# Patient Record
Sex: FEMALE | Race: WHITE | NOT HISPANIC OR LATINO | Employment: OTHER | URBAN - METROPOLITAN AREA
[De-identification: names, ages, dates, MRNs, and addresses within clinical notes are randomized per-mention and may not be internally consistent; named-entity substitution may affect disease eponyms.]

---

## 2023-01-26 PROBLEM — M05.10: Status: ACTIVE | Noted: 2023-01-26

## 2023-01-26 PROBLEM — G25.0 ESSENTIAL TREMOR: Status: ACTIVE | Noted: 2023-01-26

## 2023-01-26 PROBLEM — E78.00 HYPERCHOLESTEROLEMIA: Status: ACTIVE | Noted: 2023-01-26

## 2023-01-26 PROBLEM — G56.12 MEDIAN NEUROPATHY, LEFT: Status: ACTIVE | Noted: 2023-01-26

## 2023-01-26 PROBLEM — R20.0 LEFT FACIAL NUMBNESS: Status: ACTIVE | Noted: 2023-01-26

## 2023-01-26 PROBLEM — M79.18 CHRONIC MUSCULOSKELETAL PAIN: Status: ACTIVE | Noted: 2023-01-26

## 2023-01-26 PROBLEM — Z79.631 ON METHOTREXATE THERAPY: Status: ACTIVE | Noted: 2023-01-26

## 2023-01-26 PROBLEM — I10 HTN (HYPERTENSION), BENIGN: Status: ACTIVE | Noted: 2023-01-26

## 2023-01-26 PROBLEM — M06.371: Status: ACTIVE | Noted: 2023-01-26

## 2023-01-26 PROBLEM — E55.9 VITAMIN D DEFICIENCY: Status: ACTIVE | Noted: 2023-01-26

## 2023-01-26 PROBLEM — M81.0 OSTEOPOROSIS: Status: ACTIVE | Noted: 2023-01-26

## 2023-01-26 PROBLEM — I63.9 CEREBRAL INFARCTION (MULTI): Status: ACTIVE | Noted: 2023-01-26

## 2023-01-26 PROBLEM — R20.2 NUMBNESS AND TINGLING: Status: ACTIVE | Noted: 2023-01-26

## 2023-01-26 PROBLEM — J34.89 NASAL DISCHARGE: Status: ACTIVE | Noted: 2023-01-26

## 2023-01-26 PROBLEM — R19.5 POSITIVE FIT (FECAL IMMUNOCHEMICAL TEST): Status: ACTIVE | Noted: 2023-01-26

## 2023-01-26 PROBLEM — N64.4 BREAST PAIN: Status: ACTIVE | Noted: 2023-01-26

## 2023-01-26 PROBLEM — L60.0 ONYCHOCRYPTOSIS: Status: ACTIVE | Noted: 2023-01-26

## 2023-01-26 PROBLEM — G44.221 CHRONIC TENSION-TYPE HEADACHE, INTRACTABLE: Status: ACTIVE | Noted: 2023-01-26

## 2023-01-26 PROBLEM — R44.8 FACIAL PRESSURE: Status: ACTIVE | Noted: 2023-01-26

## 2023-01-26 PROBLEM — M54.12 CERVICAL RADICULOPATHY: Status: ACTIVE | Noted: 2023-01-26

## 2023-01-26 PROBLEM — G44.209 ACUTE NON INTRACTABLE TENSION-TYPE HEADACHE: Status: ACTIVE | Noted: 2023-01-26

## 2023-01-26 PROBLEM — R53.83 FATIGUE: Status: ACTIVE | Noted: 2023-01-26

## 2023-01-26 PROBLEM — I25.10 CORONARY DISORDER: Status: ACTIVE | Noted: 2023-01-26

## 2023-01-26 PROBLEM — M79.671 FOOT PAIN, BILATERAL: Status: ACTIVE | Noted: 2023-01-26

## 2023-01-26 PROBLEM — K21.9 GASTROESOPHAGEAL REFLUX DISEASE: Status: ACTIVE | Noted: 2023-01-26

## 2023-01-26 PROBLEM — G50.1 ATYPICAL FACE PAIN: Status: ACTIVE | Noted: 2023-01-26

## 2023-01-26 PROBLEM — M17.11 ARTHRITIS OF KNEE, RIGHT: Status: ACTIVE | Noted: 2023-01-26

## 2023-01-26 PROBLEM — M75.80 ROTATOR CUFF TENDONITIS: Status: ACTIVE | Noted: 2023-01-26

## 2023-01-26 PROBLEM — G89.29 CHRONIC MUSCULOSKELETAL PAIN: Status: ACTIVE | Noted: 2023-01-26

## 2023-01-26 PROBLEM — M25.579 ACUTE ANKLE PAIN: Status: ACTIVE | Noted: 2023-01-26

## 2023-01-26 PROBLEM — J01.90 ACUTE SINUSITIS: Status: ACTIVE | Noted: 2023-01-26

## 2023-01-26 PROBLEM — J44.9 CHRONIC OBSTRUCTIVE PULMONARY DISEASE (MULTI): Status: ACTIVE | Noted: 2023-01-26

## 2023-01-26 PROBLEM — M96.1 POSTLAMINECTOMY SYNDROME, LUMBAR: Status: ACTIVE | Noted: 2023-01-26

## 2023-01-26 PROBLEM — M48.062 LUMBAR STENOSIS WITH NEUROGENIC CLAUDICATION: Status: ACTIVE | Noted: 2023-01-26

## 2023-01-26 PROBLEM — Z86.69 HISTORY OF MIGRAINE HEADACHES: Status: ACTIVE | Noted: 2023-01-26

## 2023-01-26 PROBLEM — I73.9 PAD (PERIPHERAL ARTERY DISEASE) (CMS-HCC): Status: ACTIVE | Noted: 2023-01-26

## 2023-01-26 PROBLEM — D86.9 SARCOIDOSIS: Status: ACTIVE | Noted: 2023-01-26

## 2023-01-26 PROBLEM — R26.89 ANTALGIC GAIT: Status: ACTIVE | Noted: 2023-01-26

## 2023-01-26 PROBLEM — J34.89 RHINORRHEA: Status: ACTIVE | Noted: 2023-01-26

## 2023-01-26 PROBLEM — R49.0 CHRONIC HOARSENESS: Status: ACTIVE | Noted: 2023-01-26

## 2023-01-26 PROBLEM — M05.9 RHEUMATOID ARTHRITIS WITH RHEUMATOID FACTOR (MULTI): Status: ACTIVE | Noted: 2023-01-26

## 2023-01-26 PROBLEM — R73.01 ELEVATED FASTING GLUCOSE: Status: ACTIVE | Noted: 2023-01-26

## 2023-01-26 PROBLEM — M25.519 PAIN, JOINT, SHOULDER: Status: ACTIVE | Noted: 2023-01-26

## 2023-01-26 PROBLEM — M47.816 FACET DEGENERATION OF LUMBAR REGION: Status: ACTIVE | Noted: 2023-01-26

## 2023-01-26 PROBLEM — L85.9 HYPERKERATOSIS: Status: ACTIVE | Noted: 2023-01-26

## 2023-01-26 PROBLEM — M47.816 SPONDYLOSIS OF LUMBAR REGION WITHOUT MYELOPATHY OR RADICULOPATHY: Status: ACTIVE | Noted: 2023-01-26

## 2023-01-26 PROBLEM — B35.1 ONYCHOMYCOSIS: Status: ACTIVE | Noted: 2023-01-26

## 2023-01-26 PROBLEM — S80.11XD CONTUSION, KNEE AND LOWER LEG, RIGHT, SUBSEQUENT ENCOUNTER: Status: ACTIVE | Noted: 2023-01-26

## 2023-01-26 PROBLEM — M25.561 RIGHT KNEE PAIN: Status: ACTIVE | Noted: 2023-01-26

## 2023-01-26 PROBLEM — M79.672 FOOT PAIN, BILATERAL: Status: ACTIVE | Noted: 2023-01-26

## 2023-01-26 PROBLEM — R63.4 WEIGHT LOSS, UNINTENTIONAL: Status: ACTIVE | Noted: 2023-01-26

## 2023-01-26 PROBLEM — I73.9 CLAUDICATION (CMS-HCC): Status: ACTIVE | Noted: 2023-01-26

## 2023-01-26 PROBLEM — M70.60 TROCHANTERIC BURSITIS: Status: ACTIVE | Noted: 2023-01-26

## 2023-01-26 PROBLEM — H92.02 OTALGIA, LEFT: Status: ACTIVE | Noted: 2023-01-26

## 2023-01-26 PROBLEM — M25.559 JOINT PAIN, HIP: Status: ACTIVE | Noted: 2023-01-26

## 2023-01-26 PROBLEM — M48.00 SPINAL STENOSIS: Status: ACTIVE | Noted: 2023-01-26

## 2023-01-26 PROBLEM — S80.01XD CONTUSION, KNEE AND LOWER LEG, RIGHT, SUBSEQUENT ENCOUNTER: Status: ACTIVE | Noted: 2023-01-26

## 2023-01-26 PROBLEM — D64.9 ANEMIA: Status: ACTIVE | Noted: 2023-01-26

## 2023-01-26 PROBLEM — G50.9 TRIGEMINAL NEUROPATHY: Status: ACTIVE | Noted: 2023-01-26

## 2023-01-26 PROBLEM — I71.40 AAA (ABDOMINAL AORTIC ANEURYSM) (CMS-HCC): Status: ACTIVE | Noted: 2023-01-26

## 2023-01-26 PROBLEM — G56.03 BILATERAL CARPAL TUNNEL SYNDROME: Status: ACTIVE | Noted: 2023-01-26

## 2023-01-26 PROBLEM — R20.0 NUMBNESS AND TINGLING: Status: ACTIVE | Noted: 2023-01-26

## 2023-01-26 PROBLEM — R91.8 PULMONARY NODULES/LESIONS, MULTIPLE: Status: ACTIVE | Noted: 2023-01-26

## 2023-01-26 PROBLEM — R25.1 TREMOR: Status: ACTIVE | Noted: 2023-01-26

## 2023-01-26 PROBLEM — J31.0 CHRONIC RHINITIS: Status: ACTIVE | Noted: 2023-01-26

## 2023-01-26 PROBLEM — R51.9 HEADACHE: Status: ACTIVE | Noted: 2023-01-26

## 2023-01-26 PROBLEM — G62.9 PERIPHERAL NEUROPATHY: Status: ACTIVE | Noted: 2023-01-26

## 2023-01-26 RX ORDER — NITROGLYCERIN 0.4 MG/1
TABLET SUBLINGUAL
COMMUNITY

## 2023-01-26 RX ORDER — OMEPRAZOLE 40 MG/1
1 CAPSULE, DELAYED RELEASE ORAL
COMMUNITY
End: 2023-09-13

## 2023-01-26 RX ORDER — FOLIC ACID 1 MG/1
1 TABLET ORAL DAILY
COMMUNITY
Start: 2014-10-29

## 2023-01-26 RX ORDER — PRIMIDONE 50 MG/1
2 TABLET ORAL
COMMUNITY
Start: 2014-10-29 | End: 2023-12-20 | Stop reason: SDUPTHER

## 2023-01-26 RX ORDER — ISOSORBIDE MONONITRATE 30 MG/1
1 TABLET, EXTENDED RELEASE ORAL DAILY
COMMUNITY

## 2023-01-26 RX ORDER — FLUTICASONE FUROATE, UMECLIDINIUM BROMIDE AND VILANTEROL TRIFENATATE 100; 62.5; 25 UG/1; UG/1; UG/1
1 POWDER RESPIRATORY (INHALATION) DAILY
COMMUNITY
End: 2024-01-31

## 2023-01-26 RX ORDER — GABAPENTIN 800 MG/1
800 TABLET ORAL
COMMUNITY
Start: 2014-10-29 | End: 2023-12-20 | Stop reason: SDUPTHER

## 2023-01-26 RX ORDER — NALOXONE HYDROCHLORIDE 4 MG/.1ML
SPRAY NASAL
COMMUNITY
Start: 2021-11-18 | End: 2024-01-31

## 2023-01-26 RX ORDER — METOPROLOL TARTRATE 50 MG/1
1 TABLET ORAL EVERY 12 HOURS
COMMUNITY

## 2023-01-26 RX ORDER — IPRATROPIUM BROMIDE AND ALBUTEROL SULFATE 2.5; .5 MG/3ML; MG/3ML
SOLUTION RESPIRATORY (INHALATION) EVERY 4 HOURS PRN
COMMUNITY
End: 2024-01-31

## 2023-01-26 RX ORDER — PRAVASTATIN SODIUM 20 MG/1
1 TABLET ORAL DAILY
COMMUNITY

## 2023-01-26 RX ORDER — ERGOCALCIFEROL 1.25 MG/1
1 CAPSULE ORAL
COMMUNITY
End: 2023-12-15

## 2023-01-26 RX ORDER — MONTELUKAST SODIUM 10 MG/1
1 TABLET ORAL DAILY
COMMUNITY
Start: 2016-08-03 | End: 2023-09-13 | Stop reason: SDUPTHER

## 2023-01-26 RX ORDER — METOCLOPRAMIDE 10 MG/1
1 TABLET ORAL NIGHTLY
COMMUNITY
Start: 2016-01-07 | End: 2024-01-31 | Stop reason: SDUPTHER

## 2023-01-26 RX ORDER — CLOPIDOGREL BISULFATE 75 MG/1
1 TABLET ORAL DAILY
COMMUNITY

## 2023-01-26 RX ORDER — HYDROCODONE BITARTRATE AND ACETAMINOPHEN 7.5; 325 MG/1; MG/1
1 TABLET ORAL EVERY 6 HOURS PRN
COMMUNITY
Start: 2020-11-05 | End: 2023-11-09 | Stop reason: SDUPTHER

## 2023-01-26 RX ORDER — BUDESONIDE 0.5 MG/2ML
INHALANT ORAL
COMMUNITY
End: 2024-02-28 | Stop reason: ALTCHOICE

## 2023-01-26 RX ORDER — ALBUTEROL SULFATE 5 MG/ML
SOLUTION RESPIRATORY (INHALATION)
COMMUNITY
Start: 2014-10-29

## 2023-01-26 RX ORDER — AZATHIOPRINE 50 MG/1
2 TABLET ORAL
COMMUNITY
End: 2023-03-15

## 2023-01-26 RX ORDER — AZELASTINE 1 MG/ML
2 SPRAY, METERED NASAL DAILY
COMMUNITY
Start: 2021-11-05 | End: 2023-03-15 | Stop reason: SDUPTHER

## 2023-01-26 RX ORDER — FLUTICASONE PROPIONATE 50 MCG
2 SPRAY, SUSPENSION (ML) NASAL DAILY
COMMUNITY
Start: 2015-10-09 | End: 2023-10-12 | Stop reason: SDUPTHER

## 2023-03-09 LAB
ALANINE AMINOTRANSFERASE (SGPT) (U/L) IN SER/PLAS: 5 U/L (ref 7–45)
ALBUMIN (G/DL) IN SER/PLAS: 3.9 G/DL (ref 3.4–5)
ALKALINE PHOSPHATASE (U/L) IN SER/PLAS: 44 U/L (ref 33–136)
ANION GAP IN SER/PLAS: 11 MMOL/L (ref 10–20)
ASPARTATE AMINOTRANSFERASE (SGOT) (U/L) IN SER/PLAS: 10 U/L (ref 9–39)
BILIRUBIN TOTAL (MG/DL) IN SER/PLAS: 0.3 MG/DL (ref 0–1.2)
CALCIDIOL (25 OH VITAMIN D3) (NG/ML) IN SER/PLAS: 99 NG/ML
CALCIUM (MG/DL) IN SER/PLAS: 9 MG/DL (ref 8.6–10.3)
CARBON DIOXIDE, TOTAL (MMOL/L) IN SER/PLAS: 27 MMOL/L (ref 21–32)
CHLORIDE (MMOL/L) IN SER/PLAS: 107 MMOL/L (ref 98–107)
CREATININE (MG/DL) IN SER/PLAS: 0.72 MG/DL (ref 0.5–1.05)
ESTIMATED AVERAGE GLUCOSE FOR HBA1C: 103 MG/DL
GFR FEMALE: 87 ML/MIN/1.73M2
GLUCOSE (MG/DL) IN SER/PLAS: 79 MG/DL (ref 74–99)
HEMOGLOBIN A1C/HEMOGLOBIN TOTAL IN BLOOD: 5.2 %
POTASSIUM (MMOL/L) IN SER/PLAS: 4.6 MMOL/L (ref 3.5–5.3)
PROTEIN TOTAL: 6.9 G/DL (ref 6.4–8.2)
SODIUM (MMOL/L) IN SER/PLAS: 140 MMOL/L (ref 136–145)
THYROTROPIN (MIU/L) IN SER/PLAS BY DETECTION LIMIT <= 0.05 MIU/L: 1.83 MIU/L (ref 0.44–3.98)
UREA NITROGEN (MG/DL) IN SER/PLAS: 8 MG/DL (ref 6–23)

## 2023-03-10 ENCOUNTER — APPOINTMENT (OUTPATIENT)
Dept: PRIMARY CARE | Facility: CLINIC | Age: 73
End: 2023-03-10
Payer: MEDICARE

## 2023-03-15 ENCOUNTER — OFFICE VISIT (OUTPATIENT)
Dept: PRIMARY CARE | Facility: CLINIC | Age: 73
End: 2023-03-15
Payer: MEDICARE

## 2023-03-15 VITALS
TEMPERATURE: 96.9 F | OXYGEN SATURATION: 99 % | HEIGHT: 63 IN | SYSTOLIC BLOOD PRESSURE: 116 MMHG | HEART RATE: 64 BPM | BODY MASS INDEX: 24.84 KG/M2 | WEIGHT: 140.2 LBS | DIASTOLIC BLOOD PRESSURE: 74 MMHG

## 2023-03-15 DIAGNOSIS — J42 CHRONIC BRONCHITIS, UNSPECIFIED CHRONIC BRONCHITIS TYPE (MULTI): ICD-10-CM

## 2023-03-15 DIAGNOSIS — J30.89 NON-SEASONAL ALLERGIC RHINITIS, UNSPECIFIED TRIGGER: ICD-10-CM

## 2023-03-15 DIAGNOSIS — J01.90 ACUTE SINUSITIS, RECURRENCE NOT SPECIFIED, UNSPECIFIED LOCATION: Primary | ICD-10-CM

## 2023-03-15 LAB
6-ACETYLMORPHINE: <25 NG/ML
7-AMINOCLONAZEPAM: <25 NG/ML
ALPHA-HYDROXYALPRAZOLAM: <25 NG/ML
ALPHA-HYDROXYMIDAZOLAM: <25 NG/ML
ALPRAZOLAM: <25 NG/ML
AMPHETAMINE (PRESENCE) IN URINE BY SCREEN METHOD: ABNORMAL
BARBITURATES PRESENCE IN URINE BY SCREEN METHOD: ABNORMAL
CANNABINOIDS IN URINE BY SCREEN METHOD: ABNORMAL
CHLORDIAZEPOXIDE: <25 NG/ML
CLONAZEPAM: <25 NG/ML
COCAINE (PRESENCE) IN URINE BY SCREEN METHOD: ABNORMAL
CODEINE: <50 NG/ML
CREATINE, URINE FOR DRUG: 118.9 MG/DL
DIAZEPAM: <25 NG/ML
DRUG SCREEN COMMENT URINE: ABNORMAL
EDDP: <25 NG/ML
FENTANYL CONFIRMATION, URINE: <2.5 NG/ML
HYDROCODONE: 2450 NG/ML
HYDROMORPHONE: 237 NG/ML
LORAZEPAM: <25 NG/ML
METHADONE CONFIRMATION,URINE: <25 NG/ML
MIDAZOLAM: <25 NG/ML
MORPHINE URINE: <50 NG/ML
NORDIAZEPAM: <25 NG/ML
NORFENTANYL: <2.5 NG/ML
NORHYDROCODONE: >1000 NG/ML
NOROXYCODONE: <25 NG/ML
O-DESMETHYLTRAMADOL: <50 NG/ML
OXAZEPAM: <25 NG/ML
OXYCODONE: <25 NG/ML
OXYMORPHONE: <25 NG/ML
PHENCYCLIDINE (PRESENCE) IN URINE BY SCREEN METHOD: ABNORMAL
TEMAZEPAM: <25 NG/ML
TRAMADOL: <50 NG/ML
ZOLPIDEM METABOLITE (ZCA): <25 NG/ML
ZOLPIDEM: <25 NG/ML

## 2023-03-15 PROCEDURE — 1160F RVW MEDS BY RX/DR IN RCRD: CPT | Performed by: FAMILY MEDICINE

## 2023-03-15 PROCEDURE — 99213 OFFICE O/P EST LOW 20 MIN: CPT | Performed by: FAMILY MEDICINE

## 2023-03-15 PROCEDURE — 1159F MED LIST DOCD IN RCRD: CPT | Performed by: FAMILY MEDICINE

## 2023-03-15 PROCEDURE — 3078F DIAST BP <80 MM HG: CPT | Performed by: FAMILY MEDICINE

## 2023-03-15 PROCEDURE — 3074F SYST BP LT 130 MM HG: CPT | Performed by: FAMILY MEDICINE

## 2023-03-15 RX ORDER — CEPHALEXIN 500 MG/1
500 CAPSULE ORAL 2 TIMES DAILY
Qty: 20 CAPSULE | Refills: 0 | Status: SHIPPED | OUTPATIENT
Start: 2023-03-15 | End: 2023-03-25

## 2023-03-15 RX ORDER — METHOTREXATE 25 MG/ML
40 INJECTION, SOLUTION INTRA-ARTERIAL; INTRAMUSCULAR; INTRAVENOUS
COMMUNITY
Start: 2023-02-27

## 2023-03-15 RX ORDER — KETOROLAC TROMETHAMINE 5 MG/ML
1 SOLUTION OPHTHALMIC 2 TIMES DAILY
COMMUNITY
Start: 2022-05-04 | End: 2024-02-28 | Stop reason: ALTCHOICE

## 2023-03-15 RX ORDER — ADALIMUMAB 40MG/0.4ML
40 KIT SUBCUTANEOUS
COMMUNITY
Start: 2021-09-16

## 2023-03-15 RX ORDER — SULFAMETHOXAZOLE AND TRIMETHOPRIM 800; 160 MG/1; MG/1
1 TABLET ORAL 3 TIMES DAILY
COMMUNITY
Start: 2022-08-10 | End: 2023-03-15

## 2023-03-15 RX ORDER — IPRATROPIUM BROMIDE 42 UG/1
1 SPRAY, METERED NASAL 3 TIMES DAILY
COMMUNITY
Start: 2022-10-10 | End: 2023-03-15 | Stop reason: SDUPTHER

## 2023-03-15 RX ORDER — THEOPHYLLINE 300 MG/1
300 TABLET, EXTENDED RELEASE ORAL 3 TIMES DAILY
COMMUNITY
Start: 2023-02-13

## 2023-03-15 RX ORDER — PREDNISONE 5 MG/1
5 TABLET ORAL 3 TIMES DAILY
COMMUNITY
Start: 2022-11-06 | End: 2023-09-13 | Stop reason: ALTCHOICE

## 2023-03-15 RX ORDER — IPRATROPIUM BROMIDE 0.5 MG/2.5ML
0.5 SOLUTION RESPIRATORY (INHALATION) 2 TIMES DAILY
COMMUNITY
Start: 2023-01-23 | End: 2024-02-28 | Stop reason: SDUPTHER

## 2023-03-15 RX ORDER — SULFASALAZINE 500 MG/1
3 TABLET ORAL 3 TIMES DAILY
COMMUNITY
End: 2023-09-13

## 2023-03-15 RX ORDER — ALBUTEROL SULFATE 0.83 MG/ML
2.5 SOLUTION RESPIRATORY (INHALATION) EVERY 8 HOURS PRN
COMMUNITY
Start: 2023-01-23 | End: 2024-02-28 | Stop reason: SDUPTHER

## 2023-03-15 RX ORDER — ALBUTEROL SULFATE 90 UG/1
1 AEROSOL, METERED RESPIRATORY (INHALATION)
COMMUNITY
Start: 2023-02-27

## 2023-03-15 RX ORDER — AZELASTINE 1 MG/ML
2 SPRAY, METERED NASAL 2 TIMES DAILY
Qty: 72 ML | Refills: 3 | Status: SHIPPED | OUTPATIENT
Start: 2023-03-15 | End: 2024-05-31

## 2023-03-15 RX ORDER — HYDROXYCHLOROQUINE SULFATE 200 MG/1
200 TABLET, FILM COATED ORAL 2 TIMES DAILY
COMMUNITY
Start: 2023-01-23

## 2023-03-15 RX ORDER — IPRATROPIUM BROMIDE 42 UG/1
2 SPRAY, METERED NASAL 3 TIMES DAILY
Qty: 108 ML | Refills: 3 | Status: SHIPPED | OUTPATIENT
Start: 2023-03-15 | End: 2024-05-31 | Stop reason: WASHOUT

## 2023-03-15 ASSESSMENT — PAIN SCALES - GENERAL: PAINLEVEL: 8

## 2023-03-15 NOTE — PROGRESS NOTES
"Subjective   Patient ID: Radha Deluna is a 73 y.o. female who presents for Sinus Problem (Runny nose, sneezing for 2 months. ).    HPI patient complains of continual sinus and chest congestion for the last 2 months.  She has a history of year-round allergies.  She states the nasal sprays are helping and she would like a refill.  Denies fever or chills.    Review of Systems    Objective   /74 (BP Location: Left arm, Patient Position: Sitting, BP Cuff Size: Adult)   Pulse 64   Temp 36.1 °C (96.9 °F) (Temporal)   Ht 1.6 m (5' 3\")   Wt 63.6 kg (140 lb 3.2 oz)   SpO2 99%   BMI 24.84 kg/m²     Physical Exam  Alert, pleasant and in no acute distress.  HEENT: Normocephalic, atraumatic.  Ears: Canals clear.  Tympanic membranes intact and pearly gray.  Nose: Nares reveal mildly inflamed turbinates.  Mouth: No mucosal lesions noted.  No pharyngeal erythema.  Neck: Supple.  No palpable lymphadenopathy.  Heart: Regular rate and rhythm without murmur  Lungs: Bilateral diffuse rhonchi    Assessment/Plan   Problem List Items Addressed This Visit          Respiratory    Chronic obstructive pulmonary disease (CMS/HCC)       Infectious/Inflammatory    Acute sinusitis - Primary    Relevant Medications    cephalexin (Keflex) 500 mg capsule     Other Visit Diagnoses       Non-seasonal allergic rhinitis, unspecified trigger        Relevant Medications    azelastine (Astelin) 137 mcg (0.1 %) nasal spray    ipratropium (Atrovent) 42 mcg (0.06 %) nasal spray    dexAMETHasone (Decadron) injection 4 mg (Completed)        Patient with a flareup of sinusitis and bronchitis on top of her COPD and allergies.  We will provide IM injection of cortisone.  In addition, 10 days of Keflex antibiotics.  Refill for her nose sprays provided.  She is to call in 7 to 10 days if not improved.       "

## 2023-05-04 LAB
ALANINE AMINOTRANSFERASE (SGPT) (U/L) IN SER/PLAS: 5 U/L (ref 7–45)
ALBUMIN (G/DL) IN SER/PLAS: 4 G/DL (ref 3.4–5)
ALKALINE PHOSPHATASE (U/L) IN SER/PLAS: 62 U/L (ref 33–136)
ANION GAP IN SER/PLAS: 10 MMOL/L (ref 10–20)
ASPARTATE AMINOTRANSFERASE (SGOT) (U/L) IN SER/PLAS: 11 U/L (ref 9–39)
BILIRUBIN TOTAL (MG/DL) IN SER/PLAS: 0.3 MG/DL (ref 0–1.2)
CALCIUM (MG/DL) IN SER/PLAS: 9.6 MG/DL (ref 8.6–10.3)
CARBON DIOXIDE, TOTAL (MMOL/L) IN SER/PLAS: 29 MMOL/L (ref 21–32)
CHLORIDE (MMOL/L) IN SER/PLAS: 104 MMOL/L (ref 98–107)
CREATININE (MG/DL) IN SER/PLAS: 0.61 MG/DL (ref 0.5–1.05)
GFR FEMALE: >90 ML/MIN/1.73M2
GLUCOSE (MG/DL) IN SER/PLAS: 113 MG/DL (ref 74–99)
POTASSIUM (MMOL/L) IN SER/PLAS: 4.1 MMOL/L (ref 3.5–5.3)
PROTEIN TOTAL: 7.3 G/DL (ref 6.4–8.2)
SODIUM (MMOL/L) IN SER/PLAS: 139 MMOL/L (ref 136–145)
UREA NITROGEN (MG/DL) IN SER/PLAS: 9 MG/DL (ref 6–23)

## 2023-05-08 LAB
ERYTHROCYTE DISTRIBUTION WIDTH (RATIO) BY AUTOMATED COUNT: 14.6 % (ref 11.5–14.5)
ERYTHROCYTE MEAN CORPUSCULAR HEMOGLOBIN CONCENTRATION (G/DL) BY AUTOMATED: 31.1 G/DL (ref 32–36)
ERYTHROCYTE MEAN CORPUSCULAR VOLUME (FL) BY AUTOMATED COUNT: 98 FL (ref 80–100)
ERYTHROCYTES (10*6/UL) IN BLOOD BY AUTOMATED COUNT: 4.16 X10E12/L (ref 4–5.2)
HEMATOCRIT (%) IN BLOOD BY AUTOMATED COUNT: 40.9 % (ref 36–46)
HEMOGLOBIN (G/DL) IN BLOOD: 12.7 G/DL (ref 12–16)
LEUKOCYTES (10*3/UL) IN BLOOD BY AUTOMATED COUNT: 8.1 X10E9/L (ref 4.4–11.3)
NRBC (PER 100 WBCS) BY AUTOMATED COUNT: 0 /100 WBC (ref 0–0)
PLATELETS (10*3/UL) IN BLOOD AUTOMATED COUNT: 181 X10E9/L (ref 150–450)

## 2023-06-13 ENCOUNTER — TELEPHONE (OUTPATIENT)
Dept: PRIMARY CARE | Facility: CLINIC | Age: 73
End: 2023-06-13
Payer: MEDICARE

## 2023-06-13 DIAGNOSIS — M05.9 RHEUMATOID ARTHRITIS WITH POSITIVE RHEUMATOID FACTOR, INVOLVING UNSPECIFIED SITE (MULTI): Primary | ICD-10-CM

## 2023-06-13 NOTE — TELEPHONE ENCOUNTER
Pt called and states that she needs a new letter for her handicap place card. Pt would like to know if you could write her one or does she have to come in first

## 2023-08-16 ENCOUNTER — APPOINTMENT (OUTPATIENT)
Dept: PRIMARY CARE | Facility: CLINIC | Age: 73
End: 2023-08-16
Payer: MEDICARE

## 2023-09-08 LAB
CALCIDIOL (25 OH VITAMIN D3) (NG/ML) IN SER/PLAS: 93 NG/ML
PARATHYRIN INTACT (PG/ML) IN SER/PLAS: 49.1 PG/ML (ref 18.5–88)
THYROTROPIN (MIU/L) IN SER/PLAS BY DETECTION LIMIT <= 0.05 MIU/L: 3.36 MIU/L (ref 0.44–3.98)

## 2023-09-13 ENCOUNTER — OFFICE VISIT (OUTPATIENT)
Dept: PRIMARY CARE | Facility: CLINIC | Age: 73
End: 2023-09-13
Payer: MEDICARE

## 2023-09-13 VITALS
WEIGHT: 142.2 LBS | BODY MASS INDEX: 25.2 KG/M2 | TEMPERATURE: 97.3 F | SYSTOLIC BLOOD PRESSURE: 120 MMHG | DIASTOLIC BLOOD PRESSURE: 72 MMHG | HEIGHT: 63 IN

## 2023-09-13 DIAGNOSIS — Z72.0 NICOTINE ABUSE: ICD-10-CM

## 2023-09-13 DIAGNOSIS — F41.9 ANXIETY: ICD-10-CM

## 2023-09-13 DIAGNOSIS — K29.50 CHRONIC GASTRITIS WITHOUT BLEEDING, UNSPECIFIED GASTRITIS TYPE: ICD-10-CM

## 2023-09-13 DIAGNOSIS — Z00.00 HEALTH CARE MAINTENANCE: Primary | ICD-10-CM

## 2023-09-13 DIAGNOSIS — J44.9 CHRONIC OBSTRUCTIVE PULMONARY DISEASE, UNSPECIFIED COPD TYPE (MULTI): ICD-10-CM

## 2023-09-13 DIAGNOSIS — M75.81 RIGHT ROTATOR CUFF TENDONITIS: ICD-10-CM

## 2023-09-13 PROCEDURE — 99214 OFFICE O/P EST MOD 30 MIN: CPT | Performed by: FAMILY MEDICINE

## 2023-09-13 PROCEDURE — 1170F FXNL STATUS ASSESSED: CPT | Performed by: FAMILY MEDICINE

## 2023-09-13 PROCEDURE — 90662 IIV NO PRSV INCREASED AG IM: CPT | Performed by: FAMILY MEDICINE

## 2023-09-13 PROCEDURE — 3074F SYST BP LT 130 MM HG: CPT | Performed by: FAMILY MEDICINE

## 2023-09-13 PROCEDURE — 1160F RVW MEDS BY RX/DR IN RCRD: CPT | Performed by: FAMILY MEDICINE

## 2023-09-13 PROCEDURE — 1159F MED LIST DOCD IN RCRD: CPT | Performed by: FAMILY MEDICINE

## 2023-09-13 PROCEDURE — G0008 ADMIN INFLUENZA VIRUS VAC: HCPCS | Performed by: FAMILY MEDICINE

## 2023-09-13 PROCEDURE — 3078F DIAST BP <80 MM HG: CPT | Performed by: FAMILY MEDICINE

## 2023-09-13 PROCEDURE — 1125F AMNT PAIN NOTED PAIN PRSNT: CPT | Performed by: FAMILY MEDICINE

## 2023-09-13 PROCEDURE — G0439 PPPS, SUBSEQ VISIT: HCPCS | Performed by: FAMILY MEDICINE

## 2023-09-13 RX ORDER — MOXIFLOXACIN 5 MG/ML
SOLUTION/ DROPS OPHTHALMIC
COMMUNITY
Start: 2023-07-18 | End: 2023-09-13 | Stop reason: ALTCHOICE

## 2023-09-13 RX ORDER — METHYLPREDNISOLONE ACETATE 80 MG/ML
80 INJECTION, SUSPENSION INTRA-ARTICULAR; INTRALESIONAL; INTRAMUSCULAR; SOFT TISSUE ONCE
Status: COMPLETED | OUTPATIENT
Start: 2023-09-13 | End: 2023-09-13

## 2023-09-13 RX ORDER — PREDNISOLONE ACETATE 10 MG/ML
SUSPENSION/ DROPS OPHTHALMIC
COMMUNITY
Start: 2023-07-16 | End: 2024-02-28 | Stop reason: ALTCHOICE

## 2023-09-13 RX ORDER — MONTELUKAST SODIUM 10 MG/1
10 TABLET ORAL DAILY
Qty: 90 TABLET | Refills: 3 | Status: SHIPPED | OUTPATIENT
Start: 2023-09-13 | End: 2024-09-12

## 2023-09-13 RX ORDER — PANTOPRAZOLE SODIUM 40 MG/1
40 TABLET, DELAYED RELEASE ORAL DAILY
Qty: 30 TABLET | Refills: 5 | Status: SHIPPED | OUTPATIENT
Start: 2023-09-13 | End: 2024-05-31

## 2023-09-13 RX ORDER — BACITRACIN 500 [USP'U]/G
OINTMENT OPHTHALMIC
COMMUNITY
Start: 2023-08-21 | End: 2024-01-31

## 2023-09-13 RX ORDER — SERTRALINE HYDROCHLORIDE 25 MG/1
12.5-25 TABLET, FILM COATED ORAL DAILY
Qty: 30 TABLET | Refills: 5 | Status: SHIPPED | OUTPATIENT
Start: 2023-09-13 | End: 2024-05-31 | Stop reason: WASHOUT

## 2023-09-13 RX ADMIN — METHYLPREDNISOLONE ACETATE 80 MG: 80 INJECTION, SUSPENSION INTRA-ARTICULAR; INTRALESIONAL; INTRAMUSCULAR; SOFT TISSUE at 10:40

## 2023-09-13 ASSESSMENT — PAIN SCALES - GENERAL: PAINLEVEL: 6

## 2023-09-13 ASSESSMENT — ENCOUNTER SYMPTOMS
LOSS OF SENSATION IN FEET: 1
OCCASIONAL FEELINGS OF UNSTEADINESS: 1
DEPRESSION: 0

## 2023-09-13 ASSESSMENT — ACTIVITIES OF DAILY LIVING (ADL)
DOING_HOUSEWORK: INDEPENDENT
MANAGING_FINANCES: INDEPENDENT
GROCERY_SHOPPING: INDEPENDENT
DRESSING: INDEPENDENT
TAKING_MEDICATION: INDEPENDENT
BATHING: INDEPENDENT

## 2023-09-13 ASSESSMENT — PATIENT HEALTH QUESTIONNAIRE - PHQ9
1. LITTLE INTEREST OR PLEASURE IN DOING THINGS: NOT AT ALL
SUM OF ALL RESPONSES TO PHQ9 QUESTIONS 1 AND 2: 0
2. FEELING DOWN, DEPRESSED OR HOPELESS: NOT AT ALL

## 2023-09-13 NOTE — PROGRESS NOTES
"Subjective   Patient ID: Radha Deluna is a 73 y.o. female who presents for Medicare Annual Wellness Visit Subsequent (Medicare annual exam, pt is fasting. ) and Annual Exam (Annual physical exam. ).    HPI   Can't lift right shoulder. HX left shoulder rotator repair  Last eye appt 3 weeks ago (Cataracts removed 2-3 mos ago)  Review of Systems    Objective   /72 (BP Location: Left arm, Patient Position: Sitting, BP Cuff Size: Adult)   Temp 36.3 °C (97.3 °F) (Temporal)   Ht 1.6 m (5' 3\")   Wt 64.5 kg (142 lb 3.2 oz)   BMI 25.19 kg/m²     Physical Exam  Alert, pleasant and in no acute distress.  Neck: Supple, no JVD or carotid bruit  Heart: Regular rate and rhythm, no murmur  Lungs: Bilat rhonchi  Lower extremities: No edema  Feet: no lesions  Right shoulder: Moderate restriction of motion and positive impingement sign  Assessment/Plan   Problem List Items Addressed This Visit          Pulmonary and Pneumonias    Chronic obstructive pulmonary disease (CMS/[facility])    Relevant Medications    montelukast (Singulair) 10 mg tablet     Other Visit Diagnoses       Health care maintenance    -  Primary    Right rotator cuff tendonitis        Relevant Medications    dexAMETHasone (Decadron) injection 4 mg (Completed)    methylPREDNISolone acetate (DEPO-Medrol) injection 80 mg (Completed)    Anxiety        Relevant Medications    sertraline (Zoloft) 25 mg tablet    Nicotine abuse        Chronic gastritis without bleeding, unspecified gastritis type        Relevant Medications    pantoprazole (ProtoNix) 40 mg EC tablet          Patient here for her Medicare wellness and having struggles with her right shoulder.  We reviewed her past medical history.  Health maintenance: Flu vaccine provided.   Right shoulder: Rotator cuff tendinitis.  Discussed the importance of therapeutic exercises.  We will provide an intra-articular injection of cortisone to help provide relief.  X-ray and reevaluation if not improving over the " next month.  COPD: Montelukast refilled.  Lungs doing well.  Chronic gastritis: Continue pantoprazole.  No signs of bleeding.  Anxiety: Sertraline refilled.  Anxiety doing well.

## 2023-10-04 ENCOUNTER — APPOINTMENT (OUTPATIENT)
Dept: RADIOLOGY | Facility: HOSPITAL | Age: 73
End: 2023-10-04
Payer: MEDICARE

## 2023-10-04 ENCOUNTER — APPOINTMENT (OUTPATIENT)
Dept: CARDIOLOGY | Facility: HOSPITAL | Age: 73
End: 2023-10-04
Payer: MEDICARE

## 2023-10-06 ENCOUNTER — APPOINTMENT (OUTPATIENT)
Dept: RADIOLOGY | Facility: CLINIC | Age: 73
End: 2023-10-06
Payer: MEDICARE

## 2023-10-06 ENCOUNTER — LAB (OUTPATIENT)
Dept: LAB | Facility: LAB | Age: 73
End: 2023-10-06
Payer: MEDICARE

## 2023-10-06 ENCOUNTER — HOSPITAL ENCOUNTER (OUTPATIENT)
Dept: VASCULAR MEDICINE | Facility: HOSPITAL | Age: 73
Discharge: HOME | End: 2023-10-06
Payer: MEDICARE

## 2023-10-06 DIAGNOSIS — I48.91 UNSPECIFIED ATRIAL FIBRILLATION (MULTI): ICD-10-CM

## 2023-10-06 DIAGNOSIS — E83.51 HYPOCALCEMIA: Primary | ICD-10-CM

## 2023-10-06 DIAGNOSIS — I65.23 OCCLUSION AND STENOSIS OF BILATERAL CAROTID ARTERIES: ICD-10-CM

## 2023-10-06 DIAGNOSIS — I25.118 ATHEROSCLEROTIC HEART DISEASE OF NATIVE CORONARY ARTERY WITH OTHER FORMS OF ANGINA PECTORIS (CMS-HCC): ICD-10-CM

## 2023-10-06 LAB
ALBUMIN SERPL BCP-MCNC: 4.2 G/DL (ref 3.4–5)
ALP SERPL-CCNC: 54 U/L (ref 33–136)
ALT SERPL W P-5'-P-CCNC: 7 U/L (ref 7–45)
ANION GAP SERPL CALC-SCNC: 11 MMOL/L (ref 10–20)
AST SERPL W P-5'-P-CCNC: 15 U/L (ref 9–39)
BILIRUB SERPL-MCNC: 0.4 MG/DL (ref 0–1.2)
BUN SERPL-MCNC: 14 MG/DL (ref 6–23)
CALCIUM SERPL-MCNC: 9.9 MG/DL (ref 8.6–10.3)
CHLORIDE SERPL-SCNC: 103 MMOL/L (ref 98–107)
CO2 SERPL-SCNC: 29 MMOL/L (ref 21–32)
CREAT SERPL-MCNC: 0.69 MG/DL (ref 0.5–1.05)
GFR SERPL CREATININE-BSD FRML MDRD: >90 ML/MIN/1.73M*2
GLUCOSE SERPL-MCNC: 82 MG/DL (ref 74–99)
POTASSIUM SERPL-SCNC: 4.6 MMOL/L (ref 3.5–5.3)
PROT SERPL-MCNC: 7.3 G/DL (ref 6.4–8.2)
SODIUM SERPL-SCNC: 138 MMOL/L (ref 136–145)

## 2023-10-06 PROCEDURE — 93880 EXTRACRANIAL BILAT STUDY: CPT

## 2023-10-06 PROCEDURE — 36415 COLL VENOUS BLD VENIPUNCTURE: CPT

## 2023-10-06 PROCEDURE — 80053 COMPREHEN METABOLIC PANEL: CPT

## 2023-10-06 PROCEDURE — 93880 EXTRACRANIAL BILAT STUDY: CPT | Performed by: INTERNAL MEDICINE

## 2023-10-11 RX ORDER — FLUTICASONE PROPIONATE 50 MCG
2 SPRAY, SUSPENSION (ML) NASAL
Qty: 16 G | Status: CANCELLED | OUTPATIENT
Start: 2023-10-11

## 2023-10-11 NOTE — TELEPHONE ENCOUNTER
Pt was here in sept & needed refill on flon[phone]  Allergy to erythromycin & amoxicillin  Please advise  ty

## 2023-10-12 DIAGNOSIS — J30.89 NON-SEASONAL ALLERGIC RHINITIS, UNSPECIFIED TRIGGER: Primary | ICD-10-CM

## 2023-10-12 RX ORDER — FLUTICASONE PROPIONATE 50 MCG
2 SPRAY, SUSPENSION (ML) NASAL DAILY
Qty: 16 G | Refills: 11 | Status: SHIPPED | OUTPATIENT
Start: 2023-10-12 | End: 2024-10-11

## 2023-10-12 RX ORDER — FLUTICASONE PROPIONATE 50 MCG
2 SPRAY, SUSPENSION (ML) NASAL
Qty: 16 G | Status: CANCELLED | OUTPATIENT
Start: 2023-10-12

## 2023-10-13 ENCOUNTER — HOSPITAL ENCOUNTER (OUTPATIENT)
Dept: CARDIOLOGY | Facility: HOSPITAL | Age: 73
Discharge: HOME | End: 2023-10-13
Payer: MEDICARE

## 2023-10-13 ENCOUNTER — HOSPITAL ENCOUNTER (OUTPATIENT)
Dept: RADIOLOGY | Facility: HOSPITAL | Age: 73
Discharge: HOME | End: 2023-10-13
Payer: MEDICARE

## 2023-10-13 VITALS — WEIGHT: 140 LBS | BODY MASS INDEX: 24.8 KG/M2

## 2023-10-13 DIAGNOSIS — R07.9 CHEST PAIN, UNSPECIFIED: ICD-10-CM

## 2023-10-13 DIAGNOSIS — I25.10 ATHEROSCLEROTIC HEART DISEASE OF NATIVE CORONARY ARTERY WITHOUT ANGINA PECTORIS: ICD-10-CM

## 2023-10-13 DIAGNOSIS — I48.91 UNSPECIFIED ATRIAL FIBRILLATION (MULTI): ICD-10-CM

## 2023-10-13 DIAGNOSIS — I25.118 ATHEROSCLEROTIC HEART DISEASE OF NATIVE CORONARY ARTERY WITH OTHER FORMS OF ANGINA PECTORIS (CMS-HCC): ICD-10-CM

## 2023-10-13 PROCEDURE — 78451 HT MUSCLE IMAGE SPECT SING: CPT

## 2023-10-13 PROCEDURE — 2500000004 HC RX 250 GENERAL PHARMACY W/ HCPCS (ALT 636 FOR OP/ED): Performed by: INTERNAL MEDICINE

## 2023-10-13 PROCEDURE — 93017 CV STRESS TEST TRACING ONLY: CPT

## 2023-10-13 PROCEDURE — A9502 TC99M TETROFOSMIN: HCPCS | Performed by: INTERNAL MEDICINE

## 2023-10-13 PROCEDURE — 3430000001 HC RX 343 DIAGNOSTIC RADIOPHARMACEUTICALS: Performed by: INTERNAL MEDICINE

## 2023-10-13 RX ADMIN — DOBUTAMINE 40 MCG/KG/MIN: 12.5 INJECTION, SOLUTION, CONCENTRATE INTRAVENOUS at 10:41

## 2023-10-13 RX ADMIN — TETROFOSMIN 10.9 MILLICURIE: 0.23 INJECTION, POWDER, LYOPHILIZED, FOR SOLUTION INTRAVENOUS at 08:26

## 2023-10-13 RX ADMIN — TETROFOSMIN 35 MILLICURIE: 0.23 INJECTION, POWDER, LYOPHILIZED, FOR SOLUTION INTRAVENOUS at 12:28

## 2023-11-04 ENCOUNTER — APPOINTMENT (OUTPATIENT)
Dept: RADIOLOGY | Facility: CLINIC | Age: 73
End: 2023-11-04
Payer: MEDICARE

## 2023-11-09 ENCOUNTER — OFFICE VISIT (OUTPATIENT)
Dept: PAIN MEDICINE | Facility: CLINIC | Age: 73
End: 2023-11-09
Payer: MEDICARE

## 2023-11-09 VITALS
BODY MASS INDEX: 24.8 KG/M2 | HEART RATE: 56 BPM | DIASTOLIC BLOOD PRESSURE: 55 MMHG | SYSTOLIC BLOOD PRESSURE: 116 MMHG | TEMPERATURE: 96.4 F | RESPIRATION RATE: 18 BRPM | WEIGHT: 140 LBS | HEIGHT: 63 IN

## 2023-11-09 DIAGNOSIS — D86.9 SARCOIDOSIS: ICD-10-CM

## 2023-11-09 DIAGNOSIS — M25.551 PAIN OF BOTH HIP JOINTS: ICD-10-CM

## 2023-11-09 DIAGNOSIS — M25.552 PAIN OF BOTH HIP JOINTS: ICD-10-CM

## 2023-11-09 DIAGNOSIS — G89.29 CHRONIC MUSCULOSKELETAL PAIN: ICD-10-CM

## 2023-11-09 DIAGNOSIS — M79.18 CHRONIC MUSCULOSKELETAL PAIN: ICD-10-CM

## 2023-11-09 DIAGNOSIS — M06.371: ICD-10-CM

## 2023-11-09 DIAGNOSIS — M47.816 SPONDYLOSIS OF LUMBAR REGION WITHOUT MYELOPATHY OR RADICULOPATHY: Primary | ICD-10-CM

## 2023-11-09 PROCEDURE — 99213 OFFICE O/P EST LOW 20 MIN: CPT | Performed by: ANESTHESIOLOGY

## 2023-11-09 PROCEDURE — 1125F AMNT PAIN NOTED PAIN PRSNT: CPT | Performed by: ANESTHESIOLOGY

## 2023-11-09 PROCEDURE — 3078F DIAST BP <80 MM HG: CPT | Performed by: ANESTHESIOLOGY

## 2023-11-09 PROCEDURE — 1159F MED LIST DOCD IN RCRD: CPT | Performed by: ANESTHESIOLOGY

## 2023-11-09 PROCEDURE — 3074F SYST BP LT 130 MM HG: CPT | Performed by: ANESTHESIOLOGY

## 2023-11-09 PROCEDURE — 1160F RVW MEDS BY RX/DR IN RCRD: CPT | Performed by: ANESTHESIOLOGY

## 2023-11-09 RX ORDER — HYDROCODONE BITARTRATE AND ACETAMINOPHEN 7.5; 325 MG/1; MG/1
1 TABLET ORAL EVERY 6 HOURS PRN
Qty: 120 TABLET | Refills: 0 | Status: SHIPPED | OUTPATIENT
Start: 2023-11-09 | End: 2023-12-09

## 2023-11-09 SDOH — ECONOMIC STABILITY: FOOD INSECURITY: WITHIN THE PAST 12 MONTHS, THE FOOD YOU BOUGHT JUST DIDN'T LAST AND YOU DIDN'T HAVE MONEY TO GET MORE.: NEVER TRUE

## 2023-11-09 SDOH — ECONOMIC STABILITY: FOOD INSECURITY: WITHIN THE PAST 12 MONTHS, YOU WORRIED THAT YOUR FOOD WOULD RUN OUT BEFORE YOU GOT MONEY TO BUY MORE.: NEVER TRUE

## 2023-11-09 ASSESSMENT — COLUMBIA-SUICIDE SEVERITY RATING SCALE - C-SSRS
1. IN THE PAST MONTH, HAVE YOU WISHED YOU WERE DEAD OR WISHED YOU COULD GO TO SLEEP AND NOT WAKE UP?: NO
2. HAVE YOU ACTUALLY HAD ANY THOUGHTS OF KILLING YOURSELF?: NO
6. HAVE YOU EVER DONE ANYTHING, STARTED TO DO ANYTHING, OR PREPARED TO DO ANYTHING TO END YOUR LIFE?: NO

## 2023-11-09 ASSESSMENT — LIFESTYLE VARIABLES: HOW OFTEN DO YOU HAVE A DRINK CONTAINING ALCOHOL: NEVER

## 2023-11-09 ASSESSMENT — ENCOUNTER SYMPTOMS
JOINT SWELLING: 1
FATIGUE: 1
PAIN: 1
STIFFNESS: 1

## 2023-11-09 ASSESSMENT — PAIN SCALES - GENERAL: PAINLEVEL: 7

## 2023-11-09 NOTE — PROGRESS NOTES
Subjective   Patient ID: Radha Deluna is a 73 y.o. female who presents for Pain.  Pain  This is a chronic problem. The current episode started more than 1 year ago. The problem occurs constantly. The problem is unchanged. The pain is present in the right knee, left hip and left knee. The pain is medium. The symptoms are aggravated by any movement. Associated symptoms include fatigue, joint swelling and stiffness. Past treatments include OTC NSAID and acetaminophen. The treatment provided mild relief. The swelling is presently diffuse. She has been Fussy. Her past medical history is significant for chronic back pain and rheumatic disease.     73-year-old  female here today for med management.  She has severe symptoms with rheumatoid arthritis as well as sarcoidosis and has had a previous laminectomy.  She takes 2-1/2 tablets a day of hydrocodone 7.5 325 mg tablets which gives her excellent pain relief.  Review of Systems   Constitutional:  Positive for fatigue.   Musculoskeletal:  Positive for joint swelling and stiffness.       Objective   Physical Exam  Gen. appearance:  Patient's alert and oriented ×3 ;cooperative mild to moderate distress  Heart: Regular rate and rhythm  Lungs: Clear to auscultation  Abdomen: Soft nontender  Neuro: Cranial nerves II -XII intact; DTR: +2 over 4 biceps triceps brachial radialis patellar and Achilles  Spinal exam: Positive paraspinal tenderness noted bilaterally L4 5 L5-S1 with flexion extension and rotation/no bony abnormalities  Musculoskeletal:  Upper and lower extremity strength was 4/5 bilaterally  :  deferred  Skin: Warm and dry      Assessment/Plan   Diagnoses and all orders for this visit:  Spondylosis of lumbar region without myelopathy or radiculopathy  -     HYDROcodone-acetaminophen (Norco) 7.5-325 mg tablet; Take 1 tablet by mouth every 6 hours if needed for severe pain (7 - 10).  -     Follow Up In Pain Medicine; Future  Chronic musculoskeletal pain  -      HYDROcodone-acetaminophen (Norco) 7.5-325 mg tablet; Take 1 tablet by mouth every 6 hours if needed for severe pain (7 - 10).  -     Follow Up In Pain Medicine; Future  Sarcoidosis  -     HYDROcodone-acetaminophen (Norco) 7.5-325 mg tablet; Take 1 tablet by mouth every 6 hours if needed for severe pain (7 - 10).  -     Follow Up In Pain Medicine; Future  Rheumatoid nodule, right ankle and foot (CMS/HCC)  -     HYDROcodone-acetaminophen (Norco) 7.5-325 mg tablet; Take 1 tablet by mouth every 6 hours if needed for severe pain (7 - 10).  -     Follow Up In Pain Medicine; Future  Pain of both hip joints  -     HYDROcodone-acetaminophen (Norco) 7.5-325 mg tablet; Take 1 tablet by mouth every 6 hours if needed for severe pain (7 - 10).  -     Follow Up In Pain Medicine; Future on December 8, 2023

## 2023-11-09 NOTE — PROGRESS NOTES
Pain #7/10 to lower back down both legs which is chronic.  Pain is constant most of the time regardless of her activity.  Taking 2 1/2 Norco per day and has 6 tablets left.  This helps her pain by at least 50%.

## 2023-11-21 ENCOUNTER — APPOINTMENT (OUTPATIENT)
Dept: INFUSION THERAPY | Facility: CLINIC | Age: 73
End: 2023-11-21
Payer: MEDICARE

## 2023-11-30 DIAGNOSIS — M81.0 AGE-RELATED OSTEOPOROSIS WITHOUT CURRENT PATHOLOGICAL FRACTURE: Primary | ICD-10-CM

## 2023-11-30 RX ORDER — FAMOTIDINE 10 MG/ML
20 INJECTION INTRAVENOUS ONCE AS NEEDED
OUTPATIENT
Start: 2023-12-05

## 2023-11-30 RX ORDER — DIPHENHYDRAMINE HYDROCHLORIDE 50 MG/ML
50 INJECTION INTRAMUSCULAR; INTRAVENOUS AS NEEDED
OUTPATIENT
Start: 2023-12-05

## 2023-11-30 RX ORDER — EPINEPHRINE 0.3 MG/.3ML
0.3 INJECTION SUBCUTANEOUS EVERY 5 MIN PRN
OUTPATIENT
Start: 2023-12-05

## 2023-11-30 RX ORDER — ALBUTEROL SULFATE 0.83 MG/ML
3 SOLUTION RESPIRATORY (INHALATION) AS NEEDED
OUTPATIENT
Start: 2023-12-05

## 2023-12-05 ENCOUNTER — APPOINTMENT (OUTPATIENT)
Dept: INFUSION THERAPY | Facility: CLINIC | Age: 73
End: 2023-12-05
Payer: MEDICARE

## 2023-12-14 ENCOUNTER — APPOINTMENT (OUTPATIENT)
Dept: NEUROLOGY | Facility: CLINIC | Age: 73
End: 2023-12-14
Payer: MEDICARE

## 2023-12-15 DIAGNOSIS — E55.9 VITAMIN D DEFICIENCY, UNSPECIFIED: ICD-10-CM

## 2023-12-15 PROBLEM — Z79.620 ADALIMUMAB (HUMIRA) LONG-TERM USE: Status: ACTIVE | Noted: 2023-07-26

## 2023-12-15 PROBLEM — G89.29 CHRONIC PAIN: Status: ACTIVE | Noted: 2023-12-15

## 2023-12-15 PROBLEM — D86.9 SARCOID: Status: ACTIVE | Noted: 2023-12-15

## 2023-12-15 PROBLEM — M17.0 BILATERAL PRIMARY OSTEOARTHRITIS OF KNEE: Status: ACTIVE | Noted: 2023-07-26

## 2023-12-15 PROBLEM — M81.0 AGE-RELATED OSTEOPOROSIS WITHOUT CURRENT PATHOLOGICAL FRACTURE: Status: ACTIVE | Noted: 2019-02-27

## 2023-12-15 PROBLEM — Z79.631 METHOTREXATE, LONG TERM, CURRENT USE: Status: ACTIVE | Noted: 2023-07-26

## 2023-12-15 RX ORDER — LIFITEGRAST 50 MG/ML
1 SOLUTION/ DROPS OPHTHALMIC 2 TIMES DAILY
COMMUNITY
Start: 2023-11-10 | End: 2024-05-31

## 2023-12-15 RX ORDER — ERGOCALCIFEROL 1.25 MG/1
1 CAPSULE ORAL
Qty: 13 CAPSULE | Refills: 3 | Status: SHIPPED | OUTPATIENT
Start: 2023-12-15

## 2023-12-15 RX ORDER — NYSTATIN 100000 [USP'U]/ML
SUSPENSION ORAL
COMMUNITY
Start: 2023-10-02 | End: 2024-01-31

## 2023-12-15 RX ORDER — LOTEPREDNOL ETABONATE 5 MG/ML
1 SUSPENSION/ DROPS OPHTHALMIC 2 TIMES DAILY
COMMUNITY
Start: 2023-11-10 | End: 2024-05-31

## 2023-12-15 RX ORDER — TIZANIDINE HYDROCHLORIDE 2 MG/1
CAPSULE, GELATIN COATED ORAL
COMMUNITY
End: 2024-02-28 | Stop reason: ALTCHOICE

## 2023-12-18 ENCOUNTER — APPOINTMENT (OUTPATIENT)
Dept: NEUROLOGY | Facility: CLINIC | Age: 73
End: 2023-12-18
Payer: MEDICARE

## 2023-12-20 ENCOUNTER — LAB (OUTPATIENT)
Dept: LAB | Facility: LAB | Age: 73
End: 2023-12-20
Payer: MEDICARE

## 2023-12-20 ENCOUNTER — OFFICE VISIT (OUTPATIENT)
Dept: NEUROLOGY | Facility: CLINIC | Age: 73
End: 2023-12-20
Payer: MEDICARE

## 2023-12-20 ENCOUNTER — TELEPHONE (OUTPATIENT)
Dept: NEUROLOGY | Facility: CLINIC | Age: 73
End: 2023-12-20

## 2023-12-20 ENCOUNTER — OFFICE VISIT (OUTPATIENT)
Dept: PAIN MEDICINE | Facility: CLINIC | Age: 73
End: 2023-12-20
Payer: MEDICARE

## 2023-12-20 VITALS
WEIGHT: 146 LBS | TEMPERATURE: 97.5 F | BODY MASS INDEX: 25.87 KG/M2 | DIASTOLIC BLOOD PRESSURE: 76 MMHG | SYSTOLIC BLOOD PRESSURE: 124 MMHG | HEIGHT: 63 IN | HEART RATE: 60 BPM | RESPIRATION RATE: 16 BRPM

## 2023-12-20 VITALS
DIASTOLIC BLOOD PRESSURE: 76 MMHG | RESPIRATION RATE: 18 BRPM | HEIGHT: 63 IN | SYSTOLIC BLOOD PRESSURE: 124 MMHG | HEART RATE: 60 BPM | BODY MASS INDEX: 25.87 KG/M2 | TEMPERATURE: 97.2 F | WEIGHT: 146 LBS

## 2023-12-20 DIAGNOSIS — E83.51 HYPOCALCEMIA: Primary | ICD-10-CM

## 2023-12-20 DIAGNOSIS — M05.79 RHEUMATOID ARTHRITIS INVOLVING MULTIPLE SITES WITH POSITIVE RHEUMATOID FACTOR (MULTI): ICD-10-CM

## 2023-12-20 DIAGNOSIS — G25.0 ESSENTIAL TREMOR: ICD-10-CM

## 2023-12-20 DIAGNOSIS — D86.9 SARCOIDOSIS: ICD-10-CM

## 2023-12-20 DIAGNOSIS — M54.12 CERVICAL RADICULOPATHY: ICD-10-CM

## 2023-12-20 DIAGNOSIS — E11.42 DIABETIC POLYNEUROPATHY ASSOCIATED WITH TYPE 2 DIABETES MELLITUS (MULTI): ICD-10-CM

## 2023-12-20 DIAGNOSIS — M96.1 POSTLAMINECTOMY SYNDROME, LUMBAR: ICD-10-CM

## 2023-12-20 DIAGNOSIS — G50.9 TRIGEMINAL NEUROPATHY: ICD-10-CM

## 2023-12-20 DIAGNOSIS — R20.0 RIGHT UPPER EXTREMITY NUMBNESS: ICD-10-CM

## 2023-12-20 DIAGNOSIS — G60.3 IDIOPATHIC PROGRESSIVE NEUROPATHY: ICD-10-CM

## 2023-12-20 DIAGNOSIS — I63.9 CEREBRAL INFARCTION, UNSPECIFIED MECHANISM (MULTI): ICD-10-CM

## 2023-12-20 DIAGNOSIS — M05.771: ICD-10-CM

## 2023-12-20 DIAGNOSIS — M47.816 FACET DEGENERATION OF LUMBAR REGION: Primary | ICD-10-CM

## 2023-12-20 DIAGNOSIS — R20.0 LEFT UPPER EXTREMITY NUMBNESS: ICD-10-CM

## 2023-12-20 DIAGNOSIS — Z72.0 TOBACCO ABUSE: ICD-10-CM

## 2023-12-20 DIAGNOSIS — R20.0 LEFT FACIAL NUMBNESS: ICD-10-CM

## 2023-12-20 DIAGNOSIS — I73.9 CLAUDICATION (CMS-HCC): ICD-10-CM

## 2023-12-20 DIAGNOSIS — I73.9 PAD (PERIPHERAL ARTERY DISEASE) (CMS-HCC): ICD-10-CM

## 2023-12-20 DIAGNOSIS — G62.9 POLYNEUROPATHY: Primary | ICD-10-CM

## 2023-12-20 LAB
ALBUMIN SERPL BCP-MCNC: 4.2 G/DL (ref 3.4–5)
ALP SERPL-CCNC: 55 U/L (ref 33–136)
ALT SERPL W P-5'-P-CCNC: 9 U/L (ref 7–45)
ANION GAP SERPL CALC-SCNC: 11 MMOL/L (ref 10–20)
AST SERPL W P-5'-P-CCNC: 14 U/L (ref 9–39)
BILIRUB SERPL-MCNC: 0.3 MG/DL (ref 0–1.2)
BUN SERPL-MCNC: 10 MG/DL (ref 6–23)
CALCIUM SERPL-MCNC: 9.6 MG/DL (ref 8.6–10.3)
CHLORIDE SERPL-SCNC: 107 MMOL/L (ref 98–107)
CO2 SERPL-SCNC: 27 MMOL/L (ref 21–32)
CREAT SERPL-MCNC: 0.64 MG/DL (ref 0.5–1.05)
GFR SERPL CREATININE-BSD FRML MDRD: >90 ML/MIN/1.73M*2
GLUCOSE SERPL-MCNC: 87 MG/DL (ref 74–99)
POTASSIUM SERPL-SCNC: 4.3 MMOL/L (ref 3.5–5.3)
PROT SERPL-MCNC: 7 G/DL (ref 6.4–8.2)
SODIUM SERPL-SCNC: 141 MMOL/L (ref 136–145)

## 2023-12-20 PROCEDURE — 3074F SYST BP LT 130 MM HG: CPT | Performed by: PSYCHIATRY & NEUROLOGY

## 2023-12-20 PROCEDURE — 1159F MED LIST DOCD IN RCRD: CPT | Performed by: PSYCHIATRY & NEUROLOGY

## 2023-12-20 PROCEDURE — 3078F DIAST BP <80 MM HG: CPT | Performed by: ANESTHESIOLOGY

## 2023-12-20 PROCEDURE — 1159F MED LIST DOCD IN RCRD: CPT | Performed by: ANESTHESIOLOGY

## 2023-12-20 PROCEDURE — 80053 COMPREHEN METABOLIC PANEL: CPT

## 2023-12-20 PROCEDURE — 1160F RVW MEDS BY RX/DR IN RCRD: CPT | Performed by: PSYCHIATRY & NEUROLOGY

## 2023-12-20 PROCEDURE — 1125F AMNT PAIN NOTED PAIN PRSNT: CPT | Performed by: ANESTHESIOLOGY

## 2023-12-20 PROCEDURE — 99215 OFFICE O/P EST HI 40 MIN: CPT | Performed by: PSYCHIATRY & NEUROLOGY

## 2023-12-20 PROCEDURE — 99213 OFFICE O/P EST LOW 20 MIN: CPT | Performed by: ANESTHESIOLOGY

## 2023-12-20 PROCEDURE — 1160F RVW MEDS BY RX/DR IN RCRD: CPT | Performed by: ANESTHESIOLOGY

## 2023-12-20 PROCEDURE — 3044F HG A1C LEVEL LT 7.0%: CPT | Performed by: PSYCHIATRY & NEUROLOGY

## 2023-12-20 PROCEDURE — 4004F PT TOBACCO SCREEN RCVD TLK: CPT | Performed by: ANESTHESIOLOGY

## 2023-12-20 PROCEDURE — 36415 COLL VENOUS BLD VENIPUNCTURE: CPT

## 2023-12-20 PROCEDURE — 1125F AMNT PAIN NOTED PAIN PRSNT: CPT | Performed by: PSYCHIATRY & NEUROLOGY

## 2023-12-20 PROCEDURE — 3074F SYST BP LT 130 MM HG: CPT | Performed by: ANESTHESIOLOGY

## 2023-12-20 PROCEDURE — 2028F FOOT EXAM PERFORMED: CPT | Performed by: PSYCHIATRY & NEUROLOGY

## 2023-12-20 PROCEDURE — 4004F PT TOBACCO SCREEN RCVD TLK: CPT | Performed by: PSYCHIATRY & NEUROLOGY

## 2023-12-20 PROCEDURE — 3078F DIAST BP <80 MM HG: CPT | Performed by: PSYCHIATRY & NEUROLOGY

## 2023-12-20 RX ORDER — PRIMIDONE 50 MG/1
150 TABLET ORAL DAILY
Qty: 270 TABLET | Refills: 3 | Status: SHIPPED | OUTPATIENT
Start: 2023-12-20 | End: 2024-05-31

## 2023-12-20 RX ORDER — PRIMIDONE 50 MG/1
150 TABLET ORAL DAILY
Qty: 270 TABLET | Refills: 3 | Status: SHIPPED | OUTPATIENT
Start: 2023-12-20 | End: 2023-12-20 | Stop reason: SDUPTHER

## 2023-12-20 RX ORDER — HYDROCODONE BITARTRATE AND ACETAMINOPHEN 7.5; 325 MG/1; MG/1
1 TABLET ORAL EVERY 6 HOURS PRN
Qty: 120 TABLET | Refills: 0 | Status: SHIPPED | OUTPATIENT
Start: 2023-12-20 | End: 2024-01-19

## 2023-12-20 RX ORDER — DENOSUMAB 60 MG/ML
60 INJECTION SUBCUTANEOUS
COMMUNITY

## 2023-12-20 RX ORDER — GABAPENTIN 800 MG/1
800 TABLET ORAL 4 TIMES DAILY
Qty: 120 TABLET | Refills: 5 | Status: SHIPPED | OUTPATIENT
Start: 2023-12-20 | End: 2024-01-31

## 2023-12-20 ASSESSMENT — ENCOUNTER SYMPTOMS
ARTHRALGIAS: 1
JOINT SWELLING: 1
MYALGIAS: 1
NUMBNESS: 1
BACK PAIN: 1
PAIN: 1

## 2023-12-20 ASSESSMENT — COLUMBIA-SUICIDE SEVERITY RATING SCALE - C-SSRS
2. HAVE YOU ACTUALLY HAD ANY THOUGHTS OF KILLING YOURSELF?: NO
6. HAVE YOU EVER DONE ANYTHING, STARTED TO DO ANYTHING, OR PREPARED TO DO ANYTHING TO END YOUR LIFE?: NO
1. IN THE PAST MONTH, HAVE YOU WISHED YOU WERE DEAD OR WISHED YOU COULD GO TO SLEEP AND NOT WAKE UP?: NO

## 2023-12-20 ASSESSMENT — PAIN SCALES - GENERAL: PAINLEVEL: 8

## 2023-12-20 NOTE — PROGRESS NOTES
Subjective   Patient ID: Radha Deluna is a 73 y.o. female who presents for rheumatoid arthritis and chronic Pain.  Pain  Associated symptoms include joint swelling.     Patient has a history of chronic rheumatoid arthritis and sarcoidosis which is extremely painful.  She takes hydrocodone 7.5 mg tablets 3 times a day to moderate her pain and up to 4.  Pill count today is 19 tablets.  Review of Systems   Musculoskeletal:  Positive for arthralgias, back pain, gait problem, joint swelling and myalgias.   All other systems reviewed and are negative.      Objective   Physical Exam  Gen. appearance:  Patient's alert and oriented ×3 ;cooperative mild to moderate distress  Heart: Regular rate and rhythm  Lungs: Clear to auscultation  Abdomen: Soft nontender  Neuro: Cranial nerves II -XII intact; DTR: +2 over 4 biceps triceps brachial radialis patellar and Achilles  Spinal exam: Positive paraspinal tenderness noted bilaterally L4 5 L5-S1 with flexion extension and rotation/no bony abnormalities  Musculoskeletal:  Upper and lower extremity strength was 4/5 bilaterally  :  deferred  Skin: Warm and dry      Assessment/Plan   Diagnoses and all orders for this visit:  Facet degeneration of lumbar region  -     HYDROcodone-acetaminophen (Norco) 7.5-325 mg tablet; Take 1 tablet by mouth every 6 hours if needed for severe pain (7 - 10).  -     Follow Up In Pain Medicine; Future  Claudication (CMS/HCC)  Cervical radiculopathy  Idiopathic progressive neuropathy  Rheumatoid arthritis involving multiple sites with positive rheumatoid factor (CMS/HCC)  -     HYDROcodone-acetaminophen (Norco) 7.5-325 mg tablet; Take 1 tablet by mouth every 6 hours if needed for severe pain (7 - 10).  -     Follow Up In Pain Medicine; Future  Rheumatoid arthritis involving right foot with positive rheumatoid factor (CMS/HCC)  -     HYDROcodone-acetaminophen (Norco) 7.5-325 mg tablet; Take 1 tablet by mouth every 6 hours if needed for severe pain (7  - 10).  -     Follow Up In Pain Medicine; Future  Patient has a follow-up visit scheduled in 8 weeks on February 14, 2024.  She was told to keep her medication in secure location at all times and take it only as directed.       Brett Rider,  12/20/23 3:04 PM

## 2023-12-20 NOTE — PROGRESS NOTES
Subjective     Radha Deluna 73 y.o.  HPI  The patient states that she is essentially stable from a neurological standpoint.  She still having numbness and tingling in her hands and feet.  She is compliant with her gabapentin at 800 mg 4 times a day.  The patient does not feel that the neuropathy cream helped her.  The patient states that she did have 1 fall but did not injure herself and she occasionally uses a cane to ambulate.  The patient states that she is still occasionally having some facial numbness.  The patient did have an MRA of the neck that showed severe stenosis of the right external carotid artery and occlusion of the left external carotid artery.  The internal carotid artery showed no significant stenosis bilaterally.  I did review the images of the MRA of the neck with the patient.  The patient recently had a carotid ultrasound that showed no significant stenosis of the carotid arteries bilaterally.  The patient has been compliant with her Plavix and pravastatin.  The patient feels that her tremor is stable and she is compliant with her primidone.  The patient is left-handed and feels that her left upper extremity tremor is slightly worse on the right.  The patient feels that the numbness in her right upper extremity is very severe.  The patient is still smoking about 4 to 5 cigarettes a day.  The patient states that she did wear her cardiac monitor but I do not have a copy that result.      Review of Systems   Neurological:  Positive for numbness.        Pain in extremities   All other systems reviewed and are negative.      Patient Active Problem List   Diagnosis    AAA (abdominal aortic aneurysm) (CMS/[facility])    Acute ankle pain    Acute non intractable tension-type headache    Acute sinusitis    Chronic rhinitis    Anemia    Antalgic gait    Arthritis of knee, right    Atypical face pain    Headache    Left facial numbness    Numbness and tingling    Bilateral carpal tunnel syndrome    Breast  pain    Cerebral infarction (CMS/HCC)    Cervical radiculopathy    Chronic hoarseness    Chronic musculoskeletal pain    Claudication (CMS/HCC)    Chronic obstructive pulmonary disease (CMS/HCC)    Chronic tension-type headache, intractable    Contusion, knee and lower leg, right, subsequent encounter    Coronary disorder    Elevated fasting glucose    Essential tremor    Facet degeneration of lumbar region    Facial pressure    Fatigue    Foot pain, bilateral    Gastroesophageal reflux disease    History of migraine headaches    HTN (hypertension), benign    Hypercholesterolemia    Hyperkeratosis    Median neuropathy, left    Nasal discharge    Rhinorrhea    On methotrexate therapy    Onychocryptosis    Onychomycosis    Osteoporosis    Otalgia, left    PAD (peripheral artery disease) (CMS/HCC)    Joint pain, hip    Pain, joint, shoulder    Positive FIT (fecal immunochemical test)    Postlaminectomy syndrome, lumbar    Pulmonary nodules/lesions, multiple    Rheumatoid arthritis with rheumatoid factor (CMS/HCC)    Rheumatoid lung disease (CMS/HCC)    Right knee pain    Rotator cuff tendonitis    Sarcoidosis    Lumbar stenosis with neurogenic claudication    Spinal stenosis    Spondylosis of lumbar region without myelopathy or radiculopathy    Rheumatoid nodule, right ankle and foot (CMS/HCC)    Rheumatoid nodule, right ankle and foot (CMS/HCC)    Tremor    Peripheral neuropathy    Trigeminal neuropathy    Trochanteric bursitis    Vitamin D deficiency    Weight loss, unintentional    Adalimumab (Humira) long-term use    Age-related osteoporosis without current pathological fracture    Asthma without status asthmaticus    Bilateral primary osteoarthritis of knee    Palpitations    Angina pectoris (CMS/HCC)    Chronic pain    Chronic obstructive lung disease (CMS/HCC)    Pulmonary emphysema (CMS/HCC)    Methotrexate, long term, current use    Rheumatoid arthritis involving multiple sites with positive rheumatoid factor  (CMS/[facility])    Rheumatoid arthritis (CMS/[facility])    Sarcoid        Past Medical History:   Diagnosis Date    Chronic obstructive pulmonary disease, unspecified (CMS/HCC) 11/09/2022    Chronic obstructive pulmonary disease, unspecified COPD type    Migraine, unspecified, not intractable, without status migrainosus     Migraines    Other chronic pain 10/21/2015    Chronic pain    Personal history of other diseases of the circulatory system     History of cardiac disorder    Personal history of other diseases of the nervous system and sense organs     History of otitis media    Personal history of other diseases of the respiratory system     History of asthma    Personal history of other diseases of the respiratory system     History of sinusitis    Personal history of other endocrine, nutritional and metabolic disease     History of thyroid disorder    Personal history of other malignant neoplasm of skin     History of malignant neoplasm of skin    Sarcoidosis, unspecified 10/05/2022    Sarcoidosis        Past Surgical History:   Procedure Laterality Date    ANKLE SURGERY  10/21/2015    Ankle Surgery    APPENDECTOMY  10/21/2015    Appendectomy    BREAST SURGERY  10/21/2015    Breast Surgery    CARPAL TUNNEL RELEASE  10/26/2016    Neuroplasty Median Nerve At Carpal Tunnel    CATARACT EXTRACTION Bilateral     CHOLECYSTECTOMY  10/21/2015    Cholecystectomy    CORONARY ANGIOPLASTY WITH STENT PLACEMENT  01/14/2021    Cath Placement Of Stent 1    CT GUIDED PERCUTANEOUS BIOPSY LUNG  08/12/2020    CT GUIDED PERCUTANEOUS BIOPSY LUNG 8/12/2020 [facility]    CT GUIDED PERCUTANEOUS BIOPSY LUNG  12/01/2020    CT GUIDED PERCUTANEOUS BIOPSY LUNG 12/1/2020 [facility]    LUNG SURGERY  10/21/2015    Lung Surgery    LYMPH NODE BIOPSY  09/29/2017    Biopsy Lymph Node    MR HEAD ANGIO WO IV CONTRAST  04/04/2022    MR HEAD ANGIO WO IV CONTRAST 4/4/2022 [facility]    MR NECK ANGIO WO IV CONTRAST  08/15/2022    MR NECK ANGIO WO  IV CONTRAST 8/15/2022 [facility]    OTHER SURGICAL HISTORY  10/21/2015    Wrist Surgery    OTHER SURGICAL HISTORY  02/07/2022    Endoscopy    OTHER SURGICAL HISTORY  02/07/2022    Colonoscopy    OTHER SURGICAL HISTORY  10/29/2020    Thyroid surgery    OTHER SURGICAL HISTORY  10/29/2020    Sinus surgery    OTHER SURGICAL HISTORY  10/29/2020    Ear surgery    OTHER SURGICAL HISTORY  10/29/2020    Spinal surgery    OTHER SURGICAL HISTORY  10/21/2015    Thoracoscopy (Therapeutic) With Wedge Resection Of Lung    ROTATOR CUFF REPAIR  08/10/2017    Rotator Cuff Repair        Social History     Socioeconomic History    Marital status:      Spouse name: Not on file    Number of children: Not on file    Years of education: Not on file    Highest education level: Not on file   Occupational History    Not on file   Tobacco Use    Smoking status: Every Day     Types: Cigarettes    Smokeless tobacco: Never   Substance and Sexual Activity    Alcohol use: Never    Drug use: Never    Sexual activity: Not on file   Other Topics Concern    Not on file   Social History Narrative    Not on file     Social Determinants of Health     Financial Resource Strain: Not on file   Food Insecurity: No Food Insecurity (11/9/2023)    Hunger Vital Sign     Worried About Running Out of Food in the Last Year: Never true     Ran Out of Food in the Last Year: Never true   Transportation Needs: Not on file   Physical Activity: Not on file   Stress: Not on file   Social Connections: Not on file   Intimate Partner Violence: Not on file   Housing Stability: Not on file        Family History   Problem Relation Name Age of Onset    Other (cardiac disorder) Mother      Other (cardiac disorder) Father      Stroke Father      Coronary artery disease Father      Hypertension Father      Diabetes Other          Current Outpatient Medications   Medication Instructions    albuterol 5 mg/mL nebulizer solution nebulization, USE 0.25 ML IN 2.5 ML  NORMAL SALINE VIA NEBULIZER 3 TO 4 TIMES DAILY AS NEEDED FOR WHEEZING.    albuterol 90 mcg/actuation inhaler 1 puff, inhalation, 4 times daily RT    albuterol 2.5 mg, nebulization, Every 8 hours PRN    azelastine (Astelin) 137 mcg (0.1 %) nasal spray 2 sprays, Each Nostril, 2 times daily    bacitracin ophthalmic ointment Apply a small amount into both eyes at bedtime for 7 days then stop    budesonide (Pulmicort) 0.5 mg/2 mL nebulizer solution No dose, route, or frequency recorded.    clopidogrel (Plavix) 75 mg tablet 1 tablet, oral, Daily    ergocalciferol (VITAMIN D-2) 1,250 mcg, oral, Weekly    fluticasone (Flonase) 50 mcg/actuation nasal spray 2 sprays, Each Nostril, Daily    fluticasone-umeclidin-vilanter (Trelegy Ellipta) 100-62.5-25 mcg blister with device 1 puff, inhalation, Daily    folic acid (Folvite) 1 mg tablet 1 tablet, oral, Daily    gabapentin (NEURONTIN) 800 mg, oral, 4 times daily    Humira(CF) Pen 40 mg, subcutaneous    hydroxychloroquine (PLAQUENIL) 200 mg, oral, 2 times daily    ipratropium (Atrovent) 42 mcg (0.06 %) nasal spray 2 sprays, Each Nostril, 3 times daily    ipratropium (ATROVENT) 0.5 mg, nebulization, 2 times daily    ipratropium-albuteroL (Duo-Neb) 0.5-2.5 mg/3 mL nebulizer solution nebulization, Every 4 hours PRN, USE 1 UNIT DOSE IN NEBULIZER    isosorbide mononitrate ER (Imdur) 30 mg 24 hr tablet 1 tablet, oral, Daily    ketorolac (Acular) 0.5 % ophthalmic solution 1 drop, Both Eyes, 2 times daily    loteprednol (Lotemax) 0.5 % ophthalmic suspension 1 drop, Both Eyes, 2 times daily    methotrexate 25 mg/mL injection 40 mg/m2, intramuscular, Weekly    metoclopramide (Reglan) 10 mg tablet 1 tablet, oral, Nightly    metoprolol tartrate (Lopressor) 50 mg tablet 1 tablet, oral, Every 12 hours    montelukast (SINGULAIR) 10 mg, oral, Daily    naloxone (Narcan) 4 mg/0.1 mL nasal spray Administer a single spray in one nostril for opioid overdose ( Shallow Breathing / severe sedation) CALL  "911 Repeat after 3 minutes if no response    nitroglycerin (Nitrostat) 0.4 mg SL tablet sublingual    nystatin (Mycostatin) 100,000 unit/mL suspension SWISH AND SWALLOW 5ML FOUR TIMES DAILY FOR 14 DAYS    oxygen (O2) therapy use at night as directed    pantoprazole (PROTONIX) 40 mg, oral, Daily, Do not crush, chew, or split.    pravastatin (Pravachol) 20 mg tablet 1 tablet, oral, Daily    prednisoLONE acetate (Pred-Forte) 1 % ophthalmic suspension PLACE 1 (ONE) DROP IN THE LEFT EYE THREE TIMES DAILY FOR 2 (TWO) WEEKS THEN TWICE DAILY FOR 1 (ONE) WEEK POST SURGERY    primidone (Mysoline) 50 mg tablet 2 tablets, oral, 2 times daily    Prolia 60 mg, subcutaneous, Every 6 months    SARS-CoV-2 vaccine (Moderna) 100 mcg/0.5 mL suspension intramuscular    sertraline (ZOLOFT) 12.5-25 mg, oral, Daily    syringe with needle 1 mL 25 gauge x 5/8\" syringe miscellaneous    theophylline ER (MINE-DUR) 300 mg, oral, 3 times daily    tiZANidine (Zanaflex) 2 mg capsule oral    Xiidra 5 % dropperette 1 drop, Both Eyes, 2 times daily        Allergies   Allergen Reactions    Erythromycin Unknown        Objective  /76 (BP Location: Right arm)   Pulse 60   Temp 36.4 °C (97.5 °F)   Resp 16   Ht 1.6 m (5' 3\")   Wt 66.2 kg (146 lb)   BMI 25.86 kg/m²    GENERAL APPEARANCE:  No distress, alert and cooperative.     MENTAL STATE:  Orientation was normal to time, place and person. Recent and remote memory was intact.  Attention span and concentration were normal. Language testing was normal for comprehension, repetition, expression, and naming. Calculation was intact. The patient could correctly interpret a picture, and copy a diagram. General fund of knowledge was intact. Mini-mental status examination was performed with no errors.     CRANIAL NERVES:  Cranial nerves were normal.      CN 2- Visual Acuity  OD: 20/20 (corrected)   OS: 20/20 (corrected); visual fields full to confrontation.      CN 3, 4, 6-  Pupils round, 4 mm in " diameter, equally reactive to light. No ptosis. EOMs normal alignment, full range of movement, no nystagmus     CN 5- Facial sensation intact bilaterally. Normal corneal reflexes.      CN 7- Normal and symmetric facial strength. Nasolabial folds symmetric.     CN 8- Hearing intact to finger rub, whisper.      CN 9- Palate elevates symmetrically. Normal gag reflex.      CN 11- Normal strength of shoulder shrug and neck turning      CN 12- Tongue midline, with normal bulk and strength; no fasciculations.     MOTOR: The patient's motor examination shows that she has 5/5 strength in the upper extremities bilaterally with exception at the right APB was 5-/5.  The patient has 5-45 strength in the lower extremities bilaterally.  The patient has a left greater than right upper extremity tremor that is present with movement only.    GAIT: The patient's station and gait are mildly unsteady.    Assessment/Plan   The patient is slightly worse in terms of her paresthesias and pain with exception that she feels her right upper extremity tingling is worse.  The patient needs an EMG nerve conduction study of the upper extremities bilaterally.  I did  her to wear bilateral wrist splints at night is much as she can tolerate.  I would like to have a copy of the cardiac monitor that was done.  The patient should continue her Plavix and pravastatin as well as stroke risk factor modification.  The patient should continue her gabapentin at 800 mg 4 times a day.  The patient can use symptomatic pain medicines for severe headaches, back pain and lower extremity pain.  The patient should continue all of her medicines for arthritis as per rheumatology.  The patient should continue to follow with pain management.  The patient does need to stop smoking.  I will increase the patient's primidone to 150 mg p.o. daily.  The patient can continue to increase the primidone dose by 25 mg/week until she gets sleepy, the tremor improves or she  reaches 400 mg a day.  I discussed all these issues in detail with the patient and answered all of her questions.  The patient will follow-up with me in 6 months.

## 2023-12-20 NOTE — TELEPHONE ENCOUNTER
Pharmacy left message stating you sent in Primidone 50mg take 3 tabs in am but then you put 2 times daily - can you clarify

## 2023-12-20 NOTE — PROGRESS NOTES
Pain #8/10 to lower back and down the right leg.  Pain is constant.  Takes 2 1/2 Norco per day and has 19 tablets left.  This reduces her pain by at least 50%.

## 2023-12-20 NOTE — PATIENT INSTRUCTIONS
The patient is slightly worse in terms of her paresthesias and pain with exception that she feels her right upper extremity tingling is worse.  The patient needs an EMG nerve conduction study of the upper extremities bilaterally.  I did  her to wear bilateral wrist splints at night is much as she can tolerate.  I would like to have a copy of the cardiac monitor that was done.  The patient should continue her Plavix and pravastatin as well as stroke risk factor modification.  The patient should continue her gabapentin at 800 mg 4 times a day.  The patient can use symptomatic pain medicines for severe headaches, back pain and lower extremity pain.  The patient should continue all of her medicines for arthritis as per rheumatology.  The patient should continue to follow with pain management.  The patient does need to stop smoking.  I will increase the patient's primidone to 150 mg p.o. daily.  The patient can continue to increase the primidone dose by 25 mg/week until she gets sleepy, the tremor improves or she reaches 400 mg a day.  I discussed all these issues in detail with the patient and answered all of her questions.  The patient will follow-up with me in 6 months.

## 2023-12-22 ENCOUNTER — APPOINTMENT (OUTPATIENT)
Dept: RADIOLOGY | Facility: CLINIC | Age: 73
End: 2023-12-22
Payer: MEDICARE

## 2024-01-08 ENCOUNTER — APPOINTMENT (OUTPATIENT)
Dept: INFUSION THERAPY | Facility: CLINIC | Age: 74
End: 2024-01-08
Payer: MEDICARE

## 2024-01-09 DIAGNOSIS — M81.0 AGE-RELATED OSTEOPOROSIS WITHOUT CURRENT PATHOLOGICAL FRACTURE: Primary | ICD-10-CM

## 2024-01-09 RX ORDER — HEPARIN 100 UNIT/ML
500 SYRINGE INTRAVENOUS AS NEEDED
OUTPATIENT
Start: 2024-01-09

## 2024-01-09 RX ORDER — HEPARIN SODIUM,PORCINE/PF 10 UNIT/ML
50 SYRINGE (ML) INTRAVENOUS AS NEEDED
OUTPATIENT
Start: 2024-01-09

## 2024-01-31 ENCOUNTER — OFFICE VISIT (OUTPATIENT)
Dept: PRIMARY CARE | Facility: CLINIC | Age: 74
End: 2024-01-31
Payer: MEDICARE

## 2024-01-31 VITALS
SYSTOLIC BLOOD PRESSURE: 130 MMHG | TEMPERATURE: 97.4 F | WEIGHT: 148.8 LBS | BODY MASS INDEX: 26.36 KG/M2 | HEIGHT: 63 IN | DIASTOLIC BLOOD PRESSURE: 72 MMHG

## 2024-01-31 DIAGNOSIS — B37.0 THRUSH: ICD-10-CM

## 2024-01-31 DIAGNOSIS — K29.50 CHRONIC GASTRITIS WITHOUT BLEEDING, UNSPECIFIED GASTRITIS TYPE: ICD-10-CM

## 2024-01-31 DIAGNOSIS — K21.00 GASTROESOPHAGEAL REFLUX DISEASE WITH ESOPHAGITIS WITHOUT HEMORRHAGE: Primary | ICD-10-CM

## 2024-01-31 PROCEDURE — 3078F DIAST BP <80 MM HG: CPT | Performed by: FAMILY MEDICINE

## 2024-01-31 PROCEDURE — 99214 OFFICE O/P EST MOD 30 MIN: CPT | Performed by: FAMILY MEDICINE

## 2024-01-31 PROCEDURE — 1125F AMNT PAIN NOTED PAIN PRSNT: CPT | Performed by: FAMILY MEDICINE

## 2024-01-31 PROCEDURE — 3075F SYST BP GE 130 - 139MM HG: CPT | Performed by: FAMILY MEDICINE

## 2024-01-31 PROCEDURE — 1159F MED LIST DOCD IN RCRD: CPT | Performed by: FAMILY MEDICINE

## 2024-01-31 RX ORDER — CLOTRIMAZOLE 10 MG/1
10 LOZENGE ORAL; TOPICAL
Qty: 70 TROCHE | Refills: 3 | Status: SHIPPED | OUTPATIENT
Start: 2024-01-31 | End: 2024-02-10

## 2024-01-31 RX ORDER — METOCLOPRAMIDE 10 MG/1
10 TABLET ORAL 3 TIMES DAILY PRN
Qty: 60 TABLET | Refills: 11 | Status: SHIPPED | OUTPATIENT
Start: 2024-01-31 | End: 2025-01-30

## 2024-01-31 RX ORDER — FAMOTIDINE 40 MG/1
40 TABLET, FILM COATED ORAL NIGHTLY
Qty: 90 TABLET | Refills: 3 | Status: SHIPPED | OUTPATIENT
Start: 2024-01-31 | End: 2025-01-30

## 2024-01-31 ASSESSMENT — PAIN SCALES - GENERAL: PAINLEVEL: 2

## 2024-01-31 ASSESSMENT — ENCOUNTER SYMPTOMS: DEPRESSION: 0

## 2024-01-31 NOTE — PROGRESS NOTES
"Subjective   Patient ID: Radha Deluna is a 73 y.o. female who presents for Throat Problem (Throat burning for 6 months now. ).    HPI   Patient here with complaint of throat burning.  She was treated with some antifungal liquid with good improvement but it did not resolve.  Patient also suffers with reflux that has been difficult to control.    Review of Systems    Objective   /72 (BP Location: Left arm, Patient Position: Sitting, BP Cuff Size: Adult)   Temp 36.3 °C (97.4 °F) (Temporal)   Ht 1.6 m (5' 3\")   Wt 67.5 kg (148 lb 12.8 oz)   BMI 26.36 kg/m²     Physical Exam  Alert, pleasant and in no acute distress.  HEENT: Normocephalic, atraumatic.  Mouth: No mucosal lesions noted.  No pharyngeal erythema.  Neck: Supple.  No palpable lymphadenopathy.  Heart: Regular rate and rhythm without murmur  Lungs: Clear to auscultation    Assessment/Plan   Problem List Items Addressed This Visit             ICD-10-CM       Gastrointestinal and Abdominal    Gastroesophageal reflux disease - Primary K21.9     Other Visit Diagnoses         Codes    Chronic gastritis without bleeding, unspecified gastritis type     K29.50    Relevant Medications    famotidine (Pepcid) 40 mg tablet    metoclopramide (Reglan) 10 mg tablet    Other Relevant Orders    Referral to Gastroenterology    Thrush     B37.0    Relevant Medications    clotrimazole (Mycelex) 10 mg timbo          Patient with persisting throat burning.  We are going to refer her to GI for further workup.  In the meantime, we will treat with Mycelex troches.  Advised to continue good rinsing after use of inhaler.  Will also add famotidine in the evening to her morning PPI.     "

## 2024-02-08 ENCOUNTER — OFFICE VISIT (OUTPATIENT)
Dept: PAIN MEDICINE | Facility: CLINIC | Age: 74
End: 2024-02-08
Payer: MEDICARE

## 2024-02-08 VITALS
DIASTOLIC BLOOD PRESSURE: 65 MMHG | BODY MASS INDEX: 25.16 KG/M2 | HEART RATE: 55 BPM | RESPIRATION RATE: 18 BRPM | SYSTOLIC BLOOD PRESSURE: 109 MMHG | TEMPERATURE: 95.9 F | WEIGHT: 142 LBS | HEIGHT: 63 IN

## 2024-02-08 DIAGNOSIS — M05.79 RHEUMATOID ARTHRITIS INVOLVING MULTIPLE SITES WITH POSITIVE RHEUMATOID FACTOR (MULTI): ICD-10-CM

## 2024-02-08 DIAGNOSIS — M47.816 FACET DEGENERATION OF LUMBAR REGION: Primary | ICD-10-CM

## 2024-02-08 DIAGNOSIS — J41.0 SIMPLE CHRONIC BRONCHITIS (MULTI): ICD-10-CM

## 2024-02-08 DIAGNOSIS — G89.4 CHRONIC PAIN SYNDROME: ICD-10-CM

## 2024-02-08 DIAGNOSIS — M47.816 SPONDYLOSIS OF LUMBAR REGION WITHOUT MYELOPATHY OR RADICULOPATHY: ICD-10-CM

## 2024-02-08 DIAGNOSIS — M96.1 POSTLAMINECTOMY SYNDROME, LUMBAR: ICD-10-CM

## 2024-02-08 DIAGNOSIS — G56.12 MEDIAN NEUROPATHY, LEFT: ICD-10-CM

## 2024-02-08 PROCEDURE — 3078F DIAST BP <80 MM HG: CPT | Performed by: ANESTHESIOLOGY

## 2024-02-08 PROCEDURE — 1125F AMNT PAIN NOTED PAIN PRSNT: CPT | Performed by: ANESTHESIOLOGY

## 2024-02-08 PROCEDURE — 99213 OFFICE O/P EST LOW 20 MIN: CPT | Performed by: ANESTHESIOLOGY

## 2024-02-08 PROCEDURE — 1160F RVW MEDS BY RX/DR IN RCRD: CPT | Performed by: ANESTHESIOLOGY

## 2024-02-08 PROCEDURE — 3074F SYST BP LT 130 MM HG: CPT | Performed by: ANESTHESIOLOGY

## 2024-02-08 PROCEDURE — 1159F MED LIST DOCD IN RCRD: CPT | Performed by: ANESTHESIOLOGY

## 2024-02-08 RX ORDER — HYDROCODONE BITARTRATE AND ACETAMINOPHEN 7.5; 325 MG/1; MG/1
1 TABLET ORAL EVERY 6 HOURS PRN
Qty: 120 TABLET | Refills: 0 | Status: SHIPPED | OUTPATIENT
Start: 2024-02-08 | End: 2024-03-09

## 2024-02-08 ASSESSMENT — PAIN SCALES - GENERAL: PAINLEVEL: 8

## 2024-02-08 ASSESSMENT — ENCOUNTER SYMPTOMS
OCCASIONAL FEELINGS OF UNSTEADINESS: 0
LOSS OF SENSATION IN FEET: 0
DEPRESSION: 1

## 2024-02-08 ASSESSMENT — COLUMBIA-SUICIDE SEVERITY RATING SCALE - C-SSRS
1. IN THE PAST MONTH, HAVE YOU WISHED YOU WERE DEAD OR WISHED YOU COULD GO TO SLEEP AND NOT WAKE UP?: NO
6. HAVE YOU EVER DONE ANYTHING, STARTED TO DO ANYTHING, OR PREPARED TO DO ANYTHING TO END YOUR LIFE?: NO
2. HAVE YOU ACTUALLY HAD ANY THOUGHTS OF KILLING YOURSELF?: NO

## 2024-02-08 NOTE — PROGRESS NOTES
Pain #8/10 to lower back and down the right leg which is chronic.  Fell on 2-4-24 while getting out of bed.  Followed up with her PCP.  Taking 2 1/2 Norco per day and has 18 tablets left.  This reduces her pain by 75%.

## 2024-02-08 NOTE — PROGRESS NOTES
Subjective   Radha Deluna is a 73 y.o. female who returns for follow-up for medication management regarding her rheumatoid arthritis.  Analgesia: 5 on a 1-10 scale with med management which is stable.  Activity Level: Normal activity level.  Patient does not use a walker to ambulate and gets over 50% pain relief    Review of Systems  Unremarkable except for chief complaint    Objective   Physical Exam   Gen. appearance:  Patient's alert and oriented ×3 ;cooperative mild to moderate distress  Heart: Regular rate and rhythm  Lungs: Clear to auscultation  Abdomen: Soft nontender  Neuro: Cranial nerves II -XII intact; DTR: +2 over 4 biceps triceps brachial radialis patellar and Achilles  Spinal exam: Positive paraspinal tenderness noted bilaterally L4 5 L5-S1 with flexion extension and rotation/no bony abnormalities  Musculoskeletal:  Upper and lower extremity strength was 4/5 bilaterally  :  deferred  Skin: Warm and dry      Assessment:   1. Facet degeneration of lumbar region        2. Postlaminectomy syndrome, lumbar  Follow Up In Pain Medicine    HYDROcodone-acetaminophen (Norco) 7.5-325 mg tablet      3. Median neuropathy, left        4. Rheumatoid arthritis involving multiple sites with positive rheumatoid factor (CMS/HCC)  Follow Up In Pain Medicine    HYDROcodone-acetaminophen (Norco) 7.5-325 mg tablet      5. Spondylosis of lumbar region without myelopathy or radiculopathy  Follow Up In Pain Medicine    HYDROcodone-acetaminophen (Norco) 7.5-325 mg tablet      6. Chronic pain syndrome        7. Simple chronic bronchitis (CMS/HCC)  Follow Up In Pain Medicine    HYDROcodone-acetaminophen (Norco) 7.5-325 mg tablet          Plan:  Risk and benefits continued use of opioids was discussed e-prescribed hydrocodone 7.5 325 120 tablets which she takes 2-1/2 to 3 tablets a day.  She has a follow-up visit scheduled in 8 weeks.  Patient was instructed to take all medication as directed.

## 2024-02-23 ENCOUNTER — LAB (OUTPATIENT)
Dept: LAB | Facility: CLINIC | Age: 74
End: 2024-02-23
Payer: MEDICARE

## 2024-02-23 DIAGNOSIS — D50.9 IRON DEFICIENCY ANEMIA, UNSPECIFIED IRON DEFICIENCY ANEMIA TYPE: ICD-10-CM

## 2024-02-23 LAB
ALBUMIN SERPL BCP-MCNC: 4.1 G/DL (ref 3.4–5)
ALP SERPL-CCNC: 76 U/L (ref 33–136)
ALT SERPL W P-5'-P-CCNC: 9 U/L (ref 7–45)
ANION GAP SERPL CALC-SCNC: 14 MMOL/L (ref 10–20)
AST SERPL W P-5'-P-CCNC: 18 U/L (ref 9–39)
BASOPHILS # BLD AUTO: 0.04 X10*3/UL (ref 0–0.1)
BASOPHILS NFR BLD AUTO: 0.9 %
BILIRUB SERPL-MCNC: 0.3 MG/DL (ref 0–1.2)
BUN SERPL-MCNC: 14 MG/DL (ref 6–23)
CALCIUM SERPL-MCNC: 9.6 MG/DL (ref 8.6–10.3)
CHLORIDE SERPL-SCNC: 102 MMOL/L (ref 98–107)
CO2 SERPL-SCNC: 26 MMOL/L (ref 21–32)
CREAT SERPL-MCNC: 0.9 MG/DL (ref 0.5–1.05)
EGFRCR SERPLBLD CKD-EPI 2021: 67 ML/MIN/1.73M*2
EOSINOPHIL # BLD AUTO: 0.07 X10*3/UL (ref 0–0.4)
EOSINOPHIL NFR BLD AUTO: 1.5 %
ERYTHROCYTE [DISTWIDTH] IN BLOOD BY AUTOMATED COUNT: 13.5 % (ref 11.5–14.5)
FERRITIN SERPL-MCNC: 194 NG/ML (ref 8–150)
GLUCOSE SERPL-MCNC: 97 MG/DL (ref 74–99)
HCT VFR BLD AUTO: 44.6 % (ref 36–46)
HGB BLD-MCNC: 14.7 G/DL (ref 12–16)
IMM GRANULOCYTES # BLD AUTO: 0.01 X10*3/UL (ref 0–0.5)
IMM GRANULOCYTES NFR BLD AUTO: 0.2 % (ref 0–0.9)
IRON SATN MFR SERPL: 82 % (ref 25–45)
IRON SERPL-MCNC: 249 UG/DL (ref 35–150)
LYMPHOCYTES # BLD AUTO: 2.58 X10*3/UL (ref 0.8–3)
LYMPHOCYTES NFR BLD AUTO: 54.9 %
MCH RBC QN AUTO: 34.5 PG (ref 26–34)
MCHC RBC AUTO-ENTMCNC: 33 G/DL (ref 32–36)
MCV RBC AUTO: 105 FL (ref 80–100)
MONOCYTES # BLD AUTO: 0.28 X10*3/UL (ref 0.05–0.8)
MONOCYTES NFR BLD AUTO: 6 %
NEUTROPHILS # BLD AUTO: 1.72 X10*3/UL (ref 1.6–5.5)
NEUTROPHILS NFR BLD AUTO: 36.5 %
NRBC BLD-RTO: 0 /100 WBCS (ref 0–0)
PLATELET # BLD AUTO: 209 X10*3/UL (ref 150–450)
POTASSIUM SERPL-SCNC: 4.5 MMOL/L (ref 3.5–5.3)
PROT SERPL-MCNC: 7.4 G/DL (ref 6.4–8.2)
RBC # BLD AUTO: 4.26 X10*6/UL (ref 4–5.2)
SODIUM SERPL-SCNC: 137 MMOL/L (ref 136–145)
TIBC SERPL-MCNC: 304 UG/DL (ref 240–445)
UIBC SERPL-MCNC: 55 UG/DL (ref 110–370)
WBC # BLD AUTO: 4.7 X10*3/UL (ref 4.4–11.3)

## 2024-02-23 PROCEDURE — 80053 COMPREHEN METABOLIC PANEL: CPT

## 2024-02-23 PROCEDURE — 36415 COLL VENOUS BLD VENIPUNCTURE: CPT

## 2024-02-23 PROCEDURE — 85025 COMPLETE CBC W/AUTO DIFF WBC: CPT

## 2024-02-23 PROCEDURE — 82728 ASSAY OF FERRITIN: CPT | Mod: PARLAB

## 2024-02-23 PROCEDURE — 83540 ASSAY OF IRON: CPT

## 2024-02-26 NOTE — PROGRESS NOTES
Subjective   Radha Deluna  is a 73 y.o. female who presents for evaluation of right upper lobe pulmonary nodule     This patient is well known to me due to history of pulmonary nodules, which were biopsied via wedge resection in 2015. These were consistent with granuloma, and she was referred to a pulmonologist for ongoing management. Recently, radiographic imaging shows an enlarging nodule which is concerning for primary lung malignancy. I recommended the patient percutaneous lung procedure, and this was performed 8/12/2020. This biopsy was consistent with inflammation and thought to be related to the patient's history of granulomatous disease. She presents today for radiographic follow-up    Currently the patient is presenting for routine follow-up and in their usual state of health. She report the following symptoms: shortness of breath with activity and deny the following symptoms: chest pain, shortness of breath at rest, cough, hemoptysis, fevers, chills, and weight loss.      There have been no significant changes to their documented medical, surgical and family history.     She  reports that she has been smoking cigarettes. She has never used smokeless tobacco. She reports that she does not drink alcohol and does not use drugs.    Objective   Physical Exam  The patient is well-appearing and in no acute distress. The trachea is midline and there is no crepitus. The lungs were clear to auscultation grossly. There was good effort and excursion. The heart had a regular rate and rhythm. The abdomen was soft, nontender and nondistended. The extremities had no edema or gross deformities. Mood and affect are appropriate.  Diagnostic Studies  I reviewed a CT chest performed recently. I do not see any new or concerning nodules or adenopathy    Assessment/Plan   Overall, I believe that the patient is doing well.     Based on the patient's clinical presentation and my review of their radiographic imaging, I believe  the lung nodule is unlikely to represent a malignant process.  We discussed various management strategies including surgery, biopsy, and observation.  Based on this discussion, the patient elected for observational management.  I recommend serial radiographic surveillance.    I recommend CT chest 12 months    I discussed this in detail with the patient, including a discussion of alternatives. They were comfortable with this approach.     Arnaud Marroquin MD  [phone]

## 2024-02-28 ENCOUNTER — HOSPITAL ENCOUNTER (OUTPATIENT)
Dept: RADIOLOGY | Facility: CLINIC | Age: 74
Discharge: HOME | End: 2024-02-28
Payer: MEDICARE

## 2024-02-28 ENCOUNTER — OFFICE VISIT (OUTPATIENT)
Dept: SURGERY | Facility: CLINIC | Age: 74
End: 2024-02-28
Payer: MEDICARE

## 2024-02-28 VITALS
HEART RATE: 61 BPM | BODY MASS INDEX: 25.8 KG/M2 | SYSTOLIC BLOOD PRESSURE: 100 MMHG | DIASTOLIC BLOOD PRESSURE: 61 MMHG | HEIGHT: 63 IN | RESPIRATION RATE: 18 BRPM | TEMPERATURE: 97.9 F | OXYGEN SATURATION: 97 % | WEIGHT: 145.6 LBS

## 2024-02-28 DIAGNOSIS — R91.8 PULMONARY NODULES/LESIONS, MULTIPLE: Primary | ICD-10-CM

## 2024-02-28 DIAGNOSIS — R91.8 OTHER NONSPECIFIC ABNORMAL FINDING OF LUNG FIELD: ICD-10-CM

## 2024-02-28 PROCEDURE — 1159F MED LIST DOCD IN RCRD: CPT | Performed by: THORACIC SURGERY (CARDIOTHORACIC VASCULAR SURGERY)

## 2024-02-28 PROCEDURE — 3078F DIAST BP <80 MM HG: CPT | Performed by: THORACIC SURGERY (CARDIOTHORACIC VASCULAR SURGERY)

## 2024-02-28 PROCEDURE — 99214 OFFICE O/P EST MOD 30 MIN: CPT | Performed by: THORACIC SURGERY (CARDIOTHORACIC VASCULAR SURGERY)

## 2024-02-28 PROCEDURE — 71250 CT THORAX DX C-: CPT

## 2024-02-28 PROCEDURE — 1126F AMNT PAIN NOTED NONE PRSNT: CPT | Performed by: THORACIC SURGERY (CARDIOTHORACIC VASCULAR SURGERY)

## 2024-02-28 PROCEDURE — 1160F RVW MEDS BY RX/DR IN RCRD: CPT | Performed by: THORACIC SURGERY (CARDIOTHORACIC VASCULAR SURGERY)

## 2024-02-28 PROCEDURE — 3074F SYST BP LT 130 MM HG: CPT | Performed by: THORACIC SURGERY (CARDIOTHORACIC VASCULAR SURGERY)

## 2024-02-28 PROCEDURE — 71250 CT THORAX DX C-: CPT | Performed by: RADIOLOGY

## 2024-02-28 RX ORDER — ADALIMUMAB-ADBM 40MG/0.8ML
KIT SUBCUTANEOUS
COMMUNITY
Start: 2024-02-14 | End: 2024-02-28 | Stop reason: ALTCHOICE

## 2024-02-28 ASSESSMENT — ENCOUNTER SYMPTOMS
DEPRESSION: 0
LOSS OF SENSATION IN FEET: 1
OCCASIONAL FEELINGS OF UNSTEADINESS: 1

## 2024-02-28 ASSESSMENT — PAIN SCALES - GENERAL: PAINLEVEL: 0-NO PAIN

## 2024-02-29 ENCOUNTER — OFFICE VISIT (OUTPATIENT)
Dept: HEMATOLOGY/ONCOLOGY | Facility: CLINIC | Age: 74
End: 2024-02-29
Payer: MEDICARE

## 2024-02-29 VITALS
DIASTOLIC BLOOD PRESSURE: 74 MMHG | OXYGEN SATURATION: 99 % | BODY MASS INDEX: 25.77 KG/M2 | RESPIRATION RATE: 20 BRPM | HEART RATE: 86 BPM | SYSTOLIC BLOOD PRESSURE: 123 MMHG | TEMPERATURE: 96.8 F | WEIGHT: 145.5 LBS

## 2024-02-29 DIAGNOSIS — D50.9 IRON DEFICIENCY ANEMIA, UNSPECIFIED IRON DEFICIENCY ANEMIA TYPE: Primary | ICD-10-CM

## 2024-02-29 DIAGNOSIS — R91.1 LUNG NODULE: Primary | ICD-10-CM

## 2024-02-29 PROCEDURE — 3074F SYST BP LT 130 MM HG: CPT | Performed by: INTERNAL MEDICINE

## 2024-02-29 PROCEDURE — 3078F DIAST BP <80 MM HG: CPT | Performed by: INTERNAL MEDICINE

## 2024-02-29 PROCEDURE — 1159F MED LIST DOCD IN RCRD: CPT | Performed by: INTERNAL MEDICINE

## 2024-02-29 PROCEDURE — 1125F AMNT PAIN NOTED PAIN PRSNT: CPT | Performed by: INTERNAL MEDICINE

## 2024-02-29 PROCEDURE — 1160F RVW MEDS BY RX/DR IN RCRD: CPT | Performed by: INTERNAL MEDICINE

## 2024-02-29 PROCEDURE — 99213 OFFICE O/P EST LOW 20 MIN: CPT | Performed by: INTERNAL MEDICINE

## 2024-02-29 ASSESSMENT — PAIN SCALES - GENERAL: PAINLEVEL: 7

## 2024-02-29 NOTE — PROGRESS NOTES
LOCATION:  [facility].     HEMATOLOGY & ONCOLOGY PROBLEMS:  1. Anemia     a.  Iron deficiency due to malabsorption.     b.  S/p IV iron infusion in January 2023 with good response.     CHIEF COMPLAINT:    The patient is in the clinic today for initial hematology evaluation of anemia and iron deficiency.      HISTORY:   Ms. Deluna is a 73 year old pleasant female with anemia.  She has multiple medical problems and has also been noted to have gradually worsening anemia.  Blood work from September 2022 showed a hemoglobin of 8.6 with MCV of 88.  WBC, platelets, metabolic  profile were all normal.  Vitamin B12 and serum iron level was low.  She was tried on oral iron therapy but had problem with abdominal pain and discomfort.  Her medical history is significant for rheumatoid arthritis for which she was maintained on multiple  disease modifying agents.  With the finding of anemia methotrexate, sulfasalazine were discontinued. EGD and colonoscopy were unremarkable in June 2022.  No history of IV GYN related blood loss.  After initial hematological evaluation she was noted to  have significant iron deficiency and was supported with a course of IV iron infusion in January 2023 with good response.     INTERVAL HISTORY:  Patient denies any specific complaints other than issues with persistent weakness and fatigue. Latest blood work is essentially normal.  No history of nausea, vomiting, fever, night sweats, diarrhea, rash, anorexia or weight loss.      PAST MEDICAL HISTORY:   1.  Anemia as detailed above   2.  Hypertension   3.  Hyperlipidemia   4.  Rheumatoid arthritis   5.  Degenerative joint disease   6.  COPD   7.  GERD   8.  Osteoporosis   9.  Peripheral arterial disease   10.  Peripheral neuropathy.     SOCIAL HISTORY:   She is single and lives with her 93-year-old mother in [place].  Smokes about half to 1 pack a day for last 30 years.  Nonalcoholic.  She is a retired dental technician.   Born and raised in [place]     FAMILY HISTORY:    Father  at age 78 from heart attack.  Mother age 93 from multiple chronic medical problems.  1 sister alive and well.  She does not have any children. No other specific history of bleeding, clotting or malignant disorder in the family.     REVIEW OF SYSTEMS:  Pertinent finding as per the history above.  All other systems have been reviewed and generally negative and noncontributory.     ALLERGY & MEDICATIONS:  Allergies and latest outpatient medications list were reviewed in the EMR.    VITAL SIGNS  BSA: 1.71 meters squared  /74   Pulse 86   Temp 36 °C (96.8 °F) (Temporal)   Resp 20   Wt 66 kg (145 lb 8.1 oz)   SpO2 99%   BMI 25.77 kg/m²     PHYSICAL EXAMINATION:  Detailed examination not done.    LAB RESULTS:  CBC from 2024 was unremarkable with hemoglobin of 14.7 with MCV of 105.  WBC was 4.7 and platelets were 209K.  Metabolic profile was unremarkable.  Ferritin was slightly elevated at 194.  Last 3 sets of blood work were reviewed and the trend was noted.     ASSESSMENT & PLAN:  1. Anemia. Please refer to the details of initial presentation and management as outlined above. In summary, patient with multiple baseline medical problems who has also been noted to have gradually worsening anemia.  Blood work from 2022 showed normocytic anemia with hemoglobin of 8.6.  WBC, platelets and metabolic profile was unremarkable.  Initial  work-up revealed low iron and vitamin B12 levels.  She was also on multiple disease modifying drugs for rheumatoid arthritis including methotrexate and sulfasalazine which have been discontinued.  She was tried on oral iron therapy but had problem with  significant abdominal discomfort.  EGD and colonoscopy were unremarkable in 2022. After initial hematological evaluation she was noted to have significant iron deficiency and was supported with a course of IV iron infusion in January and 2023  with  good response.     Blood work is essentially normal.  Hemoglobin is 14.7.  Increase ferritin and iron level could be secondary to flareup of inflammation due to baseline rheumatoid arthritis.  Patient is reassured that there is no need for any additional IV iron infusion at this time.  As before, I also strongly advised her to follow-up with the GI clinic  as previously advised.  In the future if there is concern regarding persistent unexplained anemia then she will also need a bone marrow biopsy.     2. Follow up: She will let have follow-up with me in about 6 months.   Advised to contact us immediately if there are any new questions or problems.        This note has been transcribed using Dragon voice recognition system and there is a possibility of unintentional typing misprints.

## 2024-03-08 ENCOUNTER — APPOINTMENT (OUTPATIENT)
Dept: NEUROLOGY | Facility: HOSPITAL | Age: 74
End: 2024-03-08
Payer: MEDICARE

## 2024-03-11 ENCOUNTER — APPOINTMENT (OUTPATIENT)
Dept: RADIOLOGY | Facility: CLINIC | Age: 74
End: 2024-03-11
Payer: MEDICARE

## 2024-04-11 ENCOUNTER — OFFICE VISIT (OUTPATIENT)
Dept: PAIN MEDICINE | Facility: CLINIC | Age: 74
End: 2024-04-11
Payer: MEDICARE

## 2024-04-11 VITALS
DIASTOLIC BLOOD PRESSURE: 66 MMHG | TEMPERATURE: 97.2 F | SYSTOLIC BLOOD PRESSURE: 109 MMHG | BODY MASS INDEX: 25.69 KG/M2 | RESPIRATION RATE: 18 BRPM | WEIGHT: 145 LBS | OXYGEN SATURATION: 98 % | HEART RATE: 60 BPM | HEIGHT: 63 IN

## 2024-04-11 DIAGNOSIS — M96.1 POSTLAMINECTOMY SYNDROME, LUMBAR: ICD-10-CM

## 2024-04-11 DIAGNOSIS — M79.18 CHRONIC MUSCULOSKELETAL PAIN: ICD-10-CM

## 2024-04-11 DIAGNOSIS — G44.221 CHRONIC TENSION-TYPE HEADACHE, INTRACTABLE: ICD-10-CM

## 2024-04-11 DIAGNOSIS — G89.29 CHRONIC MUSCULOSKELETAL PAIN: ICD-10-CM

## 2024-04-11 DIAGNOSIS — Z79.891 LONG TERM (CURRENT) USE OF OPIATE ANALGESIC: Primary | ICD-10-CM

## 2024-04-11 DIAGNOSIS — M47.816 SPONDYLOSIS OF LUMBAR REGION WITHOUT MYELOPATHY OR RADICULOPATHY: ICD-10-CM

## 2024-04-11 LAB
AMPHETAMINES UR QL SCN: NORMAL
BARBITURATES UR QL SCN: NORMAL
BZE UR QL SCN: NORMAL
CANNABINOIDS UR QL SCN: NORMAL
CREAT UR-MCNC: 112.9 MG/DL (ref 20–320)
PCP UR QL SCN: NORMAL

## 2024-04-11 PROCEDURE — 99213 OFFICE O/P EST LOW 20 MIN: CPT | Performed by: ANESTHESIOLOGY

## 2024-04-11 PROCEDURE — 3078F DIAST BP <80 MM HG: CPT | Performed by: ANESTHESIOLOGY

## 2024-04-11 PROCEDURE — 80307 DRUG TEST PRSMV CHEM ANLYZR: CPT | Mod: MUE | Performed by: ANESTHESIOLOGY

## 2024-04-11 PROCEDURE — 3074F SYST BP LT 130 MM HG: CPT | Performed by: ANESTHESIOLOGY

## 2024-04-11 PROCEDURE — 1160F RVW MEDS BY RX/DR IN RCRD: CPT | Performed by: ANESTHESIOLOGY

## 2024-04-11 PROCEDURE — 80346 BENZODIAZEPINES1-12: CPT | Performed by: ANESTHESIOLOGY

## 2024-04-11 PROCEDURE — 1125F AMNT PAIN NOTED PAIN PRSNT: CPT | Performed by: ANESTHESIOLOGY

## 2024-04-11 PROCEDURE — 1159F MED LIST DOCD IN RCRD: CPT | Performed by: ANESTHESIOLOGY

## 2024-04-11 RX ORDER — HYDROCODONE BITARTRATE AND ACETAMINOPHEN 7.5; 325 MG/1; MG/1
1 TABLET ORAL EVERY 6 HOURS PRN
Qty: 120 TABLET | Refills: 0 | Status: SHIPPED | OUTPATIENT
Start: 2024-04-11 | End: 2024-05-11

## 2024-04-11 ASSESSMENT — COLUMBIA-SUICIDE SEVERITY RATING SCALE - C-SSRS
2. HAVE YOU ACTUALLY HAD ANY THOUGHTS OF KILLING YOURSELF?: NO
1. IN THE PAST MONTH, HAVE YOU WISHED YOU WERE DEAD OR WISHED YOU COULD GO TO SLEEP AND NOT WAKE UP?: NO
6. HAVE YOU EVER DONE ANYTHING, STARTED TO DO ANYTHING, OR PREPARED TO DO ANYTHING TO END YOUR LIFE?: NO

## 2024-04-11 ASSESSMENT — ENCOUNTER SYMPTOMS
DEPRESSION: 0
OCCASIONAL FEELINGS OF UNSTEADINESS: 1
LOSS OF SENSATION IN FEET: 0

## 2024-04-11 ASSESSMENT — PAIN SCALES - GENERAL: PAINLEVEL: 8

## 2024-04-11 NOTE — PROGRESS NOTES
Pain #8/10 generalized bone arthritic pain which is constant.  Taking 2 1/2 Norco per day and has 8 tablets left.  The meds are not as effective as they once were.

## 2024-04-11 NOTE — PROGRESS NOTES
Subjective   Radha Deluna is a 74 y.o. female who returns for a follow-up visit  regarding Chronic intractable pain syndrome.  She has severe rheumatoid arthritis as well is sarcoidosis and takes hydrocodone 7.5 mg tablets 3 times a day.  Analgesia: Pain level is 7 on a 1-10 scale which is stable.  Activity Level: Difficult activity level due to her chronic rheumatoid arthritis and generalized pain syndrome    Review of Systems  Chronic pain syndrome secondary to rheumatoid arthritis and sarcoid doses    Objective   Physical Exam   Gen. appearance:  Patient's alert and oriented ×3 ;cooperative mild to moderate distress  Heart: Regular rate and rhythm  Lungs: Clear to auscultation  Abdomen: Soft nontender  Neuro: Cranial nerves II -XII intact; DTR: +2 over 4 biceps triceps brachial radialis patellar and Achilles  Spinal exam: Positive paraspinal tenderness noted bilaterally L4 5 L5-S1 with flexion extension and rotation/no bony abnormalities  Musculoskeletal:  Upper and lower extremity strength was 4/5 bilaterally  :  deferred  Skin: Warm and dry      Gen. appearance:  Patient's alert and oriented ×3 ;cooperative mild to moderate distress  Heart: Regular rate and rhythm  Lungs: Clear to auscultation  Abdomen: Soft nontender  Neuro: Cranial nerves II -XII intact; DTR: +2 over 4 biceps triceps brachial radialis patellar and Achilles  Spinal exam: Positive paraspinal tenderness noted bilaterally L4 5 L5-S1 with flexion extension and rotation/no bony abnormalities  Musculoskeletal:  Upper and lower extremity strength was 4/5 bilaterally  :  deferred  Skin: Warm and dry    Assessment:   1. Long term (current) use of opiate analgesic  Opiate/Opioid/Benzo Prescription Compliance    OOB Internal Tracking    HYDROcodone-acetaminophen (Norco) 7.5-325 mg tablet    Follow Up In Pain Medicine      2. Chronic musculoskeletal pain  HYDROcodone-acetaminophen (Norco) 7.5-325 mg tablet      3. Chronic tension-type headache,  intractable        4. Spondylosis of lumbar region without myelopathy or radiculopathy  HYDROcodone-acetaminophen (Norco) 7.5-325 mg tablet    Follow Up In Pain Medicine      5. Postlaminectomy syndrome, lumbar            Plan:  Risk and benefits continued use of hydrocodone was discussed.  Patient was given refill of 7.5 [address] 20 with a follow-up visit scheduled in 2 months.

## 2024-04-15 LAB
1OH-MIDAZOLAM UR CFM-MCNC: <25 NG/ML
6MAM UR CFM-MCNC: <25 NG/ML
7AMINOCLONAZEPAM UR CFM-MCNC: <25 NG/ML
A-OH ALPRAZ UR CFM-MCNC: <25 NG/ML
ALPRAZ UR CFM-MCNC: <25 NG/ML
CHLORDIAZEP UR CFM-MCNC: <25 NG/ML
CLONAZEPAM UR CFM-MCNC: <25 NG/ML
CODEINE UR CFM-MCNC: <50 NG/ML
DIAZEPAM UR CFM-MCNC: <25 NG/ML
EDDP UR CFM-MCNC: <25 NG/ML
FENTANYL UR CFM-MCNC: <2.5 NG/ML
HYDROCODONE CTO UR CFM-MCNC: 2264 NG/ML
HYDROMORPHONE UR CFM-MCNC: 191 NG/ML
LORAZEPAM UR CFM-MCNC: <25 NG/ML
METHADONE UR CFM-MCNC: <25 NG/ML
MIDAZOLAM UR CFM-MCNC: <25 NG/ML
MORPHINE UR CFM-MCNC: <50 NG/ML
NORDIAZEPAM UR CFM-MCNC: <25 NG/ML
NORFENTANYL UR CFM-MCNC: <2.5 NG/ML
NORHYDROCODONE UR CFM-MCNC: >1000 NG/ML
NOROXYCODONE UR CFM-MCNC: <25 NG/ML
NORTRAMADOL UR-MCNC: <50 NG/ML
OXAZEPAM UR CFM-MCNC: <25 NG/ML
OXYCODONE UR CFM-MCNC: <25 NG/ML
OXYMORPHONE UR CFM-MCNC: <25 NG/ML
TEMAZEPAM UR CFM-MCNC: <25 NG/ML
TRAMADOL UR CFM-MCNC: <50 NG/ML
ZOLPIDEM UR CFM-MCNC: <25 NG/ML
ZOLPIDEM UR-MCNC: <25 NG/ML

## 2024-04-16 ENCOUNTER — APPOINTMENT (OUTPATIENT)
Dept: NEUROLOGY | Facility: HOSPITAL | Age: 74
End: 2024-04-16
Payer: MEDICARE

## 2024-04-26 ENCOUNTER — LAB (OUTPATIENT)
Dept: LAB | Facility: LAB | Age: 74
End: 2024-04-26
Payer: MEDICARE

## 2024-04-26 ENCOUNTER — HOSPITAL ENCOUNTER (OUTPATIENT)
Dept: RADIOLOGY | Facility: CLINIC | Age: 74
Discharge: HOME | End: 2024-04-26
Payer: MEDICARE

## 2024-04-26 DIAGNOSIS — E03.9 HYPOTHYROIDISM, UNSPECIFIED: ICD-10-CM

## 2024-04-26 DIAGNOSIS — D50.9 IRON DEFICIENCY ANEMIA, UNSPECIFIED IRON DEFICIENCY ANEMIA TYPE: ICD-10-CM

## 2024-04-26 DIAGNOSIS — M85.80 OTHER SPECIFIED DISORDERS OF BONE DENSITY AND STRUCTURE, UNSPECIFIED SITE: ICD-10-CM

## 2024-04-26 DIAGNOSIS — E11.9 TYPE 2 DIABETES MELLITUS WITHOUT COMPLICATIONS (MULTI): ICD-10-CM

## 2024-04-26 DIAGNOSIS — M81.8 OTHER OSTEOPOROSIS WITHOUT CURRENT PATHOLOGICAL FRACTURE: ICD-10-CM

## 2024-04-26 DIAGNOSIS — M81.0 AGE-RELATED OSTEOPOROSIS WITHOUT CURRENT PATHOLOGICAL FRACTURE: ICD-10-CM

## 2024-04-26 DIAGNOSIS — E55.9 VITAMIN D DEFICIENCY, UNSPECIFIED: ICD-10-CM

## 2024-04-26 DIAGNOSIS — M85.9 DISORDER OF BONE DENSITY AND STRUCTURE, UNSPECIFIED: ICD-10-CM

## 2024-04-26 DIAGNOSIS — E83.51 HYPOCALCEMIA: Primary | ICD-10-CM

## 2024-04-26 DIAGNOSIS — E78.5 HYPERLIPIDEMIA, UNSPECIFIED: ICD-10-CM

## 2024-04-26 LAB
25(OH)D3 SERPL-MCNC: 89 NG/ML (ref 30–100)
ALBUMIN SERPL BCP-MCNC: 4.2 G/DL (ref 3.4–5)
ALP SERPL-CCNC: 73 U/L (ref 33–136)
ALT SERPL W P-5'-P-CCNC: 7 U/L (ref 7–45)
ANION GAP SERPL CALC-SCNC: 14 MMOL/L (ref 10–20)
AST SERPL W P-5'-P-CCNC: 13 U/L (ref 9–39)
BASOPHILS # BLD AUTO: 0.04 X10*3/UL (ref 0–0.1)
BASOPHILS NFR BLD AUTO: 0.7 %
BILIRUB SERPL-MCNC: 0.4 MG/DL (ref 0–1.2)
BUN SERPL-MCNC: 13 MG/DL (ref 6–23)
CALCIUM SERPL-MCNC: 9.7 MG/DL (ref 8.6–10.3)
CHLORIDE SERPL-SCNC: 101 MMOL/L (ref 98–107)
CO2 SERPL-SCNC: 29 MMOL/L (ref 21–32)
CREAT SERPL-MCNC: 0.83 MG/DL (ref 0.5–1.05)
EGFRCR SERPLBLD CKD-EPI 2021: 73 ML/MIN/1.73M*2
EOSINOPHIL # BLD AUTO: 0.14 X10*3/UL (ref 0–0.4)
EOSINOPHIL NFR BLD AUTO: 2.5 %
ERYTHROCYTE [DISTWIDTH] IN BLOOD BY AUTOMATED COUNT: 13.4 % (ref 11.5–14.5)
EST. AVERAGE GLUCOSE BLD GHB EST-MCNC: 111 MG/DL
FERRITIN SERPL-MCNC: 104 NG/ML (ref 8–150)
GLUCOSE SERPL-MCNC: 88 MG/DL (ref 74–99)
HBA1C MFR BLD: 5.5 %
HCT VFR BLD AUTO: 44.7 % (ref 36–46)
HGB BLD-MCNC: 15.3 G/DL (ref 12–16)
IMM GRANULOCYTES # BLD AUTO: 0.01 X10*3/UL (ref 0–0.5)
IMM GRANULOCYTES NFR BLD AUTO: 0.2 % (ref 0–0.9)
IRON SATN MFR SERPL: 70 % (ref 25–45)
IRON SERPL-MCNC: 242 UG/DL (ref 35–150)
LYMPHOCYTES # BLD AUTO: 2.44 X10*3/UL (ref 0.8–3)
LYMPHOCYTES NFR BLD AUTO: 43.9 %
MCH RBC QN AUTO: 35.3 PG (ref 26–34)
MCHC RBC AUTO-ENTMCNC: 34.2 G/DL (ref 32–36)
MCV RBC AUTO: 103 FL (ref 80–100)
MONOCYTES # BLD AUTO: 0.29 X10*3/UL (ref 0.05–0.8)
MONOCYTES NFR BLD AUTO: 5.2 %
NEUTROPHILS # BLD AUTO: 2.64 X10*3/UL (ref 1.6–5.5)
NEUTROPHILS NFR BLD AUTO: 47.5 %
NRBC BLD-RTO: 0 /100 WBCS (ref 0–0)
PLATELET # BLD AUTO: 198 X10*3/UL (ref 150–450)
POTASSIUM SERPL-SCNC: 4.6 MMOL/L (ref 3.5–5.3)
PROT SERPL-MCNC: 7.7 G/DL (ref 6.4–8.2)
PTH-INTACT SERPL-MCNC: 56.1 PG/ML (ref 18.5–88)
RBC # BLD AUTO: 4.33 X10*6/UL (ref 4–5.2)
SODIUM SERPL-SCNC: 139 MMOL/L (ref 136–145)
TIBC SERPL-MCNC: 345 UG/DL (ref 240–445)
TSH SERPL-ACNC: 3.28 MIU/L (ref 0.44–3.98)
UIBC SERPL-MCNC: 103 UG/DL (ref 110–370)
WBC # BLD AUTO: 5.6 X10*3/UL (ref 4.4–11.3)

## 2024-04-26 PROCEDURE — 77080 DXA BONE DENSITY AXIAL: CPT | Mod: LIO

## 2024-04-26 PROCEDURE — 83550 IRON BINDING TEST: CPT

## 2024-04-26 PROCEDURE — 36415 COLL VENOUS BLD VENIPUNCTURE: CPT

## 2024-04-26 PROCEDURE — 83540 ASSAY OF IRON: CPT

## 2024-04-26 PROCEDURE — 82306 VITAMIN D 25 HYDROXY: CPT

## 2024-04-26 PROCEDURE — 77080 DXA BONE DENSITY AXIAL: CPT | Mod: LIO | Performed by: RADIOLOGY

## 2024-04-26 PROCEDURE — 80053 COMPREHEN METABOLIC PANEL: CPT

## 2024-04-26 PROCEDURE — 84443 ASSAY THYROID STIM HORMONE: CPT

## 2024-04-26 PROCEDURE — 83036 HEMOGLOBIN GLYCOSYLATED A1C: CPT

## 2024-04-26 PROCEDURE — 82728 ASSAY OF FERRITIN: CPT

## 2024-04-26 PROCEDURE — 85025 COMPLETE CBC W/AUTO DIFF WBC: CPT

## 2024-04-26 PROCEDURE — 83970 ASSAY OF PARATHORMONE: CPT

## 2024-05-27 DIAGNOSIS — M81.0 AGE-RELATED OSTEOPOROSIS WITHOUT CURRENT PATHOLOGICAL FRACTURE: Primary | ICD-10-CM

## 2024-05-27 RX ORDER — EPINEPHRINE 0.3 MG/.3ML
0.3 INJECTION SUBCUTANEOUS EVERY 5 MIN PRN
OUTPATIENT
Start: 2024-05-28

## 2024-05-27 RX ORDER — FAMOTIDINE 10 MG/ML
20 INJECTION INTRAVENOUS ONCE AS NEEDED
OUTPATIENT
Start: 2024-05-28

## 2024-05-27 RX ORDER — ALBUTEROL SULFATE 0.83 MG/ML
3 SOLUTION RESPIRATORY (INHALATION) AS NEEDED
OUTPATIENT
Start: 2024-05-28

## 2024-05-27 RX ORDER — DIPHENHYDRAMINE HYDROCHLORIDE 50 MG/ML
50 INJECTION INTRAMUSCULAR; INTRAVENOUS AS NEEDED
OUTPATIENT
Start: 2024-05-28

## 2024-05-27 RX ORDER — ACETAMINOPHEN 325 MG/1
650 TABLET ORAL ONCE
OUTPATIENT
Start: 2024-05-28 | End: 2024-05-28

## 2024-05-27 RX ORDER — DIPHENHYDRAMINE HCL 25 MG
25 CAPSULE ORAL ONCE
OUTPATIENT
Start: 2024-05-28 | End: 2024-05-28

## 2024-05-31 ENCOUNTER — OFFICE VISIT (OUTPATIENT)
Dept: PRIMARY CARE | Facility: CLINIC | Age: 74
End: 2024-05-31
Payer: MEDICARE

## 2024-05-31 VITALS
TEMPERATURE: 97.1 F | WEIGHT: 147.6 LBS | SYSTOLIC BLOOD PRESSURE: 120 MMHG | BODY MASS INDEX: 26.15 KG/M2 | HEIGHT: 63 IN | DIASTOLIC BLOOD PRESSURE: 60 MMHG

## 2024-05-31 DIAGNOSIS — J45.41 MODERATE PERSISTENT ASTHMA WITHOUT STATUS ASTHMATICUS WITH ACUTE EXACERBATION (HHS-HCC): ICD-10-CM

## 2024-05-31 DIAGNOSIS — J30.1 ALLERGIC RHINITIS DUE TO POLLEN, UNSPECIFIED SEASONALITY: Primary | ICD-10-CM

## 2024-05-31 DIAGNOSIS — K21.00 GASTROESOPHAGEAL REFLUX DISEASE WITH ESOPHAGITIS WITHOUT HEMORRHAGE: ICD-10-CM

## 2024-05-31 DIAGNOSIS — K63.5 POLYP OF COLON, UNSPECIFIED PART OF COLON, UNSPECIFIED TYPE: ICD-10-CM

## 2024-05-31 PROCEDURE — 96372 THER/PROPH/DIAG INJ SC/IM: CPT | Performed by: FAMILY MEDICINE

## 2024-05-31 PROCEDURE — 99214 OFFICE O/P EST MOD 30 MIN: CPT | Performed by: FAMILY MEDICINE

## 2024-05-31 PROCEDURE — 3078F DIAST BP <80 MM HG: CPT | Performed by: FAMILY MEDICINE

## 2024-05-31 PROCEDURE — 1159F MED LIST DOCD IN RCRD: CPT | Performed by: FAMILY MEDICINE

## 2024-05-31 PROCEDURE — 1125F AMNT PAIN NOTED PAIN PRSNT: CPT | Performed by: FAMILY MEDICINE

## 2024-05-31 PROCEDURE — 1160F RVW MEDS BY RX/DR IN RCRD: CPT | Performed by: FAMILY MEDICINE

## 2024-05-31 PROCEDURE — 3074F SYST BP LT 130 MM HG: CPT | Performed by: FAMILY MEDICINE

## 2024-05-31 RX ORDER — METHYLPREDNISOLONE ACETATE 40 MG/ML
40 INJECTION, SUSPENSION INTRA-ARTICULAR; INTRALESIONAL; INTRAMUSCULAR; SOFT TISSUE ONCE
Status: COMPLETED | OUTPATIENT
Start: 2024-05-31 | End: 2024-05-31

## 2024-05-31 RX ORDER — CLOTRIMAZOLE 10 MG/1
10 LOZENGE ORAL; TOPICAL 4 TIMES DAILY PRN
COMMUNITY
Start: 2024-04-23

## 2024-05-31 RX ORDER — ADALIMUMAB-ADBM 40MG/0.8ML
40 KIT SUBCUTANEOUS
COMMUNITY
Start: 2024-05-21

## 2024-05-31 RX ORDER — FLUTICASONE FUROATE, UMECLIDINIUM BROMIDE AND VILANTEROL TRIFENATATE 100; 62.5; 25 UG/1; UG/1; UG/1
1 POWDER RESPIRATORY (INHALATION)
COMMUNITY
Start: 2024-05-06

## 2024-05-31 RX ADMIN — METHYLPREDNISOLONE ACETATE 40 MG: 40 INJECTION, SUSPENSION INTRA-ARTICULAR; INTRALESIONAL; INTRAMUSCULAR; SOFT TISSUE at 08:57

## 2024-05-31 ASSESSMENT — PAIN SCALES - GENERAL: PAINLEVEL: 6

## 2024-05-31 ASSESSMENT — ENCOUNTER SYMPTOMS: DEPRESSION: 0

## 2024-05-31 NOTE — PROGRESS NOTES
"Subjective   Patient ID: Radha Deluna is a 74 y.o. female who presents for Follow-up (Patient is here for a 4 month follow up. ).    HPI   Patient here for follow-up.  She is still having issues with swallowing and reflux.  She has been having a lot of allergy symptoms and would like to get a shot.  Patient was placed back on Humira.  She states she does not think it is helping a lot.  She is being scheduled for osteoporosis infusion treatment  Review of Systems    Objective   /60 (BP Location: Right arm, Patient Position: Sitting, BP Cuff Size: Adult)   Temp 36.2 °C (97.1 °F) (Temporal)   Ht 1.6 m (5' 3\")   Wt 67 kg (147 lb 9.6 oz)   BMI 26.15 kg/m²     Physical Exam  Alert, pleasant and in no acute distress.  Heart: Regular rate and rhythm   Lungs: Bilateral diffuse rhonchi  Lower extremities: No edema  Assessment/Plan   Problem List Items Addressed This Visit             ICD-10-CM    Gastroesophageal reflux disease K21.9    Relevant Orders    Referral to Gastroenterology    Asthma without status asthmaticus ([facility]) J45.909     Other Visit Diagnoses         Codes    Allergic rhinitis due to pollen, unspecified seasonality    -  Primary J30.1    Relevant Medications    dexAMETHasone (Decadron) injection 4 mg (Start on 5/31/2024  9:15 AM)    methylPREDNISolone acetate (DEPO-Medrol) injection 40 mg (Start on 5/31/2024  9:15 AM)    Polyp of colon, unspecified part of colon, unspecified type     K63.5    Relevant Orders    Referral to Gastroenterology          Patient with a lot of allergy symptoms and chest wheezing.  IM injection of cortisone provided.  Follow-up with pulmonary.  GI referral to Dr. Levy as needs follow-up for GERD as well as colon polyps  Follow-up 6 months     "

## 2024-06-06 ENCOUNTER — OFFICE VISIT (OUTPATIENT)
Dept: PAIN MEDICINE | Facility: CLINIC | Age: 74
End: 2024-06-06
Payer: MEDICARE

## 2024-06-06 VITALS
TEMPERATURE: 97 F | DIASTOLIC BLOOD PRESSURE: 60 MMHG | RESPIRATION RATE: 18 BRPM | WEIGHT: 147 LBS | HEIGHT: 63 IN | OXYGEN SATURATION: 97 % | HEART RATE: 60 BPM | SYSTOLIC BLOOD PRESSURE: 128 MMHG | BODY MASS INDEX: 26.05 KG/M2

## 2024-06-06 DIAGNOSIS — M47.816 SPONDYLOSIS OF LUMBAR REGION WITHOUT MYELOPATHY OR RADICULOPATHY: ICD-10-CM

## 2024-06-06 DIAGNOSIS — I73.9 CLAUDICATION (CMS-HCC): ICD-10-CM

## 2024-06-06 DIAGNOSIS — M05.79 RHEUMATOID ARTHRITIS INVOLVING MULTIPLE SITES WITH POSITIVE RHEUMATOID FACTOR (MULTI): ICD-10-CM

## 2024-06-06 DIAGNOSIS — M96.1 POSTLAMINECTOMY SYNDROME, LUMBAR: ICD-10-CM

## 2024-06-06 DIAGNOSIS — M48.062 LUMBAR STENOSIS WITH NEUROGENIC CLAUDICATION: ICD-10-CM

## 2024-06-06 DIAGNOSIS — M47.816 FACET DEGENERATION OF LUMBAR REGION: Primary | ICD-10-CM

## 2024-06-06 PROCEDURE — 99213 OFFICE O/P EST LOW 20 MIN: CPT | Performed by: ANESTHESIOLOGY

## 2024-06-06 PROCEDURE — 1159F MED LIST DOCD IN RCRD: CPT | Performed by: ANESTHESIOLOGY

## 2024-06-06 PROCEDURE — 1125F AMNT PAIN NOTED PAIN PRSNT: CPT | Performed by: ANESTHESIOLOGY

## 2024-06-06 PROCEDURE — 3074F SYST BP LT 130 MM HG: CPT | Performed by: ANESTHESIOLOGY

## 2024-06-06 PROCEDURE — 3078F DIAST BP <80 MM HG: CPT | Performed by: ANESTHESIOLOGY

## 2024-06-06 PROCEDURE — 1160F RVW MEDS BY RX/DR IN RCRD: CPT | Performed by: ANESTHESIOLOGY

## 2024-06-06 RX ORDER — HYDROCODONE BITARTRATE AND ACETAMINOPHEN 7.5; 325 MG/1; MG/1
1 TABLET ORAL EVERY 6 HOURS PRN
Qty: 120 TABLET | Refills: 0 | Status: SHIPPED | OUTPATIENT
Start: 2024-06-06 | End: 2024-07-06

## 2024-06-06 ASSESSMENT — PAIN SCALES - GENERAL: PAINLEVEL: 7

## 2024-06-06 ASSESSMENT — ENCOUNTER SYMPTOMS
DEPRESSION: 0
OCCASIONAL FEELINGS OF UNSTEADINESS: 1
LOSS OF SENSATION IN FEET: 0

## 2024-06-06 NOTE — H&P
History Of Present Illness  Radha Deluna is a 74 y.o. female presenting with chronic pain syndrome.  Patient suffers from sarcoidosis and rheumatoid arthritis as well is intractable pain syndrome.  She manages her pain by taking 2-1/2 tablets a day of Norco 7.5/325 mg tablets.  Pill count today is 7 which is consistent with her last refill.  Can controlled substance agreement is on file..     Past Medical History  Past Medical History:   Diagnosis Date    Chronic obstructive pulmonary disease, unspecified (Multi) 11/09/2022    Chronic obstructive pulmonary disease, unspecified COPD type    Migraine, unspecified, not intractable, without status migrainosus     Migraines    Other chronic pain 10/21/2015    Chronic pain    Personal history of other diseases of the circulatory system     History of cardiac disorder    Personal history of other diseases of the nervous system and sense organs     History of otitis media    Personal history of other diseases of the respiratory system     History of asthma    Personal history of other diseases of the respiratory system     History of sinusitis    Personal history of other endocrine, nutritional and metabolic disease     History of thyroid disorder    Personal history of other malignant neoplasm of skin     History of malignant neoplasm of skin    Sarcoidosis, unspecified 10/05/2022    Sarcoidosis       Surgical History  Past Surgical History:   Procedure Laterality Date    ANKLE SURGERY  10/21/2015    Ankle Surgery    APPENDECTOMY  10/21/2015    Appendectomy    BREAST SURGERY  10/21/2015    Breast Surgery    CARPAL TUNNEL RELEASE  10/26/2016    Neuroplasty Median Nerve At Carpal Tunnel    CATARACT EXTRACTION Bilateral     CHOLECYSTECTOMY  10/21/2015    Cholecystectomy    CORONARY ANGIOPLASTY WITH STENT PLACEMENT  01/14/2021    Cath Placement Of Stent 1    CT GUIDED PERCUTANEOUS BIOPSY LUNG  08/12/2020    CT GUIDED PERCUTANEOUS BIOPSY LUNG 8/12/2020 PAR [facility]IB LEGACY     CT GUIDED PERCUTANEOUS BIOPSY LUNG  12/01/2020    CT GUIDED PERCUTANEOUS BIOPSY LUNG 12/1/2020 [facility]GAC[facility]    LUNG SURGERY  10/21/2015    Lung Surgery    LYMPH NODE BIOPSY  09/29/2017    Biopsy Lymph Node    MR HEAD ANGIO WO IV CONTRAST  04/04/2022    MR HEAD ANGIO WO IV CONTRAST 4/4/2022 PAR [facility]CILLARY LEGACY    MR NECK ANGIO WO IV CONTRAST  08/15/2022    MR NECK ANGIO WO IV CONTRAST 8/15/2022 PAR ANCILLARY LEGACY    OTHER SURGICAL HISTORY  10/21/2015    Wrist Surgery    OTHER SURGICAL HISTORY  02/07/2022    Endoscopy    OTHER SURGICAL HISTORY  02/07/2022    Colonoscopy    OTHER SURGICAL HISTORY  10/29/2020    Thyroid surgery    OTHER SURGICAL HISTORY  10/29/2020    Sinus surgery    OTHER SURGICAL HISTORY  10/29/2020    Ear surgery    OTHER SURGICAL HISTORY  10/29/2020    Spinal surgery    OTHER SURGICAL HISTORY  10/21/2015    Thoracoscopy (Therapeutic) With Wedge Resection Of Lung    ROTATOR CUFF REPAIR  08/10/2017    Rotator Cuff Repair        Social History  She reports that she has been smoking cigarettes. She has never used smokeless tobacco. She reports that she does not drink alcohol and does not use drugs.    Family History  Family History   Problem Relation Name Age of Onset    Other (cardiac disorder) Mother      Other (cardiac disorder) Father      Stroke Father      Coronary artery disease Father      Hypertension Father      Diabetes Other          Allergies  Erythromycin    Review of Systems  Unremarkable except for chief complaint  Physical Exam  Gen. appearance:  Patient's alert and oriented ×3 ;cooperative mild to moderate distress  Heart: Regular rate and rhythm  Lungs: Clear to auscultation  Abdomen: Soft nontender  Neuro: Cranial nerves II -XII intact; DTR: +2 over 4 biceps triceps brachial radialis patellar and Achilles  Spinal exam: Positive paraspinal tenderness noted bilaterally L4 5 L5-S1 with flexion extension and rotation/no bony abnormalities  Musculoskeletal:  Upper and lower  "extremity strength was 4/5 bilaterally  :  deferred  Skin: Warm and dry      Last Recorded Vitals  Blood pressure 128/60, pulse 60, temperature 36.1 °C (97 °F), resp. rate 18, height 1.6 m (5' 3\"), weight 66.7 kg (147 lb), SpO2 97%.    Relevant Results         Assessment/Plan   Diagnoses and all orders for this visit:  Facet degeneration of lumbar region  -     HYDROcodone-acetaminophen (Norco) 7.5-325 mg tablet; Take 1 tablet by mouth every 6 hours if needed for severe pain (7 - 10).  -     Follow Up In Pain Medicine; Future  Claudication ([facility])  Lumbar stenosis with neurogenic claudication  Postlaminectomy syndrome, lumbar  -     HYDROcodone-acetaminophen (Norco) 7.5-325 mg tablet; Take 1 tablet by mouth every 6 hours if needed for severe pain (7 - 10).  -     Follow Up In Pain Medicine; Future  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)  -     HYDROcodone-acetaminophen (Norco) 7.5-325 mg tablet; Take 1 tablet by mouth every 6 hours if needed for severe pain (7 - 10).  -     Follow Up In Pain Medicine; Future  Spondylosis of lumbar region without myelopathy or radiculopathy  -     HYDROcodone-acetaminophen (Norco) 7.5-325 mg tablet; Take 1 tablet by mouth every 6 hours if needed for severe pain (7 - 10).    Risk and benefits continued use of hydrocodone was discussed patient was e-prescribed 120 tablets.  A follow-up visit has been scheduled.  Patient was told to continue her home strengthening exercises as well as aerobic therapy and to keep her medication in a secure location at all times.       I spent 20 minutes in the professional and overall care of this patient.      Brett Rider, DO    "

## 2024-06-06 NOTE — PROGRESS NOTES
Pain #7/10 to her back and both legs with the right being worse. This pain is constant/chronic.  Takes  2 1/2 Norco per day which reduces her pain by 50%.  She currently has 7 tablets left.

## 2024-06-30 DIAGNOSIS — B37.0 CANDIDAL STOMATITIS: ICD-10-CM

## 2024-07-01 ENCOUNTER — APPOINTMENT (OUTPATIENT)
Dept: NEUROLOGY | Facility: CLINIC | Age: 74
End: 2024-07-01
Payer: MEDICARE

## 2024-07-01 VITALS
TEMPERATURE: 97.4 F | SYSTOLIC BLOOD PRESSURE: 120 MMHG | HEART RATE: 50 BPM | WEIGHT: 148.4 LBS | RESPIRATION RATE: 16 BRPM | DIASTOLIC BLOOD PRESSURE: 74 MMHG | HEIGHT: 63 IN | BODY MASS INDEX: 26.29 KG/M2

## 2024-07-01 DIAGNOSIS — G43.109 MIGRAINE WITH AURA AND WITHOUT STATUS MIGRAINOSUS, NOT INTRACTABLE: ICD-10-CM

## 2024-07-01 DIAGNOSIS — G62.9 POLYNEUROPATHY: ICD-10-CM

## 2024-07-01 DIAGNOSIS — G25.0 ESSENTIAL TREMOR: Primary | ICD-10-CM

## 2024-07-01 DIAGNOSIS — M48.062 LUMBAR STENOSIS WITH NEUROGENIC CLAUDICATION: ICD-10-CM

## 2024-07-01 DIAGNOSIS — E11.42 DIABETIC POLYNEUROPATHY ASSOCIATED WITH TYPE 2 DIABETES MELLITUS (MULTI): ICD-10-CM

## 2024-07-01 DIAGNOSIS — G60.3 IDIOPATHIC PROGRESSIVE NEUROPATHY: ICD-10-CM

## 2024-07-01 DIAGNOSIS — G56.12 MEDIAN NEUROPATHY, LEFT: ICD-10-CM

## 2024-07-01 PROBLEM — G43.909 MIGRAINE HEADACHE: Status: ACTIVE | Noted: 2024-07-01

## 2024-07-01 PROBLEM — G45.9 TRANSIENT ISCHEMIC ATTACK: Status: ACTIVE | Noted: 2024-07-01

## 2024-07-01 PROBLEM — Z85.828 HISTORY OF MALIGNANT NEOPLASM OF SKIN: Status: ACTIVE | Noted: 2024-07-01

## 2024-07-01 PROBLEM — Z86.39 HISTORY OF THYROID DISORDER: Status: ACTIVE | Noted: 2024-07-01

## 2024-07-01 PROBLEM — G56.00 CARPAL TUNNEL SYNDROME: Status: ACTIVE | Noted: 2024-07-01

## 2024-07-01 PROBLEM — Z86.69 HISTORY OF OTITIS MEDIA: Status: ACTIVE | Noted: 2024-07-01

## 2024-07-01 PROCEDURE — 1160F RVW MEDS BY RX/DR IN RCRD: CPT | Performed by: PSYCHIATRY & NEUROLOGY

## 2024-07-01 PROCEDURE — 3078F DIAST BP <80 MM HG: CPT | Performed by: PSYCHIATRY & NEUROLOGY

## 2024-07-01 PROCEDURE — 1159F MED LIST DOCD IN RCRD: CPT | Performed by: PSYCHIATRY & NEUROLOGY

## 2024-07-01 PROCEDURE — 99215 OFFICE O/P EST HI 40 MIN: CPT | Performed by: PSYCHIATRY & NEUROLOGY

## 2024-07-01 PROCEDURE — 3074F SYST BP LT 130 MM HG: CPT | Performed by: PSYCHIATRY & NEUROLOGY

## 2024-07-01 RX ORDER — DIPHENHYDRAMINE HCL 25 MG
25 TABLET ORAL ONCE AS NEEDED
COMMUNITY

## 2024-07-01 RX ORDER — CLOTRIMAZOLE 10 MG/1
LOZENGE ORAL
Qty: 70 TROCHE | Refills: 0 | Status: SHIPPED | OUTPATIENT
Start: 2024-07-01

## 2024-07-01 RX ORDER — IPRATROPIUM BROMIDE 0.5 MG/2.5ML
SOLUTION RESPIRATORY (INHALATION)
COMMUNITY
Start: 2024-02-28

## 2024-07-01 RX ORDER — LIFITEGRAST 50 MG/ML
SOLUTION/ DROPS OPHTHALMIC
COMMUNITY
Start: 2024-06-28

## 2024-07-01 RX ORDER — BUDESONIDE 0.5 MG/2ML
INHALANT ORAL
COMMUNITY
Start: 2022-06-16

## 2024-07-01 RX ORDER — PRIMIDONE 50 MG/1
100 TABLET ORAL DAILY
Qty: 180 TABLET | Refills: 3 | Status: SHIPPED | OUTPATIENT
Start: 2024-07-01 | End: 2025-07-01

## 2024-07-01 RX ORDER — GABAPENTIN 800 MG/1
800 TABLET ORAL 4 TIMES DAILY
Qty: 120 TABLET | Refills: 5 | Status: SHIPPED | OUTPATIENT
Start: 2024-07-01 | End: 2024-07-02 | Stop reason: SDUPTHER

## 2024-07-01 ASSESSMENT — ENCOUNTER SYMPTOMS
LOSS OF SENSATION IN FEET: 0
OCCASIONAL FEELINGS OF UNSTEADINESS: 0
DEPRESSION: 0

## 2024-07-01 NOTE — PROGRESS NOTES
Subjective     Radha Deluna 74 y.o.  HPI  The patient states that she is stable from a neurological standpoint.  The patient states that her tremor still present and she feels that the primidone does help but it makes her very sleepy.  The patient states that she took primidone 150 mg a day but was very sleepy and dropped her dose down to 100 mg a day.    The patient states that she had 1 fall and only scraped her knee.  She is going to have her EMG nerve conduction study done this Friday.  The patient states that she did wear a cardiac monitor for 10 days but I do not have a copy that result.    The patient is compliant with her Plavix, pravastatin and other medicines.  The patient is compliant with her gabapentin.  The patient would like gabapentin capsules for refills.  She is using her wrist splints on a consistent basis.    Review of Systems   All other systems reviewed and are negative.       Patient Active Problem List   Diagnosis    AAA (abdominal aortic aneurysm) (CMS-[facility])    Acute ankle pain    Acute non intractable tension-type headache    Acute sinusitis    Chronic rhinitis    Anemia    Antalgic gait    Arthritis of knee, right    Atypical face pain    Headache    Left facial numbness    Numbness and tingling    Bilateral carpal tunnel syndrome    Breast pain    Cerebral infarction (Multi)    Cervical radiculopathy    Chronic hoarseness    Chronic musculoskeletal pain    Claudication (CMS-[facility])    Chronic obstructive pulmonary disease (Multi)    Chronic tension-type headache, intractable    Contusion, knee and lower leg, right, subsequent encounter    Coronary disorder    Elevated fasting glucose    Essential tremor    Facet degeneration of lumbar region    Facial pressure    Fatigue    Foot pain, bilateral    Gastroesophageal reflux disease    History of migraine headaches    HTN (hypertension), benign    Hypercholesterolemia    Hyperkeratosis    Median neuropathy, left    Nasal discharge     Rhinorrhea    On methotrexate therapy    Onychocryptosis    Onychomycosis    Osteoporosis    Otalgia, left    PAD (peripheral artery disease) (CMS-[facility])    Joint pain, hip    Pain, joint, shoulder    Positive FIT (fecal immunochemical test)    Postlaminectomy syndrome, lumbar    Pulmonary nodules/lesions, multiple    Rheumatoid arthritis with rheumatoid factor (Multi)    Rheumatoid lung disease (Multi)    Right knee pain    Rotator cuff tendonitis    Sarcoidosis    Lumbar stenosis with neurogenic claudication    Spinal stenosis    Spondylosis of lumbar region without myelopathy or radiculopathy    Rheumatoid nodule, right ankle and foot (Multi)    Rheumatoid nodule, right ankle and foot (Multi)    Tremor    Peripheral neuropathy    Trigeminal neuropathy    Trochanteric bursitis    Vitamin D deficiency    Weight loss, unintentional    Adalimumab (Humira) long-term use    Age-related osteoporosis without current pathological fracture    Asthma without status asthmaticus ([facility])    Bilateral primary osteoarthritis of knee    Palpitations    Angina pectoris (CMS-[facility])    Chronic pain    Chronic obstructive lung disease (Multi)    Pulmonary emphysema (Multi)    Methotrexate, long term, current use    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)    Rheumatoid arthritis (Multi)    Sarcoid    Carpal tunnel syndrome    History of malignant neoplasm of skin    History of otitis media    History of thyroid disorder    Migraine headache    Transient ischemic attack        Past Medical History:   Diagnosis Date    Chronic obstructive pulmonary disease, unspecified (Multi) 11/09/2022    Chronic obstructive pulmonary disease, unspecified COPD type    Migraine, unspecified, not intractable, without status migrainosus     Migraines    Other chronic pain 10/21/2015    Chronic pain    Personal history of other diseases of the circulatory system     History of cardiac disorder    Personal  history of other diseases of the nervous system and sense organs     History of otitis media    Personal history of other diseases of the respiratory system     History of asthma    Personal history of other diseases of the respiratory system     History of sinusitis    Personal history of other endocrine, nutritional and metabolic disease     History of thyroid disorder    Personal history of other malignant neoplasm of skin     History of malignant neoplasm of skin    Sarcoidosis, unspecified 10/05/2022    Sarcoidosis        Past Surgical History:   Procedure Laterality Date    ANKLE SURGERY  10/21/2015    Ankle Surgery    APPENDECTOMY  10/21/2015    Appendectomy    BREAST SURGERY  10/21/2015    Breast Surgery    CARPAL TUNNEL RELEASE  10/26/2016    Neuroplasty Median Nerve At Carpal Tunnel    CATARACT EXTRACTION Bilateral     CHOLECYSTECTOMY  10/21/2015    Cholecystectomy    CORONARY ANGIOPLASTY WITH STENT PLACEMENT  01/14/2021    Cath Placement Of Stent 1    CT GUIDED PERCUTANEOUS BIOPSY LUNG  08/12/2020    CT GUIDED PERCUTANEOUS BIOPSY LUNG 8/12/2020 [facility]    CT GUIDED PERCUTANEOUS BIOPSY LUNG  12/01/2020    CT GUIDED PERCUTANEOUS BIOPSY LUNG 12/1/2020 [facility]    LUNG SURGERY  10/21/2015    Lung Surgery    LYMPH NODE BIOPSY  09/29/2017    Biopsy Lymph Node    MR HEAD ANGIO WO IV CONTRAST  04/04/2022    MR HEAD ANGIO WO IV CONTRAST 4/4/2022 [facility]    MR NECK ANGIO WO IV CONTRAST  08/15/2022    MR NECK ANGIO WO IV CONTRAST 8/15/2022 [facility]    OTHER SURGICAL HISTORY  10/21/2015    Wrist Surgery    OTHER SURGICAL HISTORY  02/07/2022    Endoscopy    OTHER SURGICAL HISTORY  02/07/2022    Colonoscopy    OTHER SURGICAL HISTORY  10/29/2020    Thyroid surgery    OTHER SURGICAL HISTORY  10/29/2020    Sinus surgery    OTHER SURGICAL HISTORY  10/29/2020    Ear surgery    OTHER SURGICAL HISTORY  10/29/2020    Spinal surgery    OTHER SURGICAL HISTORY  10/21/2015    Thoracoscopy  (Therapeutic) With Wedge Resection Of Lung    ROTATOR CUFF REPAIR  08/10/2017    Rotator Cuff Repair        Social History     Socioeconomic History    Marital status:      Spouse name: Not on file    Number of children: Not on file    Years of education: Not on file    Highest education level: Not on file   Occupational History    Not on file   Tobacco Use    Smoking status: Every Day     Types: Cigarettes     Passive exposure: Current    Smokeless tobacco: Never   Vaping Use    Vaping status: Never Used   Substance and Sexual Activity    Alcohol use: Not Currently    Drug use: Never    Sexual activity: Not on file   Other Topics Concern    Not on file   Social History Narrative    Not on file     Social Determinants of Health     Financial Resource Strain: Not on file   Food Insecurity: No Food Insecurity (11/9/2023)    Hunger Vital Sign     Worried About Running Out of Food in the Last Year: Never true     Ran Out of Food in the Last Year: Never true   Transportation Needs: Not on file   Physical Activity: Not on file   Stress: Not on file   Social Connections: Not on file   Intimate Partner Violence: Not on file   Housing Stability: Not on file        Family History   Problem Relation Name Age of Onset    Other (cardiac disorder) Mother      Other (cardiac disorder) Father      Stroke Father      Coronary artery disease Father      Hypertension Father      Diabetes Other          Current Outpatient Medications   Medication Instructions    adalimumab-adbm 40 mg, Every 14 days    albuterol 5 mg/mL nebulizer solution nebulization, USE 0.25 ML IN 2.5 ML NORMAL SALINE VIA NEBULIZER 3 TO 4 TIMES DAILY AS NEEDED FOR WHEEZING.    albuterol 90 mcg/actuation inhaler 1 puff, inhalation, 4 times daily RT    azelastine (Astelin) 137 mcg (0.1 %) nasal spray 2 sprays, Each Nostril, 2 times daily    Bifidobacterium infantis (ALIGN ORAL) oral, Daily    budesonide (Pulmicort) 0.5 mg/2 mL nebulizer solution inhalation     "clopidogrel (Plavix) 75 mg tablet 1 tablet, oral, Daily    clotrimazole (MYCELEX) 10 mg, oral, 4 times daily PRN    diphenhydrAMINE (SOMINEX) 25 mg, oral, Once as needed    ergocalciferol (VITAMIN D-2) 1,250 mcg, oral, Once Weekly    famotidine (PEPCID) 40 mg, oral, Nightly    fluticasone (Flonase) 50 mcg/actuation nasal spray 2 sprays, Each Nostril, Daily    folic acid (Folvite) 1 mg tablet 1 tablet, oral, Daily    gabapentin (NEURONTIN) 800 mg, oral, 4 times daily, 4 times daily    Humira(CF) Pen 40 mg, subcutaneous, Every 14 days    HYDROcodone-acetaminophen (Norco) 7.5-325 mg tablet 1 tablet, oral, Every 6 hours PRN    hydroxychloroquine (PLAQUENIL) 200 mg, oral, 2 times daily    ipratropium (Atrovent) 0.02 % nebulizer solution USE 1 (ONE) VIAL IN NEBULIZER TWICE DAILY FOR 90 DAYS    isosorbide mononitrate ER (Imdur) 30 mg 24 hr tablet 1 tablet, oral, Daily    methotrexate 25 mg/mL injection 40 mg/m2, intramuscular, Once Weekly    metoclopramide (REGLAN) 10 mg, oral, 3 times daily PRN    metoprolol tartrate (Lopressor) 50 mg tablet 1 tablet, oral, Every 12 hours    montelukast (SINGULAIR) 10 mg, oral, Daily    nitroglycerin (Nitrostat) 0.4 mg SL tablet sublingual    oxygen (O2) therapy use at night as directed    pantoprazole (PROTONIX) 40 mg, oral, Daily, Do not crush, chew, or split.    pravastatin (Pravachol) 20 mg tablet 1 tablet, oral, Daily    primidone (MYSOLINE) 150 mg, oral, Daily    Prolia 60 mg, subcutaneous, Every 6 months    syringe with needle 1 mL 25 gauge x 5/8\" syringe miscellaneous    theophylline ER (MINE-DUR) 300 mg, oral, 3 times daily    Trelegy Ellipta 100-62.5-25 mcg blister with device 1 puff, inhalation    Xiidra 5 % dropperette         Allergies   Allergen Reactions    Erythromycin Unknown        Objective  /74 (BP Location: Left arm)   Pulse 50   Temp 36.3 °C (97.4 °F)   Resp 16   Ht 1.6 m (5' 3\")   Wt 67.3 kg (148 lb 6.4 oz)   BMI 26.29 kg/m²    GENERAL APPEARANCE:  No " distress, alert and cooperative.     MENTAL STATE:  Orientation was normal to time, place and person. Recent and remote memory was intact.  Attention span and concentration were normal. Language testing was normal for comprehension, repetition, expression, and naming. Calculation was intact. The patient could correctly interpret a picture, and copy a diagram. General fund of knowledge was intact. Mini-mental status examination was performed with no errors.     CRANIAL NERVES:  Cranial nerves were normal.      CN 2- Visual Acuity  OD: 20/20 (corrected)   OS: 20/20 (corrected); visual fields full to confrontation.      CN 3, 4, 6-  Pupils round, 4 mm in diameter, equally reactive to light. No ptosis. EOMs normal alignment, full range of movement, no nystagmus     CN 5- Facial sensation intact bilaterally. Normal corneal reflexes.      CN 7- Normal and symmetric facial strength. Nasolabial folds symmetric.     CN 8- Hearing intact to finger rub, whisper.      CN 9- Palate elevates symmetrically. Normal gag reflex.      CN 11- Normal strength of shoulder shrug and neck turning      CN 12- Tongue midline, with normal bulk and strength; no fasciculations.     MOTOR: The patient is motor testing shows that she has 5/5 strength in the upper extremities bilaterally with the exception that her right APB was 5-/5.  The patient has 5-/5 strength in the lower extremities bilaterally.  The patient has a left greater than right upper extremity tremor that is present with movement only.    GAIT: The patient station and gait are mildly unsteady.    Assessment/Plan   The patient is stable from a neurological standpoint.  I will certainly check the results of the EMG nerve conduction study.  I did  her to wear bilateral wrist splints at night is much as she can tolerate.  I would like to have a copy of the cardiac monitor that was done.  The patient should continue her Plavix and pravastatin as well as stroke risk factor  modification.  The patient should continue her gabapentin at 800 mg 4 times a day.  I will try to refill her gabapentin with capsules.  The patient can use symptomatic pain medicines for severe headaches, back pain and lower extremity pain.  The patient should continue all of her medicines for arthritis as per rheumatology.  The patient should continue to follow with pain management.  The patient does need to stop smoking.  The patient should continue her primidone at 100 mg a day and I did give her a refill for this.  I discussed all these issues in detail with the patient and answered all of her questions.  The patient will follow-up with me in 6 months.

## 2024-07-01 NOTE — PATIENT INSTRUCTIONS
The patient is stable from a neurological standpoint.  I will certainly check the results of the EMG nerve conduction study.  I did  her to wear bilateral wrist splints at night is much as she can tolerate.  I would like to have a copy of the cardiac monitor that was done.  The patient should continue her Plavix and pravastatin as well as stroke risk factor modification.  The patient should continue her gabapentin at 800 mg 4 times a day.  I will try to refill her gabapentin with capsules.  The patient can use symptomatic pain medicines for severe headaches, back pain and lower extremity pain.  The patient should continue all of her medicines for arthritis as per rheumatology.  The patient should continue to follow with pain management.  The patient does need to stop smoking.  The patient should continue her primidone at 100 mg a day and I did give her a refill for this.  I discussed all these issues in detail with the patient and answered all of her questions.  The patient will follow-up with me in 6 months.

## 2024-07-02 ENCOUNTER — TELEPHONE (OUTPATIENT)
Dept: NEUROLOGY | Facility: CLINIC | Age: 74
End: 2024-07-02
Payer: MEDICARE

## 2024-07-02 DIAGNOSIS — E11.42 DIABETIC POLYNEUROPATHY ASSOCIATED WITH TYPE 2 DIABETES MELLITUS (MULTI): ICD-10-CM

## 2024-07-02 DIAGNOSIS — G62.9 POLYNEUROPATHY: ICD-10-CM

## 2024-07-02 RX ORDER — GABAPENTIN 400 MG/1
800 CAPSULE ORAL 4 TIMES DAILY
Qty: 720 TABLET | Refills: 1 | Status: SHIPPED | OUTPATIENT
Start: 2024-07-02 | End: 2024-12-29

## 2024-07-02 NOTE — TELEPHONE ENCOUNTER
Pt states that she discussed with you yesterday that she cannot take the gabapentin tablets as they cause her GI upset - however you sent in script for gabapentin 800mg tablets.  I tried to send you request for capsules but I do not see that the capsules come in that strength, please advise.

## 2024-07-10 DIAGNOSIS — M48.062 LUMBAR STENOSIS WITH NEUROGENIC CLAUDICATION: Primary | ICD-10-CM

## 2024-07-10 RX ORDER — CYCLOBENZAPRINE HCL 10 MG
10 TABLET ORAL 3 TIMES DAILY PRN
COMMUNITY
End: 2024-07-10 | Stop reason: SDUPTHER

## 2024-07-10 RX ORDER — CYCLOBENZAPRINE HCL 10 MG
10 TABLET ORAL 3 TIMES DAILY PRN
Qty: 45 TABLET | Refills: 1 | Status: SHIPPED | OUTPATIENT
Start: 2024-07-10

## 2024-07-12 ENCOUNTER — HOSPITAL ENCOUNTER (OUTPATIENT)
Dept: NEUROLOGY | Facility: HOSPITAL | Age: 74
Discharge: HOME | End: 2024-07-12
Payer: MEDICARE

## 2024-07-12 DIAGNOSIS — R20.0 RIGHT UPPER EXTREMITY NUMBNESS: ICD-10-CM

## 2024-07-12 DIAGNOSIS — R20.0 LEFT UPPER EXTREMITY NUMBNESS: ICD-10-CM

## 2024-07-12 PROCEDURE — 95886 MUSC TEST DONE W/N TEST COMP: CPT | Performed by: PSYCHIATRY & NEUROLOGY

## 2024-07-12 PROCEDURE — 95913 NRV CNDJ TEST 13/> STUDIES: CPT | Performed by: PSYCHIATRY & NEUROLOGY

## 2024-07-31 DIAGNOSIS — J30.89 NON-SEASONAL ALLERGIC RHINITIS, UNSPECIFIED TRIGGER: ICD-10-CM

## 2024-07-31 DIAGNOSIS — J45.41 MODERATE PERSISTENT ASTHMA WITHOUT STATUS ASTHMATICUS WITH ACUTE EXACERBATION (HHS-HCC): Primary | ICD-10-CM

## 2024-07-31 RX ORDER — AZELASTINE 1 MG/ML
2 SPRAY, METERED NASAL 2 TIMES DAILY
Qty: 72 ML | Refills: 3 | Status: SHIPPED | OUTPATIENT
Start: 2024-07-31 | End: 2025-07-31

## 2024-07-31 RX ORDER — IPRATROPIUM BROMIDE 0.5 MG/2.5ML
SOLUTION RESPIRATORY (INHALATION)
Qty: 75 ML | Refills: 5 | Status: SHIPPED | OUTPATIENT
Start: 2024-07-31

## 2024-08-01 DIAGNOSIS — J30.89 NON-SEASONAL ALLERGIC RHINITIS, UNSPECIFIED TRIGGER: Primary | ICD-10-CM

## 2024-08-01 DIAGNOSIS — M81.0 AGE-RELATED OSTEOPOROSIS WITHOUT CURRENT PATHOLOGICAL FRACTURE: Primary | ICD-10-CM

## 2024-08-01 RX ORDER — IPRATROPIUM BROMIDE 42 UG/1
2 SPRAY, METERED NASAL 4 TIMES DAILY
Qty: 15 ML | Refills: 5 | Status: SHIPPED | OUTPATIENT
Start: 2024-08-01

## 2024-08-01 RX ORDER — IPRATROPIUM BROMIDE 42 UG/1
2 SPRAY, METERED NASAL 4 TIMES DAILY
COMMUNITY
End: 2024-08-01 | Stop reason: SDUPTHER

## 2024-08-12 ENCOUNTER — TELEPHONE (OUTPATIENT)
Dept: PAIN MEDICINE | Facility: CLINIC | Age: 74
End: 2024-08-12
Payer: MEDICARE

## 2024-08-14 ENCOUNTER — LAB (OUTPATIENT)
Dept: LAB | Facility: LAB | Age: 74
End: 2024-08-14
Payer: MEDICARE

## 2024-08-14 ENCOUNTER — OFFICE VISIT (OUTPATIENT)
Dept: PAIN MEDICINE | Facility: CLINIC | Age: 74
End: 2024-08-14
Payer: MEDICARE

## 2024-08-14 VITALS
RESPIRATION RATE: 18 BRPM | HEART RATE: 63 BPM | HEIGHT: 63 IN | SYSTOLIC BLOOD PRESSURE: 133 MMHG | DIASTOLIC BLOOD PRESSURE: 58 MMHG | TEMPERATURE: 97.9 F | OXYGEN SATURATION: 97 % | WEIGHT: 146 LBS | BODY MASS INDEX: 25.87 KG/M2

## 2024-08-14 DIAGNOSIS — M47.816 SPONDYLOSIS OF LUMBAR REGION WITHOUT MYELOPATHY OR RADICULOPATHY: ICD-10-CM

## 2024-08-14 DIAGNOSIS — D86.9 SARCOID: ICD-10-CM

## 2024-08-14 DIAGNOSIS — M79.18 CHRONIC MUSCULOSKELETAL PAIN: ICD-10-CM

## 2024-08-14 DIAGNOSIS — J41.1 MUCOPURULENT CHRONIC BRONCHITIS (MULTI): ICD-10-CM

## 2024-08-14 DIAGNOSIS — M05.722 RHEUMATOID ARTHRITIS INVOLVING BOTH ELBOWS WITH POSITIVE RHEUMATOID FACTOR (MULTI): ICD-10-CM

## 2024-08-14 DIAGNOSIS — G89.29 CHRONIC MUSCULOSKELETAL PAIN: ICD-10-CM

## 2024-08-14 DIAGNOSIS — G62.9 PERIPHERAL POLYNEUROPATHY: ICD-10-CM

## 2024-08-14 DIAGNOSIS — M48.062 LUMBAR STENOSIS WITH NEUROGENIC CLAUDICATION: Primary | ICD-10-CM

## 2024-08-14 DIAGNOSIS — J41.0 SIMPLE CHRONIC BRONCHITIS (MULTI): ICD-10-CM

## 2024-08-14 DIAGNOSIS — M47.816 FACET DEGENERATION OF LUMBAR REGION: ICD-10-CM

## 2024-08-14 DIAGNOSIS — M05.721 RHEUMATOID ARTHRITIS INVOLVING BOTH ELBOWS WITH POSITIVE RHEUMATOID FACTOR (MULTI): ICD-10-CM

## 2024-08-14 DIAGNOSIS — Z79.899 MEDICATION MANAGEMENT: ICD-10-CM

## 2024-08-14 DIAGNOSIS — M96.1 POSTLAMINECTOMY SYNDROME, LUMBAR: ICD-10-CM

## 2024-08-14 DIAGNOSIS — D86.9 SARCOIDOSIS: ICD-10-CM

## 2024-08-14 DIAGNOSIS — E83.51 HYPOCALCEMIA: ICD-10-CM

## 2024-08-14 LAB
ALBUMIN SERPL BCP-MCNC: 4.2 G/DL (ref 3.4–5)
ALP SERPL-CCNC: 62 U/L (ref 33–136)
ALT SERPL W P-5'-P-CCNC: 5 U/L (ref 7–45)
ANION GAP SERPL CALC-SCNC: 10 MMOL/L (ref 10–20)
AST SERPL W P-5'-P-CCNC: 12 U/L (ref 9–39)
BILIRUB SERPL-MCNC: 0.3 MG/DL (ref 0–1.2)
BUN SERPL-MCNC: 10 MG/DL (ref 6–23)
CALCIUM SERPL-MCNC: 9.9 MG/DL (ref 8.6–10.3)
CHLORIDE SERPL-SCNC: 103 MMOL/L (ref 98–107)
CO2 SERPL-SCNC: 29 MMOL/L (ref 21–32)
CREAT SERPL-MCNC: 0.77 MG/DL (ref 0.5–1.05)
EGFRCR SERPLBLD CKD-EPI 2021: 80 ML/MIN/1.73M*2
GLUCOSE SERPL-MCNC: 75 MG/DL (ref 74–99)
POTASSIUM SERPL-SCNC: 4.3 MMOL/L (ref 3.5–5.3)
PROT SERPL-MCNC: 7.2 G/DL (ref 6.4–8.2)
SODIUM SERPL-SCNC: 138 MMOL/L (ref 136–145)

## 2024-08-14 PROCEDURE — 99213 OFFICE O/P EST LOW 20 MIN: CPT | Performed by: ANESTHESIOLOGY

## 2024-08-14 PROCEDURE — 80053 COMPREHEN METABOLIC PANEL: CPT

## 2024-08-14 PROCEDURE — 3075F SYST BP GE 130 - 139MM HG: CPT | Performed by: ANESTHESIOLOGY

## 2024-08-14 PROCEDURE — 1125F AMNT PAIN NOTED PAIN PRSNT: CPT | Performed by: ANESTHESIOLOGY

## 2024-08-14 PROCEDURE — 3078F DIAST BP <80 MM HG: CPT | Performed by: ANESTHESIOLOGY

## 2024-08-14 PROCEDURE — 36415 COLL VENOUS BLD VENIPUNCTURE: CPT

## 2024-08-14 PROCEDURE — 1159F MED LIST DOCD IN RCRD: CPT | Performed by: ANESTHESIOLOGY

## 2024-08-14 PROCEDURE — 1160F RVW MEDS BY RX/DR IN RCRD: CPT | Performed by: ANESTHESIOLOGY

## 2024-08-14 RX ORDER — NALOXONE HYDROCHLORIDE 4 MG/.1ML
1 SPRAY NASAL AS NEEDED
Qty: 2 EACH | Refills: 0 | Status: SHIPPED | OUTPATIENT
Start: 2024-08-14

## 2024-08-14 RX ORDER — HYDROCODONE BITARTRATE AND ACETAMINOPHEN 7.5; 325 MG/1; MG/1
1 TABLET ORAL EVERY 6 HOURS PRN
Qty: 120 TABLET | Refills: 0 | Status: SHIPPED | OUTPATIENT
Start: 2024-08-14 | End: 2024-09-13

## 2024-08-14 ASSESSMENT — ENCOUNTER SYMPTOMS
DEPRESSION: 0
LOSS OF SENSATION IN FEET: 0
OCCASIONAL FEELINGS OF UNSTEADINESS: 1

## 2024-08-14 ASSESSMENT — PAIN SCALES - GENERAL: PAINLEVEL: 8

## 2024-08-14 NOTE — PROGRESS NOTES
Seth #8/10 lower back and both legs, right is worse.   Pain increases with any activity.  Taking 2 1/2 Norco per day  and has 1 tablet left.  This reduces her pain by 75%.

## 2024-08-14 NOTE — PROGRESS NOTES
Subjective   Patient ID: Radha Deluna is a 74 y.o. female who presents for med management.  Patient has sarcoidosis and rheumatoid arthritis and has been on Humira CF for the rheumatoid arthritis.  Patient also complains of severe pain involving her hips shoulders knees and states that the hydrocodone is not working as well at 2-1/2 tablets a day.  I have recommended that she increase that to 3 times daily  HPI  Sarcoidosis; rheumatoid arthritis; COPD; post lumbar laminectomy syndrome  Review of Systems  Chronic generalized pain secondary to her rheumatoid arthritis  Objective   Physical Exam  Gen. appearance:  Patient's alert and oriented ×3 ;cooperative mild to moderate distress  Heart: Regular rate and rhythm  Lungs: Clear to auscultation  Abdomen: Soft nontender  Neuro: Cranial nerves II -XII intact; DTR: +2 over 4 biceps triceps brachial radialis patellar and Achilles  Spinal exam: Positive paraspinal tenderness noted bilaterally L4 5 L5-S1 with flexion extension and rotation/no bony abnormalities  Musculoskeletal:  Upper and lower extremity strength was 4/5 bilaterally  :  deferred  Skin: Warm and dry      Assessment/Plan   Diagnoses and all orders for this visit:  Lumbar stenosis with neurogenic claudication  -     HYDROcodone-acetaminophen (Norco) 7.5-325 mg tablet; Take 1 tablet by mouth every 6 hours if needed for severe pain (7 - 10).  -     Follow Up In Pain Medicine; Future  Postlaminectomy syndrome, lumbar  -     HYDROcodone-acetaminophen (Norco) 7.5-325 mg tablet; Take 1 tablet by mouth every 6 hours if needed for severe pain (7 - 10).  -     Follow Up In Pain Medicine; Future  -     naloxone (Narcan) 4 mg/0.1 mL nasal spray; Administer 1 spray (4 mg) into affected nostril(s) if needed for opioid reversal. May repeat every 2-3 minutes if needed, alternating nostrils, until medical assistance becomes available.  Facet degeneration of lumbar region  Peripheral polyneuropathy  -      HYDROcodone-acetaminophen (Norco) 7.5-325 mg tablet; Take 1 tablet by mouth every 6 hours if needed for severe pain (7 - 10).  -     Follow Up In Pain Medicine; Future  Sarcoid  Sarcoidosis  -     HYDROcodone-acetaminophen (Norco) 7.5-325 mg tablet; Take 1 tablet by mouth every 6 hours if needed for severe pain (7 - 10).  -     Follow Up In Pain Medicine; Future  Chronic musculoskeletal pain  -     HYDROcodone-acetaminophen (Norco) 7.5-325 mg tablet; Take 1 tablet by mouth every 6 hours if needed for severe pain (7 - 10).  -     Follow Up In Pain Medicine; Future  -     naloxone (Narcan) 4 mg/0.1 mL nasal spray; Administer 1 spray (4 mg) into affected nostril(s) if needed for opioid reversal. May repeat every 2-3 minutes if needed, alternating nostrils, until medical assistance becomes available.  Medication management  -     HYDROcodone-acetaminophen (Norco) 7.5-325 mg tablet; Take 1 tablet by mouth every 6 hours if needed for severe pain (7 - 10).  -     Follow Up In Pain Medicine; Future  -     naloxone (Narcan) 4 mg/0.1 mL nasal spray; Administer 1 spray (4 mg) into affected nostril(s) if needed for opioid reversal. May repeat every 2-3 minutes if needed, alternating nostrils, until medical assistance becomes available.  Mucopurulent chronic bronchitis (Multi)  Simple chronic bronchitis (Multi)  Spondylosis of lumbar region without myelopathy or radiculopathy  Rheumatoid arthritis involving both elbows with positive rheumatoid factor (Multi)         Brett Rider DO 08/14/24 3:32 PM

## 2024-08-16 DIAGNOSIS — M81.0 AGE-RELATED OSTEOPOROSIS WITHOUT CURRENT PATHOLOGICAL FRACTURE: Primary | ICD-10-CM

## 2024-08-19 ENCOUNTER — INFUSION (OUTPATIENT)
Dept: INFUSION THERAPY | Facility: CLINIC | Age: 74
End: 2024-08-19
Payer: MEDICARE

## 2024-08-19 VITALS
TEMPERATURE: 97 F | HEART RATE: 54 BPM | DIASTOLIC BLOOD PRESSURE: 57 MMHG | SYSTOLIC BLOOD PRESSURE: 104 MMHG | RESPIRATION RATE: 21 BRPM | OXYGEN SATURATION: 95 %

## 2024-08-19 DIAGNOSIS — M81.0 AGE-RELATED OSTEOPOROSIS WITHOUT CURRENT PATHOLOGICAL FRACTURE: ICD-10-CM

## 2024-08-19 PROCEDURE — 96372 THER/PROPH/DIAG INJ SC/IM: CPT | Mod: INF

## 2024-08-19 PROCEDURE — 2500000004 HC RX 250 GENERAL PHARMACY W/ HCPCS (ALT 636 FOR OP/ED): Mod: JZ | Performed by: SPECIALIST

## 2024-08-19 RX ORDER — DIPHENHYDRAMINE HYDROCHLORIDE 50 MG/ML
50 INJECTION INTRAMUSCULAR; INTRAVENOUS AS NEEDED
OUTPATIENT
Start: 2024-10-23

## 2024-08-19 RX ORDER — FAMOTIDINE 10 MG/ML
20 INJECTION INTRAVENOUS ONCE AS NEEDED
OUTPATIENT
Start: 2024-10-23

## 2024-08-19 RX ORDER — EPINEPHRINE 0.3 MG/.3ML
0.3 INJECTION SUBCUTANEOUS EVERY 5 MIN PRN
OUTPATIENT
Start: 2024-10-23

## 2024-08-19 RX ORDER — ALBUTEROL SULFATE 0.83 MG/ML
3 SOLUTION RESPIRATORY (INHALATION) AS NEEDED
OUTPATIENT
Start: 2024-10-23

## 2024-08-19 ASSESSMENT — ENCOUNTER SYMPTOMS
OCCASIONAL FEELINGS OF UNSTEADINESS: 0
LOSS OF SENSATION IN FEET: 0
DEPRESSION: 0

## 2024-08-19 NOTE — PROGRESS NOTES
8/19/2024 pt here for prolia. Calcium on 8/14/2024 is 9.9.    Prolia given, patient tolerated, next appointment scheduled, VSS, pt walked out of appointment on her own alert and steady.

## 2024-08-19 NOTE — PROGRESS NOTES
[facility]   Infusion Clinic Note   Date: 2024   Name: Radha Deluna  : 1950   MRN: [id number]         Reason for Visit: OP Infusion (prolia)      Accompanied by:Self   Visit Type:: injection   Diagnosis: Age-related osteoporosis without current pathological fracture    Allergies:   Allergies as of 2024 - Reviewed 2024   Allergen Reaction Noted    Erythromycin Other 2023      Current Meds has a current medication list which includes the following prescription(s): adalimumab-adbm, albuterol, albuterol, azelastine, bifidobacterium infantis, budesonide, clopidogrel, clotrimazole, cyclobenzaprine, prolia, diphenhydramine, ergocalciferol, famotidine, fluticasone, folic acid, gabapentin, humira(cf) pen, hydrocodone-acetaminophen, hydroxychloroquine, ipratropium, ipratropium, isosorbide mononitrate er, methotrexate, metoclopramide, metoprolol tartrate, montelukast, naloxone, nitroglycerin, oxygen dme/hospice, pravastatin, primidone, syringe with needle, theophylline er, trelegy ellipta, xiidra, and pantoprazole.        Vitals:There were no vitals filed for this visit.   Infusion Pre-procedure Checklist   Allergies reviewed: yes   Medications reviewed: yes   Contraindications to treatment:No   Previous reaction to current treatment:No   Current Health Issues: None   Pain: '    Is the pain different from normal: No   Is the pain tolerable: n/a   Is your Doctor aware: n/a   Contraindications based on patient history: No   Provider notified: Not applicable   Labs: Labs reviewed   Fall Risk Screening:      Review of Systems - Oncology   Negative for complaint: [] all other systems have been reviewed and are negative for complaint   Infusion Readiness:   Assessment Concerns Related to Infusion: No  Provider notified: n/a  Assess patient for the concerns below. Document provider notification as appropriate:  - Does not meet criteria to treat N/A  - Has an active or  recent infection with/without current antibiotic use N/A  - Has recent/planned dental work Yes  - Has recent/planned surgeries Yes  - Has recently received or plans to receive vaccinations No  - Has treatment related toxicities N/A  - Is pregnant (unless noted otherwise) N/A    Initiated By: ANGEL BRIGHT RN   Time: 10:37 AM     Radha Deluna had no medications administered during this visit.

## 2024-08-30 ENCOUNTER — OFFICE VISIT (OUTPATIENT)
Dept: HEMATOLOGY/ONCOLOGY | Facility: CLINIC | Age: 74
End: 2024-08-30
Payer: MEDICARE

## 2024-08-30 ENCOUNTER — LAB (OUTPATIENT)
Dept: LAB | Facility: CLINIC | Age: 74
End: 2024-08-30
Payer: MEDICARE

## 2024-08-30 VITALS
RESPIRATION RATE: 20 BRPM | BODY MASS INDEX: 26.32 KG/M2 | OXYGEN SATURATION: 100 % | TEMPERATURE: 96.4 F | DIASTOLIC BLOOD PRESSURE: 63 MMHG | HEART RATE: 58 BPM | SYSTOLIC BLOOD PRESSURE: 116 MMHG | WEIGHT: 148.59 LBS

## 2024-08-30 DIAGNOSIS — D50.9 IRON DEFICIENCY ANEMIA, UNSPECIFIED IRON DEFICIENCY ANEMIA TYPE: ICD-10-CM

## 2024-08-30 DIAGNOSIS — M81.0 AGE-RELATED OSTEOPOROSIS WITHOUT CURRENT PATHOLOGICAL FRACTURE: ICD-10-CM

## 2024-08-30 DIAGNOSIS — I73.00 RAYNAUD'S PHENOMENON WITHOUT GANGRENE: ICD-10-CM

## 2024-08-30 DIAGNOSIS — G62.9 PERIPHERAL POLYNEUROPATHY: Primary | ICD-10-CM

## 2024-08-30 LAB
ALBUMIN SERPL BCP-MCNC: 4.5 G/DL (ref 3.4–5)
ALP SERPL-CCNC: 66 U/L (ref 33–136)
ALT SERPL W P-5'-P-CCNC: 5 U/L (ref 7–45)
ANION GAP SERPL CALC-SCNC: 9 MMOL/L (ref 10–20)
AST SERPL W P-5'-P-CCNC: 11 U/L (ref 9–39)
BASOPHILS # BLD AUTO: 0.05 X10*3/UL (ref 0–0.1)
BASOPHILS NFR BLD AUTO: 0.9 %
BILIRUB SERPL-MCNC: 0.3 MG/DL (ref 0–1.2)
BUN SERPL-MCNC: 12 MG/DL (ref 6–23)
CALCIUM SERPL-MCNC: 9.9 MG/DL (ref 8.6–10.3)
CHLORIDE SERPL-SCNC: 103 MMOL/L (ref 98–107)
CO2 SERPL-SCNC: 28 MMOL/L (ref 21–32)
CREAT SERPL-MCNC: 0.7 MG/DL (ref 0.5–1.05)
EGFRCR SERPLBLD CKD-EPI 2021: 90 ML/MIN/1.73M*2
EOSINOPHIL # BLD AUTO: 0.16 X10*3/UL (ref 0–0.4)
EOSINOPHIL NFR BLD AUTO: 2.7 %
ERYTHROCYTE [DISTWIDTH] IN BLOOD BY AUTOMATED COUNT: 14 % (ref 11.5–14.5)
FERRITIN SERPL-MCNC: 117 NG/ML (ref 8–150)
GLUCOSE SERPL-MCNC: 99 MG/DL (ref 74–99)
HCT VFR BLD AUTO: 45.3 % (ref 36–46)
HGB BLD-MCNC: 15.3 G/DL (ref 12–16)
IMM GRANULOCYTES # BLD AUTO: 0.02 X10*3/UL (ref 0–0.5)
IMM GRANULOCYTES NFR BLD AUTO: 0.3 % (ref 0–0.9)
IRON SATN MFR SERPL: 45 % (ref 25–45)
IRON SERPL-MCNC: 138 UG/DL (ref 35–150)
LYMPHOCYTES # BLD AUTO: 2.35 X10*3/UL (ref 0.8–3)
LYMPHOCYTES NFR BLD AUTO: 40 %
MCH RBC QN AUTO: 35.1 PG (ref 26–34)
MCHC RBC AUTO-ENTMCNC: 33.8 G/DL (ref 32–36)
MCV RBC AUTO: 104 FL (ref 80–100)
MONOCYTES # BLD AUTO: 0.47 X10*3/UL (ref 0.05–0.8)
MONOCYTES NFR BLD AUTO: 8 %
NEUTROPHILS # BLD AUTO: 2.82 X10*3/UL (ref 1.6–5.5)
NEUTROPHILS NFR BLD AUTO: 48.1 %
NRBC BLD-RTO: 0 /100 WBCS (ref 0–0)
PLATELET # BLD AUTO: 206 X10*3/UL (ref 150–450)
POTASSIUM SERPL-SCNC: 4.2 MMOL/L (ref 3.5–5.3)
PROT SERPL-MCNC: 7.8 G/DL (ref 6.4–8.2)
RBC # BLD AUTO: 4.36 X10*6/UL (ref 4–5.2)
SODIUM SERPL-SCNC: 136 MMOL/L (ref 136–145)
TIBC SERPL-MCNC: 304 UG/DL (ref 240–445)
UIBC SERPL-MCNC: 166 UG/DL (ref 110–370)
WBC # BLD AUTO: 5.9 X10*3/UL (ref 4.4–11.3)

## 2024-08-30 PROCEDURE — 80053 COMPREHEN METABOLIC PANEL: CPT

## 2024-08-30 PROCEDURE — 82595 ASSAY OF CRYOGLOBULIN: CPT

## 2024-08-30 PROCEDURE — 99213 OFFICE O/P EST LOW 20 MIN: CPT | Performed by: INTERNAL MEDICINE

## 2024-08-30 PROCEDURE — 1159F MED LIST DOCD IN RCRD: CPT | Performed by: INTERNAL MEDICINE

## 2024-08-30 PROCEDURE — 36415 COLL VENOUS BLD VENIPUNCTURE: CPT

## 2024-08-30 PROCEDURE — 1126F AMNT PAIN NOTED NONE PRSNT: CPT | Performed by: INTERNAL MEDICINE

## 2024-08-30 PROCEDURE — 83540 ASSAY OF IRON: CPT

## 2024-08-30 PROCEDURE — 3074F SYST BP LT 130 MM HG: CPT | Performed by: INTERNAL MEDICINE

## 2024-08-30 PROCEDURE — 82728 ASSAY OF FERRITIN: CPT | Mod: PARLAB

## 2024-08-30 PROCEDURE — 3078F DIAST BP <80 MM HG: CPT | Performed by: INTERNAL MEDICINE

## 2024-08-30 PROCEDURE — 85025 COMPLETE CBC W/AUTO DIFF WBC: CPT

## 2024-08-30 ASSESSMENT — PAIN SCALES - GENERAL: PAINLEVEL: 0-NO PAIN

## 2024-08-30 NOTE — PROGRESS NOTES
LOCATION:  [facility].     HEMATOLOGY & ONCOLOGY PROBLEMS:  1. Anemia     a.  Iron deficiency due to malabsorption.     b.  S/p IV iron infusion in Jan 2023 with good response.     CHIEF COMPLAINT:    The patient is in the clinic today for initial hematology evaluation of anemia and iron deficiency.      HISTORY:   Ms. Deluna is a 73 year old pleasant female with anemia.  She has multiple medical problems and has also been noted to have gradually worsening anemia.  Blood work from September 2022 showed a hemoglobin of 8.6 with MCV of 88.  WBC, platelets, metabolic  profile were all normal.  Vitamin B12 and serum iron level was low.  She was tried on oral iron therapy but had problem with abdominal pain and discomfort.  Her medical history is significant for rheumatoid arthritis for which she was maintained on multiple  disease modifying agents.  With the finding of anemia methotrexate, sulfasalazine were discontinued. EGD and colonoscopy were unremarkable in June 2022.  No history of IV GYN related blood loss.  After initial hematological evaluation she was noted to  have significant iron deficiency and was supported with a course of IV iron infusion in January 2023 with good response.     INTERVAL HISTORY:  Patient denies any specific complaints other than issues with persistent weakness and fatigue.  Also has issues with chronic neuropathy and has been advised testing for cryoglobulin.  Latest blood work is essentially normal.  No history of nausea, vomiting, fever, night sweats, diarrhea, rash, anorexia or weight loss.  Recent colonoscopy was unremarkable.  Multiple polyps were removed but they were all benign.     PAST MEDICAL HISTORY:   1.  Anemia as detailed above   2.  Hypertension   3.  Hyperlipidemia   4.  Rheumatoid arthritis   5.  Degenerative joint disease   6.  COPD   7.  GERD   8.  Osteoporosis   9.  Peripheral arterial disease   10.  Peripheral neuropathy.     SOCIAL  HISTORY:   She is single and lives with her 93-year-old mother in [place].  Smokes about half to 1 pack a day for last 30 years.  Nonalcoholic.  She is a retired dental technician.  Born and raised in [place]     FAMILY HISTORY:    Father  at age 78 from heart attack.  Mother age 93 from multiple chronic medical problems.  1 sister alive and well.  She does not have any children. No other specific history of bleeding, clotting or malignant disorder in the family.     REVIEW OF SYSTEMS:  Pertinent finding as per the history above.  All other systems have been reviewed and generally negative and noncontributory.     ALLERGY & MEDICATIONS:  Allergies and latest outpatient medications list were reviewed in the EMR.    VITAL SIGNS  BSA: There is no height or weight on file to calculate BSA.  There were no vitals taken for this visit.    PHYSICAL EXAMINATION:  Detailed examination not done.    LAB RESULTS:  CBC from today is unremarkable with hemoglobin of 15.3 with MCV of 104.  WBC is 5.9 with platelets of 206.  Metabolic profile is unremarkable.  F iron panel is normal.  Ferritin is pending.  Last 3 sets of blood work were reviewed and the trend was noted.     ASSESSMENT & PLAN:  1. Anemia. Please refer to the details of initial presentation and management as outlined above. In summary, patient with multiple baseline medical problems who has also been noted to have gradually worsening anemia.  Blood work from 2022 showed normocytic anemia with hemoglobin of 8.6.  WBC, platelets and metabolic profile was unremarkable.  Initial  work-up revealed low iron and vitamin B12 levels.  She was also on multiple disease modifying drugs for rheumatoid arthritis including methotrexate and sulfasalazine which have been discontinued.  She was tried on oral iron therapy but had problem with  significant abdominal discomfort. EGD and colonoscopy were unremarkable in 2022. After initial hematological evaluation she  was noted to have significant iron deficiency and was supported with IV iron infusions in Jan and Sep 2023 with good response.     Blood work is essentially normal.  Hemoglobin is 15.3.  Iron panel is normal.  Patient is reassured that there is no need for any additional IV iron infusion at this time.  Recent GI evaluation is unremarkable.  In the future if there is concern regarding persistent unexplained anemia then she will also need a bone marrow biopsy.     2.  Neuropathy.  To complete the workup we will check cryoglobulin screen today.    3.  Follow up: She will let have follow-up with me in about 6 months.   Advised to contact us immediately if there are any new questions or problems.        This note has been transcribed using Dragon voice recognition system and there is a possibility of unintentional typing misprints.

## 2024-09-05 LAB — CRYOGLOB SER QL: NORMAL

## 2024-09-18 ENCOUNTER — APPOINTMENT (OUTPATIENT)
Dept: PRIMARY CARE | Facility: CLINIC | Age: 74
End: 2024-09-18
Payer: MEDICARE

## 2024-09-26 ENCOUNTER — OFFICE VISIT (OUTPATIENT)
Dept: PAIN MEDICINE | Facility: CLINIC | Age: 74
End: 2024-09-26
Payer: MEDICARE

## 2024-09-26 VITALS
DIASTOLIC BLOOD PRESSURE: 59 MMHG | WEIGHT: 148 LBS | RESPIRATION RATE: 18 BRPM | HEART RATE: 72 BPM | OXYGEN SATURATION: 100 % | SYSTOLIC BLOOD PRESSURE: 125 MMHG | BODY MASS INDEX: 26.22 KG/M2 | TEMPERATURE: 97 F | HEIGHT: 63 IN

## 2024-09-26 DIAGNOSIS — M48.062 LUMBAR STENOSIS WITH NEUROGENIC CLAUDICATION: ICD-10-CM

## 2024-09-26 DIAGNOSIS — G43.001 MIGRAINE WITHOUT AURA AND WITH STATUS MIGRAINOSUS, NOT INTRACTABLE: ICD-10-CM

## 2024-09-26 DIAGNOSIS — M79.18 CHRONIC MUSCULOSKELETAL PAIN: ICD-10-CM

## 2024-09-26 DIAGNOSIS — M47.816 SPONDYLOSIS OF LUMBAR REGION WITHOUT MYELOPATHY OR RADICULOPATHY: ICD-10-CM

## 2024-09-26 DIAGNOSIS — M96.1 POSTLAMINECTOMY SYNDROME, LUMBAR: ICD-10-CM

## 2024-09-26 DIAGNOSIS — Z79.891 LONG TERM (CURRENT) USE OF OPIATE ANALGESIC: ICD-10-CM

## 2024-09-26 DIAGNOSIS — I25.10 CORONARY DISORDER: ICD-10-CM

## 2024-09-26 DIAGNOSIS — M47.816 FACET DEGENERATION OF LUMBAR REGION: ICD-10-CM

## 2024-09-26 DIAGNOSIS — Z79.899 MEDICATION MANAGEMENT: ICD-10-CM

## 2024-09-26 DIAGNOSIS — G62.9 PERIPHERAL POLYNEUROPATHY: ICD-10-CM

## 2024-09-26 DIAGNOSIS — M05.711 RHEUMATOID ARTHRITIS INVOLVING RIGHT SHOULDER WITH POSITIVE RHEUMATOID FACTOR (MULTI): ICD-10-CM

## 2024-09-26 DIAGNOSIS — D86.9 SARCOIDOSIS: ICD-10-CM

## 2024-09-26 DIAGNOSIS — R00.2 PALPITATIONS: Primary | ICD-10-CM

## 2024-09-26 DIAGNOSIS — G89.29 CHRONIC MUSCULOSKELETAL PAIN: ICD-10-CM

## 2024-09-26 DIAGNOSIS — M81.0 AGE-RELATED OSTEOPOROSIS WITHOUT CURRENT PATHOLOGICAL FRACTURE: ICD-10-CM

## 2024-09-26 DIAGNOSIS — M05.79 RHEUMATOID ARTHRITIS INVOLVING MULTIPLE SITES WITH POSITIVE RHEUMATOID FACTOR (MULTI): ICD-10-CM

## 2024-09-26 PROCEDURE — 99213 OFFICE O/P EST LOW 20 MIN: CPT | Performed by: ANESTHESIOLOGY

## 2024-09-26 PROCEDURE — 3074F SYST BP LT 130 MM HG: CPT | Performed by: ANESTHESIOLOGY

## 2024-09-26 PROCEDURE — 3078F DIAST BP <80 MM HG: CPT | Performed by: ANESTHESIOLOGY

## 2024-09-26 PROCEDURE — 1159F MED LIST DOCD IN RCRD: CPT | Performed by: ANESTHESIOLOGY

## 2024-09-26 PROCEDURE — 1125F AMNT PAIN NOTED PAIN PRSNT: CPT | Performed by: ANESTHESIOLOGY

## 2024-09-26 PROCEDURE — 1160F RVW MEDS BY RX/DR IN RCRD: CPT | Performed by: ANESTHESIOLOGY

## 2024-09-26 RX ORDER — HYDROCODONE BITARTRATE AND ACETAMINOPHEN 7.5; 325 MG/1; MG/1
1 TABLET ORAL EVERY 6 HOURS PRN
Qty: 120 TABLET | Refills: 0 | Status: SHIPPED | OUTPATIENT
Start: 2024-09-26 | End: 2024-10-26

## 2024-09-26 ASSESSMENT — PAIN SCALES - GENERAL: PAINLEVEL: 7

## 2024-09-26 ASSESSMENT — ENCOUNTER SYMPTOMS
OCCASIONAL FEELINGS OF UNSTEADINESS: 1
LOSS OF SENSATION IN FEET: 0
DEPRESSION: 0

## 2024-09-26 NOTE — PROGRESS NOTES
Subjective   Patient ID: Radha Deluna is a 74 y.o. female who presents for medication management.  Patient has a longstanding history of severe rheumatoid arthritis and chronic pain syndrome.  She has had a previous lumbar laminectomy as well as severe facet arthrosis involving the lumbosacral spine.  Patient continues to be on Humira for long-term use of her rheumatoid arthritis.  HPI  History of sarcoidosis; rheumatoid arthritis; COPD; postlaminectomy syndrome  Review of Systems  Unremarkable except chief complaint of chronic low back pain.  Patient takes hydrocodone 5 to 7.5 mg tablets 3 times a day to moderate her pain.  Objective   Physical Exam  Gen. appearance:  Patient's alert and oriented ×3 ;cooperative mild to moderate distress  Heart: Regular rate and rhythm  Lungs: Clear to auscultation  Abdomen: Soft nontender  Neuro: Cranial nerves II -XII intact; DTR: +2 over 4 biceps triceps brachial radialis patellar and Achilles  Spinal exam: Positive paraspinal tenderness noted bilaterally L4 5 L5-S1 with flexion extension and rotation/no bony abnormalities  Musculoskeletal:  Upper and lower extremity strength was 4/5 bilaterally  :  deferred  Skin: Warm and dry      Assessment/Plan   Diagnoses and all orders for this visit:  Palpitations  Long term (current) use of opiate analgesic  -     Follow Up In Pain Medicine  Spondylosis of lumbar region without myelopathy or radiculopathy  -     Follow Up In Pain Medicine  Facet degeneration of lumbar region  -     Follow Up In Pain Medicine  Postlaminectomy syndrome, lumbar  -     Follow Up In Pain Medicine  -     Follow Up In Pain Medicine  -     HYDROcodone-acetaminophen (Norco) 7.5-325 mg tablet; Take 1 tablet by mouth every 6 hours if needed for severe pain (7 - 10).  -     Follow Up In Pain Medicine; Future  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)  -     Follow Up In Pain Medicine  -      HYDROcodone-acetaminophen (Norco) 7.5-325 mg tablet; Take 1 tablet by mouth every 6 hours if needed for severe pain (7 - 10).  -     Follow Up In Pain Medicine; Future  Lumbar stenosis with neurogenic claudication  -     Follow Up In Pain Medicine  Peripheral polyneuropathy  -     Follow Up In Pain Medicine  Sarcoidosis  -     Follow Up In Pain Medicine  Chronic musculoskeletal pain  -     Follow Up In Pain Medicine  Medication management  -     Follow Up In Pain Medicine  -     HYDROcodone-acetaminophen (Norco) 7.5-325 mg tablet; Take 1 tablet by mouth every 6 hours if needed for severe pain (7 - 10).  -     Follow Up In Pain Medicine; Future  Coronary disorder  Age-related osteoporosis without current pathological fracture  Rheumatoid arthritis involving right shoulder with positive rheumatoid factor (Multi)  Migraine without aura and with status migrainosus, not intractable     Patient has follow-up visit scheduled in 6 weeks November 7, 2024.  She was instructed to take all medication as directed keep a secure location at all times.    Brett Rider DO 09/26/24 8:43 AM

## 2024-09-26 NOTE — PROGRESS NOTES
Pain #7/10 lower back and right leg which is chronic/constant.  Taking 3 Norco per day and has 11 tablets left.  This reduces her pain by at least 50%.

## 2024-09-27 ENCOUNTER — APPOINTMENT (OUTPATIENT)
Dept: PRIMARY CARE | Facility: CLINIC | Age: 74
End: 2024-09-27
Payer: MEDICARE

## 2024-09-27 VITALS
BODY MASS INDEX: 26.4 KG/M2 | WEIGHT: 149 LBS | HEART RATE: 72 BPM | SYSTOLIC BLOOD PRESSURE: 96 MMHG | OXYGEN SATURATION: 95 % | HEIGHT: 63 IN | DIASTOLIC BLOOD PRESSURE: 52 MMHG

## 2024-09-27 DIAGNOSIS — Z00.00 ANNUAL PHYSICAL EXAM: Primary | ICD-10-CM

## 2024-09-27 DIAGNOSIS — J44.9 CHRONIC OBSTRUCTIVE PULMONARY DISEASE, UNSPECIFIED COPD TYPE (MULTI): ICD-10-CM

## 2024-09-27 DIAGNOSIS — Z79.899 HIGH RISK MEDICATION USE: ICD-10-CM

## 2024-09-27 DIAGNOSIS — J20.9 ACUTE BRONCHITIS, UNSPECIFIED ORGANISM: ICD-10-CM

## 2024-09-27 LAB — THEOPHYLLINE SERPL-MCNC: 7.8 UG/ML (ref 10–20)

## 2024-09-27 PROCEDURE — 1124F ACP DISCUSS-NO DSCNMKR DOCD: CPT | Performed by: FAMILY MEDICINE

## 2024-09-27 PROCEDURE — 3078F DIAST BP <80 MM HG: CPT | Performed by: FAMILY MEDICINE

## 2024-09-27 PROCEDURE — 99213 OFFICE O/P EST LOW 20 MIN: CPT | Performed by: FAMILY MEDICINE

## 2024-09-27 PROCEDURE — 3074F SYST BP LT 130 MM HG: CPT | Performed by: FAMILY MEDICINE

## 2024-09-27 PROCEDURE — 90662 IIV NO PRSV INCREASED AG IM: CPT | Performed by: FAMILY MEDICINE

## 2024-09-27 PROCEDURE — 1160F RVW MEDS BY RX/DR IN RCRD: CPT | Performed by: FAMILY MEDICINE

## 2024-09-27 PROCEDURE — G0008 ADMIN INFLUENZA VIRUS VAC: HCPCS | Performed by: FAMILY MEDICINE

## 2024-09-27 PROCEDURE — 80198 ASSAY OF THEOPHYLLINE: CPT

## 2024-09-27 PROCEDURE — 1125F AMNT PAIN NOTED PAIN PRSNT: CPT | Performed by: FAMILY MEDICINE

## 2024-09-27 PROCEDURE — 1159F MED LIST DOCD IN RCRD: CPT | Performed by: FAMILY MEDICINE

## 2024-09-27 PROCEDURE — 1170F FXNL STATUS ASSESSED: CPT | Performed by: FAMILY MEDICINE

## 2024-09-27 RX ORDER — OLOPATADINE HYDROCHLORIDE 1 MG/ML
1 SOLUTION/ DROPS OPHTHALMIC EVERY 12 HOURS
COMMUNITY

## 2024-09-27 RX ORDER — PREDNISONE 20 MG/1
TABLET ORAL
Qty: 10 TABLET | Refills: 0 | Status: SHIPPED | OUTPATIENT
Start: 2024-09-27

## 2024-09-27 RX ORDER — CEPHALEXIN 500 MG/1
500 CAPSULE ORAL 2 TIMES DAILY
Qty: 20 CAPSULE | Refills: 0 | Status: SHIPPED | OUTPATIENT
Start: 2024-09-27 | End: 2024-10-07

## 2024-09-27 RX ORDER — MONTELUKAST SODIUM 10 MG/1
10 TABLET ORAL DAILY
Qty: 90 TABLET | Refills: 1 | Status: SHIPPED | OUTPATIENT
Start: 2024-09-27 | End: 2025-03-26

## 2024-09-27 ASSESSMENT — ACTIVITIES OF DAILY LIVING (ADL)
DOING_HOUSEWORK: INDEPENDENT
GROCERY_SHOPPING: INDEPENDENT
MANAGING_FINANCES: INDEPENDENT
TAKING_MEDICATION: INDEPENDENT
BATHING: INDEPENDENT
DRESSING: INDEPENDENT

## 2024-09-27 ASSESSMENT — ENCOUNTER SYMPTOMS
LOSS OF SENSATION IN FEET: 0
DEPRESSION: 0
OCCASIONAL FEELINGS OF UNSTEADINESS: 0

## 2024-09-27 ASSESSMENT — PATIENT HEALTH QUESTIONNAIRE - PHQ9
1. LITTLE INTEREST OR PLEASURE IN DOING THINGS: NOT AT ALL
2. FEELING DOWN, DEPRESSED OR HOPELESS: NOT AT ALL
SUM OF ALL RESPONSES TO PHQ9 QUESTIONS 1 AND 2: 0

## 2024-09-27 ASSESSMENT — PAIN SCALES - GENERAL: PAINLEVEL: 7

## 2024-09-27 NOTE — PROGRESS NOTES
"Subjective   Patient ID: Radha Deluna is a 74 y.o. female who presents for Medicare Annual Wellness Visit Subsequent (She is fasting.), Med Refill (Needs refill for montelukast sodium to be sent to [facility] and URI.    HPI   Patient here for annual checkup.  Last eye appt: 2 weeks ago.  Double vision in 1 eye.  Having procedure to help eye film.  She does complain of a progressively worsening cough over the last few days.  No fevers or chills  Review of Systems  Cardiovascular: no chest pain, no edema, no palpitations, and no syncope.   Respiratory: cough, but no shortness of breath, and no wheezing  Gastrointestinal: no abdominal pain, nausea, vomiting or blood in stools  Genitourinary: no dysuria or hematuria  Objective   BP 96/52 (BP Location: Left arm, Patient Position: Sitting, BP Cuff Size: Adult)   Pulse 72   Ht 1.6 m (5' 3\")   Wt 67.6 kg (149 lb)   SpO2 95%   BMI 26.39 kg/m²     Physical Exam  Alert, pleasant and in no acute distress.  Heart: Regular rate and rhythm   Lungs: Diffuse coarse rhonchi  Lower extremities: No edema  Assessment/Plan   Problem List Items Addressed This Visit             ICD-10-CM    Chronic obstructive pulmonary disease (Multi) J44.9    Relevant Medications    montelukast (Singulair) 10 mg tablet    Other Relevant Orders    Theophylline level     Other Visit Diagnoses         Codes    Annual physical exam    -  Primary Z00.00    Acute bronchitis, unspecified organism     J20.9    Relevant Medications    cephalexin (Keflex) 500 mg capsule    predniSONE (Deltasone) 20 mg tablet    High risk medication use     Z79.899    Relevant Orders    Theophylline level        1.  Health maintenance: Care gaps addressed.  Flu vaccine provided.  Encouraged her receiving COVID-vaccine from local pharmacy.  Colonoscopy up to date as was completed 1 month ago  2.  Acute bronchitis/COPD: Will start antibiotics and prednisone to try to stop this early.  She will continue her her home " nebulizers.  Theophylline level ordered  Patient has had quite a bit of blood work for last few months.  We reviewed these and all appears stable.  Follow-up 6 months

## 2024-10-15 DIAGNOSIS — J30.89 NON-SEASONAL ALLERGIC RHINITIS, UNSPECIFIED TRIGGER: ICD-10-CM

## 2024-10-15 RX ORDER — FLUTICASONE PROPIONATE 50 MCG
2 SPRAY, SUSPENSION (ML) NASAL DAILY
Qty: 16 G | Refills: 3 | Status: SHIPPED | OUTPATIENT
Start: 2024-10-15

## 2024-10-28 DIAGNOSIS — J30.89 NON-SEASONAL ALLERGIC RHINITIS, UNSPECIFIED TRIGGER: ICD-10-CM

## 2024-10-28 RX ORDER — IPRATROPIUM BROMIDE 42 UG/1
2 SPRAY, METERED NASAL 4 TIMES DAILY
Qty: 15 ML | Refills: 5 | Status: SHIPPED | OUTPATIENT
Start: 2024-10-28

## 2024-11-04 ENCOUNTER — LAB (OUTPATIENT)
Dept: LAB | Facility: LAB | Age: 74
End: 2024-11-04
Payer: MEDICARE

## 2024-11-04 DIAGNOSIS — E78.5 HYPERLIPIDEMIA, UNSPECIFIED: ICD-10-CM

## 2024-11-04 DIAGNOSIS — E83.51 HYPOCALCEMIA: Primary | ICD-10-CM

## 2024-11-04 DIAGNOSIS — E55.9 VITAMIN D DEFICIENCY, UNSPECIFIED: ICD-10-CM

## 2024-11-04 DIAGNOSIS — E11.9 TYPE 2 DIABETES MELLITUS WITHOUT COMPLICATIONS (MULTI): ICD-10-CM

## 2024-11-04 DIAGNOSIS — M81.0 AGE-RELATED OSTEOPOROSIS WITHOUT CURRENT PATHOLOGICAL FRACTURE: ICD-10-CM

## 2024-11-04 LAB
25(OH)D3 SERPL-MCNC: 94 NG/ML (ref 30–100)
ALBUMIN SERPL BCP-MCNC: 4.1 G/DL (ref 3.4–5)
ALP SERPL-CCNC: 53 U/L (ref 33–136)
ALT SERPL W P-5'-P-CCNC: 10 U/L (ref 7–45)
ANION GAP SERPL CALC-SCNC: 11 MMOL/L (ref 10–20)
AST SERPL W P-5'-P-CCNC: 16 U/L (ref 9–39)
BILIRUB SERPL-MCNC: 0.4 MG/DL (ref 0–1.2)
BUN SERPL-MCNC: 12 MG/DL (ref 6–23)
CALCIUM SERPL-MCNC: 9.7 MG/DL (ref 8.6–10.3)
CHLORIDE SERPL-SCNC: 103 MMOL/L (ref 98–107)
CO2 SERPL-SCNC: 29 MMOL/L (ref 21–32)
CREAT SERPL-MCNC: 0.77 MG/DL (ref 0.5–1.05)
EGFRCR SERPLBLD CKD-EPI 2021: 80 ML/MIN/1.73M*2
GLUCOSE SERPL-MCNC: 79 MG/DL (ref 74–99)
POTASSIUM SERPL-SCNC: 4.4 MMOL/L (ref 3.5–5.3)
PROT SERPL-MCNC: 7.2 G/DL (ref 6.4–8.2)
PTH-INTACT SERPL-MCNC: 41.2 PG/ML (ref 18.5–88)
SODIUM SERPL-SCNC: 139 MMOL/L (ref 136–145)
TSH SERPL-ACNC: 4.24 MIU/L (ref 0.44–3.98)

## 2024-11-04 PROCEDURE — 82306 VITAMIN D 25 HYDROXY: CPT

## 2024-11-04 PROCEDURE — 83970 ASSAY OF PARATHORMONE: CPT

## 2024-11-04 PROCEDURE — 84443 ASSAY THYROID STIM HORMONE: CPT

## 2024-11-04 PROCEDURE — 83036 HEMOGLOBIN GLYCOSYLATED A1C: CPT

## 2024-11-04 PROCEDURE — 36415 COLL VENOUS BLD VENIPUNCTURE: CPT

## 2024-11-04 PROCEDURE — 80053 COMPREHEN METABOLIC PANEL: CPT

## 2024-11-05 LAB
EST. AVERAGE GLUCOSE BLD GHB EST-MCNC: 117 MG/DL
HBA1C MFR BLD: 5.7 %

## 2024-11-11 ENCOUNTER — HOSPITAL ENCOUNTER (OUTPATIENT)
Dept: RADIOLOGY | Facility: CLINIC | Age: 74
Discharge: HOME | End: 2024-11-11
Payer: MEDICARE

## 2024-11-11 DIAGNOSIS — E04.2 NONTOXIC MULTINODULAR GOITER: ICD-10-CM

## 2024-11-11 DIAGNOSIS — R93.89 ABNORMAL FINDINGS ON DIAGNOSTIC IMAGING OF OTHER SPECIFIED BODY STRUCTURES: ICD-10-CM

## 2024-11-11 PROCEDURE — 76536 US EXAM OF HEAD AND NECK: CPT | Mod: LIO

## 2024-11-11 PROCEDURE — 76536 US EXAM OF HEAD AND NECK: CPT | Mod: LIO | Performed by: STUDENT IN AN ORGANIZED HEALTH CARE EDUCATION/TRAINING PROGRAM

## 2024-11-13 DIAGNOSIS — B37.0 CANDIDAL STOMATITIS: ICD-10-CM

## 2024-11-13 RX ORDER — CLOTRIMAZOLE 10 MG/1
LOZENGE ORAL
Qty: 70 TROCHE | Refills: 0 | Status: SHIPPED | OUTPATIENT
Start: 2024-11-13

## 2024-11-14 ENCOUNTER — OFFICE VISIT (OUTPATIENT)
Dept: PAIN MEDICINE | Facility: CLINIC | Age: 74
End: 2024-11-14
Payer: MEDICARE

## 2024-11-14 VITALS
RESPIRATION RATE: 16 BRPM | SYSTOLIC BLOOD PRESSURE: 133 MMHG | TEMPERATURE: 96.8 F | HEART RATE: 77 BPM | OXYGEN SATURATION: 97 % | DIASTOLIC BLOOD PRESSURE: 64 MMHG

## 2024-11-14 DIAGNOSIS — D86.9 SARCOIDOSIS: ICD-10-CM

## 2024-11-14 DIAGNOSIS — M47.816 SPONDYLOSIS OF LUMBAR REGION WITHOUT MYELOPATHY OR RADICULOPATHY: ICD-10-CM

## 2024-11-14 DIAGNOSIS — Z79.631 METHOTREXATE, LONG TERM, CURRENT USE: ICD-10-CM

## 2024-11-14 DIAGNOSIS — M05.10 RHEUMATOID LUNG DISEASE (MULTI): ICD-10-CM

## 2024-11-14 DIAGNOSIS — M05.779 RHEUMATOID ARTHRITIS INVOLVING FOOT WITH POSITIVE RHEUMATOID FACTOR, UNSPECIFIED LATERALITY (MULTI): ICD-10-CM

## 2024-11-14 DIAGNOSIS — J41.1 MUCOPURULENT CHRONIC BRONCHITIS (MULTI): Primary | ICD-10-CM

## 2024-11-14 DIAGNOSIS — J41.0 SIMPLE CHRONIC BRONCHITIS (MULTI): ICD-10-CM

## 2024-11-14 DIAGNOSIS — G89.4 CHRONIC PAIN SYNDROME: ICD-10-CM

## 2024-11-14 PROCEDURE — 99213 OFFICE O/P EST LOW 20 MIN: CPT | Performed by: ANESTHESIOLOGY

## 2024-11-14 PROCEDURE — 3075F SYST BP GE 130 - 139MM HG: CPT | Performed by: ANESTHESIOLOGY

## 2024-11-14 PROCEDURE — 3078F DIAST BP <80 MM HG: CPT | Performed by: ANESTHESIOLOGY

## 2024-11-14 PROCEDURE — 1160F RVW MEDS BY RX/DR IN RCRD: CPT | Performed by: ANESTHESIOLOGY

## 2024-11-14 PROCEDURE — 1159F MED LIST DOCD IN RCRD: CPT | Performed by: ANESTHESIOLOGY

## 2024-11-14 PROCEDURE — 1125F AMNT PAIN NOTED PAIN PRSNT: CPT | Performed by: ANESTHESIOLOGY

## 2024-11-14 RX ORDER — HYDROCODONE BITARTRATE AND ACETAMINOPHEN 7.5; 325 MG/1; MG/1
1 TABLET ORAL EVERY 6 HOURS PRN
Qty: 120 TABLET | Refills: 0 | Status: SHIPPED | OUTPATIENT
Start: 2024-11-14 | End: 2024-12-14

## 2024-11-14 ASSESSMENT — PAIN DESCRIPTION - DESCRIPTORS: DESCRIPTORS: ACHING

## 2024-11-14 ASSESSMENT — PAIN SCALES - GENERAL
PAINLEVEL_OUTOF10: 8
PAINLEVEL_OUTOF10: 8

## 2024-11-14 ASSESSMENT — PATIENT HEALTH QUESTIONNAIRE - PHQ9
2. FEELING DOWN, DEPRESSED OR HOPELESS: NOT AT ALL
1. LITTLE INTEREST OR PLEASURE IN DOING THINGS: NOT AT ALL
SUM OF ALL RESPONSES TO PHQ9 QUESTIONS 1 AND 2: 0

## 2024-11-14 ASSESSMENT — PAIN - FUNCTIONAL ASSESSMENT: PAIN_FUNCTIONAL_ASSESSMENT: 0-10

## 2024-11-14 ASSESSMENT — ENCOUNTER SYMPTOMS
OCCASIONAL FEELINGS OF UNSTEADINESS: 1
DEPRESSION: 0
LOSS OF SENSATION IN FEET: 1

## 2024-11-14 NOTE — PROGRESS NOTES
Subjective   Radha Deluna is a 74 y.o. female who returns for a follow-up visit  regarding her chronic pain syndrome.  Patient states that her pain is well-controlled when taking 3 hydrocodone 7.5 mg tablets a day.  Analgesia: 7-8 on a 1-10 scale which is stable.  Activity Level: Activity level is normal and that she is able to care for herself and walk without assistance.  She suffers from chronic pain syndrome and has severe rheumatoid arthritis as well as sarcoidosis and COPD    Review of Systems  Unremarkable except chief complaint    Objective   Physical Exam      Gen. appearance:  Patient's alert and oriented ×3 ;cooperative mild to moderate distress  Heart: Regular rate and rhythm  Lungs: Clear to auscultation  Abdomen: Soft nontender  Neuro: Cranial nerves II -XII intact; DTR: +2 over 4 biceps triceps brachial radialis patellar and Achilles  Spinal exam: Positive paraspinal tenderness noted bilaterally L4 5 L5-S1 with flexion extension and rotation/no bony abnormalities  Musculoskeletal:  Upper and lower extremity strength was 4/5 bilaterally  :  deferred  Skin: Warm and dry    Assessment:   1. Mucopurulent chronic bronchitis (Multi)        2. Rheumatoid lung disease (Multi)        3. Simple chronic bronchitis (Multi)        4. Chronic pain syndrome        5. Spondylosis of lumbar region without myelopathy or radiculopathy  HYDROcodone-acetaminophen (Norco) 7.5-325 mg tablet    Follow Up In Pain Medicine      6. Rheumatoid arthritis involving foot with positive rheumatoid factor, unspecified laterality (Multi)  HYDROcodone-acetaminophen (Norco) 7.5-325 mg tablet    Follow Up In Pain Medicine      7. Sarcoidosis  HYDROcodone-acetaminophen (Norco) 7.5-325 mg tablet    Follow Up In Pain Medicine      8. Methotrexate, long term, current use            Plan:  Risk and benefits continue use of opioids was discussed.  Patient was e-prescribed a refill of hydrocodone 7.5/325 mg tablets #120 or 1 every 6  hours as necessary.  A follow-up visit scheduled on December 18, 2024.  She was told to keep all medication in secure location at all times.

## 2024-11-14 NOTE — PROGRESS NOTES
Fuv, back pain 8/10. Low back down bilateral legs.r>l.  takes norco for pain. Has 11 left and takes 3 a day.

## 2024-12-12 ENCOUNTER — OFFICE VISIT (OUTPATIENT)
Dept: PAIN MEDICINE | Facility: CLINIC | Age: 74
End: 2024-12-12
Payer: MEDICARE

## 2024-12-12 VITALS
HEART RATE: 90 BPM | TEMPERATURE: 97.3 F | SYSTOLIC BLOOD PRESSURE: 152 MMHG | DIASTOLIC BLOOD PRESSURE: 72 MMHG | RESPIRATION RATE: 18 BRPM | BODY MASS INDEX: 26.39 KG/M2 | OXYGEN SATURATION: 92 % | WEIGHT: 149 LBS

## 2024-12-12 DIAGNOSIS — D86.9 SARCOIDOSIS: ICD-10-CM

## 2024-12-12 DIAGNOSIS — M79.18 CHRONIC MUSCULOSKELETAL PAIN: ICD-10-CM

## 2024-12-12 DIAGNOSIS — M05.779 RHEUMATOID ARTHRITIS INVOLVING FOOT WITH POSITIVE RHEUMATOID FACTOR, UNSPECIFIED LATERALITY (MULTI): ICD-10-CM

## 2024-12-12 DIAGNOSIS — M96.1 POSTLAMINECTOMY SYNDROME, LUMBAR: Primary | ICD-10-CM

## 2024-12-12 DIAGNOSIS — G89.29 CHRONIC MUSCULOSKELETAL PAIN: ICD-10-CM

## 2024-12-12 DIAGNOSIS — M47.816 SPONDYLOSIS OF LUMBAR REGION WITHOUT MYELOPATHY OR RADICULOPATHY: ICD-10-CM

## 2024-12-12 PROCEDURE — 3077F SYST BP >= 140 MM HG: CPT | Performed by: ANESTHESIOLOGY

## 2024-12-12 PROCEDURE — 99213 OFFICE O/P EST LOW 20 MIN: CPT | Performed by: ANESTHESIOLOGY

## 2024-12-12 PROCEDURE — 1125F AMNT PAIN NOTED PAIN PRSNT: CPT | Performed by: ANESTHESIOLOGY

## 2024-12-12 PROCEDURE — 1159F MED LIST DOCD IN RCRD: CPT | Performed by: ANESTHESIOLOGY

## 2024-12-12 PROCEDURE — 3078F DIAST BP <80 MM HG: CPT | Performed by: ANESTHESIOLOGY

## 2024-12-12 PROCEDURE — 1160F RVW MEDS BY RX/DR IN RCRD: CPT | Performed by: ANESTHESIOLOGY

## 2024-12-12 RX ORDER — HYDROCODONE BITARTRATE AND ACETAMINOPHEN 7.5; 325 MG/1; MG/1
1 TABLET ORAL EVERY 6 HOURS PRN
Qty: 120 TABLET | Refills: 0 | Status: SHIPPED | OUTPATIENT
Start: 2024-12-12 | End: 2025-01-11

## 2024-12-12 ASSESSMENT — ENCOUNTER SYMPTOMS
LOSS OF SENSATION IN FEET: 1
OCCASIONAL FEELINGS OF UNSTEADINESS: 1
DEPRESSION: 0

## 2024-12-12 ASSESSMENT — PAIN DESCRIPTION - DESCRIPTORS: DESCRIPTORS: ACHING

## 2024-12-12 ASSESSMENT — PAIN SCALES - GENERAL
PAINLEVEL_OUTOF10: 8
PAINLEVEL_OUTOF10: 8

## 2024-12-12 ASSESSMENT — PAIN - FUNCTIONAL ASSESSMENT: PAIN_FUNCTIONAL_ASSESSMENT: 0-10

## 2024-12-12 NOTE — PROGRESS NOTES
Fuv, low back pain down bilateral legs. R>l. Takes norco for pain. Has 18 left and takes 3 a day per patient..

## 2024-12-12 NOTE — PROGRESS NOTES
Subjective   Patient ID: Radha Deluna is a 74 y.o. female who is here today for med management.  She has severe rheumatoid arthritis involving multiple joints along with sarcoidosis.  Patient takes Norco 7.5 mg tablets 3 times a day.  [place] report was reviewed which shows her active cumulative morphine equivalent today of 29.5.  A controlled substance agreement was signed today.  She denies bladder or bowel dysfunction.  She is able to function at a high level when taking her meds regularly    HPI  Rheumatoid arthritis; sarcoidosis; post lumbar laminectomy syndrome; lumbar spondylosis; long-term use of opioids for pain control  Review of Systems  Unremarkable except chief complaint of low back pain and generalized joint pain which is currently well-controlled on her pain regimen  Objective   Physical Exam  Gen. appearance:  Patient's alert and oriented ×3 ;cooperative mild to moderate distress  Heart: Regular rate and rhythm  Lungs: Clear to auscultation  Abdomen: Soft nontender  Neuro: Cranial nerves II -XII intact; DTR: +2 over 4 biceps triceps brachial radialis patellar and Achilles  Spinal exam: Positive paraspinal tenderness noted bilaterally L4 5 L5-S1 with flexion extension and rotation/no bony abnormalities  Musculoskeletal:  Upper and lower extremity strength was 4/5 bilaterally  :  deferred  Skin: Warm and dry      Assessment/Plan   Diagnoses and all orders for this visit:  Spondylosis of lumbar region without myelopathy or radiculopathy  -     Follow Up In Pain Medicine  -     HYDROcodone-acetaminophen (Norco) 7.5-325 mg tablet; Take 1 tablet by mouth every 6 hours if needed for severe pain (7 - 10).  -     Follow Up In Pain Medicine; Future  Rheumatoid arthritis involving foot with positive rheumatoid factor, unspecified laterality (Multi)  -     Follow Up In Pain Medicine  -     HYDROcodone-acetaminophen (Norco) 7.5-325 mg tablet; Take 1 tablet by mouth every 6 hours if needed for severe pain (7  - 10).  -     Follow Up In Pain Medicine; Future  Sarcoidosis  -     Follow Up In Pain Medicine  -     HYDROcodone-acetaminophen (Norco) 7.5-325 mg tablet; Take 1 tablet by mouth every 6 hours if needed for severe pain (7 - 10).  -     Follow Up In Pain Medicine; Future    Patient was instructed to take all medication as directed keep secure location at all times.  Controlled substance agreement was signed today and her meds were sent to her pharmacy.  She was told to take all medication as directed.

## 2024-12-13 ENCOUNTER — APPOINTMENT (OUTPATIENT)
Dept: PRIMARY CARE | Facility: CLINIC | Age: 74
End: 2024-12-13
Payer: MEDICARE

## 2024-12-17 ENCOUNTER — APPOINTMENT (OUTPATIENT)
Dept: PRIMARY CARE | Facility: CLINIC | Age: 74
End: 2024-12-17
Payer: MEDICARE

## 2024-12-17 VITALS
SYSTOLIC BLOOD PRESSURE: 130 MMHG | OXYGEN SATURATION: 96 % | BODY MASS INDEX: 26.75 KG/M2 | TEMPERATURE: 97.8 F | HEART RATE: 82 BPM | WEIGHT: 151 LBS | DIASTOLIC BLOOD PRESSURE: 90 MMHG | HEIGHT: 63 IN

## 2024-12-17 DIAGNOSIS — J30.1 ALLERGIC RHINITIS DUE TO POLLEN, UNSPECIFIED SEASONALITY: ICD-10-CM

## 2024-12-17 DIAGNOSIS — J20.9 ACUTE BRONCHITIS, UNSPECIFIED ORGANISM: Primary | ICD-10-CM

## 2024-12-17 PROCEDURE — 3075F SYST BP GE 130 - 139MM HG: CPT | Performed by: NURSE PRACTITIONER

## 2024-12-17 PROCEDURE — 1159F MED LIST DOCD IN RCRD: CPT | Performed by: NURSE PRACTITIONER

## 2024-12-17 PROCEDURE — 3080F DIAST BP >= 90 MM HG: CPT | Performed by: NURSE PRACTITIONER

## 2024-12-17 PROCEDURE — 99214 OFFICE O/P EST MOD 30 MIN: CPT | Performed by: NURSE PRACTITIONER

## 2024-12-17 PROCEDURE — 96372 THER/PROPH/DIAG INJ SC/IM: CPT | Performed by: NURSE PRACTITIONER

## 2024-12-17 RX ORDER — CEPHALEXIN 500 MG/1
500 CAPSULE ORAL 2 TIMES DAILY
Qty: 14 CAPSULE | Refills: 0 | Status: SHIPPED | OUTPATIENT
Start: 2024-12-17 | End: 2024-12-17

## 2024-12-17 RX ORDER — CEPHALEXIN 500 MG/1
500 CAPSULE ORAL 2 TIMES DAILY
Qty: 20 CAPSULE | Refills: 0 | Status: SHIPPED | OUTPATIENT
Start: 2024-12-17 | End: 2024-12-27

## 2024-12-17 RX ORDER — METHYLPREDNISOLONE ACETATE 40 MG/ML
40 INJECTION, SUSPENSION INTRA-ARTICULAR; INTRALESIONAL; INTRAMUSCULAR; SOFT TISSUE ONCE
Status: COMPLETED | OUTPATIENT
Start: 2024-12-17 | End: 2024-12-17

## 2024-12-17 ASSESSMENT — ENCOUNTER SYMPTOMS
GASTROINTESTINAL NEGATIVE: 1
SINUS PRESSURE: 1
WHEEZING: 1
PALPITATIONS: 0
SINUS PAIN: 1
COUGH: 1
CHILLS: 0
DEPRESSION: 0
FEVER: 0
SORE THROAT: 0

## 2024-12-17 NOTE — PROGRESS NOTES
"Subjective   Patient ID: Radha Deluna is a 74 y.o. female who presents for Establish Care (Allergy issues).    HPI     Patient of Dr. Moise Spivey presents to clinic requesting an allergy shot.    Patient with significant past medical history to include COPD, hypertension, hyperlipidemia, PAD, CAD with stents, GERD symptoms.      Patient being followed by both pulmonary and cardiology    Today patient complains of chest congestion x 1 week.  She states she has productive cough with yellow phlegm, postnasal drainage sinus pressure.  She denies fevers chest pains.    Patient also states she has seasonal allergies states yes are tearing and she gets allergy shots which helps her symptoms..    Patient is a current long-term cigarette smoker she states 2 to 5 cigarettes a day.  States she has been trying to quit.    She is without any specific complaints or concerns today.    Review of Systems   Constitutional:  Negative for chills and fever.   HENT:  Positive for congestion, postnasal drip, sinus pressure and sinus pain. Negative for ear pain and sore throat.    Respiratory:  Positive for cough and wheezing.    Cardiovascular:  Negative for chest pain and palpitations.   Gastrointestinal: Negative.    Skin: Negative.        Objective   /90 (BP Location: Left arm, Patient Position: Sitting, BP Cuff Size: Adult)   Pulse 82   Temp 36.6 °C (97.8 °F) (Temporal)   Ht 1.6 m (5' 3\")   Wt 68.5 kg (151 lb)   SpO2 96%   BMI 26.75 kg/m²     Physical Exam  Vitals reviewed.   Constitutional:       Appearance: She is not ill-appearing or toxic-appearing.   HENT:      Right Ear: Tympanic membrane normal.      Left Ear: Tympanic membrane normal.      Mouth/Throat:      Mouth: Mucous membranes are moist.      Pharynx: Oropharynx is clear.   Cardiovascular:      Rate and Rhythm: Normal rate and regular rhythm.   Pulmonary:      Breath sounds: Wheezing (mild expiratory) and rhonchi present.   Musculoskeletal:         " General: Normal range of motion.   Skin:     General: Skin is warm and dry.   Neurological:      Mental Status: She is alert and oriented to person, place, and time.         Assessment/Plan   Diagnoses and all orders for this visit:  Acute bronchitis, unspecified organism  -     cephalexin (Keflex) 500 mg capsule; Take 1 capsule (500 mg) by mouth 2 times a day for 10 days.  -Continue albuterol inhaler, nebulizer as needed continue nasals spray as needed  -Warm tea and honey  -Fluids as tolerated.  -Follow-up with pulmonology as scheduled.  -Follow-up if no improvement.    Allergic rhinitis due to pollen, unspecified seasonality  -     methylPREDNISolone acetate (DEPO-Medrol) injection 40 mg

## 2024-12-17 NOTE — PATIENT INSTRUCTIONS
Today you were treated for  chronic bronchitis and allergies.  Steroid injection was given today in clinic.  Please start Keflex antibiotic and take as prescribed until complete.  Please use inhalers and nebulizer as prescribed.  If you experience any shortness of breathing chest pains please go immediately to the ER.  Follow-up with your pulmonologist and cardiologist as scheduled.  Please schedule with new primary care PCP.

## 2024-12-26 DIAGNOSIS — B37.0 CANDIDAL STOMATITIS: ICD-10-CM

## 2024-12-26 RX ORDER — CLOTRIMAZOLE 10 MG/1
LOZENGE ORAL
Qty: 70 TROCHE | Refills: 0 | Status: SHIPPED | OUTPATIENT
Start: 2024-12-26

## 2025-01-23 ENCOUNTER — PATIENT OUTREACH (OUTPATIENT)
Dept: PRIMARY CARE | Facility: CLINIC | Age: 75
End: 2025-01-23
Payer: MEDICARE

## 2025-01-23 ENCOUNTER — APPOINTMENT (OUTPATIENT)
Dept: PAIN MEDICINE | Facility: CLINIC | Age: 75
End: 2025-01-23
Payer: MEDICARE

## 2025-01-23 NOTE — PROGRESS NOTES
Discharge Facility: [facility]   Discharge Diagnosis: Pneumonia of both lungs due to infectious organism, unspecified part of lung   Hyponatremia   Hypokalemia   Chronic obstructive pulmonary disease with acute exacerbation  Admission Date: 1/19/25  Discharge Date: 1/22/25    PCP Appointment Date: Dr Mayo Diamond 2/18/25 New patient visit  Specialist Appointment Date: Thoracic Surg Dr Marroquin 2/26/25  Hem Onc Dr Dior 3/3/25  Hospital Encounter and Summary Linked: Yes  See discharge assessment below for further details     Engagement  Call Start Time: 1010 (1/23/2025 10:15 AM)    Medications  Medications reviewed with patient/caregiver?: Yes (new/changes only) (1/23/2025 10:15 AM)  Is the patient having any side effects they believe may be caused by any medication additions or changes?: No (1/23/2025 10:15 AM)  Does the patient have all medications ordered at discharge?: Yes (1/23/2025 10:15 AM)  Care Management Interventions: No intervention needed (1/23/2025 10:15 AM)  Prescription Comments: predniSONE (DELTASONE) 20 MG tablet    olopatadine (PATANOL) 0.1 % ophthalmic solution    Acidophilus Lactobacillus CAPS    guaiFENesin (MUCINEX) 600 MG SR tablet    cefdinir (OMNICEF) 300 MG capsule    doxycycline (VIBRA-TABS) 100 MG tablet   benzonatate (TESSALON) 100 MG capsule (1/23/2025 10:15 AM)  Is the patient taking all medications as directed (includes completed medication regime)?: Yes (1/23/2025 10:15 AM)  Care Management Interventions: Provided patient education (1/23/2025 10:15 AM)  Medication Comments: Meds to beds delivered. no noted issues obtaining medications (1/23/2025 10:15 AM)    Appointments  Does the patient have a primary care provider?: Yes (Pt will have new patient visit with Dr Diamond) (1/23/2025 10:15 AM)  Care Management Interventions: Verified appointment date/time/provider (1/23/2025 10:15 AM)  Has the patient kept scheduled appointments due by today?: Yes (1/23/2025 10:15 AM)  Care  Management Interventions: Advised patient to keep appointment (1/23/2025 10:15 AM)    Self Management  What is the home health agency?: denies need (1/23/2025 10:15 AM)  What Durable Medical Equipment (DME) was ordered?: continues home oxygen therapy (1/23/2025 10:15 AM)    Patient Teaching  Does the patient have access to their discharge instructions?: Yes (1/23/2025 10:15 AM)  Care Management Interventions: Reviewed instructions with patient (1/23/2025 10:15 AM)  What is the patient's perception of their health status since discharge?: Improving (1/23/2025 10:15 AM)  Is the patient/caregiver able to teach back the hierarchy of who to call/visit for symptoms/problems? PCP, Specialist, Home Health nurse, Urgent Care, ED, 911: Yes (1/23/2025 10:15 AM)  Patient/Caregiver Education Comments: CM discussed referencing discharge paperwork to follow detailed daily medication schedule (1/23/2025 10:15 AM)    Wrap Up  Wrap Up Additional Comments: This CM spoke with patient via phone. Successful transition of care outreach complete. Pt reports doing well at home since discharge. New meds reviewed. Pt denies any prescription medication questions. Pt reports she is weak and tired. Pt continues to use her home oxygen at night. Patient denies any further discharge questions/needs at this time.Pt states the nurses reviewed her medications and discharge instructions with her thoroughly prior to discharge. Emphasized that Follow up is needed after discharge to review the hospital recommendations, assess your response to your treatment.  Pt aware of my availability for non-emergent concerns. Contact info provided to patient. (1/23/2025 10:15 AM)

## 2025-02-06 ENCOUNTER — PATIENT OUTREACH (OUTPATIENT)
Dept: PRIMARY CARE | Facility: CLINIC | Age: 75
End: 2025-02-06
Payer: MEDICARE

## 2025-02-11 DIAGNOSIS — M81.0 SENILE OSTEOPOROSIS: Primary | ICD-10-CM

## 2025-02-11 DIAGNOSIS — M81.0 AGE-RELATED OSTEOPOROSIS WITHOUT CURRENT PATHOLOGICAL FRACTURE: ICD-10-CM

## 2025-02-11 RX ORDER — ACETAMINOPHEN 325 MG/1
650 TABLET ORAL ONCE
OUTPATIENT
Start: 2025-04-23

## 2025-02-11 RX ORDER — ALBUTEROL SULFATE 0.83 MG/ML
3 SOLUTION RESPIRATORY (INHALATION) AS NEEDED
OUTPATIENT
Start: 2025-04-23

## 2025-02-11 RX ORDER — DIPHENHYDRAMINE HYDROCHLORIDE 50 MG/ML
50 INJECTION INTRAMUSCULAR; INTRAVENOUS AS NEEDED
OUTPATIENT
Start: 2025-04-23

## 2025-02-11 RX ORDER — FAMOTIDINE 10 MG/ML
20 INJECTION, SOLUTION INTRAVENOUS ONCE AS NEEDED
OUTPATIENT
Start: 2025-04-23

## 2025-02-11 RX ORDER — EPINEPHRINE 0.3 MG/.3ML
0.3 INJECTION SUBCUTANEOUS EVERY 5 MIN PRN
OUTPATIENT
Start: 2025-04-23

## 2025-02-12 ENCOUNTER — TELEPHONE (OUTPATIENT)
Dept: INFUSION THERAPY | Facility: CLINIC | Age: 75
End: 2025-02-12
Payer: MEDICARE

## 2025-02-17 ENCOUNTER — APPOINTMENT (OUTPATIENT)
Dept: INFUSION THERAPY | Facility: CLINIC | Age: 75
End: 2025-02-17
Payer: MEDICARE

## 2025-02-17 NOTE — PROGRESS NOTES
"Subjective   Radha Deluna  is a 74 y.o. female who presents for evaluation of right upper lobe pulmonary nodule     This patient is well known to me due to history of pulmonary nodules, which were biopsied via wedge resection in 2015. These were consistent with granuloma, and she was referred to a pulmonologist for ongoing management. Recently, radiographic imaging shows an enlarging nodule which is concerning for primary lung malignancy. I recommended the patient percutaneous lung procedure, and this was performed 8/12/2020. This biopsy was consistent with inflammation and thought to be related to the patient's history of granulomatous disease. She presents today for radiographic follow-up    Currently the patient is {Usual state of health:69992}. She {T report deny:22206}. {WILDorBLANK:52194::\" \"}    {CWT [facility] stuff:33370}    She  reports that she has been smoking cigarettes. She has been exposed to tobacco smoke. She has never used smokeless tobacco. She reports that she does not currently use alcohol. She reports that she does not use drugs.    Objective   Physical Exam  {CWT exam:04892}  Diagnostic Studies  {CWT reviewed:05910}    Assessment/Plan   I believe that the patient {CWT doing well:10987}.     {CWT plan smart text:43090}    I recommend {CWTworkup:67762}    I discussed this in detail with the patient, including a discussion of alternatives. They were comfortable with this approach.     Arnaud aMrroquin MD  [phone]    "

## 2025-02-18 ENCOUNTER — APPOINTMENT (OUTPATIENT)
Dept: PRIMARY CARE | Facility: CLINIC | Age: 75
End: 2025-02-18
Payer: MEDICARE

## 2025-02-18 VITALS
HEART RATE: 79 BPM | WEIGHT: 154 LBS | OXYGEN SATURATION: 98 % | DIASTOLIC BLOOD PRESSURE: 88 MMHG | BODY MASS INDEX: 27.29 KG/M2 | TEMPERATURE: 97.5 F | SYSTOLIC BLOOD PRESSURE: 128 MMHG | HEIGHT: 63 IN

## 2025-02-18 DIAGNOSIS — M81.0 SENILE OSTEOPOROSIS: ICD-10-CM

## 2025-02-18 DIAGNOSIS — B37.0 CANDIDAL STOMATITIS: ICD-10-CM

## 2025-02-18 DIAGNOSIS — K21.00 GASTROESOPHAGEAL REFLUX DISEASE WITH ESOPHAGITIS WITHOUT HEMORRHAGE: ICD-10-CM

## 2025-02-18 DIAGNOSIS — K29.50 CHRONIC GASTRITIS WITHOUT BLEEDING, UNSPECIFIED GASTRITIS TYPE: ICD-10-CM

## 2025-02-18 DIAGNOSIS — J44.9 CHRONIC OBSTRUCTIVE PULMONARY DISEASE, UNSPECIFIED COPD TYPE (MULTI): Primary | ICD-10-CM

## 2025-02-18 DIAGNOSIS — M05.9 RHEUMATOID ARTHRITIS WITH POSITIVE RHEUMATOID FACTOR, INVOLVING UNSPECIFIED SITE (MULTI): ICD-10-CM

## 2025-02-18 DIAGNOSIS — I25.10 CORONARY ARTERY DISEASE, UNSPECIFIED VESSEL OR LESION TYPE, UNSPECIFIED WHETHER ANGINA PRESENT, UNSPECIFIED WHETHER NATIVE OR TRANSPLANTED HEART: ICD-10-CM

## 2025-02-18 DIAGNOSIS — E78.5 DYSLIPIDEMIA: ICD-10-CM

## 2025-02-18 DIAGNOSIS — J45.41 MODERATE PERSISTENT ASTHMA WITHOUT STATUS ASTHMATICUS WITH ACUTE EXACERBATION (HHS-HCC): ICD-10-CM

## 2025-02-18 DIAGNOSIS — F17.200 CURRENT SMOKER: ICD-10-CM

## 2025-02-18 PROCEDURE — 3079F DIAST BP 80-89 MM HG: CPT | Performed by: STUDENT IN AN ORGANIZED HEALTH CARE EDUCATION/TRAINING PROGRAM

## 2025-02-18 PROCEDURE — 1159F MED LIST DOCD IN RCRD: CPT | Performed by: STUDENT IN AN ORGANIZED HEALTH CARE EDUCATION/TRAINING PROGRAM

## 2025-02-18 PROCEDURE — 1160F RVW MEDS BY RX/DR IN RCRD: CPT | Performed by: STUDENT IN AN ORGANIZED HEALTH CARE EDUCATION/TRAINING PROGRAM

## 2025-02-18 PROCEDURE — 3074F SYST BP LT 130 MM HG: CPT | Performed by: STUDENT IN AN ORGANIZED HEALTH CARE EDUCATION/TRAINING PROGRAM

## 2025-02-18 PROCEDURE — 99204 OFFICE O/P NEW MOD 45 MIN: CPT | Performed by: STUDENT IN AN ORGANIZED HEALTH CARE EDUCATION/TRAINING PROGRAM

## 2025-02-18 PROCEDURE — 99406 BEHAV CHNG SMOKING 3-10 MIN: CPT | Performed by: STUDENT IN AN ORGANIZED HEALTH CARE EDUCATION/TRAINING PROGRAM

## 2025-02-18 RX ORDER — ONDANSETRON 4 MG/1
4 TABLET, ORALLY DISINTEGRATING ORAL EVERY 8 HOURS PRN
Qty: 20 TABLET | Refills: 5 | Status: SHIPPED | OUTPATIENT
Start: 2025-02-18 | End: 2025-08-17

## 2025-02-18 RX ORDER — CLOTRIMAZOLE 10 MG/1
10 LOZENGE ORAL DAILY
Qty: 70 TROCHE | Refills: 0 | Status: SHIPPED | OUTPATIENT
Start: 2025-02-18

## 2025-02-18 RX ORDER — ALBUTEROL SULFATE 90 UG/1
2 AEROSOL, METERED RESPIRATORY (INHALATION) EVERY 4 HOURS PRN
Qty: 8.5 G | Refills: 5 | Status: SHIPPED | OUTPATIENT
Start: 2025-02-18 | End: 2026-02-18

## 2025-02-18 ASSESSMENT — ENCOUNTER SYMPTOMS
LOSS OF SENSATION IN FEET: 0
OCCASIONAL FEELINGS OF UNSTEADINESS: 0
DEPRESSION: 0

## 2025-02-18 NOTE — PROGRESS NOTES
Subjective   Patient ID: Radha Deluna is a 74 y.o. female who presents for New Patient Visit (HOSPITAL DISCHARGE EST CARE).  [facility]  Radha is here to establish care.     She is living at home alone. Was admitted to [facility] with Influenza A/pneumonia from 1/19/25-1/22/25. Her sister is a retired nurse and checks in on her. She reports that she continues to have some SOB since discharge from the hospital, but it has been improving gradually. Energy has been low, but she has been able to do everything she needs to around the house.  She has been maintained on 2L, this was prior to recent hospitalization.    She is following with Dr. Powell, pulmonology. Maintained on Trelegy, theophilline, montelukast and nebulizers - ipratropium x2, albuterol x3.     PMHx: T2DM, COPD, RA  SurgHx: appendectomy, cholecystectomy   FamHx: CAD - mother and father  SocialHx: Current smoker. No EtOH use. No drug use. Lives at home alone. 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     Review of Systems  12-point ROS was reviewed and is negative, unless otherwise noted in HPI    Objective   Vitals:    02/18/25 0851   BP: 128/88   Pulse: 79   Temp: 36.4 °C (97.5 °F)   SpO2: 98%      Physical Exam  GEN: alert, conversant, NAD  HEENT: PERRL, EOMI, MMM, Tms pearly gray bilaterally  NECK: supple, no LAD appreciated  CHEST: CTAB  CV: S1, S2, RRR, no murmurs appreciated  ABD: soft, NT, ND  EXT: no significant LE edema  SKIN: warm, dry    Assessment/Plan   #Rheumatoid Arthritis  - maintained on plaquenil 300mg daily, methotrexate, folic acid and Humira   - Following with Rheumatology, Dr. Schafer    #osteoporosis  - maintained on Prolia  - Following with Dr. Yeh    #RUL pulmonology nodule  - following yearly with Dr. Marroquin for surviellance    #COPD  #Current smoker  - down to 1/4 ppd smoker. 30 pack year hx  - Advised cessation, counseled cessation tips, offer cessation aids  - Spent >5 minutes counseling smoking cessation    #CAD s/p  PCI  #DLD  #PAD  - Following with Dr. Garcia  - Maintained on pravastatin, Plavix, imdur 30mg, metoprolol 50mg BID    #neuropathy  #tremor  - Following with Dr. Izquierdo, Dr. Madrigal  - maintained on gabapentin TID and primidone daily  - maintained on Norco chronically with Dr. Madrigal    Health Maintenance:  Vaccines: COVID (x2), Flu (UTD), TDAP (advised), Shingles (Utd), Pneumonia (UTD), RSV (UTD)  Screening: Colonoscopy (2024, repeat in 2 years), Mammogram (declines), LDCT (1/2025), DEXA (4/2024)  Labs: Reviewed recent, none needed today     RTC in 6 months for medicare wellness/physical, or sooner PRN    Mayo Diamond, DO

## 2025-02-26 ENCOUNTER — APPOINTMENT (OUTPATIENT)
Dept: RADIOLOGY | Facility: CLINIC | Age: 75
End: 2025-02-26
Payer: MEDICARE

## 2025-02-26 ENCOUNTER — APPOINTMENT (OUTPATIENT)
Dept: SURGERY | Facility: CLINIC | Age: 75
End: 2025-02-26
Payer: MEDICARE

## 2025-02-28 ENCOUNTER — PATIENT OUTREACH (OUTPATIENT)
Dept: PRIMARY CARE | Facility: CLINIC | Age: 75
End: 2025-02-28
Payer: MEDICARE

## 2025-02-28 ENCOUNTER — TELEPHONE (OUTPATIENT)
Dept: PRIMARY CARE | Facility: CLINIC | Age: 75
End: 2025-02-28
Payer: MEDICARE

## 2025-03-03 ENCOUNTER — LAB (OUTPATIENT)
Dept: LAB | Facility: CLINIC | Age: 75
End: 2025-03-03
Payer: MEDICARE

## 2025-03-03 ENCOUNTER — OFFICE VISIT (OUTPATIENT)
Dept: HEMATOLOGY/ONCOLOGY | Facility: CLINIC | Age: 75
End: 2025-03-03
Payer: MEDICARE

## 2025-03-03 VITALS
RESPIRATION RATE: 20 BRPM | WEIGHT: 149.91 LBS | BODY MASS INDEX: 26.56 KG/M2 | HEART RATE: 66 BPM | DIASTOLIC BLOOD PRESSURE: 77 MMHG | TEMPERATURE: 97.5 F | SYSTOLIC BLOOD PRESSURE: 141 MMHG

## 2025-03-03 DIAGNOSIS — D50.9 IRON DEFICIENCY ANEMIA, UNSPECIFIED IRON DEFICIENCY ANEMIA TYPE: ICD-10-CM

## 2025-03-03 DIAGNOSIS — R11.0 NAUSEA: Primary | ICD-10-CM

## 2025-03-03 DIAGNOSIS — D50.9 IRON DEFICIENCY ANEMIA, UNSPECIFIED IRON DEFICIENCY ANEMIA TYPE: Primary | ICD-10-CM

## 2025-03-03 LAB
ALBUMIN SERPL BCP-MCNC: 4.3 G/DL (ref 3.4–5)
ALP SERPL-CCNC: 48 U/L (ref 33–136)
ALT SERPL W P-5'-P-CCNC: 6 U/L (ref 7–45)
ANION GAP SERPL CALC-SCNC: 11 MMOL/L (ref 10–20)
AST SERPL W P-5'-P-CCNC: 12 U/L (ref 9–39)
BASOPHILS # BLD AUTO: 0.05 X10*3/UL (ref 0–0.1)
BASOPHILS NFR BLD AUTO: 0.5 %
BILIRUB SERPL-MCNC: 0.4 MG/DL (ref 0–1.2)
BUN SERPL-MCNC: 8 MG/DL (ref 6–23)
CALCIUM SERPL-MCNC: 9.6 MG/DL (ref 8.6–10.3)
CHLORIDE SERPL-SCNC: 101 MMOL/L (ref 98–107)
CO2 SERPL-SCNC: 28 MMOL/L (ref 21–32)
CREAT SERPL-MCNC: 0.65 MG/DL (ref 0.5–1.05)
EGFRCR SERPLBLD CKD-EPI 2021: >90 ML/MIN/1.73M*2
EOSINOPHIL # BLD AUTO: 0.11 X10*3/UL (ref 0–0.4)
EOSINOPHIL NFR BLD AUTO: 1.2 %
ERYTHROCYTE [DISTWIDTH] IN BLOOD BY AUTOMATED COUNT: 14.7 % (ref 11.5–14.5)
FERRITIN SERPL-MCNC: 145 NG/ML (ref 8–150)
GLUCOSE SERPL-MCNC: 99 MG/DL (ref 74–99)
HCT VFR BLD AUTO: 43.5 % (ref 36–46)
HGB BLD-MCNC: 13.9 G/DL (ref 12–16)
IMM GRANULOCYTES # BLD AUTO: 0.05 X10*3/UL (ref 0–0.5)
IMM GRANULOCYTES NFR BLD AUTO: 0.5 % (ref 0–0.9)
IRON SATN MFR SERPL: 79 % (ref 25–45)
IRON SERPL-MCNC: 227 UG/DL (ref 35–150)
LYMPHOCYTES # BLD AUTO: 1.97 X10*3/UL (ref 0.8–3)
LYMPHOCYTES NFR BLD AUTO: 20.9 %
MCH RBC QN AUTO: 33.4 PG (ref 26–34)
MCHC RBC AUTO-ENTMCNC: 32 G/DL (ref 32–36)
MCV RBC AUTO: 105 FL (ref 80–100)
MONOCYTES # BLD AUTO: 0.44 X10*3/UL (ref 0.05–0.8)
MONOCYTES NFR BLD AUTO: 4.7 %
NEUTROPHILS # BLD AUTO: 6.82 X10*3/UL (ref 1.6–5.5)
NEUTROPHILS NFR BLD AUTO: 72.2 %
NRBC BLD-RTO: 0 /100 WBCS (ref 0–0)
PLATELET # BLD AUTO: 189 X10*3/UL (ref 150–450)
POTASSIUM SERPL-SCNC: 4 MMOL/L (ref 3.5–5.3)
PROT SERPL-MCNC: 7.4 G/DL (ref 6.4–8.2)
RBC # BLD AUTO: 4.16 X10*6/UL (ref 4–5.2)
SODIUM SERPL-SCNC: 136 MMOL/L (ref 136–145)
TIBC SERPL-MCNC: 288 UG/DL (ref 240–445)
UIBC SERPL-MCNC: 61 UG/DL (ref 110–370)
WBC # BLD AUTO: 9.4 X10*3/UL (ref 4.4–11.3)

## 2025-03-03 PROCEDURE — 99213 OFFICE O/P EST LOW 20 MIN: CPT | Performed by: INTERNAL MEDICINE

## 2025-03-03 PROCEDURE — 3078F DIAST BP <80 MM HG: CPT | Performed by: INTERNAL MEDICINE

## 2025-03-03 PROCEDURE — 1126F AMNT PAIN NOTED NONE PRSNT: CPT | Performed by: INTERNAL MEDICINE

## 2025-03-03 PROCEDURE — 83540 ASSAY OF IRON: CPT

## 2025-03-03 PROCEDURE — 82728 ASSAY OF FERRITIN: CPT | Mod: PARLAB

## 2025-03-03 PROCEDURE — 85025 COMPLETE CBC W/AUTO DIFF WBC: CPT

## 2025-03-03 PROCEDURE — 36415 COLL VENOUS BLD VENIPUNCTURE: CPT

## 2025-03-03 PROCEDURE — 1159F MED LIST DOCD IN RCRD: CPT | Performed by: INTERNAL MEDICINE

## 2025-03-03 PROCEDURE — 3077F SYST BP >= 140 MM HG: CPT | Performed by: INTERNAL MEDICINE

## 2025-03-03 PROCEDURE — 84075 ASSAY ALKALINE PHOSPHATASE: CPT

## 2025-03-03 RX ORDER — PROMETHAZINE HYDROCHLORIDE 12.5 MG/1
12.5 TABLET ORAL EVERY 8 HOURS PRN
Qty: 20 TABLET | Refills: 5 | Status: SHIPPED | OUTPATIENT
Start: 2025-03-03 | End: 2025-03-10

## 2025-03-03 ASSESSMENT — PAIN SCALES - GENERAL: PAINLEVEL_OUTOF10: 0-NO PAIN

## 2025-03-03 NOTE — PROGRESS NOTES
"Ryan Deluna  is a 74 y.o. female who presents for evaluation of right upper lobe pulmonary nodule     This patient is well known to me due to history of pulmonary nodules, which were biopsied via wedge resection in 2015. These were consistent with granuloma, and she was referred to a pulmonologist for ongoing management. Recently, radiographic imaging shows an enlarging nodule which is concerning for primary lung malignancy. I recommended the patient percutaneous lung procedure, and this was performed 8/12/2020. This biopsy was consistent with inflammation and thought to be related to the patient's history of granulomatous disease. She presents today for radiographic follow-up    Currently the patient is  feeling \"tired\". Her breathing is \"about the same\". She wears oxygen at night. She had lost quite a bit of weight last year, but then has gained weight back recently . She denies the following symptoms: chest pain, shortness of breath at rest, hemoptysis, fevers, and chills.  She was admitted to [facility] in January 2025 with COPD exacerbation and pneumonia.  She was discharged on a \"prolonged course\" of antibiotics.    She  has a past medical history of Chronic obstructive pulmonary disease, unspecified (11/09/2022), Migraine, unspecified, not intractable, without status migrainosus, Other chronic pain (10/21/2015), Personal history of other diseases of the circulatory system, Personal history of other diseases of the nervous system and sense organs, Personal history of other diseases of the respiratory system, Personal history of other diseases of the respiratory system, Personal history of other endocrine, nutritional and metabolic disease, Personal history of other malignant neoplasm of skin, and Sarcoidosis, unspecified (10/05/2022).  She  has a past surgical history that includes Ankle surgery (10/21/2015); Appendectomy (10/21/2015); Lung surgery (10/21/2015); Cholecystectomy " (10/21/2015); Breast surgery (10/21/2015); Other surgical history (10/21/2015); Other surgical history (02/07/2022); Other surgical history (02/07/2022); Lymph node biopsy (09/29/2017); Other surgical history (10/29/2020); Other surgical history (10/29/2020); Other surgical history (10/29/2020); Other surgical history (10/29/2020); Coronary angioplasty with stent (01/14/2021); Rotator cuff repair (08/10/2017); Other surgical history (10/21/2015); Carpal tunnel release (10/26/2016); CT guided percutaneous biopsy lung (08/12/2020); CT guided percutaneous biopsy lung (12/01/2020); MR angio head wo IV contrast (04/04/2022); MR angio neck wo IV contrast (08/15/2022); and Cataract extraction (Bilateral).    She  reports that she has been smoking cigarettes. She has been exposed to tobacco smoke. She has never used smokeless tobacco. She reports that she does not currently use alcohol. She reports that she does not use drugs.    Objective   Physical Exam  Phone visit (audio only evaluation)   Diagnostic Studies  I reviewed a CT scan of the chest dated 1/20/2025 from [facility].            Assessment/Plan   I believe that the patient is doing well.     Based on the patient's clinical presentation and my review of their radiographic imaging, I believe the lung nodule is unlikely to represent a malignant process.  We discussed various management strategies including surgery, biopsy, and observation.  Based on this discussion, the patient elected for observational management.  I recommend serial radiographic surveillance.    There is a new 5mm nodule in the left lower lobe.     I recommend CT chest 6 months in person visit.     I discussed this in detail with the patient, including a discussion of alternatives. They were comfortable with this approach.     Arnaud Marroquin MD  [phone]  Time 11 min

## 2025-03-03 NOTE — TELEPHONE ENCOUNTER
Sent alternate nausea medication. Atrovent nasal spray is still listed as a preferred agent on EMR, can she ask her insurance if they have a preferred alternate?

## 2025-03-03 NOTE — PROGRESS NOTES
LOCATION:  [facility].     HEMATOLOGY & ONCOLOGY PROBLEMS:  1. Anemia     a.  Iron deficiency due to malabsorption.     b.  S/p IV iron infusion in Jan 2023 with good response.     CHIEF COMPLAINT:    The patient is in the clinic today for initial hematology evaluation of anemia and iron deficiency.      HISTORY:   Ms. Deluna is a 74 year old pleasant female with anemia.  She has multiple medical problems and has also been noted to have gradually worsening anemia.  Blood work from September 2022 showed a hemoglobin of 8.6 with MCV of 88.  WBC, platelets, metabolic  profile were all normal.  Vitamin B12 and serum iron level was low.  She was tried on oral iron therapy but had problem with abdominal pain and discomfort.  Her medical history is significant for rheumatoid arthritis for which she was maintained on multiple  disease modifying agents.  With the finding of anemia methotrexate, sulfasalazine were discontinued. EGD and colonoscopy were unremarkable in June 2022.  No history of IV GYN related blood loss.  After initial hematological evaluation she was noted to  have significant iron deficiency and was supported with a course of IV iron infusion in January 2023 with good response.     INTERVAL HISTORY:  Patient denies any specific complaints other than issues with chronic neuropathy.  Latest blood work is essentially normal.  No history of nausea, vomiting, fever, night sweats, diarrhea, rash, anorexia or weight loss.  Recent colonoscopy was unremarkable.  Multiple polyps were removed but they were all benign.     PAST MEDICAL HISTORY:   1.  Anemia as detailed above   2.  Hypertension   3.  Hyperlipidemia   4.  Rheumatoid arthritis   5.  Degenerative joint disease   6.  COPD   7.  GERD   8.  Osteoporosis   9.  Peripheral arterial disease   10.  Peripheral neuropathy.     SOCIAL HISTORY:   She is single and lives with her 93-year-old mother in [place].  Smokes about half to 1 pack  a day for last 30 years.  Nonalcoholic.  She is a retired dental technician.  Born and raised in [place]     FAMILY HISTORY:    Father  at age 78 from heart attack.  Mother age 93 from multiple chronic medical problems.  1 sister alive and well.  She does not have any children. No other specific history of bleeding, clotting or malignant disorder in the family.     REVIEW OF SYSTEMS:  Pertinent finding as per the history above.  All other systems have been reviewed and generally negative and noncontributory.     ALLERGY & MEDICATIONS:  Allergies and latest outpatient medications list were reviewed in the EMR.    VITAL SIGNS  BSA: There is no height or weight on file to calculate BSA.  There were no vitals taken for this visit.    PHYSICAL EXAMINATION:  Detailed examination not done.    LAB RESULTS:  CBC from today is unremarkable with hemoglobin of 13.9 with MCV of 105.  WBC is 9.4 with platelets of 189.  Metabolic profile is unremarkable.  Serum iron is 227 with % saturation of 79.   Ferritin is pending.  Last 3 sets of blood work were reviewed and the trend was noted.     ASSESSMENT & PLAN:  1. Anemia. Please refer to the details of initial presentation and management as outlined above. In summary, patient with multiple baseline medical problems who has also been noted to have gradually worsening anemia.  Blood work from 2022 showed normocytic anemia with hemoglobin of 8.6.  WBC, platelets and metabolic profile was unremarkable.  Initial  work-up revealed low iron and vitamin B12 levels.  She was also on multiple disease modifying drugs for rheumatoid arthritis including methotrexate and sulfasalazine which have been discontinued.  She was tried on oral iron therapy but had problem with  significant abdominal discomfort. EGD and colonoscopy were unremarkable in 2022. After initial hematological evaluation she was noted to have significant iron deficiency and was supported with IV iron  infusions in Jan and Sep 2023 with good response.     Blood work is essentially normal.  Hemoglobin is 13.5.  Iron panel shows moderately elevated serum iron.  Patient is reassured that there is no need for any additional IV iron infusion at this time.  GI evaluation has been unremarkable in the past.  In the future if there is concern regarding persistent unexplained anemia then she will also need a bone marrow biopsy.     2.  Neuropathy.  No clear etiology.  Cryoglobulin screen was negative in August 2024.  She will continue to follow with neurology clinic.    3.  Follow up: She will let have follow-up with me in about 6 months.   Advised to contact us immediately if there are any new questions or problems.        This note has been transcribed using Dragon voice recognition system and there is a possibility of unintentional typing misprints.    19.2

## 2025-03-04 RX ORDER — LOTILANER OPHTHALMIC SOLUTION 2.5 MG/ML
1 SOLUTION/ DROPS OPHTHALMIC EVERY 12 HOURS
COMMUNITY
Start: 2025-02-03 | End: 2025-03-16

## 2025-03-04 RX ORDER — CARBOXYMETHYLCELLULOSE SODIUM 2.5 MG/ML
SOLUTION/ DROPS OPHTHALMIC
COMMUNITY
Start: 2025-02-03

## 2025-03-05 ENCOUNTER — TELEMEDICINE (OUTPATIENT)
Dept: SURGERY | Facility: CLINIC | Age: 75
End: 2025-03-05
Payer: MEDICARE

## 2025-03-05 DIAGNOSIS — R91.8 PULMONARY NODULES/LESIONS, MULTIPLE: Primary | ICD-10-CM

## 2025-03-05 DIAGNOSIS — R91.1 LUNG NODULE: ICD-10-CM

## 2025-03-05 DIAGNOSIS — J30.89 NON-SEASONAL ALLERGIC RHINITIS, UNSPECIFIED TRIGGER: ICD-10-CM

## 2025-03-05 RX ORDER — PERFLUOROHEXYLOCTANE 1 MG/MG
SOLUTION OPHTHALMIC
COMMUNITY
Start: 2025-03-04

## 2025-03-05 RX ORDER — FLUTICASONE PROPIONATE 50 MCG
2 SPRAY, SUSPENSION (ML) NASAL DAILY
Qty: 16 G | Refills: 3 | Status: SHIPPED | OUTPATIENT
Start: 2025-03-05

## 2025-03-05 SDOH — ECONOMIC STABILITY: FOOD INSECURITY: WITHIN THE PAST 12 MONTHS, THE FOOD YOU BOUGHT JUST DIDN'T LAST AND YOU DIDN'T HAVE MONEY TO GET MORE.: NEVER TRUE

## 2025-03-05 SDOH — ECONOMIC STABILITY: FOOD INSECURITY: WITHIN THE PAST 12 MONTHS, YOU WORRIED THAT YOUR FOOD WOULD RUN OUT BEFORE YOU GOT MONEY TO BUY MORE.: NEVER TRUE

## 2025-03-05 ASSESSMENT — ENCOUNTER SYMPTOMS
DEPRESSION: 0
LOSS OF SENSATION IN FEET: 1
OCCASIONAL FEELINGS OF UNSTEADINESS: 1

## 2025-03-05 ASSESSMENT — LIFESTYLE VARIABLES
HOW MANY STANDARD DRINKS CONTAINING ALCOHOL DO YOU HAVE ON A TYPICAL DAY: PATIENT DOES NOT DRINK
HOW OFTEN DO YOU HAVE SIX OR MORE DRINKS ON ONE OCCASION: NEVER
HOW OFTEN DO YOU HAVE A DRINK CONTAINING ALCOHOL: NEVER
SKIP TO QUESTIONS 9-10: 1
AUDIT-C TOTAL SCORE: 0

## 2025-03-05 ASSESSMENT — PAIN SCALES - GENERAL: PAINLEVEL_OUTOF10: 8

## 2025-03-07 ENCOUNTER — HOSPITAL ENCOUNTER (OUTPATIENT)
Dept: RADIOLOGY | Facility: EXTERNAL LOCATION | Age: 75
Discharge: HOME | End: 2025-03-07

## 2025-03-10 ENCOUNTER — APPOINTMENT (OUTPATIENT)
Dept: NEUROLOGY | Facility: CLINIC | Age: 75
End: 2025-03-10
Payer: MEDICARE

## 2025-03-10 VITALS
SYSTOLIC BLOOD PRESSURE: 126 MMHG | BODY MASS INDEX: 26.93 KG/M2 | HEART RATE: 105 BPM | RESPIRATION RATE: 16 BRPM | DIASTOLIC BLOOD PRESSURE: 82 MMHG | TEMPERATURE: 97.3 F | HEIGHT: 63 IN | WEIGHT: 152 LBS

## 2025-03-10 DIAGNOSIS — M48.062 LUMBAR STENOSIS WITH NEUROGENIC CLAUDICATION: ICD-10-CM

## 2025-03-10 DIAGNOSIS — R26.81 UNSTEADY GAIT: ICD-10-CM

## 2025-03-10 DIAGNOSIS — G50.1 ATYPICAL FACE PAIN: ICD-10-CM

## 2025-03-10 DIAGNOSIS — I10 HTN (HYPERTENSION), BENIGN: ICD-10-CM

## 2025-03-10 DIAGNOSIS — E11.42 DIABETIC POLYNEUROPATHY ASSOCIATED WITH TYPE 2 DIABETES MELLITUS (MULTI): ICD-10-CM

## 2025-03-10 DIAGNOSIS — G44.209 ACUTE NON INTRACTABLE TENSION-TYPE HEADACHE: ICD-10-CM

## 2025-03-10 DIAGNOSIS — G44.221 CHRONIC TENSION-TYPE HEADACHE, INTRACTABLE: ICD-10-CM

## 2025-03-10 DIAGNOSIS — I63.332: ICD-10-CM

## 2025-03-10 DIAGNOSIS — G56.03 BILATERAL CARPAL TUNNEL SYNDROME: ICD-10-CM

## 2025-03-10 DIAGNOSIS — G25.0 ESSENTIAL TREMOR: Primary | ICD-10-CM

## 2025-03-10 PROCEDURE — 3079F DIAST BP 80-89 MM HG: CPT | Performed by: PSYCHIATRY & NEUROLOGY

## 2025-03-10 PROCEDURE — 1159F MED LIST DOCD IN RCRD: CPT | Performed by: PSYCHIATRY & NEUROLOGY

## 2025-03-10 PROCEDURE — 1160F RVW MEDS BY RX/DR IN RCRD: CPT | Performed by: PSYCHIATRY & NEUROLOGY

## 2025-03-10 PROCEDURE — 4004F PT TOBACCO SCREEN RCVD TLK: CPT | Performed by: PSYCHIATRY & NEUROLOGY

## 2025-03-10 PROCEDURE — 99215 OFFICE O/P EST HI 40 MIN: CPT | Performed by: PSYCHIATRY & NEUROLOGY

## 2025-03-10 PROCEDURE — G8433 SCR FOR DEP NOT CPT DOC RSN: HCPCS | Performed by: PSYCHIATRY & NEUROLOGY

## 2025-03-10 PROCEDURE — 3074F SYST BP LT 130 MM HG: CPT | Performed by: PSYCHIATRY & NEUROLOGY

## 2025-03-10 RX ORDER — PRIMIDONE 50 MG/1
150 TABLET ORAL DAILY
Qty: 270 TABLET | Refills: 3 | Status: SHIPPED | OUTPATIENT
Start: 2025-03-10

## 2025-03-10 ASSESSMENT — ENCOUNTER SYMPTOMS
OCCASIONAL FEELINGS OF UNSTEADINESS: 1
LOSS OF SENSATION IN FEET: 1
NUMBNESS: 1
HEADACHES: 1
DEPRESSION: 0

## 2025-03-10 NOTE — PATIENT INSTRUCTIONS
The patient is stable from a neurological standpoint but is still having an unsteady gait.  The patient does have carpal tunnel syndrome on the right.  The patient needs an EMG nerve conduction study of the left lower extremity.  The patient needs MRIs of the cervical and lumbar spines.  I did  her to wear bilateral wrist splints at night is much as she can tolerate.    The patient needs to wear an elbow pad on her right elbow.  I would like to have a copy of the cardiac monitor that was done at Dr. Garcia's office.  The patient should continue her Plavix and pravastatin as well as stroke risk factor modification.  The patient should continue her gabapentin at 800 mg 4 times a day.  The patient can use symptomatic pain medicines for severe headaches, back pain and lower extremity pain.  The patient should continue all of her medicines for arthritis as per rheumatology.  The patient should continue to follow with pain management.  The patient does need to stop smoking.  I will increase the patient's primidone to 150 mg daily.  I did warn the patient of the possibility of sedation with this medicine.  I discussed all these issues in detail with the patient and answered all of her questions.  The patient will follow-up with me in 6 months.

## 2025-03-10 NOTE — PROGRESS NOTES
Subjective     Radha Deluna 74 y.o.  HPI  The patient states that her tremor is still present.  She states that it will occasionally occur at rest and also when she is using it.  She is compliant with her primidone.    The patient denies any new stroke symptoms and is compliant with her Plavix and Pravachol.  The patient states that she is smoking about 3 cigarettes a day.    The patient has had 2 falls since I saw her last.  She states that she did scan her knee.  The patient did have an EMG nerve conduction study of the right upper extremity and limited examination of the left upper extremity.  The patient has a right median neuropathy at the wrist but there is no evidence of a left median neuropathy across the wrist.  The patient states that she wore a wrist plate but does not feel that it helped her.    The patient had a heart monitor done with Dr. Garcia but I do not have a copy that result  Review of Systems   Neurological:  Positive for numbness and headaches.        Patient Active Problem List   Diagnosis    AAA (abdominal aortic aneurysm) (CMS-[facility])    Acute ankle pain    Acute non intractable tension-type headache    Acute sinusitis    Chronic rhinitis    Anemia    Antalgic gait    Arthritis of knee, right    Atypical face pain    Headache    Left facial numbness    Numbness and tingling    Bilateral carpal tunnel syndrome    Breast pain    Cerebral infarction    Cervical radiculopathy    Chronic hoarseness    Chronic musculoskeletal pain    Claudication (CMS-[facility])    Chronic obstructive pulmonary disease (Multi)    Chronic tension-type headache, intractable    Contusion, knee and lower leg, right, subsequent encounter    Coronary disorder    Elevated fasting glucose    Essential tremor    Facet degeneration of lumbar region    Facial pressure    Fatigue    Foot pain, bilateral    Gastroesophageal reflux disease    History of migraine headaches    HTN (hypertension), benign    Hypercholesterolemia     Hyperkeratosis    Median neuropathy, left    Nasal discharge    Rhinorrhea    On methotrexate therapy    Onychocryptosis    Onychomycosis    Osteoporosis    Otalgia, left    PAD (peripheral artery disease) (CMS-[facility])    Joint pain, hip    Pain, joint, shoulder    Positive FIT (fecal immunochemical test)    Postlaminectomy syndrome, lumbar    Pulmonary nodules/lesions, multiple    Rheumatoid arthritis with rheumatoid factor    Rheumatoid lung disease (Multi)    Right knee pain    Rotator cuff tendonitis    Sarcoidosis    Lumbar stenosis with neurogenic claudication    Spinal stenosis    Spondylosis of lumbar region without myelopathy or radiculopathy    Rheumatoid nodule, right ankle and foot (Multi)    Rheumatoid nodule, right ankle and foot (Multi)    Tremor    Peripheral neuropathy    Trigeminal neuropathy    Trochanteric bursitis    Vitamin D deficiency    Weight loss, unintentional    Adalimumab (Humira) long-term use    Senile osteoporosis    Asthma without status asthmaticus ([facility])    Bilateral primary osteoarthritis of knee    Palpitations    Angina pectoris    Chronic pain    Chronic obstructive lung disease (Multi)    Pulmonary emphysema (Multi)    Methotrexate, long term, current use    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)    Rheumatoid arthritis    Sarcoid    Carpal tunnel syndrome    History of malignant neoplasm of skin    History of otitis media    History of thyroid disorder    Migraine headache    Transient ischemic attack        Past Medical History:   Diagnosis Date    Chronic obstructive pulmonary disease, unspecified 11/09/2022    Chronic obstructive pulmonary disease, unspecified COPD type    Migraine, unspecified, not intractable, without status migrainosus     Migraines    Other chronic pain 10/21/2015    Chronic pain    Personal history of other diseases of the circulatory system     History of cardiac disorder    Personal history of other  diseases of the nervous system and sense organs     History of otitis media    Personal history of other diseases of the respiratory system     History of asthma    Personal history of other diseases of the respiratory system     History of sinusitis    Personal history of other endocrine, nutritional and metabolic disease     History of thyroid disorder    Personal history of other malignant neoplasm of skin     History of malignant neoplasm of skin    Sarcoidosis, unspecified 10/05/2022    Sarcoidosis        Past Surgical History:   Procedure Laterality Date    ANKLE SURGERY  10/21/2015    Ankle Surgery    APPENDECTOMY  10/21/2015    Appendectomy    BREAST SURGERY  10/21/2015    Breast Surgery    CARPAL TUNNEL RELEASE  10/26/2016    Neuroplasty Median Nerve At Carpal Tunnel    CATARACT EXTRACTION Bilateral     CHOLECYSTECTOMY  10/21/2015    Cholecystectomy    CORONARY ANGIOPLASTY WITH STENT PLACEMENT  01/14/2021    Cath Placement Of Stent 1    CT GUIDED PERCUTANEOUS BIOPSY LUNG  08/12/2020    CT GUIDED PERCUTANEOUS BIOPSY LUNG 8/12/2020 [facility]    CT GUIDED PERCUTANEOUS BIOPSY LUNG  12/01/2020    CT GUIDED PERCUTANEOUS BIOPSY LUNG 12/1/2020 [facility]    EYE SURGERY      laser    LUNG SURGERY  10/21/2015    Lung Surgery    LYMPH NODE BIOPSY  09/29/2017    Biopsy Lymph Node    MR HEAD ANGIO WO IV CONTRAST  04/04/2022    MR HEAD ANGIO WO IV CONTRAST 4/4/2022 [facility]    MR NECK ANGIO WO IV CONTRAST  08/15/2022    MR NECK ANGIO WO IV CONTRAST 8/15/2022 [facility]    OTHER SURGICAL HISTORY  10/21/2015    Wrist Surgery    OTHER SURGICAL HISTORY  02/07/2022    Endoscopy    OTHER SURGICAL HISTORY  02/07/2022    Colonoscopy    OTHER SURGICAL HISTORY  10/29/2020    Thyroid surgery    OTHER SURGICAL HISTORY  10/29/2020    Sinus surgery    OTHER SURGICAL HISTORY  10/29/2020    Ear surgery    OTHER SURGICAL HISTORY  10/29/2020    Spinal surgery    OTHER SURGICAL HISTORY  10/21/2015     Thoracoscopy (Therapeutic) With Wedge Resection Of Lung    ROTATOR CUFF REPAIR  08/10/2017    Rotator Cuff Repair        Social History     Socioeconomic History    Marital status:      Spouse name: Not on file    Number of children: Not on file    Years of education: Not on file    Highest education level: Not on file   Occupational History    Not on file   Tobacco Use    Smoking status: Every Day     Types: Cigarettes     Passive exposure: Current    Smokeless tobacco: Never   Vaping Use    Vaping status: Never Used   Substance and Sexual Activity    Alcohol use: Not Currently    Drug use: Never    Sexual activity: Not on file   Other Topics Concern    Not on file   Social History Narrative    Not on file     Social Drivers of Health     Financial Resource Strain: Not on file   Food Insecurity: No Food Insecurity (3/5/2025)    Hunger Vital Sign     Worried About Running Out of Food in the Last Year: Never true     Ran Out of Food in the Last Year: Never true   Transportation Needs: No Transportation Needs (1/23/2025)    Received from [employer]    [employer] - Transportation     Lack of Transportation (Medical): No     Lack of Transportation (Non-Medical): No   Physical Activity: Not on file   Stress: Not on file   Social Connections: Not on file   Intimate Partner Violence: Unknown (1/19/2025)    Received from [employer]    Humiliation, Afraid, Rape, and Kick questionnaire     Fear of Current or Ex-Partner: Not on file     Emotionally Abused: No     Physically Abused: Not on file     Sexually Abused: Not on file   Housing Stability: Unknown (1/19/2025)    Received from [employer]    Housing Stability Vital Sign     Unable to Pay for Housing in the Last Year: No     Number of Times Moved in the Last Year: Not on file     Homeless in the Last Year: Not on file        Family History   Problem Relation Name Age of Onset    Other (cardiac disorder) Mother      Other (cardiac disorder) Father      Stroke Father       Coronary artery disease Father      Hypertension Father      Diabetes Other          Current Outpatient Medications   Medication Instructions    adalimumab-adbm (CYLTEZO CF) 40 mg, Every 14 days    albuterol 5 mg/mL nebulizer solution Take by nebulization. USE 0.25 ML IN 2.5 ML NORMAL SALINE VIA NEBULIZER 3 TO 4 TIMES DAILY AS NEEDED FOR WHEEZING.    azelastine (Astelin) 137 mcg (0.1 %) nasal spray 2 sprays, Each Nostril, 2 times daily    Bifidobacterium infantis (ALIGN ORAL) Daily    carboxymethylcellulose sodium (TheraTears) 0.25 % dropperette Administer into affected eye(s).    clopidogrel (Plavix) 75 mg tablet 1 tablet, Daily    clotrimazole (MYCELEX) 10 mg, oral, Daily    cyclobenzaprine (FLEXERIL) 10 mg, oral, 3 times daily PRN    ergocalciferol (VITAMIN D-2) 1,250 mcg, oral, Once Weekly    famotidine (PEPCID) 40 mg, oral, Nightly    fluticasone (Flonase) 50 mcg/actuation nasal spray 2 sprays, Each Nostril, Daily    folic acid (Folvite) 1 mg tablet 1 tablet, Daily    gabapentin (NEURONTIN) 800 mg, oral, 4 times daily    hydroxychloroquine (PLAQUENIL) 200 mg, 2 times daily    ipratropium (Atrovent) 0.02 % nebulizer solution Use 1 vial 4 times daily    ipratropium (Atrovent) 42 mcg (0.06 %) nasal spray 2 sprays, Each Nostril, 4 times daily    isosorbide mononitrate ER (Imdur) 30 mg 24 hr tablet 1 tablet, Daily    lotilaner (Xdemvy) 0.25 % drops 1 drop, Every 12 hours    methotrexate 25 mg/mL injection 40 mg/m2, Once Weekly    metoprolol tartrate (Lopressor) 50 mg tablet 1 tablet, Every 12 hours    Miebo, PF, 100 % drops     montelukast (SINGULAIR) 10 mg, oral, Daily    naloxone (NARCAN) 4 mg, nasal, As needed, May repeat every 2-3 minutes if needed, alternating nostrils, until medical assistance becomes available.    nitroglycerin (Nitrostat) 0.4 mg SL tablet Place under the tongue.    olopatadine (Patanol) 0.1 % ophthalmic solution 1 drop, Every 12 hours    oxygen (O2) therapy use at night as directed     "pantoprazole (PROTONIX) 40 mg, oral, Daily, Do not crush, chew, or split.    pravastatin (Pravachol) 20 mg tablet 1 tablet, Daily    primidone (MYSOLINE) 100 mg, oral, Daily    ProAir HFA 90 mcg/actuation inhaler 2 puffs, inhalation, Every 4 hours PRN    Prolia 60 mg, Every 6 months    promethazine (PHENERGAN) 12.5 mg, oral, Every 8 hours PRN    syringe with needle 1 mL 25 gauge x 5/8\" syringe No dose, route, or frequency recorded.    theophylline ER (MINE-DUR) 300 mg, 3 times daily    Trelegy Ellipta 100-62.5-25 mcg blister with device 1 puff    Xiidra 5 % dropperette         Allergies   Allergen Reactions    Erythromycin Other     Chest pain    Augmentin [Amoxicillin-Pot Clavulanate] Nausea Only and GI Upset     vomiting        Objective  /82 (BP Location: Left arm)   Pulse 105   Temp 36.3 °C (97.3 °F)   Resp 16   Ht 1.6 m (5' 3\")   Wt 68.9 kg (152 lb)   BMI 26.93 kg/m²    GENERAL APPEARANCE:  No distress, alert and cooperative.     MENTAL STATE:  Orientation was normal to time, place and person. Recent and remote memory was intact.  Attention span and concentration were normal. Language testing was normal for comprehension, repetition, expression, and naming. Calculation was intact. The patient could correctly interpret a picture, and copy a diagram. General fund of knowledge was intact. Mini-mental status examination was performed with no errors.     CRANIAL NERVES:  Cranial nerves were normal.      CN 2- Visual Acuity  OD: 20/20 (corrected)   OS: 20/20 (corrected); visual fields full to confrontation.      CN 3, 4, 6-  Pupils round, 4 mm in diameter, equally reactive to light. No ptosis. EOMs normal alignment, full range of movement, no nystagmus     CN 5- Facial sensation intact bilaterally. Normal corneal reflexes.      CN 7- Normal and symmetric facial strength. Nasolabial folds symmetric.     CN 8- Hearing intact to finger rub, whisper.      CN 9- Palate elevates symmetrically. Normal gag " reflex.      CN 11- Normal strength of shoulder shrug and neck turning      CN 12- Tongue midline, with normal bulk and strength; no fasciculations.     MOTOR:  The patient is motor testing shows that she has 5/5 strength in the upper extremities bilaterally with the exception that her right APB was 5-/5.  The patient has 5-/5 strength in the lower extremities bilaterally.  The patient has a left greater than right upper extremity tremor that is present with movement only.     GAIT: The patient Station and gait are mildly unsteady.    Assessment/Plan   The patient is stable from a neurological standpoint but is still having an unsteady gait.  The patient does have carpal tunnel syndrome on the right.  The patient needs an EMG nerve conduction study of the left lower extremity.  The patient needs MRIs of the cervical and lumbar spines.  I did  her to wear bilateral wrist splints at night is much as she can tolerate.    The patient needs to wear an elbow pad on her right elbow.  I would like to have a copy of the cardiac monitor that was done at Dr. Garcia's office.  The patient should continue her Plavix and pravastatin as well as stroke risk factor modification.  The patient should continue her gabapentin at 800 mg 4 times a day.  The patient can use symptomatic pain medicines for severe headaches, back pain and lower extremity pain.  The patient should continue all of her medicines for arthritis as per rheumatology.  The patient should continue to follow with pain management.  The patient does need to stop smoking.  I will increase the patient's primidone to 150 mg daily.  I did warn the patient of the possibility of sedation with this medicine.  I discussed all these issues in detail with the patient and answered all of her questions.  The patient will follow-up with me in 6 months.

## 2025-03-13 ENCOUNTER — OFFICE VISIT (OUTPATIENT)
Dept: PAIN MEDICINE | Facility: CLINIC | Age: 75
End: 2025-03-13
Payer: MEDICARE

## 2025-03-13 VITALS
DIASTOLIC BLOOD PRESSURE: 75 MMHG | TEMPERATURE: 97.7 F | BODY MASS INDEX: 26.93 KG/M2 | WEIGHT: 152 LBS | HEART RATE: 89 BPM | SYSTOLIC BLOOD PRESSURE: 129 MMHG | OXYGEN SATURATION: 100 % | HEIGHT: 63 IN | RESPIRATION RATE: 20 BRPM

## 2025-03-13 DIAGNOSIS — M48.062 LUMBAR STENOSIS WITH NEUROGENIC CLAUDICATION: ICD-10-CM

## 2025-03-13 DIAGNOSIS — D86.9 SARCOIDOSIS: ICD-10-CM

## 2025-03-13 DIAGNOSIS — M05.731 RHEUMATOID ARTHRITIS INVOLVING RIGHT WRIST WITH POSITIVE RHEUMATOID FACTOR (MULTI): Primary | ICD-10-CM

## 2025-03-13 DIAGNOSIS — M47.816 SPONDYLOSIS OF LUMBAR REGION WITHOUT MYELOPATHY OR RADICULOPATHY: ICD-10-CM

## 2025-03-13 DIAGNOSIS — M05.779 RHEUMATOID ARTHRITIS INVOLVING FOOT WITH POSITIVE RHEUMATOID FACTOR, UNSPECIFIED LATERALITY (MULTI): ICD-10-CM

## 2025-03-13 DIAGNOSIS — J44.0 CHRONIC OBSTRUCTIVE PULMONARY DISEASE WITH ACUTE LOWER RESPIRATORY INFECTION (MULTI): ICD-10-CM

## 2025-03-13 DIAGNOSIS — J43.9 PULMONARY EMPHYSEMA, UNSPECIFIED EMPHYSEMA TYPE (MULTI): ICD-10-CM

## 2025-03-13 PROCEDURE — 99213 OFFICE O/P EST LOW 20 MIN: CPT | Performed by: ANESTHESIOLOGY

## 2025-03-13 PROCEDURE — 1125F AMNT PAIN NOTED PAIN PRSNT: CPT | Performed by: ANESTHESIOLOGY

## 2025-03-13 PROCEDURE — 3074F SYST BP LT 130 MM HG: CPT | Performed by: ANESTHESIOLOGY

## 2025-03-13 PROCEDURE — 3078F DIAST BP <80 MM HG: CPT | Performed by: ANESTHESIOLOGY

## 2025-03-13 PROCEDURE — 1160F RVW MEDS BY RX/DR IN RCRD: CPT | Performed by: ANESTHESIOLOGY

## 2025-03-13 PROCEDURE — 4004F PT TOBACCO SCREEN RCVD TLK: CPT | Performed by: ANESTHESIOLOGY

## 2025-03-13 PROCEDURE — 1159F MED LIST DOCD IN RCRD: CPT | Performed by: ANESTHESIOLOGY

## 2025-03-13 RX ORDER — OXYCODONE AND ACETAMINOPHEN 5; 325 MG/1; MG/1
1 TABLET ORAL EVERY 12 HOURS PRN
Qty: 90 TABLET | Refills: 0 | Status: SHIPPED | OUTPATIENT
Start: 2025-03-13 | End: 2025-04-12

## 2025-03-13 ASSESSMENT — COLUMBIA-SUICIDE SEVERITY RATING SCALE - C-SSRS
6. HAVE YOU EVER DONE ANYTHING, STARTED TO DO ANYTHING, OR PREPARED TO DO ANYTHING TO END YOUR LIFE?: NO
2. HAVE YOU ACTUALLY HAD ANY THOUGHTS OF KILLING YOURSELF?: NO
1. IN THE PAST MONTH, HAVE YOU WISHED YOU WERE DEAD OR WISHED YOU COULD GO TO SLEEP AND NOT WAKE UP?: NO

## 2025-03-13 ASSESSMENT — ENCOUNTER SYMPTOMS
LOSS OF SENSATION IN FEET: 1
OCCASIONAL FEELINGS OF UNSTEADINESS: 1
DEPRESSION: 0

## 2025-03-13 ASSESSMENT — PAIN SCALES - GENERAL: PAINLEVEL_OUTOF10: 8

## 2025-03-13 NOTE — PROGRESS NOTES
Subjective   Patient ID: Radha Deluna is a 74 y.o. female with severe rheumatoid arthritis here today for med management.  She has been on hydrocodone 7.5 mg tablets 3 times a day which have been less effective recently in reducing her pain.  She was hospitalized for short time was placed on Percocet which worked much better at a lower dose.  OARRS report was reviewed pain management agreement is up-to-date.  New MRI of lumbar spine was ordered by neurology as well as a cervical MRI.    HPI  Rheumatoid arthritis; chronic severe pain syndrome; COPD; rheumatoid lung disease; pulmonary emphysema; lumbosacral radiculopathy  Review of Systems  Chronic low back pain with radicular symptoms  Objective   Physical Exam  Gen. appearance:  Patient's alert and oriented ×3 ;cooperative mild to moderate distress  Heart: Regular rate and rhythm  Lungs: Clear to auscultation  Abdomen: Soft nontender  Neuro: Cranial nerves II -XII intact; DTR: +2 over 4 biceps triceps brachial radialis patellar and Achilles  Spinal exam: Positive paraspinal tenderness noted bilaterally L4 5 L5-S1 with flexion extension and rotation/no bony abnormalities  Musculoskeletal:  Upper and lower extremity strength was 4/5 bilaterally  :  deferred  Skin: Warm and dry      Assessment/Plan   Diagnoses and all orders for this visit:  Spondylosis of lumbar region without myelopathy or radiculopathy  -     Follow Up In Pain Medicine  -     oxyCODONE-acetaminophen (Percocet) 5-325 mg tablet; Take 1 tablet by mouth every 12 hours if needed for severe pain (7 - 10).  -     Follow Up In Pain Medicine; Future  Rheumatoid arthritis involving foot with positive rheumatoid factor, unspecified laterality (Multi)  -     Follow Up In Pain Medicine  -     oxyCODONE-acetaminophen (Percocet) 5-325 mg tablet; Take 1 tablet by mouth every 12 hours if needed for severe pain (7 - 10).  -     Follow Up In Pain Medicine; Future  Sarcoidosis  -     Follow Up In Pain  Medicine  -     oxyCODONE-acetaminophen (Percocet) 5-325 mg tablet; Take 1 tablet by mouth every 12 hours if needed for severe pain (7 - 10).  -     Follow Up In Pain Medicine; Future  Patient was switched from hydrocodone 7.5 to Percocet 5/325 mg tablets #91 every 8 hours.  Follow-up visit scheduled in 1 month.  I have urged her to try to get her cervical and lumbar MRIs done prior to her next visit.

## 2025-03-13 NOTE — PROGRESS NOTES
Pain #8/10 lower back and down both legs, right is worse than left.  Walking/standing makes it worse. Takes 3 Norco per day and has 0 tablets left.

## 2025-03-24 DIAGNOSIS — J44.9 CHRONIC OBSTRUCTIVE PULMONARY DISEASE, UNSPECIFIED COPD TYPE (MULTI): ICD-10-CM

## 2025-03-24 RX ORDER — MONTELUKAST SODIUM 10 MG/1
10 TABLET ORAL DAILY
Qty: 90 TABLET | Refills: 1 | Status: SHIPPED | OUTPATIENT
Start: 2025-03-24 | End: 2025-09-20

## 2025-04-03 ENCOUNTER — HOSPITAL ENCOUNTER (OUTPATIENT)
Dept: RADIOLOGY | Facility: CLINIC | Age: 75
Discharge: HOME | End: 2025-04-03
Payer: MEDICARE

## 2025-04-03 DIAGNOSIS — R26.81 UNSTEADY GAIT: ICD-10-CM

## 2025-04-03 DIAGNOSIS — M48.062 LUMBAR STENOSIS WITH NEUROGENIC CLAUDICATION: ICD-10-CM

## 2025-04-03 PROCEDURE — 72141 MRI NECK SPINE W/O DYE: CPT

## 2025-04-03 PROCEDURE — 72148 MRI LUMBAR SPINE W/O DYE: CPT

## 2025-04-10 ENCOUNTER — OFFICE VISIT (OUTPATIENT)
Dept: PAIN MEDICINE | Facility: CLINIC | Age: 75
End: 2025-04-10
Payer: MEDICARE

## 2025-04-10 VITALS
SYSTOLIC BLOOD PRESSURE: 124 MMHG | TEMPERATURE: 96.8 F | OXYGEN SATURATION: 99 % | BODY MASS INDEX: 26.93 KG/M2 | HEART RATE: 83 BPM | DIASTOLIC BLOOD PRESSURE: 61 MMHG | WEIGHT: 152 LBS | RESPIRATION RATE: 20 BRPM | HEIGHT: 63 IN

## 2025-04-10 DIAGNOSIS — M05.779 RHEUMATOID ARTHRITIS INVOLVING FOOT WITH POSITIVE RHEUMATOID FACTOR, UNSPECIFIED LATERALITY (MULTI): ICD-10-CM

## 2025-04-10 DIAGNOSIS — M48.062 LUMBAR STENOSIS WITH NEUROGENIC CLAUDICATION: ICD-10-CM

## 2025-04-10 DIAGNOSIS — I63.00 CEREBRAL INFARCTION DUE TO THROMBOSIS OF PRECEREBRAL ARTERY (MULTI): ICD-10-CM

## 2025-04-10 DIAGNOSIS — R91.8 PULMONARY NODULES/LESIONS, MULTIPLE: ICD-10-CM

## 2025-04-10 DIAGNOSIS — M54.17 LUMBOSACRAL RADICULOPATHY AT L5: Primary | ICD-10-CM

## 2025-04-10 DIAGNOSIS — D86.9 SARCOIDOSIS: ICD-10-CM

## 2025-04-10 DIAGNOSIS — J41.0 SIMPLE CHRONIC BRONCHITIS (MULTI): ICD-10-CM

## 2025-04-10 DIAGNOSIS — M47.816 SPONDYLOSIS OF LUMBAR REGION WITHOUT MYELOPATHY OR RADICULOPATHY: ICD-10-CM

## 2025-04-10 PROCEDURE — 99213 OFFICE O/P EST LOW 20 MIN: CPT | Performed by: ANESTHESIOLOGY

## 2025-04-10 PROCEDURE — 3078F DIAST BP <80 MM HG: CPT | Performed by: ANESTHESIOLOGY

## 2025-04-10 PROCEDURE — 3074F SYST BP LT 130 MM HG: CPT | Performed by: ANESTHESIOLOGY

## 2025-04-10 PROCEDURE — 1125F AMNT PAIN NOTED PAIN PRSNT: CPT | Performed by: ANESTHESIOLOGY

## 2025-04-10 PROCEDURE — 4004F PT TOBACCO SCREEN RCVD TLK: CPT | Performed by: ANESTHESIOLOGY

## 2025-04-10 PROCEDURE — 1160F RVW MEDS BY RX/DR IN RCRD: CPT | Performed by: ANESTHESIOLOGY

## 2025-04-10 PROCEDURE — 1159F MED LIST DOCD IN RCRD: CPT | Performed by: ANESTHESIOLOGY

## 2025-04-10 RX ORDER — HYDROCODONE BITARTRATE AND ACETAMINOPHEN 7.5; 325 MG/1; MG/1
1 TABLET ORAL EVERY 12 HOURS PRN
Qty: 90 TABLET | Refills: 0 | Status: SHIPPED | OUTPATIENT
Start: 2025-04-10 | End: 2025-05-10

## 2025-04-10 ASSESSMENT — ENCOUNTER SYMPTOMS
OCCASIONAL FEELINGS OF UNSTEADINESS: 0
LOSS OF SENSATION IN FEET: 0
DEPRESSION: 0

## 2025-04-10 ASSESSMENT — PAIN SCALES - GENERAL: PAINLEVEL_OUTOF10: 7

## 2025-04-10 NOTE — PROGRESS NOTES
Subjective   Patient ID: Radha Deluna is a 75 y.o. female who is here today complaining of severe low back pain.  Review of her MRI shows multilevel changes of the superior endplate with osteophytes.  She also has retrolisthesis L2-3 L3-4 L4-5 along with anterior listhesis L4 4 on L5 with spinal canal stenosis.  This is causing her significant pain which travels down her right leg.  Risk and benefits of epidural steroid injection was discussed and the patient has chosen that is an option for pain control.  She had been on oxycodone 5 mg tablets which make her too sleepy and she is asking to go back on hydrocodone 7.5/325 mg tablets.    HPI  Rheumatoid arthritis; COPD; spondylosis lumbar spine with radiculopathy; spinal canal stenosis L4-5 L5-S1; peripheral neuropathy; chronic pain syndrome history of cerebral infarction  Review of Systems  Patient continues to get excellent pain relief with the oxycodone however this made her much more sleepy and she was requesting to change over to hydrocodone which I will do today  Objective   Physical Exam  Gen. appearance:  Patient's alert and oriented ×3 ;cooperative mild to moderate distress  Heart: Regular rate and rhythm  Lungs: Clear to auscultation  Abdomen: Soft nontender  Neuro: Cranial nerves II -XII intact; DTR: +2 over 4 biceps triceps brachial radialis patellar and Achilles  Spinal exam: Positive paraspinal tenderness noted bilaterally L4 5 L5-S1 with flexion extension and rotation/no bony abnormalities  Musculoskeletal:  Upper and lower extremity strength was 4/5 bilaterally positive straight leg raising on right at 30 degrees  :  deferred  Skin: Warm and dry      Assessment/Plan   Diagnoses and all orders for this visit:  Lumbosacral radiculopathy at L5  -     FL pain management; Future  -     Epidural Steroid Injection; Future  -     HYDROcodone-acetaminophen (Norco) 7.5-325 mg tablet; Take 1 tablet by mouth every 12 hours if needed for severe pain (7 -  10).  Spondylosis of lumbar region without myelopathy or radiculopathy  -     Follow Up In Pain Medicine  -     FL pain management; Future  -     Epidural Steroid Injection; Future  -     HYDROcodone-acetaminophen (Norco) 7.5-325 mg tablet; Take 1 tablet by mouth every 12 hours if needed for severe pain (7 - 10).  Rheumatoid arthritis involving foot with positive rheumatoid factor, unspecified laterality (Multi)  -     Follow Up In Pain Medicine  -     FL pain management; Future  -     Epidural Steroid Injection; Future  -     HYDROcodone-acetaminophen (Norco) 7.5-325 mg tablet; Take 1 tablet by mouth every 12 hours if needed for severe pain (7 - 10).  Sarcoidosis  -     Follow Up In Pain Medicine  -     FL pain management; Future  -     Epidural Steroid Injection; Future  -     HYDROcodone-acetaminophen (Norco) 7.5-325 mg tablet; Take 1 tablet by mouth every 12 hours if needed for severe pain (7 - 10).    Risk and benefits continued use of opioids for pain management was discussed.  Patient was e-prescribed hydrocodone 7.5 325 number 90 tablets 1 every 8 hours.  She was told to be n.p.o. for 6 hours prior to her scheduled procedure in May 30, 2025 for a lumbar epidural steroid injection.

## 2025-04-10 NOTE — PROGRESS NOTES
Pain #7/10 to lower back, legs,  generalized joint pain. Pain increases with activity. Takes 2 Percocet per day and has 68 left.  States they make her head cloudy and she feels sleepy.  Requests to go back on Norco.

## 2025-04-16 DIAGNOSIS — M48.061 SPINAL STENOSIS OF LUMBAR REGION, UNSPECIFIED WHETHER NEUROGENIC CLAUDICATION PRESENT: ICD-10-CM

## 2025-04-30 ENCOUNTER — HOSPITAL ENCOUNTER (OUTPATIENT)
Dept: PAIN MEDICINE | Facility: CLINIC | Age: 75
Discharge: HOME | End: 2025-04-30
Payer: MEDICARE

## 2025-04-30 VITALS
DIASTOLIC BLOOD PRESSURE: 69 MMHG | SYSTOLIC BLOOD PRESSURE: 99 MMHG | HEART RATE: 79 BPM | TEMPERATURE: 97.2 F | RESPIRATION RATE: 18 BRPM | BODY MASS INDEX: 26.93 KG/M2 | OXYGEN SATURATION: 96 % | WEIGHT: 152 LBS

## 2025-04-30 DIAGNOSIS — M47.816 SPONDYLOSIS OF LUMBAR REGION WITHOUT MYELOPATHY OR RADICULOPATHY: ICD-10-CM

## 2025-04-30 DIAGNOSIS — M05.779 RHEUMATOID ARTHRITIS INVOLVING FOOT WITH POSITIVE RHEUMATOID FACTOR, UNSPECIFIED LATERALITY (MULTI): ICD-10-CM

## 2025-04-30 DIAGNOSIS — M54.17 LUMBOSACRAL RADICULOPATHY AT L5: ICD-10-CM

## 2025-04-30 DIAGNOSIS — D86.9 SARCOIDOSIS: ICD-10-CM

## 2025-04-30 PROCEDURE — 2500000004 HC RX 250 GENERAL PHARMACY W/ HCPCS (ALT 636 FOR OP/ED): Mod: JZ | Performed by: ANESTHESIOLOGY

## 2025-04-30 PROCEDURE — 62323 NJX INTERLAMINAR LMBR/SAC: CPT | Performed by: ANESTHESIOLOGY

## 2025-04-30 PROCEDURE — 2550000001 HC RX 255 CONTRASTS: Performed by: ANESTHESIOLOGY

## 2025-04-30 RX ORDER — HYDROCODONE BITARTRATE AND ACETAMINOPHEN 7.5; 325 MG/1; MG/1
1 TABLET ORAL EVERY 12 HOURS PRN
Qty: 90 TABLET | Refills: 0 | Status: SHIPPED | OUTPATIENT
Start: 2025-05-09

## 2025-04-30 RX ORDER — METHYLPREDNISOLONE ACETATE 80 MG/ML
INJECTION, SUSPENSION INTRA-ARTICULAR; INTRALESIONAL; INTRAMUSCULAR; SOFT TISSUE AS NEEDED
Status: COMPLETED | OUTPATIENT
Start: 2025-04-30 | End: 2025-04-30

## 2025-04-30 RX ADMIN — METHYLPREDNISOLONE ACETATE 80 MG: 80 INJECTION, SUSPENSION INTRA-ARTICULAR; INTRALESIONAL; INTRAMUSCULAR; SOFT TISSUE at 09:29

## 2025-04-30 RX ADMIN — IOHEXOL 3 ML: 240 INJECTION, SOLUTION INTRATHECAL; INTRAVASCULAR; INTRAVENOUS; ORAL at 09:29

## 2025-04-30 ASSESSMENT — PAIN SCALES - GENERAL
PAINLEVEL_OUTOF10: 0 - NO PAIN
PAINLEVEL_OUTOF10: 6

## 2025-04-30 ASSESSMENT — PAIN - FUNCTIONAL ASSESSMENT: PAIN_FUNCTIONAL_ASSESSMENT: 0-10

## 2025-05-05 DIAGNOSIS — G62.9 POLYNEUROPATHY: ICD-10-CM

## 2025-05-05 DIAGNOSIS — E11.42 DIABETIC POLYNEUROPATHY ASSOCIATED WITH TYPE 2 DIABETES MELLITUS: ICD-10-CM

## 2025-05-05 DIAGNOSIS — B37.0 CANDIDAL STOMATITIS: ICD-10-CM

## 2025-05-05 RX ORDER — GABAPENTIN 400 MG/1
800 CAPSULE ORAL 4 TIMES DAILY
Qty: 720 CAPSULE | Refills: 1 | Status: SHIPPED | OUTPATIENT
Start: 2025-05-05 | End: 2025-11-01

## 2025-05-05 RX ORDER — CLOTRIMAZOLE 10 MG/1
10 LOZENGE ORAL DAILY
Qty: 70 TROCHE | Refills: 3 | Status: SHIPPED | OUTPATIENT
Start: 2025-05-05

## 2025-05-16 ENCOUNTER — HOSPITAL ENCOUNTER (OUTPATIENT)
Dept: NEUROLOGY | Facility: HOSPITAL | Age: 75
Discharge: HOME | End: 2025-05-16
Payer: MEDICARE

## 2025-05-16 DIAGNOSIS — R26.81 UNSTEADY GAIT: ICD-10-CM

## 2025-05-16 DIAGNOSIS — E11.42 DIABETIC POLYNEUROPATHY ASSOCIATED WITH TYPE 2 DIABETES MELLITUS: ICD-10-CM

## 2025-06-02 ENCOUNTER — TELEPHONE (OUTPATIENT)
Dept: PAIN MEDICINE | Facility: CLINIC | Age: 75
End: 2025-06-02
Payer: MEDICARE

## 2025-06-02 NOTE — TELEPHONE ENCOUNTER
I spoke to patient today regarding pain relief she received from last lumbar epidural injection she received on 4/30/25. She states she received about 40% relief. Over the past few days, her pain has started to increase so she would like to have another injection.

## 2025-06-06 ENCOUNTER — TELEPHONE (OUTPATIENT)
Dept: PAIN MEDICINE | Facility: CLINIC | Age: 75
End: 2025-06-06
Payer: MEDICARE

## 2025-06-06 NOTE — TELEPHONE ENCOUNTER
I spoke with patient for clarification of the pain relief she received from her last lumber interlaminar epidural steroid injection performed on 4/30/25. She states she received 50-60% pain relief when sitting. She would like to proceed with another steroid injection on June 11th.

## 2025-06-07 DIAGNOSIS — K29.50 CHRONIC GASTRITIS WITHOUT BLEEDING, UNSPECIFIED GASTRITIS TYPE: ICD-10-CM

## 2025-06-09 RX ORDER — METOCLOPRAMIDE 10 MG/1
TABLET ORAL
Qty: 60 TABLET | Refills: 11 | OUTPATIENT
Start: 2025-06-09

## 2025-06-11 ENCOUNTER — HOSPITAL ENCOUNTER (OUTPATIENT)
Dept: PAIN MEDICINE | Facility: CLINIC | Age: 75
Discharge: HOME | End: 2025-06-11
Payer: MEDICARE

## 2025-06-11 VITALS
DIASTOLIC BLOOD PRESSURE: 68 MMHG | BODY MASS INDEX: 26.93 KG/M2 | WEIGHT: 152 LBS | SYSTOLIC BLOOD PRESSURE: 109 MMHG | HEIGHT: 63 IN | TEMPERATURE: 97.3 F | RESPIRATION RATE: 18 BRPM | HEART RATE: 79 BPM | OXYGEN SATURATION: 95 %

## 2025-06-11 DIAGNOSIS — D86.9 SARCOIDOSIS: ICD-10-CM

## 2025-06-11 DIAGNOSIS — M47.816 SPONDYLOSIS OF LUMBAR REGION WITHOUT MYELOPATHY OR RADICULOPATHY: ICD-10-CM

## 2025-06-11 DIAGNOSIS — M05.779 RHEUMATOID ARTHRITIS INVOLVING FOOT WITH POSITIVE RHEUMATOID FACTOR, UNSPECIFIED LATERALITY (MULTI): ICD-10-CM

## 2025-06-11 DIAGNOSIS — M54.17 LUMBOSACRAL RADICULOPATHY AT L5: ICD-10-CM

## 2025-06-11 PROCEDURE — 2550000001 HC RX 255 CONTRASTS: Performed by: ANESTHESIOLOGY

## 2025-06-11 PROCEDURE — 62323 NJX INTERLAMINAR LMBR/SAC: CPT | Performed by: ANESTHESIOLOGY

## 2025-06-11 PROCEDURE — 2500000004 HC RX 250 GENERAL PHARMACY W/ HCPCS (ALT 636 FOR OP/ED): Mod: JZ | Performed by: ANESTHESIOLOGY

## 2025-06-11 RX ORDER — METHYLPREDNISOLONE ACETATE 80 MG/ML
INJECTION, SUSPENSION INTRA-ARTICULAR; INTRALESIONAL; INTRAMUSCULAR; SOFT TISSUE AS NEEDED
Status: COMPLETED | OUTPATIENT
Start: 2025-06-11 | End: 2025-06-11

## 2025-06-11 RX ORDER — DIPHENHYDRAMINE HCL 25 MG
25 TABLET ORAL NIGHTLY PRN
COMMUNITY

## 2025-06-11 RX ADMIN — IOHEXOL 3 ML: 240 INJECTION, SOLUTION INTRATHECAL; INTRAVASCULAR; INTRAVENOUS; ORAL at 08:24

## 2025-06-11 RX ADMIN — METHYLPREDNISOLONE ACETATE 80 MG: 80 INJECTION, SUSPENSION INTRA-ARTICULAR; INTRALESIONAL; INTRAMUSCULAR; SOFT TISSUE at 08:24

## 2025-06-11 ASSESSMENT — PAIN SCALES - GENERAL
PAINLEVEL_OUTOF10: 0 - NO PAIN
PAINLEVEL_OUTOF10: 6

## 2025-06-11 ASSESSMENT — PAIN - FUNCTIONAL ASSESSMENT
PAIN_FUNCTIONAL_ASSESSMENT: 0-10
PAIN_FUNCTIONAL_ASSESSMENT: 0-10

## 2025-06-11 ASSESSMENT — PAIN DESCRIPTION - DESCRIPTORS: DESCRIPTORS: ACHING;SORE

## 2025-06-11 NOTE — H&P
"History Of Present Illness  Radha Deluna is a 75 y.o. female presenting with with a history of lumbosacral radiculopathy.The order is in air she was treated with lumbar epidural steroid injection on 4/30/2025 which gave her some 75% pain relief.  Patient is here today for a repeat procedure..     Past Medical History  Medical History[1]    Surgical History  Surgical History[2]     Social History  She reports that she has been smoking cigarettes. She has been exposed to tobacco smoke. She has never used smokeless tobacco. She reports that she does not currently use alcohol. She reports that she does not use drugs.    Family History  Family History[3]     Allergies  Erythromycin and Augmentin [amoxicillin-pot clavulanate]    Review of Systems  Unremarkable except for chief complaint  Physical Exam  Gen. appearance:  Patient's alert and oriented ×3 ;cooperative mild to moderate distress  Heart: Regular rate and rhythm  Lungs: Clear to auscultation  Abdomen: Soft nontender  Neuro: Cranial nerves II -XII intact; DTR: +2 over 4 biceps triceps brachial radialis patellar and Achilles  Spinal exam: Positive paraspinal tenderness noted bilaterally L4 5 L5-S1 with flexion extension and rotation/no bony abnormalities  Musculoskeletal:  Upper and lower extremity strength was 4/5 bilaterally  :  deferred  Skin: Warm and dry      Last Recorded Vitals  Blood pressure 109/68, pulse 70, temperature 36.3 °C (97.3 °F), temperature source Temporal, resp. rate 18, height 1.6 m (5' 3\"), weight 68.9 kg (152 lb), SpO2 97%.    Relevant Results       Assessment/Plan   Assessment & Plan  Lumbosacral radiculopathy at L5      No changes in the patient's medical history since her last visit       I spent 10 minutes in the professional and overall care of this patient.      Brett Rider DO       [1]   Past Medical History:  Diagnosis Date    Chronic obstructive pulmonary disease, unspecified 11/09/2022    Chronic obstructive " pulmonary disease, unspecified COPD type    Low back pain     Migraine, unspecified, not intractable, without status migrainosus     Migraines    Other chronic pain 10/21/2015    Chronic pain    Personal history of other diseases of the circulatory system     History of cardiac disorder    Personal history of other diseases of the nervous system and sense organs     History of otitis media    Personal history of other diseases of the respiratory system     History of asthma    Personal history of other diseases of the respiratory system     History of sinusitis    Personal history of other endocrine, nutritional and metabolic disease     History of thyroid disorder    Personal history of other malignant neoplasm of skin     History of malignant neoplasm of skin    Sarcoidosis, unspecified 10/05/2022    Sarcoidosis   [2]   Past Surgical History:  Procedure Laterality Date    ANKLE SURGERY  10/21/2015    Ankle Surgery    APPENDECTOMY  10/21/2015    Appendectomy    BREAST SURGERY  10/21/2015    Breast Surgery    CARPAL TUNNEL RELEASE  10/26/2016    Neuroplasty Median Nerve At Carpal Tunnel    CATARACT EXTRACTION Bilateral     CHOLECYSTECTOMY  10/21/2015    Cholecystectomy    CORONARY ANGIOPLASTY WITH STENT PLACEMENT  01/14/2021    Cath Placement Of Stent 1    CT GUIDED PERCUTANEOUS BIOPSY LUNG  08/12/2020    CT GUIDED PERCUTANEOUS BIOPSY LUNG 8/12/2020 [facility]    CT GUIDED PERCUTANEOUS BIOPSY LUNG  12/01/2020    CT GUIDED PERCUTANEOUS BIOPSY LUNG 12/1/2020 [facility]    EYE SURGERY      laser    LUNG SURGERY  10/21/2015    Lung Surgery    LYMPH NODE BIOPSY  09/29/2017    Biopsy Lymph Node    MR HEAD ANGIO WO IV CONTRAST  04/04/2022    MR HEAD ANGIO WO IV CONTRAST 4/4/2022 [facility]    MR NECK ANGIO WO IV CONTRAST  08/15/2022    MR NECK ANGIO WO IV CONTRAST 8/15/2022 [facility]    OTHER SURGICAL HISTORY  10/21/2015    Wrist Surgery    OTHER SURGICAL HISTORY  02/07/2022    Endoscopy     OTHER SURGICAL HISTORY  02/07/2022    Colonoscopy    OTHER SURGICAL HISTORY  10/29/2020    Thyroid surgery    OTHER SURGICAL HISTORY  10/29/2020    Sinus surgery    OTHER SURGICAL HISTORY  10/29/2020    Ear surgery    OTHER SURGICAL HISTORY  10/29/2020    Spinal surgery    OTHER SURGICAL HISTORY  10/21/2015    Thoracoscopy (Therapeutic) With Wedge Resection Of Lung    ROTATOR CUFF REPAIR  08/10/2017    Rotator Cuff Repair   [3]   Family History  Problem Relation Name Age of Onset    Other (cardiac disorder) Mother      Other (cardiac disorder) Father      Stroke Father      Coronary artery disease Father      Hypertension Father      Diabetes Other

## 2025-06-24 ENCOUNTER — APPOINTMENT (OUTPATIENT)
Dept: PRIMARY CARE | Facility: CLINIC | Age: 75
End: 2025-06-24
Payer: MEDICARE

## 2025-06-24 VITALS
WEIGHT: 147 LBS | BODY MASS INDEX: 26.05 KG/M2 | OXYGEN SATURATION: 95 % | HEIGHT: 63 IN | DIASTOLIC BLOOD PRESSURE: 73 MMHG | SYSTOLIC BLOOD PRESSURE: 113 MMHG | TEMPERATURE: 97.9 F | HEART RATE: 58 BPM

## 2025-06-24 DIAGNOSIS — R10.9 LEFT FLANK PAIN: Primary | ICD-10-CM

## 2025-06-24 DIAGNOSIS — H60.502 ACUTE OTITIS EXTERNA OF LEFT EAR, UNSPECIFIED TYPE: ICD-10-CM

## 2025-06-24 DIAGNOSIS — R11.0 NAUSEA: ICD-10-CM

## 2025-06-24 PROCEDURE — 4004F PT TOBACCO SCREEN RCVD TLK: CPT | Performed by: NURSE PRACTITIONER

## 2025-06-24 PROCEDURE — 1123F ACP DISCUSS/DSCN MKR DOCD: CPT | Performed by: NURSE PRACTITIONER

## 2025-06-24 PROCEDURE — 99214 OFFICE O/P EST MOD 30 MIN: CPT | Performed by: NURSE PRACTITIONER

## 2025-06-24 PROCEDURE — 3074F SYST BP LT 130 MM HG: CPT | Performed by: NURSE PRACTITIONER

## 2025-06-24 PROCEDURE — 1158F ADVNC CARE PLAN TLK DOCD: CPT | Performed by: NURSE PRACTITIONER

## 2025-06-24 PROCEDURE — 1159F MED LIST DOCD IN RCRD: CPT | Performed by: NURSE PRACTITIONER

## 2025-06-24 PROCEDURE — 3078F DIAST BP <80 MM HG: CPT | Performed by: NURSE PRACTITIONER

## 2025-06-24 RX ORDER — ADALIMUMAB 40MG/0.4ML
KIT SUBCUTANEOUS
COMMUNITY
Start: 2025-06-02

## 2025-06-24 RX ORDER — NEOMYCIN SULFATE, POLYMYXIN B SULFATE, HYDROCORTISONE 3.5; 10000; 1 MG/ML; [USP'U]/ML; MG/ML
2 SOLUTION/ DROPS AURICULAR (OTIC) 4 TIMES DAILY
Qty: 10 ML | Refills: 0 | Status: SHIPPED | OUTPATIENT
Start: 2025-06-24 | End: 2025-07-01

## 2025-06-24 RX ORDER — METOCLOPRAMIDE 10 MG/1
10 TABLET ORAL DAILY PRN
Qty: 30 TABLET | Refills: 1 | Status: SHIPPED | OUTPATIENT
Start: 2025-06-24 | End: 2025-08-23

## 2025-06-24 ASSESSMENT — ENCOUNTER SYMPTOMS
FREQUENCY: 0
FLANK PAIN: 1
DIARRHEA: 0
DYSURIA: 0
VOMITING: 0
ABDOMINAL PAIN: 1
DEPRESSION: 0
RESPIRATORY NEGATIVE: 1
HEMATURIA: 0
NAUSEA: 0
NEUROLOGICAL NEGATIVE: 1
CARDIOVASCULAR NEGATIVE: 1
ROS GI COMMENTS: SEE HPI

## 2025-06-24 NOTE — PROGRESS NOTES
"Subjective   Patient ID: Radha Deluna is a 75 y.o. female who presents for Establish Care (Complains of pain in her left side x 2 months. /Left ear pain. ).    HPI     She presents to clinic for evaluation of left side pain x 2 and half months.  Patient describes the pain as a pressure in the left lower quadrant and left flank area.  She denies any urinary symptoms.  Denies nausea vomiting fevers.  Pain is intermittent but when it comes she states this stops her in her tracks.     Patient states she has had constipation but states constipation.  She states she has not had any constipation since symptoms started    Patient also complains of left ear pain x 3 months.  She denies recent cough cold or other symptoms.    Patient also requesting a refill on Reglan.  She states she only take it as needed for medications that she takes.    No change in appetite.    Review of Systems   HENT:  Positive for ear pain.    Respiratory: Negative.     Cardiovascular: Negative.    Gastrointestinal:  Positive for abdominal pain. Negative for diarrhea, nausea and vomiting.        See HPI   Genitourinary:  Positive for flank pain. Negative for dysuria, frequency, hematuria, pelvic pain and urgency.   Skin: Negative.    Neurological: Negative.        Objective   /73 (BP Location: Right arm, Patient Position: Sitting, BP Cuff Size: Adult)   Pulse 58   Temp 36.6 °C (97.9 °F) (Temporal)   Ht 1.6 m (5' 3\")   Wt 66.7 kg (147 lb)   SpO2 95%   BMI 26.04 kg/m²     Physical Exam  Vitals reviewed.   Constitutional:       General: She is not in acute distress.     Appearance: Normal appearance. She is not ill-appearing.   HENT:      Right Ear: Tympanic membrane and external ear normal.      Left Ear: Tympanic membrane normal. There is no impacted cerumen.      Ears:      Comments: Left external canal with mild erythema no fluid noted     Mouth/Throat:      Mouth: Mucous membranes are moist.      Pharynx: Oropharynx is clear. "   Cardiovascular:      Rate and Rhythm: Normal rate and regular rhythm.   Pulmonary:      Effort: Pulmonary effort is normal.      Breath sounds: Normal breath sounds.   Abdominal:      General: Bowel sounds are normal.      Palpations: Abdomen is soft.      Tenderness: There is no abdominal tenderness. There is no right CVA tenderness, left CVA tenderness, guarding or rebound.   Skin:     General: Skin is warm and dry.   Neurological:      Mental Status: She is alert and oriented to person, place, and time.         Assessment/Plan   Diagnoses and all orders for this visit:  Left flank pain  -     CT abdomen pelvis wo IV contrast; Future  -     Urinalysis with Reflex Microscopic; Future  Acute otitis externa of left ear, unspecified type  -     neomycin-polymyxin-HC (Cortisporin) otic solution; Administer 2 drops into the left ear 4 times a day for 7 days.  Nausea  -     metoclopramide (Reglan) 10 mg tablet; Take 1 tablet (10 mg) by mouth once daily as needed (nausea).

## 2025-06-24 NOTE — PATIENT INSTRUCTIONS
Medication Reglan has been refilled as requested.  Please start eardrops as prescribed for left ear pain.  CAT scan of the abdomen and pelvis ordered for your chronic left lower abdominal and flank pain.  Follow-up in 3 months or as needed.

## 2025-07-03 ENCOUNTER — OFFICE VISIT (OUTPATIENT)
Dept: PAIN MEDICINE | Facility: CLINIC | Age: 75
End: 2025-07-03
Payer: MEDICARE

## 2025-07-03 ENCOUNTER — TELEPHONE (OUTPATIENT)
Dept: PAIN MEDICINE | Facility: CLINIC | Age: 75
End: 2025-07-03

## 2025-07-03 VITALS
RESPIRATION RATE: 18 BRPM | WEIGHT: 147 LBS | DIASTOLIC BLOOD PRESSURE: 62 MMHG | OXYGEN SATURATION: 98 % | BODY MASS INDEX: 26.05 KG/M2 | SYSTOLIC BLOOD PRESSURE: 130 MMHG | TEMPERATURE: 98.6 F | HEIGHT: 63 IN | HEART RATE: 81 BPM

## 2025-07-03 DIAGNOSIS — M47.816 SPONDYLOSIS OF LUMBAR REGION WITHOUT MYELOPATHY OR RADICULOPATHY: ICD-10-CM

## 2025-07-03 DIAGNOSIS — M54.17 LUMBOSACRAL RADICULOPATHY AT L5: ICD-10-CM

## 2025-07-03 DIAGNOSIS — M05.721 RHEUMATOID ARTHRITIS INVOLVING BOTH ELBOWS WITH POSITIVE RHEUMATOID FACTOR (MULTI): ICD-10-CM

## 2025-07-03 DIAGNOSIS — M05.722 RHEUMATOID ARTHRITIS INVOLVING BOTH ELBOWS WITH POSITIVE RHEUMATOID FACTOR (MULTI): ICD-10-CM

## 2025-07-03 DIAGNOSIS — Z79.891 LONG TERM (CURRENT) USE OF OPIATE ANALGESIC: Primary | ICD-10-CM

## 2025-07-03 DIAGNOSIS — D86.9 SARCOIDOSIS: ICD-10-CM

## 2025-07-03 DIAGNOSIS — M05.779 RHEUMATOID ARTHRITIS INVOLVING FOOT WITH POSITIVE RHEUMATOID FACTOR, UNSPECIFIED LATERALITY (MULTI): ICD-10-CM

## 2025-07-03 PROCEDURE — 1159F MED LIST DOCD IN RCRD: CPT | Performed by: ANESTHESIOLOGY

## 2025-07-03 PROCEDURE — 1160F RVW MEDS BY RX/DR IN RCRD: CPT | Performed by: ANESTHESIOLOGY

## 2025-07-03 PROCEDURE — 4004F PT TOBACCO SCREEN RCVD TLK: CPT | Performed by: ANESTHESIOLOGY

## 2025-07-03 PROCEDURE — 99213 OFFICE O/P EST LOW 20 MIN: CPT | Performed by: ANESTHESIOLOGY

## 2025-07-03 PROCEDURE — 1125F AMNT PAIN NOTED PAIN PRSNT: CPT | Performed by: ANESTHESIOLOGY

## 2025-07-03 PROCEDURE — 3075F SYST BP GE 130 - 139MM HG: CPT | Performed by: ANESTHESIOLOGY

## 2025-07-03 PROCEDURE — 3078F DIAST BP <80 MM HG: CPT | Performed by: ANESTHESIOLOGY

## 2025-07-03 RX ORDER — HYDROCODONE BITARTRATE AND ACETAMINOPHEN 7.5; 325 MG/1; MG/1
1 TABLET ORAL EVERY 12 HOURS PRN
Qty: 90 TABLET | Refills: 0 | Status: SHIPPED | OUTPATIENT
Start: 2025-07-03

## 2025-07-03 ASSESSMENT — PAIN SCALES - GENERAL: PAINLEVEL_OUTOF10: 5

## 2025-07-03 ASSESSMENT — ENCOUNTER SYMPTOMS
LOSS OF SENSATION IN FEET: 1
OCCASIONAL FEELINGS OF UNSTEADINESS: 1
DEPRESSION: 0

## 2025-07-03 NOTE — H&P
History Of Present Illness  Radha Deluna is a 75 y.o. female presenting with a chief complaint of chronic low back pain.  Patient has a history of severe lumbar spine pain secondary to advanced facet arthrosis and degenerative disc disease lumbar spine.  Recently she has been having some flank pain and has been recommended to have CT of her kidneys because she feels level of fullness.  She takes Norco 7.5 mg tablets 2 times a day to moderate her pain.  [place] report today is 15 her pill count is 8 tablets.  Controlled substance agreement was done today as well as urine drug screen with results pending.  She gets excellent relief when taking her medications on a regular basis..     Past Medical History  She has a past medical history of Chronic obstructive pulmonary disease, unspecified (11/09/2022), Low back pain, Migraine, unspecified, not intractable, without status migrainosus, Other chronic pain (10/21/2015), Personal history of other diseases of the circulatory system, Personal history of other diseases of the nervous system and sense organs, Personal history of other diseases of the respiratory system, Personal history of other diseases of the respiratory system, Personal history of other endocrine, nutritional and metabolic disease, Personal history of other malignant neoplasm of skin, and Sarcoidosis, unspecified (10/05/2022).    Surgical History  She has a past surgical history that includes Ankle surgery (10/21/2015); Appendectomy (10/21/2015); Lung surgery (10/21/2015); Cholecystectomy (10/21/2015); Breast surgery (10/21/2015); Other surgical history (10/21/2015); Other surgical history (02/07/2022); Other surgical history (02/07/2022); Lymph node biopsy (09/29/2017); Other surgical history (10/29/2020); Other surgical history (10/29/2020); Other surgical history (10/29/2020); Other surgical history (10/29/2020); Coronary angioplasty with stent (01/14/2021); Rotator cuff repair (08/10/2017); Other  surgical history (10/21/2015); Carpal tunnel release (10/26/2016); CT guided percutaneous biopsy lung (08/12/2020); CT guided percutaneous biopsy lung (12/01/2020); MR angio head wo IV contrast (04/04/2022); MR angio neck wo IV contrast (08/15/2022); Cataract extraction (Bilateral); and Eye surgery.     Social History  She reports that she has been smoking cigarettes. She has been exposed to tobacco smoke. She has never used smokeless tobacco. She reports that she does not currently use alcohol. She reports that she does not use drugs.    Family History  Family History[1]     Allergies  Erythromycin and Augmentin [amoxicillin-pot clavulanate]    Review of Systems  Unremarkable except for chief complaint  Physical Exam  Gen. appearance:  Patient's alert and oriented ×3 ;cooperative mild to moderate distress  Heart: Regular rate and rhythm  Lungs: Clear to auscultation  Abdomen: Soft nontender  Neuro: Cranial nerves II -XII intact; DTR: +2 over 4 biceps triceps brachial radialis patellar and Achilles  Spinal exam: Positive paraspinal tenderness noted bilaterally L4 5 L5-S1 with flexion extension and rotation/no bony abnormalities  Musculoskeletal:  Upper and lower extremity strength was 4/5 bilaterally  :  deferred  Skin: Warm and dry    Last Recorded Vitals  /62   Pulse 81   Temp 37 °C (98.6 °F)   Resp 18   Wt 66.7 kg (147 lb)   SpO2 98%     ASSESSMENT:  1.  COPD  2.  Rheumatoid arthritis  3.  Sarcoidosis  4.  Chronic joint pain  5.  Medication management    PLAN:  Risk and benefits continued use of opioids was discussed.  I e-prescribed 7.5/325 mg tablets of Norco No. 90 and total to her pharmacy.  Follow-up visit scheduled on 8/14/2025.  Patient was told to take all medication as directed and keep secure location at all times.      Brett Rider DO         [1]   Family History  Problem Relation Name Age of Onset    Other (cardiac disorder) Mother      Other (cardiac disorder) Father      Stroke  Father      Coronary artery disease Father      Hypertension Father      Diabetes Other

## 2025-07-03 NOTE — PROGRESS NOTES
Pain #5/10 .   States Lumbar Block #2 has given her 70% sustained relief.  Lower back is much better.  Taking 2 Norco per day and has 8 tablets left.

## 2025-07-06 LAB
1OH-MIDAZOLAM UR-MCNC: NEGATIVE NG/ML
7AMINOCLONAZEPAM UR-MCNC: NEGATIVE NG/ML
A-OH ALPRAZ UR-MCNC: NEGATIVE NG/ML
A-OH-TRIAZOLAM UR-MCNC: NEGATIVE NG/ML
AMOBARBITAL UR-MCNC: ABNORMAL NG/ML
AMPHET UR-MCNC: ABNORMAL NG/ML
AMPHETAMINES UR QL: ABNORMAL
BARBITURATES UR QL: ABNORMAL
BUTALBITAL UR-MCNC: ABNORMAL NG/ML
BZE UR QL: ABNORMAL
BZE UR-MCNC: ABNORMAL MG/L
CODEINE UR-MCNC: NEGATIVE NG/ML
CREAT UR-MCNC: ABNORMAL MG/DL
DRUG SCREEN COMMENT UR-IMP: ABNORMAL
EDDP UR-MCNC: ABNORMAL NG/ML
FENTANYL UR-MCNC: ABNORMAL NG/ML
HYDROCODONE UR-MCNC: 2090 NG/ML
HYDROMORPHONE UR-MCNC: 245 NG/ML
LORAZEPAM UR-MCNC: NEGATIVE NG/ML
METHADONE UR-MCNC: ABNORMAL UG/L
METHAMPHET UR-MCNC: ABNORMAL UG/ML
MORPHINE UR-MCNC: NEGATIVE NG/ML
NORDIAZEPAM UR-MCNC: NEGATIVE NG/ML
NORFENTANYL UR-MCNC: ABNORMAL NG/ML
NORHYDROCODONE UR CFM-MCNC: 4910 NG/ML
NOROXYCODONE UR CFM-MCNC: NEGATIVE NG/ML
NORTRAMADOL UR-MCNC: ABNORMAL UG/ML
OH-ETHYLFLURAZ UR-MCNC: NEGATIVE NG/ML
OXAZEPAM UR-MCNC: NEGATIVE NG/ML
OXIDANTS UR QL: ABNORMAL
OXYCODONE UR CFM-MCNC: NEGATIVE NG/ML
OXYMORPHONE UR CFM-MCNC: NEGATIVE NG/ML
PCP UR QL: ABNORMAL
PCP UR-MCNC: ABNORMAL UG/L
PENTOBARB UR-MCNC: ABNORMAL UG/ML
PH UR: ABNORMAL [PH]
PHENOBARB UR-MCNC: ABNORMAL UG/ML
QUEST 6 ACETYLMORPHINE: ABNORMAL
QUEST ABNORMAL SPECIMEN VALIDITY TEST:: ABNORMAL
QUEST NOTES AND COMMENTS: ABNORMAL
QUEST PATIENT HISTORICAL REPORT: ABNORMAL
QUEST ZOLPIDEM: ABNORMAL
SECOBARBITAL UR-MCNC: ABNORMAL UG/ML
SP GR UR: ABNORMAL
TEMAZEPAM UR-MCNC: NEGATIVE NG/ML
THC UR QL: ABNORMAL
THC UR-MCNC: ABNORMAL NG/ML
TRAMADOL UR-MCNC: ABNORMAL UG/ML
ZOLPIDEM PHENYL-4-CARB UR CFM-MCNC: ABNORMAL NG/ML

## 2025-07-08 LAB
1OH-MIDAZOLAM UR-MCNC: NEGATIVE NG/ML
7AMINOCLONAZEPAM UR-MCNC: NEGATIVE NG/ML
A-OH ALPRAZ UR-MCNC: NEGATIVE NG/ML
A-OH-TRIAZOLAM UR-MCNC: NEGATIVE NG/ML
AMOBARBITAL UR-MCNC: NEGATIVE NG/ML
AMPHETAMINES UR QL: NEGATIVE NG/ML
BARBITURATES UR QL: POSITIVE NG/ML
BUTALBITAL UR-MCNC: NEGATIVE NG/ML
BZE UR QL: NEGATIVE NG/ML
CODEINE UR-MCNC: NEGATIVE NG/ML
CREAT UR-MCNC: 102 MG/DL
DRUG SCREEN COMMENT UR-IMP: ABNORMAL
EDDP UR-MCNC: NEGATIVE NG/ML
FENTANYL UR-MCNC: NEGATIVE NG/ML
HYDROCODONE UR-MCNC: 2090 NG/ML
HYDROMORPHONE UR-MCNC: 245 NG/ML
LORAZEPAM UR-MCNC: NEGATIVE NG/ML
METHADONE UR-MCNC: NEGATIVE NG/ML
MORPHINE UR-MCNC: NEGATIVE NG/ML
NORDIAZEPAM UR-MCNC: NEGATIVE NG/ML
NORFENTANYL UR-MCNC: NEGATIVE NG/ML
NORHYDROCODONE UR CFM-MCNC: 4910 NG/ML
NOROXYCODONE UR CFM-MCNC: NEGATIVE NG/ML
NORTRAMADOL UR-MCNC: NEGATIVE NG/ML
OH-ETHYLFLURAZ UR-MCNC: NEGATIVE NG/ML
OXAZEPAM UR-MCNC: NEGATIVE NG/ML
OXIDANTS UR QL: NEGATIVE MCG/ML
OXYCODONE UR CFM-MCNC: NEGATIVE NG/ML
OXYMORPHONE UR CFM-MCNC: NEGATIVE NG/ML
PCP UR QL: NEGATIVE NG/ML
PENTOBARB UR-MCNC: NEGATIVE NG/ML
PH UR: 6.2 [PH] (ref 4.5–9)
PHENOBARB UR-MCNC: 2720 NG/ML
QUEST 6 ACETYLMORPHINE: NEGATIVE NG/ML
QUEST NOTES AND COMMENTS: ABNORMAL
QUEST ZOLPIDEM: NEGATIVE NG/ML
SECOBARBITAL UR-MCNC: NEGATIVE NG/ML
TEMAZEPAM UR-MCNC: NEGATIVE NG/ML
THC UR QL: NEGATIVE NG/ML
TRAMADOL UR-MCNC: NEGATIVE NG/ML
ZOLPIDEM PHENYL-4-CARB UR CFM-MCNC: NEGATIVE NG/ML

## 2025-07-13 ENCOUNTER — HOSPITAL ENCOUNTER (OUTPATIENT)
Dept: RADIOLOGY | Facility: HOSPITAL | Age: 75
Discharge: HOME | End: 2025-07-13
Payer: MEDICARE

## 2025-07-13 DIAGNOSIS — R10.9 LEFT FLANK PAIN: ICD-10-CM

## 2025-07-13 PROCEDURE — 74176 CT ABD & PELVIS W/O CONTRAST: CPT

## 2025-07-13 PROCEDURE — 74176 CT ABD & PELVIS W/O CONTRAST: CPT | Performed by: RADIOLOGY

## 2025-07-15 ENCOUNTER — APPOINTMENT (OUTPATIENT)
Dept: PRIMARY CARE | Facility: CLINIC | Age: 75
End: 2025-07-15
Payer: MEDICARE

## 2025-07-15 VITALS
WEIGHT: 146.2 LBS | BODY MASS INDEX: 25.9 KG/M2 | SYSTOLIC BLOOD PRESSURE: 98 MMHG | HEART RATE: 61 BPM | DIASTOLIC BLOOD PRESSURE: 65 MMHG | TEMPERATURE: 96.8 F | OXYGEN SATURATION: 96 %

## 2025-07-15 DIAGNOSIS — R10.9 LEFT FLANK PAIN: ICD-10-CM

## 2025-07-15 DIAGNOSIS — J01.00 ACUTE MAXILLARY SINUSITIS, RECURRENCE NOT SPECIFIED: ICD-10-CM

## 2025-07-15 DIAGNOSIS — K59.00 CONSTIPATION, UNSPECIFIED CONSTIPATION TYPE: ICD-10-CM

## 2025-07-15 DIAGNOSIS — J44.9 CHRONIC OBSTRUCTIVE PULMONARY DISEASE, UNSPECIFIED COPD TYPE (MULTI): Primary | ICD-10-CM

## 2025-07-15 PROCEDURE — 3074F SYST BP LT 130 MM HG: CPT | Performed by: STUDENT IN AN ORGANIZED HEALTH CARE EDUCATION/TRAINING PROGRAM

## 2025-07-15 PROCEDURE — 3078F DIAST BP <80 MM HG: CPT | Performed by: STUDENT IN AN ORGANIZED HEALTH CARE EDUCATION/TRAINING PROGRAM

## 2025-07-15 PROCEDURE — 4004F PT TOBACCO SCREEN RCVD TLK: CPT | Performed by: STUDENT IN AN ORGANIZED HEALTH CARE EDUCATION/TRAINING PROGRAM

## 2025-07-15 PROCEDURE — 1125F AMNT PAIN NOTED PAIN PRSNT: CPT | Performed by: STUDENT IN AN ORGANIZED HEALTH CARE EDUCATION/TRAINING PROGRAM

## 2025-07-15 PROCEDURE — G2211 COMPLEX E/M VISIT ADD ON: HCPCS | Performed by: STUDENT IN AN ORGANIZED HEALTH CARE EDUCATION/TRAINING PROGRAM

## 2025-07-15 PROCEDURE — 1159F MED LIST DOCD IN RCRD: CPT | Performed by: STUDENT IN AN ORGANIZED HEALTH CARE EDUCATION/TRAINING PROGRAM

## 2025-07-15 PROCEDURE — 1160F RVW MEDS BY RX/DR IN RCRD: CPT | Performed by: STUDENT IN AN ORGANIZED HEALTH CARE EDUCATION/TRAINING PROGRAM

## 2025-07-15 PROCEDURE — 99214 OFFICE O/P EST MOD 30 MIN: CPT | Performed by: STUDENT IN AN ORGANIZED HEALTH CARE EDUCATION/TRAINING PROGRAM

## 2025-07-15 RX ORDER — AMOXICILLIN 250 MG
1 CAPSULE ORAL DAILY
Qty: 30 TABLET | Refills: 11 | Status: SHIPPED | OUTPATIENT
Start: 2025-07-15 | End: 2026-07-15

## 2025-07-15 RX ORDER — DOXYCYCLINE 100 MG/1
100 CAPSULE ORAL 2 TIMES DAILY
Qty: 10 CAPSULE | Refills: 0 | Status: SHIPPED | OUTPATIENT
Start: 2025-07-15 | End: 2025-07-20

## 2025-07-15 RX ORDER — POLYETHYLENE GLYCOL 3350 17 G/17G
17 POWDER, FOR SOLUTION ORAL 2 TIMES DAILY
Qty: 60 PACKET | Refills: 7 | Status: SHIPPED | OUTPATIENT
Start: 2025-07-15 | End: 2026-03-12

## 2025-07-15 ASSESSMENT — ENCOUNTER SYMPTOMS
DEPRESSION: 0
FLANK PAIN: 1

## 2025-07-15 ASSESSMENT — PAIN SCALES - GENERAL: PAINLEVEL_OUTOF10: 5

## 2025-07-15 NOTE — PROGRESS NOTES
Subjective   Patient ID: Radha Deluna is a 75 y.o. female who presents for Flank Pain and Sinus Problem.  Radha is here for follow up visit.    She has been following with her ophthalmologist. She has been following regularly with pain management, has had two block and has gotten some relief from the pain. She has been constipated. Taking metmucil daily and dulcolax. Has been dealing with left sided low back pain with radiation around hip and into groin. Had a CT scan which was negative for any acute abdominal process.     Review of Systems   Genitourinary:  Positive for flank pain.     12-point ROS was reviewed and is negative, unless otherwise noted in HPI    Objective   Vitals:    07/15/25 1029   BP: 98/65   Pulse: 61   Temp: 36 °C (96.8 °F)   SpO2: 96%      Physical Exam  GEN: alert, conversant, NAD  HEENT: PERRL, EOMI, MMM, Tms pearly gray bilaterally  NECK: supple, no LAD appreciated  CHEST: CTAB  CV: S1, S2, RRR, no murmurs appreciated  Back: left sided paraspinal muscle ttp  ABD: soft, NT, ND  EXT: no significant LE edema  SKIN: warm, dry    Assessment/Plan   #constipation  - switch from metamucil to miralax daily  - add pericolace PRN constipation, stop dulcolax  - continue good PO water intake    #left sided low back pain with radiation into abd/groin  Reviewed recent CT scan: no concerning findings.  Suspect related to constipation and chronic degenerative changes    #Rheumatoid Arthritis  - maintained on plaquenil 300mg daily, methotrexate, folic acid and Humira   - Following with Rheumatology, Dr. Schafer    #osteoporosis  - maintained on Prolia  - Following with Dr. Yeh    #RUL pulmonology nodule  - following yearly with Dr. Marroquin for surviellance    #COPD  #Current smoker  - down to 3 cigarettes daily. 30 pack year hx  - Advised cessation, counseled cessation tips, offer cessation aids  - Spent >5 minutes counseling smoking cessation  LDCT 1/2025    #CAD s/p PCI  #DLD  #PAD  - Following with  Dr. Garcia  - Maintained on pravastatin, Plavix, imdur 30mg, metoprolol 50mg BID    #neuropathy  #tremor  - Following with Dr. Izquierdo, Dr. Madrigal  - maintained on gabapentin TID and primidone daily  - maintained on Norco chronically with Dr. Madrigal    Health Maintenance:  Vaccines: COVID (x2), Flu (UTD), TDAP (advised), Shingles (Utd), Pneumonia (UTD), RSV (UTD)  Screening: Colonoscopy (2024, repeat in 2 years), Mammogram (declines), LDCT (1/2025), DEXA (4/2024)  Labs: Reviewed recent, none needed today     RTC in 3 months for medicare wellness/physical, or sooner PRN    Mayo Diamond, DO

## 2025-07-20 DIAGNOSIS — J30.89 NON-SEASONAL ALLERGIC RHINITIS, UNSPECIFIED TRIGGER: ICD-10-CM

## 2025-07-21 RX ORDER — FLUTICASONE PROPIONATE 50 MCG
2 SPRAY, SUSPENSION (ML) NASAL DAILY
Qty: 16 G | Refills: 3 | Status: SHIPPED | OUTPATIENT
Start: 2025-07-21

## 2025-07-22 DIAGNOSIS — M81.0 SENILE OSTEOPOROSIS: Primary | ICD-10-CM

## 2025-07-22 RX ORDER — ACETAMINOPHEN 325 MG/1
650 TABLET ORAL ONCE
OUTPATIENT
Start: 2025-07-22

## 2025-07-24 DIAGNOSIS — M81.0 AGE-RELATED OSTEOPOROSIS WITHOUT CURRENT PATHOLOGICAL FRACTURE: Primary | ICD-10-CM

## 2025-07-31 ENCOUNTER — INFUSION (OUTPATIENT)
Dept: INFUSION THERAPY | Facility: CLINIC | Age: 75
End: 2025-07-31
Payer: MEDICARE

## 2025-07-31 VITALS
OXYGEN SATURATION: 100 % | TEMPERATURE: 96.6 F | DIASTOLIC BLOOD PRESSURE: 59 MMHG | SYSTOLIC BLOOD PRESSURE: 100 MMHG | HEART RATE: 73 BPM | RESPIRATION RATE: 20 BRPM

## 2025-07-31 DIAGNOSIS — M81.0 AGE-RELATED OSTEOPOROSIS WITHOUT CURRENT PATHOLOGICAL FRACTURE: ICD-10-CM

## 2025-07-31 PROCEDURE — 2500000004 HC RX 250 GENERAL PHARMACY W/ HCPCS (ALT 636 FOR OP/ED): Mod: JZ | Performed by: NURSE PRACTITIONER

## 2025-07-31 PROCEDURE — 96372 THER/PROPH/DIAG INJ SC/IM: CPT

## 2025-07-31 RX ORDER — EPINEPHRINE 0.3 MG/.3ML
0.3 INJECTION SUBCUTANEOUS EVERY 5 MIN PRN
OUTPATIENT
Start: 2025-10-18

## 2025-07-31 RX ORDER — FAMOTIDINE 10 MG/ML
20 INJECTION, SOLUTION INTRAVENOUS ONCE AS NEEDED
OUTPATIENT
Start: 2025-10-18

## 2025-07-31 RX ORDER — ALBUTEROL SULFATE 0.83 MG/ML
3 SOLUTION RESPIRATORY (INHALATION) AS NEEDED
OUTPATIENT
Start: 2025-10-18

## 2025-07-31 RX ORDER — DIPHENHYDRAMINE HYDROCHLORIDE 50 MG/ML
50 INJECTION, SOLUTION INTRAMUSCULAR; INTRAVENOUS AS NEEDED
OUTPATIENT
Start: 2025-10-18

## 2025-07-31 RX ADMIN — DENOSUMAB 60 MG: 60 INJECTION SUBCUTANEOUS at 11:01

## 2025-07-31 ASSESSMENT — ENCOUNTER SYMPTOMS
LOSS OF SENSATION IN FEET: 1
DEPRESSION: 0
OCCASIONAL FEELINGS OF UNSTEADINESS: 1

## 2025-07-31 ASSESSMENT — PAIN SCALES - GENERAL: PAINLEVEL_OUTOF10: 6

## 2025-07-31 NOTE — PROGRESS NOTES
[facility]   Infusion Clinic Note   Date: 2025   Name: Radha Deluna  : 1950   MRN: [id number]         Reason for Visit: Injections (Prolia injection)      Accompanied by:Self   Visit Type:: injection   Diagnosis: No diagnosis found.    Allergies:   Allergies as of 2025 - Reviewed 07/15/2025   Allergen Reaction Noted    Erythromycin Other 2023    Augmentin [amoxicillin-pot clavulanate] Nausea Only and GI Upset 2023      Current Meds has a current medication list which includes the following prescription(s): albuterol, azelastine, bifidobacterium infantis, theratears, clopidogrel, clotrimazole, cyclobenzaprine, prolia, diphenhydramine, ergocalciferol, famotidine, fluticasone, folic acid, gabapentin, humira(cf) pen, hydrocodone-acetaminophen, hydroxychloroquine, ipratropium, ipratropium, isosorbide mononitrate er, methotrexate, metoclopramide, metoprolol tartrate, miebo (pf), montelukast, naloxone, nitroglycerin, oxygen dme/hospice, pantoprazole, polyethylene glycol, pravastatin, primidone, proair hfa, sennosides-docusate sodium, syringe with needle, theophylline er, trelegy ellipta, and xiidra.        Vitals:There were no vitals filed for this visit.   Infusion Pre-procedure Checklist   Allergies reviewed:yes   Medications reviewed: yes   Contraindications to treatment:No   Previous reaction to current treatment:No   Current Health Issues: None and Other (add comment)   Pain: '    Is the pain different from normal: No   Is the pain tolerable: n/a   Is your Doctor aware: n/a   Contraindications based on patient history: No   Provider notified: n/a     Labs: Labs reviewed   Fall Risk Screening:      Review of Systems - Oncology   Negative for complaint: [] all other systems have been reviewed and are negative for complaint   Infusion Readiness:   Assessment Concerns Related to Infusion: No  Provider notified: n/a  Assess patient for the concerns below.  Document provider notification as appropriate:  - Does not meet criteria to treat N/A  - Has an active or recent infection with/without current antibiotic use N/A  - Has recent/planned dental work N/A  - Has recent/planned surgeries N/A  - Has recently received or plans to receive vaccinations N/A  - Has treatment related toxicities N/A  - Is pregnant (unless noted otherwise) N/A    Initiated By: Ilene Clark RN   Time: 10:53 AM     Radha Deluna had no medications administered during this visit.

## 2025-08-04 DIAGNOSIS — J44.9 CHRONIC OBSTRUCTIVE PULMONARY DISEASE, UNSPECIFIED COPD TYPE (MULTI): ICD-10-CM

## 2025-08-04 RX ORDER — MONTELUKAST SODIUM 10 MG/1
10 TABLET ORAL DAILY
Qty: 90 TABLET | Refills: 1 | Status: ON HOLD | OUTPATIENT
Start: 2025-08-04

## 2025-08-05 ENCOUNTER — TELEPHONE (OUTPATIENT)
Dept: PRIMARY CARE | Facility: CLINIC | Age: 75
End: 2025-08-05
Payer: MEDICARE

## 2025-08-05 DIAGNOSIS — J30.89 NON-SEASONAL ALLERGIC RHINITIS, UNSPECIFIED TRIGGER: ICD-10-CM

## 2025-08-05 RX ORDER — IPRATROPIUM BROMIDE 42 UG/1
2 SPRAY, METERED NASAL 4 TIMES DAILY
Qty: 15 ML | Refills: 5 | Status: ON HOLD | OUTPATIENT
Start: 2025-08-05

## 2025-08-05 NOTE — TELEPHONE ENCOUNTER
Pt is calling requesting a refill on her ipratropium (Atrovent) 42 mcg. She would like a [employer] supply sent to   [facility] [place] [phone]      She saw Mayo Diamond but ultimately wants to see you as her PCP. Pt last saw you 6/24/2025    Pts #[phone]

## 2025-08-07 ENCOUNTER — APPOINTMENT (OUTPATIENT)
Dept: RADIOLOGY | Facility: HOSPITAL | Age: 75
DRG: 871 | End: 2025-08-07
Payer: MEDICARE

## 2025-08-07 ENCOUNTER — HOSPITAL ENCOUNTER (INPATIENT)
Facility: HOSPITAL | Age: 75
End: 2025-08-07
Attending: EMERGENCY MEDICINE | Admitting: STUDENT IN AN ORGANIZED HEALTH CARE EDUCATION/TRAINING PROGRAM
Payer: MEDICARE

## 2025-08-07 DIAGNOSIS — M05.10 RHEUMATOID LUNG DISEASE (MULTI): ICD-10-CM

## 2025-08-07 DIAGNOSIS — G56.03 BILATERAL CARPAL TUNNEL SYNDROME: ICD-10-CM

## 2025-08-07 DIAGNOSIS — R50.9 FEVER, UNSPECIFIED FEVER CAUSE: ICD-10-CM

## 2025-08-07 DIAGNOSIS — S80.01XD CONTUSION, KNEE AND LOWER LEG, RIGHT, SUBSEQUENT ENCOUNTER: ICD-10-CM

## 2025-08-07 DIAGNOSIS — R44.8 FACIAL PRESSURE: ICD-10-CM

## 2025-08-07 DIAGNOSIS — L85.9 HYPERKERATOSIS: ICD-10-CM

## 2025-08-07 DIAGNOSIS — E78.00 HYPERCHOLESTEROLEMIA: ICD-10-CM

## 2025-08-07 DIAGNOSIS — M79.672 FOOT PAIN, BILATERAL: ICD-10-CM

## 2025-08-07 DIAGNOSIS — M54.12 CERVICAL RADICULOPATHY: ICD-10-CM

## 2025-08-07 DIAGNOSIS — R25.1 TREMOR: ICD-10-CM

## 2025-08-07 DIAGNOSIS — Z86.39 HISTORY OF THYROID DISORDER: ICD-10-CM

## 2025-08-07 DIAGNOSIS — G89.29 CHRONIC MUSCULOSKELETAL PAIN: ICD-10-CM

## 2025-08-07 DIAGNOSIS — M79.18 CHRONIC MUSCULOSKELETAL PAIN: ICD-10-CM

## 2025-08-07 DIAGNOSIS — M47.816 SPONDYLOSIS OF LUMBAR REGION WITHOUT MYELOPATHY OR RADICULOPATHY: ICD-10-CM

## 2025-08-07 DIAGNOSIS — G45.9 TRANSIENT ISCHEMIC ATTACK: ICD-10-CM

## 2025-08-07 DIAGNOSIS — G43.109 MIGRAINE WITH AURA AND WITHOUT STATUS MIGRAINOSUS, NOT INTRACTABLE: ICD-10-CM

## 2025-08-07 DIAGNOSIS — R20.0 NUMBNESS AND TINGLING: ICD-10-CM

## 2025-08-07 DIAGNOSIS — M17.11 ARTHRITIS OF KNEE, RIGHT: ICD-10-CM

## 2025-08-07 DIAGNOSIS — I73.9 PAD (PERIPHERAL ARTERY DISEASE): ICD-10-CM

## 2025-08-07 DIAGNOSIS — J34.89 NASAL DISCHARGE: ICD-10-CM

## 2025-08-07 DIAGNOSIS — G50.1 ATYPICAL FACE PAIN: ICD-10-CM

## 2025-08-07 DIAGNOSIS — M05.762 RHEUMATOID ARTHRITIS INVOLVING BOTH KNEES WITH POSITIVE RHEUMATOID FACTOR (MULTI): ICD-10-CM

## 2025-08-07 DIAGNOSIS — M79.671 FOOT PAIN, BILATERAL: ICD-10-CM

## 2025-08-07 DIAGNOSIS — D86.9 SARCOID: ICD-10-CM

## 2025-08-07 DIAGNOSIS — M05.761 RHEUMATOID ARTHRITIS INVOLVING BOTH KNEES WITH POSITIVE RHEUMATOID FACTOR (MULTI): ICD-10-CM

## 2025-08-07 DIAGNOSIS — R06.00 DYSPNEA, UNSPECIFIED TYPE: ICD-10-CM

## 2025-08-07 DIAGNOSIS — I25.10 CORONARY DISORDER: ICD-10-CM

## 2025-08-07 DIAGNOSIS — I10 HTN (HYPERTENSION), BENIGN: ICD-10-CM

## 2025-08-07 DIAGNOSIS — R73.01 ELEVATED FASTING GLUCOSE: ICD-10-CM

## 2025-08-07 DIAGNOSIS — M47.816 FACET DEGENERATION OF LUMBAR REGION: ICD-10-CM

## 2025-08-07 DIAGNOSIS — M81.0 SENILE OSTEOPOROSIS: ICD-10-CM

## 2025-08-07 DIAGNOSIS — R49.0 CHRONIC HOARSENESS: ICD-10-CM

## 2025-08-07 DIAGNOSIS — R41.82 ALTERED MENTAL STATUS, UNSPECIFIED ALTERED MENTAL STATUS TYPE: Primary | ICD-10-CM

## 2025-08-07 DIAGNOSIS — Z79.620 ADALIMUMAB (HUMIRA) LONG-TERM USE: ICD-10-CM

## 2025-08-07 DIAGNOSIS — R20.2 NUMBNESS AND TINGLING: ICD-10-CM

## 2025-08-07 DIAGNOSIS — R19.5 POSITIVE FIT (FECAL IMMUNOCHEMICAL TEST): ICD-10-CM

## 2025-08-07 DIAGNOSIS — G44.221 CHRONIC TENSION-TYPE HEADACHE, INTRACTABLE: ICD-10-CM

## 2025-08-07 DIAGNOSIS — M25.551 PAIN OF BOTH HIP JOINTS: ICD-10-CM

## 2025-08-07 DIAGNOSIS — I73.9 CLAUDICATION: ICD-10-CM

## 2025-08-07 DIAGNOSIS — S80.11XD CONTUSION, KNEE AND LOWER LEG, RIGHT, SUBSEQUENT ENCOUNTER: ICD-10-CM

## 2025-08-07 DIAGNOSIS — B35.1 ONYCHOMYCOSIS: ICD-10-CM

## 2025-08-07 DIAGNOSIS — T67.01XA HEAT STROKE, INITIAL ENCOUNTER: ICD-10-CM

## 2025-08-07 DIAGNOSIS — Z86.69 HISTORY OF OTITIS MEDIA: ICD-10-CM

## 2025-08-07 DIAGNOSIS — J43.2 CENTRILOBULAR EMPHYSEMA (MULTI): ICD-10-CM

## 2025-08-07 DIAGNOSIS — M25.561 RIGHT KNEE PAIN, UNSPECIFIED CHRONICITY: ICD-10-CM

## 2025-08-07 DIAGNOSIS — M96.1 POSTLAMINECTOMY SYNDROME, LUMBAR: ICD-10-CM

## 2025-08-07 DIAGNOSIS — G25.0 ESSENTIAL TREMOR: ICD-10-CM

## 2025-08-07 DIAGNOSIS — H92.02 OTALGIA, LEFT: ICD-10-CM

## 2025-08-07 DIAGNOSIS — Z86.69 HISTORY OF MIGRAINE HEADACHES: ICD-10-CM

## 2025-08-07 DIAGNOSIS — Z79.631 METHOTREXATE, LONG TERM, CURRENT USE: ICD-10-CM

## 2025-08-07 DIAGNOSIS — J31.0 CHRONIC RHINITIS: ICD-10-CM

## 2025-08-07 DIAGNOSIS — R91.8 PULMONARY NODULES/LESIONS, MULTIPLE: ICD-10-CM

## 2025-08-07 DIAGNOSIS — M06.371: ICD-10-CM

## 2025-08-07 DIAGNOSIS — R26.89 ANTALGIC GAIT: ICD-10-CM

## 2025-08-07 DIAGNOSIS — M48.062 LUMBAR STENOSIS WITH NEUROGENIC CLAUDICATION: ICD-10-CM

## 2025-08-07 DIAGNOSIS — R06.09 OTHER FORMS OF DYSPNEA: ICD-10-CM

## 2025-08-07 DIAGNOSIS — G56.12 MEDIAN NEUROPATHY, LEFT: ICD-10-CM

## 2025-08-07 DIAGNOSIS — D86.9 SARCOIDOSIS: ICD-10-CM

## 2025-08-07 DIAGNOSIS — G44.229 CHRONIC TENSION-TYPE HEADACHE, NOT INTRACTABLE: ICD-10-CM

## 2025-08-07 DIAGNOSIS — N64.4 BREAST PAIN: ICD-10-CM

## 2025-08-07 DIAGNOSIS — R00.2 PALPITATIONS: ICD-10-CM

## 2025-08-07 DIAGNOSIS — L60.0 ONYCHOCRYPTOSIS: ICD-10-CM

## 2025-08-07 DIAGNOSIS — M17.0 BILATERAL PRIMARY OSTEOARTHRITIS OF KNEE: ICD-10-CM

## 2025-08-07 DIAGNOSIS — E55.9 VITAMIN D DEFICIENCY: ICD-10-CM

## 2025-08-07 DIAGNOSIS — R20.0 LEFT FACIAL NUMBNESS: ICD-10-CM

## 2025-08-07 DIAGNOSIS — J34.89 RHINORRHEA: ICD-10-CM

## 2025-08-07 DIAGNOSIS — R63.4 WEIGHT LOSS, UNINTENTIONAL: ICD-10-CM

## 2025-08-07 DIAGNOSIS — M25.552 PAIN OF BOTH HIP JOINTS: ICD-10-CM

## 2025-08-07 DIAGNOSIS — G44.209 ACUTE NON INTRACTABLE TENSION-TYPE HEADACHE: ICD-10-CM

## 2025-08-07 DIAGNOSIS — G50.9 TRIGEMINAL NEUROPATHY: ICD-10-CM

## 2025-08-07 DIAGNOSIS — Z79.631 ON METHOTREXATE THERAPY: ICD-10-CM

## 2025-08-07 DIAGNOSIS — M05.79 RHEUMATOID ARTHRITIS INVOLVING MULTIPLE SITES WITH POSITIVE RHEUMATOID FACTOR (MULTI): ICD-10-CM

## 2025-08-07 DIAGNOSIS — I20.9 ANGINA PECTORIS: ICD-10-CM

## 2025-08-07 DIAGNOSIS — M75.80 ROTATOR CUFF TENDINITIS, UNSPECIFIED LATERALITY: ICD-10-CM

## 2025-08-07 DIAGNOSIS — Z85.828 HISTORY OF MALIGNANT NEOPLASM OF SKIN: ICD-10-CM

## 2025-08-07 DIAGNOSIS — M05.771: ICD-10-CM

## 2025-08-07 DIAGNOSIS — G62.89 OTHER POLYNEUROPATHY: ICD-10-CM

## 2025-08-07 DIAGNOSIS — J18.9 ATYPICAL PNEUMONIA: Primary | ICD-10-CM

## 2025-08-07 LAB
ALBUMIN SERPL BCP-MCNC: 3.8 G/DL (ref 3.4–5)
ALP SERPL-CCNC: 60 U/L (ref 33–136)
ALT SERPL W P-5'-P-CCNC: 6 U/L (ref 7–45)
AMPHETAMINES UR QL SCN: ABNORMAL
ANION GAP BLDV CALCULATED.4IONS-SCNC: 7 MMOL/L (ref 10–25)
ANION GAP SERPL CALC-SCNC: 11 MMOL/L (ref 10–20)
APPEARANCE UR: CLEAR
AST SERPL W P-5'-P-CCNC: 11 U/L (ref 9–39)
BARBITURATES UR QL SCN: ABNORMAL
BASE EXCESS BLDV CALC-SCNC: 1.3 MMOL/L (ref -2–3)
BASOPHILS # BLD AUTO: 0.06 X10*3/UL (ref 0–0.1)
BASOPHILS NFR BLD AUTO: 0.4 %
BENZODIAZ UR QL SCN: ABNORMAL
BILIRUB SERPL-MCNC: 0.6 MG/DL (ref 0–1.2)
BILIRUB UR STRIP.AUTO-MCNC: NEGATIVE MG/DL
BODY TEMPERATURE: 37 DEGREES CELSIUS
BUN SERPL-MCNC: 8 MG/DL (ref 6–23)
BZE UR QL SCN: ABNORMAL
CA-I BLDV-SCNC: 1.14 MMOL/L (ref 1.1–1.33)
CALCIUM SERPL-MCNC: 8.8 MG/DL (ref 8.6–10.3)
CANNABINOIDS UR QL SCN: ABNORMAL
CARDIAC TROPONIN I PNL SERPL HS: 20 NG/L (ref 0–13)
CHLORIDE BLDV-SCNC: 103 MMOL/L (ref 98–107)
CHLORIDE SERPL-SCNC: 101 MMOL/L (ref 98–107)
CO2 SERPL-SCNC: 24 MMOL/L (ref 21–32)
COLOR UR: ABNORMAL
CREAT SERPL-MCNC: 0.48 MG/DL (ref 0.5–1.05)
EGFRCR SERPLBLD CKD-EPI 2021: >90 ML/MIN/1.73M*2
EOSINOPHIL # BLD AUTO: 0.01 X10*3/UL (ref 0–0.4)
EOSINOPHIL NFR BLD AUTO: 0.1 %
ERYTHROCYTE [DISTWIDTH] IN BLOOD BY AUTOMATED COUNT: 13.7 % (ref 11.5–14.5)
ETHANOL SERPL-MCNC: <10 MG/DL
FENTANYL+NORFENTANYL UR QL SCN: ABNORMAL
GLUCOSE BLDV-MCNC: 128 MG/DL (ref 74–99)
GLUCOSE SERPL-MCNC: 119 MG/DL (ref 74–99)
GLUCOSE UR STRIP.AUTO-MCNC: NORMAL MG/DL
HCO3 BLDV-SCNC: 25 MMOL/L (ref 22–26)
HCT VFR BLD AUTO: 39.4 % (ref 36–46)
HCT VFR BLD EST: 40 % (ref 36–46)
HGB BLD-MCNC: 12.8 G/DL (ref 12–16)
HGB BLDV-MCNC: 13.4 G/DL (ref 12–16)
IMM GRANULOCYTES # BLD AUTO: 0.13 X10*3/UL (ref 0–0.5)
IMM GRANULOCYTES NFR BLD AUTO: 0.9 % (ref 0–0.9)
INHALED O2 CONCENTRATION: 21 %
KETONES UR STRIP.AUTO-MCNC: NEGATIVE MG/DL
LACTATE BLDV-SCNC: 1.3 MMOL/L (ref 0.4–2)
LACTATE SERPL-SCNC: 1.3 MMOL/L (ref 0.4–2)
LEUKOCYTE ESTERASE UR QL STRIP.AUTO: ABNORMAL
LYMPHOCYTES # BLD AUTO: 0.61 X10*3/UL (ref 0.8–3)
LYMPHOCYTES NFR BLD AUTO: 4.1 %
MAGNESIUM SERPL-MCNC: 1.5 MG/DL (ref 1.6–2.4)
MCH RBC QN AUTO: 34.1 PG (ref 26–34)
MCHC RBC AUTO-ENTMCNC: 32.5 G/DL (ref 32–36)
MCV RBC AUTO: 105 FL (ref 80–100)
METHADONE UR QL SCN: ABNORMAL
MONOCYTES # BLD AUTO: 1.04 X10*3/UL (ref 0.05–0.8)
MONOCYTES NFR BLD AUTO: 7 %
MUCOUS THREADS #/AREA URNS AUTO: NORMAL /LPF
NEUTROPHILS # BLD AUTO: 12.94 X10*3/UL (ref 1.6–5.5)
NEUTROPHILS NFR BLD AUTO: 87.5 %
NITRITE UR QL STRIP.AUTO: NEGATIVE
NRBC BLD-RTO: 0 /100 WBCS (ref 0–0)
OPIATES UR QL SCN: ABNORMAL
OXYCODONE+OXYMORPHONE UR QL SCN: ABNORMAL
OXYHGB MFR BLDV: 54 % (ref 45–75)
PCO2 BLDV: 36 MM HG (ref 41–51)
PCP UR QL SCN: ABNORMAL
PH BLDV: 7.45 PH (ref 7.33–7.43)
PH UR STRIP.AUTO: 5.5 [PH]
PLATELET # BLD AUTO: 186 X10*3/UL (ref 150–450)
PO2 BLDV: 27 MM HG (ref 35–45)
POTASSIUM BLDV-SCNC: 3.4 MMOL/L (ref 3.5–5.3)
POTASSIUM SERPL-SCNC: 3.4 MMOL/L (ref 3.5–5.3)
PROT SERPL-MCNC: 6.9 G/DL (ref 6.4–8.2)
PROT UR STRIP.AUTO-MCNC: ABNORMAL MG/DL
RBC # BLD AUTO: 3.75 X10*6/UL (ref 4–5.2)
RBC # UR STRIP.AUTO: NEGATIVE MG/DL
RBC #/AREA URNS AUTO: NORMAL /HPF
SAO2 % BLDV: 57 % (ref 45–75)
SODIUM BLDV-SCNC: 132 MMOL/L (ref 136–145)
SODIUM SERPL-SCNC: 133 MMOL/L (ref 136–145)
SP GR UR STRIP.AUTO: 1.01
UROBILINOGEN UR STRIP.AUTO-MCNC: NORMAL MG/DL
WBC # BLD AUTO: 14.8 X10*3/UL (ref 4.4–11.3)
WBC #/AREA URNS AUTO: NORMAL /HPF
WBC CLUMPS #/AREA URNS AUTO: NORMAL /HPF

## 2025-08-07 PROCEDURE — 96361 HYDRATE IV INFUSION ADD-ON: CPT

## 2025-08-07 PROCEDURE — 96360 HYDRATION IV INFUSION INIT: CPT | Mod: 59

## 2025-08-07 PROCEDURE — 2500000004 HC RX 250 GENERAL PHARMACY W/ HCPCS (ALT 636 FOR OP/ED): Performed by: INTERNAL MEDICINE

## 2025-08-07 PROCEDURE — 2500000001 HC RX 250 WO HCPCS SELF ADMINISTERED DRUGS (ALT 637 FOR MEDICARE OP): Performed by: EMERGENCY MEDICINE

## 2025-08-07 PROCEDURE — 96365 THER/PROPH/DIAG IV INF INIT: CPT

## 2025-08-07 PROCEDURE — 83605 ASSAY OF LACTIC ACID: CPT | Performed by: EMERGENCY MEDICINE

## 2025-08-07 PROCEDURE — 87040 BLOOD CULTURE FOR BACTERIA: CPT | Mod: PARLAB | Performed by: EMERGENCY MEDICINE

## 2025-08-07 PROCEDURE — 99285 EMERGENCY DEPT VISIT HI MDM: CPT | Mod: 25 | Performed by: EMERGENCY MEDICINE

## 2025-08-07 PROCEDURE — 94640 AIRWAY INHALATION TREATMENT: CPT

## 2025-08-07 PROCEDURE — 70450 CT HEAD/BRAIN W/O DYE: CPT | Performed by: RADIOLOGY

## 2025-08-07 PROCEDURE — 71045 X-RAY EXAM CHEST 1 VIEW: CPT | Performed by: RADIOLOGY

## 2025-08-07 PROCEDURE — 82077 ASSAY SPEC XCP UR&BREATH IA: CPT | Performed by: EMERGENCY MEDICINE

## 2025-08-07 PROCEDURE — 85025 COMPLETE CBC W/AUTO DIFF WBC: CPT | Performed by: EMERGENCY MEDICINE

## 2025-08-07 PROCEDURE — 84132 ASSAY OF SERUM POTASSIUM: CPT | Performed by: EMERGENCY MEDICINE

## 2025-08-07 PROCEDURE — 87086 URINE CULTURE/COLONY COUNT: CPT | Mod: PARLAB | Performed by: EMERGENCY MEDICINE

## 2025-08-07 PROCEDURE — 71250 CT THORAX DX C-: CPT | Performed by: RADIOLOGY

## 2025-08-07 PROCEDURE — 36415 COLL VENOUS BLD VENIPUNCTURE: CPT | Performed by: EMERGENCY MEDICINE

## 2025-08-07 PROCEDURE — 1200000002 HC GENERAL ROOM WITH TELEMETRY DAILY

## 2025-08-07 PROCEDURE — 84295 ASSAY OF SERUM SODIUM: CPT | Performed by: EMERGENCY MEDICINE

## 2025-08-07 PROCEDURE — 81001 URINALYSIS AUTO W/SCOPE: CPT | Performed by: EMERGENCY MEDICINE

## 2025-08-07 PROCEDURE — 71250 CT THORAX DX C-: CPT

## 2025-08-07 PROCEDURE — 2500000001 HC RX 250 WO HCPCS SELF ADMINISTERED DRUGS (ALT 637 FOR MEDICARE OP): Performed by: INTERNAL MEDICINE

## 2025-08-07 PROCEDURE — 99223 1ST HOSP IP/OBS HIGH 75: CPT | Performed by: INTERNAL MEDICINE

## 2025-08-07 PROCEDURE — 80307 DRUG TEST PRSMV CHEM ANLYZR: CPT | Performed by: EMERGENCY MEDICINE

## 2025-08-07 PROCEDURE — 96366 THER/PROPH/DIAG IV INF ADDON: CPT

## 2025-08-07 PROCEDURE — 2500000002 HC RX 250 W HCPCS SELF ADMINISTERED DRUGS (ALT 637 FOR MEDICARE OP, ALT 636 FOR OP/ED): Performed by: INTERNAL MEDICINE

## 2025-08-07 PROCEDURE — 84075 ASSAY ALKALINE PHOSPHATASE: CPT | Performed by: EMERGENCY MEDICINE

## 2025-08-07 PROCEDURE — 2500000004 HC RX 250 GENERAL PHARMACY W/ HCPCS (ALT 636 FOR OP/ED): Performed by: EMERGENCY MEDICINE

## 2025-08-07 PROCEDURE — 96375 TX/PRO/DX INJ NEW DRUG ADDON: CPT

## 2025-08-07 PROCEDURE — 71045 X-RAY EXAM CHEST 1 VIEW: CPT

## 2025-08-07 PROCEDURE — 2500000005 HC RX 250 GENERAL PHARMACY W/O HCPCS: Performed by: INTERNAL MEDICINE

## 2025-08-07 PROCEDURE — 84484 ASSAY OF TROPONIN QUANT: CPT | Performed by: EMERGENCY MEDICINE

## 2025-08-07 PROCEDURE — 96367 TX/PROPH/DG ADDL SEQ IV INF: CPT

## 2025-08-07 PROCEDURE — 83735 ASSAY OF MAGNESIUM: CPT | Performed by: INTERNAL MEDICINE

## 2025-08-07 PROCEDURE — 70450 CT HEAD/BRAIN W/O DYE: CPT

## 2025-08-07 RX ORDER — NALOXONE HYDROCHLORIDE 1 MG/ML
0.2 INJECTION INTRAMUSCULAR; INTRAVENOUS; SUBCUTANEOUS EVERY 5 MIN PRN
Status: ACTIVE | OUTPATIENT
Start: 2025-08-07

## 2025-08-07 RX ORDER — PRAVASTATIN SODIUM 20 MG/1
20 TABLET ORAL DAILY
Status: DISCONTINUED | OUTPATIENT
Start: 2025-08-07 | End: 2025-08-08

## 2025-08-07 RX ORDER — CEFTRIAXONE 1 G/50ML
1 INJECTION, SOLUTION INTRAVENOUS EVERY 24 HOURS
Status: DISPENSED | OUTPATIENT
Start: 2025-08-07

## 2025-08-07 RX ORDER — NITROGLYCERIN 0.4 MG/1
0.4 TABLET SUBLINGUAL EVERY 5 MIN PRN
Status: ACTIVE | OUTPATIENT
Start: 2025-08-07

## 2025-08-07 RX ORDER — IPRATROPIUM BROMIDE 42 UG/1
2 SPRAY, METERED NASAL 4 TIMES DAILY
Status: DISPENSED | OUTPATIENT
Start: 2025-08-07

## 2025-08-07 RX ORDER — ACETAMINOPHEN 325 MG/1
650 TABLET ORAL ONCE
Status: COMPLETED | OUTPATIENT
Start: 2025-08-07 | End: 2025-08-07

## 2025-08-07 RX ORDER — POLYETHYLENE GLYCOL 3350 17 G/17G
17 POWDER, FOR SOLUTION ORAL 2 TIMES DAILY
Status: ON HOLD | COMMUNITY
Start: 2025-07-15

## 2025-08-07 RX ORDER — KETOROLAC TROMETHAMINE 30 MG/ML
15 INJECTION, SOLUTION INTRAMUSCULAR; INTRAVENOUS ONCE
Status: COMPLETED | OUTPATIENT
Start: 2025-08-07 | End: 2025-08-07

## 2025-08-07 RX ORDER — IPRATROPIUM BROMIDE AND ALBUTEROL SULFATE 2.5; .5 MG/3ML; MG/3ML
SOLUTION RESPIRATORY (INHALATION)
Status: COMPLETED
Start: 2025-08-07 | End: 2025-08-08

## 2025-08-07 RX ORDER — IPRATROPIUM BROMIDE AND ALBUTEROL SULFATE 2.5; .5 MG/3ML; MG/3ML
3 SOLUTION RESPIRATORY (INHALATION) EVERY 2 HOUR PRN
Status: DISPENSED | OUTPATIENT
Start: 2025-08-07

## 2025-08-07 RX ORDER — PRIMIDONE 50 MG/1
150 TABLET ORAL DAILY
Status: DISPENSED | OUTPATIENT
Start: 2025-08-08

## 2025-08-07 RX ORDER — IPRATROPIUM BROMIDE AND ALBUTEROL SULFATE 2.5; .5 MG/3ML; MG/3ML
3 SOLUTION RESPIRATORY (INHALATION)
Status: DISPENSED | OUTPATIENT
Start: 2025-08-08

## 2025-08-07 RX ORDER — METOPROLOL TARTRATE 50 MG/1
50 TABLET ORAL EVERY 12 HOURS
Status: DISPENSED | OUTPATIENT
Start: 2025-08-07

## 2025-08-07 RX ORDER — HYDROXYCHLOROQUINE SULFATE 200 MG/1
300 TABLET, FILM COATED ORAL DAILY
Status: DISPENSED | OUTPATIENT
Start: 2025-08-07

## 2025-08-07 RX ORDER — FAMOTIDINE 20 MG/1
40 TABLET, FILM COATED ORAL NIGHTLY
Status: DISCONTINUED | OUTPATIENT
Start: 2025-08-07 | End: 2025-08-07

## 2025-08-07 RX ORDER — AZELASTINE 1 MG/ML
2 SPRAY, METERED NASAL 2 TIMES DAILY
Status: DISPENSED | OUTPATIENT
Start: 2025-08-07

## 2025-08-07 RX ORDER — ONDANSETRON HYDROCHLORIDE 2 MG/ML
4 INJECTION, SOLUTION INTRAVENOUS ONCE
Status: COMPLETED | OUTPATIENT
Start: 2025-08-07 | End: 2025-08-07

## 2025-08-07 RX ORDER — GABAPENTIN 400 MG/1
800 CAPSULE ORAL 2 TIMES DAILY
Status: DISPENSED | OUTPATIENT
Start: 2025-08-07

## 2025-08-07 RX ORDER — POTASSIUM CHLORIDE 14.9 MG/ML
20 INJECTION INTRAVENOUS
Status: COMPLETED | OUTPATIENT
Start: 2025-08-07 | End: 2025-08-08

## 2025-08-07 RX ORDER — METHOTREXATE 25 MG/ML
20 INJECTION, SOLUTION INTRAMUSCULAR; INTRATHECAL; INTRAVENOUS; SUBCUTANEOUS
Status: ACTIVE | OUTPATIENT
Start: 2025-08-10

## 2025-08-07 RX ORDER — ENOXAPARIN SODIUM 100 MG/ML
40 INJECTION SUBCUTANEOUS EVERY 24 HOURS
Status: DISCONTINUED | OUTPATIENT
Start: 2025-08-07 | End: 2025-08-09

## 2025-08-07 RX ORDER — PANTOPRAZOLE SODIUM 40 MG/1
40 TABLET, DELAYED RELEASE ORAL DAILY
Status: DISPENSED | OUTPATIENT
Start: 2025-08-07

## 2025-08-07 RX ORDER — MONTELUKAST SODIUM 10 MG/1
10 TABLET ORAL DAILY
Status: DISPENSED | OUTPATIENT
Start: 2025-08-07

## 2025-08-07 RX ORDER — POLYETHYLENE GLYCOL 3350 17 G/17G
17 POWDER, FOR SOLUTION ORAL DAILY PRN
Status: ACTIVE | OUTPATIENT
Start: 2025-08-07

## 2025-08-07 RX ORDER — AMOXICILLIN 250 MG
1 CAPSULE ORAL DAILY
Status: DISPENSED | OUTPATIENT
Start: 2025-08-07

## 2025-08-07 RX ORDER — NALOXONE HYDROCHLORIDE 4 MG/.1ML
4 SPRAY NASAL AS NEEDED
Status: DISCONTINUED | OUTPATIENT
Start: 2025-08-07 | End: 2025-08-07

## 2025-08-07 RX ORDER — ISOSORBIDE MONONITRATE 60 MG/1
60 TABLET, EXTENDED RELEASE ORAL DAILY
Status: ON HOLD | COMMUNITY
Start: 2025-08-04

## 2025-08-07 RX ORDER — METOCLOPRAMIDE 10 MG/1
10 TABLET ORAL DAILY PRN
Status: ACTIVE | OUTPATIENT
Start: 2025-08-07

## 2025-08-07 RX ORDER — ALBUTEROL SULFATE 90 UG/1
2 INHALANT RESPIRATORY (INHALATION) EVERY 4 HOURS PRN
Status: ACTIVE | OUTPATIENT
Start: 2025-08-07

## 2025-08-07 RX ORDER — FLUTICASONE PROPIONATE 50 MCG
2 SPRAY, SUSPENSION (ML) NASAL DAILY
Status: DISPENSED | OUTPATIENT
Start: 2025-08-07

## 2025-08-07 RX ORDER — HYDROCODONE BITARTRATE AND ACETAMINOPHEN 7.5; 325 MG/1; MG/1
1 TABLET ORAL EVERY 12 HOURS PRN
Refills: 0 | Status: DISPENSED | OUTPATIENT
Start: 2025-08-07

## 2025-08-07 RX ORDER — ISOSORBIDE MONONITRATE 60 MG/1
60 TABLET, EXTENDED RELEASE ORAL DAILY
Status: DISPENSED | OUTPATIENT
Start: 2025-08-07

## 2025-08-07 RX ORDER — IPRATROPIUM BROMIDE AND ALBUTEROL SULFATE 2.5; .5 MG/3ML; MG/3ML
3 SOLUTION RESPIRATORY (INHALATION)
Status: DISCONTINUED | OUTPATIENT
Start: 2025-08-07 | End: 2025-08-07

## 2025-08-07 RX ADMIN — ENOXAPARIN SODIUM 40 MG: 100 INJECTION SUBCUTANEOUS at 16:36

## 2025-08-07 RX ADMIN — SODIUM CHLORIDE 2000 ML: 9 INJECTION, SOLUTION INTRAVENOUS at 12:45

## 2025-08-07 RX ADMIN — ACETAMINOPHEN 650 MG: 325 TABLET ORAL at 12:49

## 2025-08-07 RX ADMIN — IPRATROPIUM BROMIDE 2 SPRAY: 42 SPRAY, METERED NASAL at 21:02

## 2025-08-07 RX ADMIN — IPRATROPIUM BROMIDE AND ALBUTEROL SULFATE 3 ML: .5; 3 SOLUTION RESPIRATORY (INHALATION) at 21:08

## 2025-08-07 RX ADMIN — PANTOPRAZOLE SODIUM 40 MG: 40 TABLET, DELAYED RELEASE ORAL at 20:41

## 2025-08-07 RX ADMIN — FLUTICASONE PROPIONATE 2 SPRAY: 50 SPRAY, METERED NASAL at 20:45

## 2025-08-07 RX ADMIN — AZELASTINE HYDROCHLORIDE 2 SPRAY: 137 SPRAY, METERED NASAL at 20:51

## 2025-08-07 RX ADMIN — ONDANSETRON 4 MG: 2 INJECTION INTRAMUSCULAR; INTRAVENOUS at 13:02

## 2025-08-07 RX ADMIN — POTASSIUM CHLORIDE 20 MEQ: 14.9 INJECTION, SOLUTION INTRAVENOUS at 21:55

## 2025-08-07 RX ADMIN — DEXTROSE 1750 MG: 5 SOLUTION INTRAVENOUS at 14:05

## 2025-08-07 RX ADMIN — MONTELUKAST 10 MG: 10 TABLET, FILM COATED ORAL at 20:40

## 2025-08-07 RX ADMIN — KETOROLAC TROMETHAMINE 15 MG: 30 INJECTION, SOLUTION INTRAMUSCULAR; INTRAVENOUS at 14:06

## 2025-08-07 RX ADMIN — Medication: at 21:08

## 2025-08-07 RX ADMIN — DOXYCYCLINE 100 MG: 100 INJECTION, POWDER, LYOPHILIZED, FOR SOLUTION INTRAVENOUS at 20:52

## 2025-08-07 RX ADMIN — HYDROCODONE BITARTRATE AND ACETAMINOPHEN 1 TABLET: 7.5; 325 TABLET ORAL at 20:37

## 2025-08-07 RX ADMIN — POTASSIUM CHLORIDE 20 MEQ: 14.9 INJECTION, SOLUTION INTRAVENOUS at 23:56

## 2025-08-07 RX ADMIN — PRAVASTATIN SODIUM 20 MG: 20 TABLET ORAL at 20:41

## 2025-08-07 RX ADMIN — GABAPENTIN 800 MG: 400 CAPSULE ORAL at 20:41

## 2025-08-07 RX ADMIN — PIPERACILLIN SODIUM AND TAZOBACTAM SODIUM 4.5 G: 4; .5 INJECTION, SOLUTION INTRAVENOUS at 13:02

## 2025-08-07 RX ADMIN — CEFTRIAXONE 1 G: 1 INJECTION, SOLUTION INTRAVENOUS at 20:35

## 2025-08-07 RX ADMIN — HYDROXYCHLOROQUINE SULFATE 300 MG: 200 TABLET, FILM COATED ORAL at 20:41

## 2025-08-07 RX ADMIN — SENNOSIDES AND DOCUSATE SODIUM 1 TABLET: 50; 8.6 TABLET ORAL at 20:41

## 2025-08-07 SDOH — SOCIAL STABILITY: SOCIAL INSECURITY
WITHIN THE LAST YEAR, HAVE YOU BEEN RAPED OR FORCED TO HAVE ANY KIND OF SEXUAL ACTIVITY BY YOUR PARTNER OR EX-PARTNER?: NO

## 2025-08-07 SDOH — SOCIAL STABILITY: SOCIAL INSECURITY: ARE THERE ANY APPARENT SIGNS OF INJURIES/BEHAVIORS THAT COULD BE RELATED TO ABUSE/NEGLECT?: NO

## 2025-08-07 SDOH — ECONOMIC STABILITY: FOOD INSECURITY: WITHIN THE PAST 12 MONTHS, THE FOOD YOU BOUGHT JUST DIDN'T LAST AND YOU DIDN'T HAVE MONEY TO GET MORE.: NEVER TRUE

## 2025-08-07 SDOH — SOCIAL STABILITY: SOCIAL INSECURITY: ARE YOU OR HAVE YOU BEEN THREATENED OR ABUSED PHYSICALLY, EMOTIONALLY, OR SEXUALLY BY ANYONE?: NO

## 2025-08-07 SDOH — SOCIAL STABILITY: SOCIAL INSECURITY: WITHIN THE LAST YEAR, HAVE YOU BEEN HUMILIATED OR EMOTIONALLY ABUSED IN OTHER WAYS BY YOUR PARTNER OR EX-PARTNER?: NO

## 2025-08-07 SDOH — ECONOMIC STABILITY: FOOD INSECURITY: WITHIN THE PAST 12 MONTHS, YOU WORRIED THAT YOUR FOOD WOULD RUN OUT BEFORE YOU GOT THE MONEY TO BUY MORE.: NEVER TRUE

## 2025-08-07 SDOH — ECONOMIC STABILITY: INCOME INSECURITY: IN THE PAST 12 MONTHS HAS THE ELECTRIC, GAS, OIL, OR WATER COMPANY THREATENED TO SHUT OFF SERVICES IN YOUR HOME?: NO

## 2025-08-07 SDOH — SOCIAL STABILITY: SOCIAL INSECURITY: HAS ANYONE EVER THREATENED TO HURT YOUR FAMILY OR YOUR PETS?: NO

## 2025-08-07 SDOH — SOCIAL STABILITY: SOCIAL INSECURITY
WITHIN THE LAST YEAR, HAVE YOU BEEN KICKED, HIT, SLAPPED, OR OTHERWISE PHYSICALLY HURT BY YOUR PARTNER OR EX-PARTNER?: NO

## 2025-08-07 SDOH — SOCIAL STABILITY: SOCIAL INSECURITY: HAVE YOU HAD THOUGHTS OF HARMING ANYONE ELSE?: NO

## 2025-08-07 SDOH — SOCIAL STABILITY: SOCIAL INSECURITY: DO YOU FEEL UNSAFE GOING BACK TO THE PLACE WHERE YOU ARE LIVING?: NO

## 2025-08-07 SDOH — ECONOMIC STABILITY: HOUSING INSECURITY: AT ANY TIME IN THE PAST 12 MONTHS, WERE YOU HOMELESS OR LIVING IN A SHELTER (INCLUDING NOW)?: NO

## 2025-08-07 SDOH — SOCIAL STABILITY: SOCIAL INSECURITY: ABUSE: ADULT

## 2025-08-07 SDOH — SOCIAL STABILITY: SOCIAL INSECURITY: WERE YOU ABLE TO COMPLETE ALL THE BEHAVIORAL HEALTH SCREENINGS?: YES

## 2025-08-07 SDOH — SOCIAL STABILITY: SOCIAL INSECURITY: WITHIN THE LAST YEAR, HAVE YOU BEEN AFRAID OF YOUR PARTNER OR EX-PARTNER?: NO

## 2025-08-07 SDOH — ECONOMIC STABILITY: FOOD INSECURITY: HOW HARD IS IT FOR YOU TO PAY FOR THE VERY BASICS LIKE FOOD, HOUSING, MEDICAL CARE, AND HEATING?: NOT HARD AT ALL

## 2025-08-07 SDOH — ECONOMIC STABILITY: HOUSING INSECURITY: IN THE LAST 12 MONTHS, WAS THERE A TIME WHEN YOU WERE NOT ABLE TO PAY THE MORTGAGE OR RENT ON TIME?: NO

## 2025-08-07 SDOH — SOCIAL STABILITY: SOCIAL INSECURITY: DO YOU FEEL ANYONE HAS EXPLOITED OR TAKEN ADVANTAGE OF YOU FINANCIALLY OR OF YOUR PERSONAL PROPERTY?: NO

## 2025-08-07 SDOH — ECONOMIC STABILITY: TRANSPORTATION INSECURITY: IN THE PAST 12 MONTHS, HAS LACK OF TRANSPORTATION KEPT YOU FROM MEDICAL APPOINTMENTS OR FROM GETTING MEDICATIONS?: NO

## 2025-08-07 SDOH — ECONOMIC STABILITY: HOUSING INSECURITY: IN THE PAST 12 MONTHS, HOW MANY TIMES HAVE YOU MOVED WHERE YOU WERE LIVING?: 0

## 2025-08-07 SDOH — SOCIAL STABILITY: SOCIAL INSECURITY: DOES ANYONE TRY TO KEEP YOU FROM HAVING/CONTACTING OTHER FRIENDS OR DOING THINGS OUTSIDE YOUR HOME?: NO

## 2025-08-07 SDOH — SOCIAL STABILITY: SOCIAL INSECURITY: HAVE YOU HAD ANY THOUGHTS OF HARMING ANYONE ELSE?: NO

## 2025-08-07 ASSESSMENT — COGNITIVE AND FUNCTIONAL STATUS - GENERAL
PATIENT BASELINE BEDBOUND: NO
DRESSING REGULAR LOWER BODY CLOTHING: A LITTLE
MOBILITY SCORE: 19
TOILETING: A LITTLE
DAILY ACTIVITIY SCORE: 20
TURNING FROM BACK TO SIDE WHILE IN FLAT BAD: A LITTLE
MOVING TO AND FROM BED TO CHAIR: A LITTLE
CLIMB 3 TO 5 STEPS WITH RAILING: A LITTLE
WALKING IN HOSPITAL ROOM: A LITTLE
HELP NEEDED FOR BATHING: A LITTLE
STANDING UP FROM CHAIR USING ARMS: A LITTLE
DRESSING REGULAR UPPER BODY CLOTHING: A LITTLE

## 2025-08-07 ASSESSMENT — ACTIVITIES OF DAILY LIVING (ADL)
HEARING - RIGHT EAR: FUNCTIONAL
JUDGMENT_ADEQUATE_SAFELY_COMPLETE_DAILY_ACTIVITIES: NO
LACK_OF_TRANSPORTATION: NO
ASSISTIVE_DEVICE: WALKER;CANE
WALKS IN HOME: INDEPENDENT
HEARING - LEFT EAR: FUNCTIONAL
PATIENT'S MEMORY ADEQUATE TO SAFELY COMPLETE DAILY ACTIVITIES?: NO
TOILETING: NEEDS ASSISTANCE
LACK_OF_TRANSPORTATION: NO
DRESSING YOURSELF: NEEDS ASSISTANCE
BATHING: NEEDS ASSISTANCE
FEEDING YOURSELF: INDEPENDENT
GROOMING: NEEDS ASSISTANCE
ADEQUATE_TO_COMPLETE_ADL: YES

## 2025-08-07 ASSESSMENT — ENCOUNTER SYMPTOMS
CONFUSION: 1
ALLERGIC/IMMUNOLOGIC NEGATIVE: 1
ACTIVITY CHANGE: 1
COUGH: 1
FEVER: 1
GASTROINTESTINAL NEGATIVE: 1
CARDIOVASCULAR NEGATIVE: 1
CHILLS: 1
EYES NEGATIVE: 1
ENDOCRINE NEGATIVE: 1
BACK PAIN: 1
HEMATOLOGIC/LYMPHATIC NEGATIVE: 1
DIZZINESS: 1

## 2025-08-07 ASSESSMENT — PAIN SCALES - GENERAL
PAINLEVEL_OUTOF10: 4
PAINLEVEL_OUTOF10: 0 - NO PAIN
PAINLEVEL_OUTOF10: 0 - NO PAIN
PAINLEVEL_OUTOF10: 9
PAINLEVEL_OUTOF10: 0 - NO PAIN

## 2025-08-07 ASSESSMENT — LIFESTYLE VARIABLES
HOW OFTEN DO YOU HAVE A DRINK CONTAINING ALCOHOL: NEVER
SKIP TO QUESTIONS 9-10: 1
HOW OFTEN DO YOU HAVE 6 OR MORE DRINKS ON ONE OCCASION: NEVER
HOW MANY STANDARD DRINKS CONTAINING ALCOHOL DO YOU HAVE ON A TYPICAL DAY: PATIENT DOES NOT DRINK
AUDIT-C TOTAL SCORE: 0
AUDIT-C TOTAL SCORE: 0

## 2025-08-07 ASSESSMENT — PATIENT HEALTH QUESTIONNAIRE - PHQ9
2. FEELING DOWN, DEPRESSED OR HOPELESS: NOT AT ALL
SUM OF ALL RESPONSES TO PHQ9 QUESTIONS 1 & 2: 0
1. LITTLE INTEREST OR PLEASURE IN DOING THINGS: NOT AT ALL

## 2025-08-07 ASSESSMENT — PAIN - FUNCTIONAL ASSESSMENT
PAIN_FUNCTIONAL_ASSESSMENT: 0-10

## 2025-08-07 ASSESSMENT — PAIN DESCRIPTION - DESCRIPTORS
DESCRIPTORS: ACHING
DESCRIPTORS: ACHING

## 2025-08-07 ASSESSMENT — PAIN DESCRIPTION - FREQUENCY: FREQUENCY: CONSTANT/CONTINUOUS

## 2025-08-07 ASSESSMENT — PAIN DESCRIPTION - LOCATION
LOCATION: BACK
LOCATION: BACK

## 2025-08-07 ASSESSMENT — PAIN DESCRIPTION - ORIENTATION: ORIENTATION: RIGHT

## 2025-08-07 ASSESSMENT — PAIN DESCRIPTION - PAIN TYPE: TYPE: CHRONIC PAIN

## 2025-08-07 ASSESSMENT — PAIN DESCRIPTION - ONSET: ONSET: ONGOING
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
## 2025-08-07 NOTE — PROGRESS NOTES
Pharmacy Medication History Review    Radha Deluna is a 75 y.o. female admitted for No Principal Problem: There is no principal problem currently on the Problem List. Please update the Problem List and refresh.. Pharmacy reviewed the patient's [id number] medications and allergies for accuracy.    The list below reflectives the updated PTA list. Please review each medication in order reconciliation for additional clarification and justification.  Prior to Admission medications   Medication Sig Start Date End Date Taking? Authorizing Provider   Bifidobacterium infantis (ALIGN ORAL) Take 1 capsule by mouth once daily.   Yes Historical Provider, MD   carboxymethylcellulose sodium (TheraTears) 0.25 % dropperette Administer 1 drop into both eyes 4 times a day as needed (dry eye). 2/3/25  Yes Historical Provider, MD   clopidogrel (Plavix) 75 mg tablet Take 1 tablet (75 mg) by mouth once daily.   Yes Historical Provider, MD   clotrimazole (Mycelex) 10 mg timbo Take 1 tablet (10 mg) by mouth once daily. 5/5/25  Yes Mayo Diamond, DO   denosumab (Prolia) 60 mg/mL syringe Inject 1 mL (60 mg) under the skin every 6 months.   Yes Historical Provider, MD   diphenhydrAMINE (Sominex) 25 mg tablet Take 1 tablet (25 mg) by mouth as needed at bedtime for sleep.   Yes Historical Provider, MD   ergocalciferol (Vitamin D-2) 1.25 MG (00864 UT) capsule TAKE 1 CAPSULE WEEKLY.  Patient taking differently: Take 1 capsule (1.25 mg) by mouth every 14 (fourteen) days. 12/15/23  Yes Moise Spivey,    famotidine (Pepcid) 40 mg tablet TAKE 1 TABLET BY MOUTH ONCE DAILY AT BEDTIME 2/13/25  Yes Gary Mascorro V, DO   fluticasone (Flonase) 50 mcg/actuation nasal spray Administer 2 sprays into each nostril once daily. 7/21/25  Yes Mayo Diamond, DO   folic acid (Folvite) 1 mg tablet Take 1 tablet (1 mg) by mouth once daily. 10/29/14  Yes Historical Provider, MD   gabapentin (Neurontin) 400 mg capsule Take 2 capsules (800 mg)  by mouth 4 times a day. 5/5/25 11/5/25 Yes JONO ButterfieldCF, Pen 40 mg/0.4 mL pen injector kit pen-injector Inject 1 Pen (40 mg) under the skin every 14 (fourteen) days. 6/2/25  Yes Historical Provider, MD   HYDROcodone-acetaminophen (Norco) 7.5-325 mg tablet Take 1 tablet by mouth every 12 hours if needed for severe pain (7 - 10). 7/3/25  Yes Brett Rider,    hydroxychloroquine (Plaquenil) 200 mg tablet Take 1.5 tablets (300 mg) by mouth once daily. 7/2/25  Yes Historical Provider, MD   ipratropium (Atrovent) 42 mcg (0.06 %) nasal spray Administer 2 sprays into each nostril 4 times a day. 8/5/25  Yes JONO Ospina   isosorbide mononitrate ER (Imdur) 60 mg 24 hr tablet Take 1 tablet (60 mg) by mouth once daily. 8/4/25  Yes Historical Provider, MD   methotrexate 25 mg/mL injection Inject 0.8 mL (20 mg) into the muscle 1 (one) time per week. 2/27/23  Yes Historical Provider, MD   metoclopramide (Reglan) 10 mg tablet Take 1 tablet (10 mg) by mouth once daily as needed (nausea). 6/24/25 9/6/25 Yes JONO Ospina   metoprolol tartrate (Lopressor) 50 mg tablet Take 1 tablet by mouth every 12 hours.   Yes Historical Provider, MD Bernal PF, 100 % drops Administer 1 drop into both eyes 4 times a day. 3/4/25  Yes Historical Provider, MD   montelukast (Singulair) 10 mg tablet Take 1 tablet (10 mg) by mouth once daily. 8/4/25  Yes Mayo Diamond,    naloxone (Narcan) 4 mg/0.1 mL nasal spray Administer 1 spray (4 mg) into affected nostril(s) if needed for opioid reversal. May repeat every 2-3 minutes if needed, alternating nostrils, until medical assistance becomes available. 8/14/24  Yes Brett Rider, DO   nitroglycerin (Nitrostat) 0.4 mg SL tablet Place 1 tablet (0.4 mg) under the tongue every 5 minutes if needed for chest pain. May take up to 3 doses over 15 minutes. Call 911 if pain persists.   Yes Historical Provider, MD   oxygen (O2) therapy  Inhale once daily at bedtime. use at night as directed   Yes Historical Provider, MD   polyethylene glycol (Glycolax, Miralax) 17 gram packet Take 17 g by mouth 2 times a day. Mix 1 cap (17g) into 8 ounces of fluid. 7/15/25 3/12/26 Yes Mayo Diamond,    polyethylene glycol (Glycolax, Miralax) 17 gram/dose powder Mix 17 g of powder and drink 2 times a day. 7/15/25  Yes Historical Provider, MD   pravastatin (Pravachol) 20 mg tablet Take 1 tablet (20 mg) by mouth once daily.   Yes Historical Provider, MD   primidone (Mysoline) 50 mg tablet Take 3 tablets (150 mg) by mouth early in the morning.. 3/10/25  Yes Frank Izquierdo MD   ProAir HFA 90 mcg/actuation inhaler Inhale 2 puffs every 4 hours if needed for wheezing or shortness of breath. 2/18/25 2/18/26 Yes Mayo Diamond DO   sennosides-docusate sodium (Martha-Colace) 8.6-50 mg tablet Take 1 tablet by mouth once daily. 7/15/25 7/15/26 Yes Mayo Diamond DO   theophylline ER (Alok-Dur) 300 mg 12 hr tablet Take 1 tablet (300 mg) by mouth 3 times a day. 2/13/23  Yes Historical Provider, MD Matt Panda 100-62.5-25 mcg blister with device Inhale 1 puff once daily. 5/6/24  Yes Historical Provider, MD   Xiidra 5 % dropperette Administer 1 drop into both eyes every 12 hours. 6/28/24  Yes Historical Provider, MD   azelastine (Astelin) 137 mcg (0.1 %) nasal spray Administer 2 sprays into each nostril 2 times a day.  Patient not taking: Reported on 8/7/2025 7/31/24 7/31/25 no Moise Spivey DO   cyclobenzaprine (Flexeril) 10 mg tablet Take 1 tablet (10 mg) by mouth 3 times a day as needed for muscle spasms.  Patient not taking: Reported on 8/7/2025 7/10/24  no Moise Spivey DO   ipratropium (Atrovent) 0.02 % nebulizer solution Use 1 vial 4 times daily  Patient not taking: Reported on 8/7/2025 7/31/24  victor m Spivey, DO   pantoprazole (ProtoNix) 40 mg EC tablet Take 1 tablet (40 mg) by mouth once daily. Do not crush, chew, or split.  Patient  "not taking: Reported on 8/7/2025 9/13/23 6/24/25 no Moise Spivey DO   syringe with needle 1 mL 25 gauge x 5/8\" syringe    no Historical Provider, MD   albuterol 5 mg/mL nebulizer solution Take by nebulization. USE 0.25 ML IN 2.5 ML NORMAL SALINE VIA NEBULIZER 3 TO 4 TIMES DAILY AS NEEDED FOR WHEEZING.  Patient not taking: Reported on 8/7/2025 10/29/14 8/7/25 no Historical Provider, MD   ipratropium (Atrovent) 42 mcg (0.06 %) nasal spray Administer 2 sprays into each nostril 4 times a day. 10/28/24 8/5/25 no Moise Spivey DO   isosorbide mononitrate ER (Imdur) 30 mg 24 hr tablet Take 1 tablet (30 mg) by mouth once daily.  8/7/25 no Historical Provider, MD   montelukast (Singulair) 10 mg tablet Take 1 tablet (10 mg) by mouth once daily. 3/24/25 8/4/25 no Mayo Diamond DO        The list below reflectives the updated allergy list. Please review each documented allergy for additional clarification and justification.  Allergies  Reviewed by Nona Rubio RN on 8/7/2025        Severity Reactions Comments    Erythromycin High Other Chest pain    Augmentin [amoxicillin-pot Clavulanate] Medium GI Upset, Nausea/vomiting             Below are additional concerns with the patient's PTA list.    Patient poor historian do to current condition. Medical and pharmacy records reviewed for accuracy.    Елена Quiroz"

## 2025-08-07 NOTE — CARE PLAN
The patient's goals for the shift include  safety and comfort    The clinical goals for the shift include  less confusion      Problem: Safety - Adult  Goal: Free from fall injury  Outcome: Progressing     Problem: Chronic Conditions and Co-morbidities  Goal: Patient's chronic conditions and co-morbidity symptoms are monitored and maintained or improved  Outcome: Progressing

## 2025-08-07 NOTE — PROGRESS NOTES
Patient was handed off to me from the previous team. For full history, physical, and prior ED course, please see previous provider note prior to patient handoff. This is an addendum to the record.    HPI/prior hospital course:   In brief, patient is 75-year-old female history of low back pain, COPD, migraine, CAD s/p stents, AAA, CVA, hypertension, HLD, GERD, PAD, rheumatoid lung disease presenting with concern for altered mental status.  Reportedly found in a hot car that she had locked and reportedly been in for 45 minutes.  Tachycardic, hypothermic on presentation here.  Hospital Course/MDM:  Continued on broad-spectrum antibiotics.  LP considered but in impression of prior provider and resident, altered mental status more likely heatstroke versus metabolic encephalopathy with no focal deficits or neck stiffness.  Already on broad-spectrum antibiotics and diagnostic yield felt to be low at this time and deferred to discretion of inpatient team.  Admitted for further management of altered mental status.    Disposition:  Admitted    Beltran Corado MD, PhD
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
## 2025-08-07 NOTE — PROGRESS NOTES
Medication Adjustment    The following medication(s) was/were adjusted for Radha Deluna per protocol/policy due to indication for the medication .    Medication(s) adjusted:   Vancomycin 2000 mg IV x 1 dose to 1750 mg IV x 1 dose d/t indication of sepsis and weight-based dosing of 25 mg/kg x 72.6 kg being ~1800mg. Rounded down to 1750mg, closest available dose.    John Magana, [facility]

## 2025-08-07 NOTE — H&P
Chief Complaint   Patient presents with    Altered Mental Status    Hypotension    Rapid Heart Rate       HPI    Radha Deluna is a 75 y.o. female with a PMHx of chronic lower back pain, COPD, migraine, CAD s/p stents, AAA, CVA, hypertension, hypercholesterolemia, GERD, PAD, rheumatoid lung disease s/p lung biopsy who presented to [facility] on 8/7/2025 with a chief complaint of AMS.  Patient was not able to remember what happened to her, and the last thing that she was able to remember was being in the ambulance.  According to the ER physician, patient was brought by EMS for AMS, reportedly sitting in her car per neighbor, last known normal around 10 AM.  She was seen driving her car down the road in the wrong direction, she was able to make it to her house without hitting her car.  After that she was sitting in her car for 45 minutes. Patient was confused and febrile for EMS. They also found her tachycardic and hypotensive started on a liter of fluids prior to arrival. She cannot remember what she did today. She thinks she may have gone to the hospital earlier but is unsure.According to the ED NOTE,  Neighbors had also reported that they do not think patient has had any major oral intake over the last 3 days.   She reported having chills productive cough in the last 2 days, denied all other symptoms.    ROS: 10 point review of systems negative with the exception of above.    ED Course:    ED Triage Vitals [08/07/25 1200]   Temperature Heart Rate Respirations BP   (!) 38.9 °C (102.1 °F) (!) 134 (!) 21 113/59      Pulse Ox Temp Source Heart Rate Source Patient Position   94 % Oral Monitor Lying      BP Location FiO2 (%)     Right arm --         Labs:  Abnormal Labs Reviewed   CBC WITH AUTO DIFFERENTIAL - Abnormal; Notable for the following components:       Result Value    WBC 14.8 (*)     RBC 3.75 (*)      (*)     MCH 34.1 (*)     Neutrophils Absolute 12.94 (*)     Lymphocytes Absolute 0.61 (*)     Monocytes  Absolute 1.04 (*)     All other components within normal limits   COMPREHENSIVE METABOLIC PANEL - Abnormal; Notable for the following components:    Glucose 119 (*)     Sodium 133 (*)     Potassium 3.4 (*)     Creatinine 0.48 (*)     ALT 6 (*)     All other components within normal limits   TROPONIN I, HIGH SENSITIVITY - Abnormal; Notable for the following components:    Troponin I, High Sensitivity 20 (*)     All other components within normal limits    Narrative:     Less than 99th percentile of normal range cutoff-  Female and children under 18 years old <14 ng/L; Male <21 ng/L: Negative  Repeat testing should be performed if clinically indicated.     Female and children under 18 years old 14-50 ng/L; Male 21-50 ng/L:  Consistent with possible cardiac damage and possible increased clinical   risk. Serial measurements may help to assess extent of myocardial damage.     >50 ng/L: Consistent with cardiac damage, increased clinical risk and  myocardial infarction. Serial measurements may help assess extent of   myocardial damage.      NOTE: Children less than 1 year old may have higher baseline troponin   levels and results should be interpreted in conjunction with the overall   clinical context.     NOTE: Troponin I testing is performed using a different   testing methodology at [facility] than at other   [facility]. Direct result comparisons should only   be made within the same method.   BLOOD GAS VENOUS FULL PANEL - Abnormal; Notable for the following components:    POCT pH, Venous 7.45 (*)     POCT pCO2, Venous 36 (*)     POCT pO2, Venous 27 (*)     POCT Sodium, Venous 132 (*)     POCT Potassium, Venous 3.4 (*)     POCT Glucose, Venous 128 (*)     POCT Anion Gap, Venous 7.0 (*)     All other components within normal limits   URINALYSIS WITH REFLEX CULTURE AND MICROSCOPIC - Abnormal; Notable for the following components:    Leukocyte Esterase, Urine 25 Joseluis/uL (*)     All other components  within normal limits   DRUG SCREEN,URINE - Abnormal; Notable for the following components:    Opiate Screen, Urine Presumptive Positive (*)     All other components within normal limits    Narrative:     Drug screen results are presumptive and should not be used to assess   compliance with prescribed medication. Contact the performing [facility] laboratory   to add-on definitive confirmatory testing if clinically indicated.    Toxicology screening results are reported qualitatively. The concentration must   be greater than or equal to the cutoff to be reported as positive. The concentration   at which the screening test can detect an individual drug or metabolite varies.   The absence of expected drug(s) and/or drug metabolite(s) may indicate non-compliance,   inappropriate timing of specimen collection relative to drug administration, poor drug   absorption, diluted/adulterated urine, or limitations of testing. For medical purposes   only; not valid for forensic use.    Interpretive questions should be directed to the laboratory medical directors.        No orders to display       XR chest 1 view   Final Result   1.  Redemonstration of several nodular opacities over the right lung   the largest measuring 15 mm over the upper lungs. These appear   similar to the prior. Repeat CT is advised for further evaluation of   these findings                  MACRO:   None        Signed by: Rustam Hilliard 8/7/2025 2:08 PM   Dictation workstation:   [id number]      CT head wo IV contrast   Final Result   NO ACUTE INTRACRANIAL PROCESS        MACRO:   None        Signed by: Ezra Tracy 8/7/2025 1:49 PM   Dictation workstation:   [id number]      CT chest wo IV contrast    (Results Pending)     XR chest 1 view   Final Result   1.  Redemonstration of several nodular opacities over the right lung   the largest measuring 15 mm over the upper lungs. These appear   similar to the prior. Repeat CT is advised for further evaluation of    these findings                  MACRO:   None        Signed by: Rustam Hilliard 8/7/2025 2:08 PM   Dictation workstation:   [id number]      CT head wo IV contrast   Final Result   NO ACUTE INTRACRANIAL PROCESS        MACRO:   None        Signed by: Ezra Tracy 8/7/2025 1:49 PM   Dictation workstation:   [id number]              Intervention: In ED, patient received   Medications   enoxaparin (Lovenox) syringe 40 mg (40 mg subcutaneous Given 8/7/25 1636)   polyethylene glycol (Glycolax, Miralax) packet 17 g (has no administration in time range)   azelastine (Astelin) 137 mcg (0.1 %) nasal spray 2 spray (has no administration in time range)   lubricating eye drops ophthalmic solution 2 drop (has no administration in time range)   fluticasone (Flonase) nasal spray 2 spray (2 sprays Each Nostril Given 8/7/25 2045)   gabapentin (Neurontin) capsule 800 mg (800 mg oral Given 8/7/25 2041)   HYDROcodone-acetaminophen (Norco) 7.5-325 mg per tablet 1 tablet (1 tablet oral Given 8/7/25 2037)   hydroxychloroquine (Plaquenil) tablet 300 mg (300 mg oral Given 8/7/25 2041)   ipratropium (Atrovent) 42 mcg (0.06 %) nasal spray 2 spray (has no administration in time range)   isosorbide mononitrate ER (Imdur) 24 hr tablet 60 mg ( oral Dose Auto Held 8/11/25 0900)   methotrexate injection 20 mg (has no administration in time range)   metoclopramide (Reglan) tablet 10 mg (has no administration in time range)   metoprolol tartrate (Lopressor) tablet 50 mg ( oral Dose Auto Held 8/11/25 1900)   montelukast (Singulair) tablet 10 mg (10 mg oral Given 8/7/25 2040)   nitroglycerin (Nitrostat) SL tablet 0.4 mg (has no administration in time range)   pantoprazole (ProtoNix) EC tablet 40 mg (40 mg oral Given 8/7/25 2041)   pravastatin (Pravachol) tablet 20 mg (20 mg oral Given 8/7/25 2041)   primidone (Mysoline) tablet 150 mg (has no administration in time range)   albuterol 90 mcg/actuation inhaler 2 puff (has no administration in time  range)   sennosides-docusate sodium (Martha-Colace) 8.6-50 mg per tablet 1 tablet (1 tablet oral Given 8/7/25 2041)   cefTRIAXone (Rocephin) 1 g in dextrose (iso) IV 50 mL (1 g intravenous New Bag 8/7/25 2035)   doxycycline (Vibramycin) 100 mg in dextrose 5%  mL (has no administration in time range)   naloxone (Narcan) injection 0.2 mg (has no administration in time range)   ipratropium-albuteroL (Duo-Neb) 0.5-2.5 mg/3 mL nebulizer solution 3 mL (has no administration in time range)   potassium chloride 20 mEq in sterile water for injection 100 mL (has no administration in time range)   sodium chloride 0.9 % bolus 2,178 mL (0 mL intravenous Stopped 8/7/25 1633)   acetaminophen (Tylenol) tablet 650 mg (650 mg oral Given 8/7/25 1249)   piperacillin-tazobactam (Zosyn) 4.5 g in dextrose (iso)  mL (0 g intravenous Stopped 8/7/25 1332)   vancomycin (Vancocin) 1,750 mg in dextrose 5% 500 mL IV (0 mg intravenous Stopped 8/7/25 1633)   ondansetron (Zofran) injection 4 mg (4 mg intravenous Given 8/7/25 1302)   ketorolac (Toradol) injection 15 mg (15 mg intravenous Given 8/7/25 1406)      Patient was then transferred to the floor for further management      Meds:   Current Discharge Medication List          Follows up with Dr. Annie Mcmahan, JOSE ANTONIO-CNP      Past Medical History   Medical History[1]   Surgical History   Surgical History[2]  Family History   Family History[3]  Social History     Tobacco Use: High Risk (7/31/2025)    Patient History     Smoking Tobacco Use: Every Day     Smokeless Tobacco Use: Never     Passive Exposure: Current      Social History     Substance and Sexual Activity   Alcohol Use Not Currently      Allergies   RX Allergies[4]   Meds    Scheduled medications  Scheduled Medications[5]  Continuous medications  Continuous Medications[6]  PRN medications  PRN Medications[7]   Objective     Vitals  Visit Vitals  /58   Pulse (!) 104   Temp 36.2 °C (97.2 °F)   Resp 20  "  Ht 1.6 m (5' 2.99\")   Wt 65.7 kg (144 lb 13.5 oz)   SpO2 96%   BMI 25.66 kg/m²   OB Status Postmenopausal   Smoking Status Every Day   BSA 1.71 m²        Review of Systems   Constitutional:  Positive for activity change, chills and fever.   HENT: Negative.     Eyes: Negative.    Respiratory:  Positive for cough.    Cardiovascular: Negative.    Gastrointestinal: Negative.    Endocrine: Negative.    Genitourinary: Negative.    Musculoskeletal:  Positive for back pain.   Skin: Negative.    Allergic/Immunologic: Negative.    Neurological:  Positive for dizziness.   Hematological: Negative.    Psychiatric/Behavioral:  Positive for confusion.      Temp:  [36.2 °C (97.2 °F)-38.9 °C (102.1 °F)] 36.2 °C (97.2 °F)  Heart Rate:  [104-134] 104  Resp:  [20-35] 20  BP: ()/(57-59) 127/58  I/O last 3 completed shifts:  In: 100 (1.4 mL/kg) [IV Piggyback:100]  Out: - (0 mL/kg)   Dosing Weight: 72.6 kg   No intake/output data recorded.  Physical Exam  Vitals reviewed.   Constitutional:       General: She is not in acute distress.     Appearance: She is normal weight.   HENT:      Head: Normocephalic and atraumatic.      Nose: Nose normal.      Mouth/Throat:      Mouth: Mucous membranes are moist.      Pharynx: Oropharynx is clear.     Eyes:      Extraocular Movements: Extraocular movements intact.      Conjunctiva/sclera: Conjunctivae normal.      Pupils: Pupils are equal, round, and reactive to light.       Cardiovascular:      Rate and Rhythm: Regular rhythm. Tachycardia present.      Pulses: Normal pulses.      Heart sounds: Normal heart sounds.   Pulmonary:      Effort: Pulmonary effort is normal.      Breath sounds: Wheezing and rales present.   Abdominal:      General: Abdomen is flat.      Palpations: Abdomen is soft.     Musculoskeletal:         General: Normal range of motion.      Cervical back: Normal range of motion and neck supple.     Skin:     General: Skin is warm and dry.     Neurological:      General: No " "focal deficit present.      Mental Status: She is alert and oriented to person, place, and time. Mental status is at baseline.     Psychiatric:         Mood and Affect: Mood normal.         Behavior: Behavior normal.       Assessment & Plan  Altered mental status, unspecified altered mental status type          I/Os    Intake/Output Summary (Last 24 hours) at 8/7/2025 2051  Last data filed at 8/7/2025 1332  Gross per 24 hour   Intake 100 ml   Output --   Net 100 ml         Labs:   Results from last 72 hours   Lab Units 08/07/25  1254   SODIUM mmol/L 133*   POTASSIUM mmol/L 3.4*   CHLORIDE mmol/L 101   CO2 mmol/L 24   BUN mg/dL 8   CREATININE mg/dL 0.48*   GLUCOSE mg/dL 119*   CALCIUM mg/dL 8.8   ANION GAP mmol/L 11   EGFR mL/min/1.73m*2 >90      Results from last 72 hours   Lab Units 08/07/25  1254   WBC AUTO x10*3/uL 14.8*   HEMOGLOBIN g/dL 12.8   HEMATOCRIT % 39.4   PLATELETS AUTO x10*3/uL 186   NEUTROS PCT AUTO % 87.5   LYMPHS PCT AUTO % 4.1   MONOS PCT AUTO % 7.0   EOS PCT AUTO % 0.1      Lab Results   Component Value Date    CALCIUM 8.8 08/07/2025      Lab Results   Component Value Date    CRP 0.24 01/21/2020      [unfilled]     Micro/ID:   No results found for the last 90 days.                   No lab exists for component: \"AGALPCRNB\"   .ID  Lab Results   Component Value Date    BLOODCULT Loaded on Instrument - Culture in progress 08/07/2025    BLOODCULT Loaded on Instrument - Culture in progress 08/07/2025     Images    XR chest 1 view  Narrative: Interpreted By:  Rustam Hilliard,   STUDY:  XR CHEST 1 VIEW;  8/7/2025 1:58 pm      INDICATION:  Signs/Symptoms:cough.          COMPARISON:  01/19/2025      ACCESSION NUMBER(S):  [id number]      ORDERING CLINICIAN:  RADHA SUTHERLAND      FINDINGS:                  CARDIOMEDIASTINAL SILHOUETTE:  Cardiomediastinal silhouette is mildly enlarged      LUNGS:  Redemonstration of multiple nodular opacities over the right lung  similar to the prior. The largest " measuring 15 mm over the right  upper lung. Increased perihilar vascular congestion and mild edema  present.      ABDOMEN:  No remarkable upper abdominal findings.      BONES:  No acute osseous changes.      Impression: 1.  Redemonstration of several nodular opacities over the right lung  the largest measuring 15 mm over the upper lungs. These appear  similar to the prior. Repeat CT is advised for further evaluation of  these findings              MACRO:  None      Signed by: Rustam Hilliard 8/7/2025 2:08 PM  Dictation workstation:   [id number]  CT head wo IV contrast  Narrative: Interpreted By:  Ezra Tracy,   STUDY:  CT HEAD WO IV CONTRAST;  8/7/2025 1:25 pm      INDICATION:  Signs/Symptoms:confusion.          COMPARISON:  MRI brain 4 April 2022      ACCESSION NUMBER(S):  [id number]      ORDERING CLINICIAN:  RADHA SUTHERLAND      TECHNIQUE:  CT of the brain from the skull vertex to the skull base, without  intravenous contrast      FINDINGS:  ACUTE INTRA-AXIAL HEMORRHAGE:  Negative      ACUTE EXTRA-AXIAL/SUBDURAL HEMORRHAGE:  Negative      ACUTE INTRACRANIAL MASS EFFECT:  Negative      CT EVIDENCE OF ACUTE / SUBACUTE TERRITORIAL ISCHEMIA:  Negative      VENTRICLES:  Normal caliber and configuration      OTHER BRAIN FINDINGS:  Densely calcified bilateral carotid siphons      INCLUDED PARANASAL SINUSES: Only a fraction of the maxillary sinuses  are included in the field of view but on the left, included portion  completely opacified. Mucosal thickening and some of the ethmoid air  cells      INCLUDED MASTOID AIR CELLS: All clear      SKULL:  No lytic or blastic lesion      EXTRACRANIAL SOFT TISSUES:  Scalp and occular globes grossly normal  by CT      Impression: NO ACUTE INTRACRANIAL PROCESS      MACRO:  None      Signed by: Ezra Tracy 8/7/2025 1:49 PM  Dictation workstation:   [id number]    Assessment and Plan    Radhasheyla Deluna is a 75 y.o. female admitted for  AMS and fever    Acute Medical Issues    #AMS(Resolved):  #Heat stroke vs Infection:  -Patient was found to be confused , drove her car in the wrong direction.  -She was in her car for 45 minutes and was feverish, responded to fluids.   - CT head NO ACUTE INTRACRANIAL PROCESS     #Acute hypoxic respiratory failure:  # Concern for CAP  # Possible COPD exacerbation  - Patient was on 2 L in the ED, according to the nurse she was dropping to the 80s on room air, she uses oxygen 2 L at night at home.  - Patient reported chills and productive cough, she was found to have fever.  - Chest x-ray showed; Redemonstration of several nodular opacities over the right lung  the largest measuring 15 mm over the upper lungs. These appear  similar to the prior. Repeat CT is advised for further evaluation of  these findings  - CT chest; pending.  -Start Ceftriaxone and Azithromycin  -Received Zosyn/Vanco  -Mycoplasma, Legionella, legionella , Procal, MRSA; pending  - RT evaluate and treat, DuoNeb, albuterol, incentive spirometry.  - Wean oxygen as tolerated        #Sepsis:  - SIRS criteria in the ED 3/4; tachycardia, leukocytosis fever:  - Acute hypoxic respiratory failure, normal lactate.      #Acute myocardial injury:  - Likely secondary to demand ischemia type II MI, mildly elevated troponin of 20, no chest pain  - EKG pending.      #Hyponatremia:  #Hypokalemia:  - Continue to monitor, replete potassium as needed    #Seizure?  - Patient on primidone, 150 mg, oral, Daily  - could be a sezuire  related, will consult  neurology for evaluation.      Chronic Medical Issues   # Allergic rhinitis  -Continue azelastine, 2 spray, Each Nostril, BID,  fluticasone, 2 spray, Each Nostril, Daily, montelukast, 10 mg, oral, Daily      #Chronic back pain  -Continue gabapentin, 800 mg, oral, BID    #Rheumatoid arthritis  -Continue hydroxychloroquine, 300 mg, oral, Daily, on methotrexate, 20 mg, intramuscular, Every Sunday      #HTN/CAD/AAA:  -Hold isosorbide mononitrate ER, 60 mg, oral,  Daily, metoprolol tartrate, 50 mg, oral, q12h    # GERD:  - pantoprazole, 40 mg, oral, Daily    #HLD  -Continue pravastatin, 20 mg, oral, Daily        F: PO intake & IVF PRN  E: Replete as needed  N: Regular diet  GI ppx: Protonix 40 mg daily   DVT ppx: Lovenox subcutaneous  Antibiotics: Ceftriaxone, doxycycline   Oxygenation: 2L NC    Code Status: DNR and No Intubation   Emergency Contact: Extended Emergency Contact Information  Primary Emergency Contact: KERWIN FRASER  Address: [address]           [place], [place]  Home Phone: [phone]  Work Phone: [phone]  Mobile Phone: [phone]  Relation: Sibling   needed? No     Disposition: 75 y.o.female admitted for AMS , RESOLVED , Anticipate LOS > 2 midnights.         [facility]ak[facility]  Internal Medicine, Hospitalist   [facility]         [1]   Past Medical History:  Diagnosis Date    Chronic obstructive pulmonary disease, unspecified 11/09/2022    Chronic obstructive pulmonary disease, unspecified COPD type    Low back pain     Migraine, unspecified, not intractable, without status migrainosus     Migraines    Other chronic pain 10/21/2015    Chronic pain    Personal history of other diseases of the circulatory system     History of cardiac disorder    Personal history of other diseases of the nervous system and sense organs     History of otitis media    Personal history of other diseases of the respiratory system     History of asthma    Personal history of other diseases of the respiratory system     History of sinusitis    Personal history of other endocrine, nutritional and metabolic disease     History of thyroid disorder    Personal history of other malignant neoplasm of skin     History of malignant neoplasm of skin    Sarcoidosis, unspecified 10/05/2022    Sarcoidosis   [2]   Past Surgical History:  Procedure Laterality Date    ANKLE SURGERY  10/21/2015    Ankle Surgery    APPENDECTOMY  10/21/2015    Appendectomy    BREAST SURGERY   10/21/2015    Breast Surgery    CARPAL TUNNEL RELEASE  10/26/2016    Neuroplasty Median Nerve At Carpal Tunnel    CATARACT EXTRACTION Bilateral     CHOLECYSTECTOMY  10/21/2015    Cholecystectomy    CORONARY ANGIOPLASTY WITH STENT PLACEMENT  01/14/2021    Cath Placement Of Stent 1    CT GUIDED PERCUTANEOUS BIOPSY LUNG  08/12/2020    CT GUIDED PERCUTANEOUS BIOPSY LUNG 8/12/2020 [facility]    CT GUIDED PERCUTANEOUS BIOPSY LUNG  12/01/2020    CT GUIDED PERCUTANEOUS BIOPSY LUNG 12/1/2020 [facility]    EYE SURGERY      laser    LUNG SURGERY  10/21/2015    Lung Surgery    LYMPH NODE BIOPSY  09/29/2017    Biopsy Lymph Node    MR HEAD ANGIO WO IV CONTRAST  04/04/2022    MR HEAD ANGIO WO IV CONTRAST 4/4/2022 [facility]    MR NECK ANGIO WO IV CONTRAST  08/15/2022    MR NECK ANGIO WO IV CONTRAST 8/15/2022 [facility]    OTHER SURGICAL HISTORY  10/21/2015    Wrist Surgery    OTHER SURGICAL HISTORY  02/07/2022    Endoscopy    OTHER SURGICAL HISTORY  02/07/2022    Colonoscopy    OTHER SURGICAL HISTORY  10/29/2020    Thyroid surgery    OTHER SURGICAL HISTORY  10/29/2020    Sinus surgery    OTHER SURGICAL HISTORY  10/29/2020    Ear surgery    OTHER SURGICAL HISTORY  10/29/2020    Spinal surgery    OTHER SURGICAL HISTORY  10/21/2015    Thoracoscopy (Therapeutic) With Wedge Resection Of Lung    ROTATOR CUFF REPAIR  08/10/2017    Rotator Cuff Repair   [3]   Family History  Problem Relation Name Age of Onset    Other (cardiac disorder) Mother      Other (cardiac disorder) Father      Stroke Father      Coronary artery disease Father      Hypertension Father      Diabetes Other     [4]   Allergies  Allergen Reactions    Erythromycin Other     Chest pain    Augmentin [Amoxicillin-Pot Clavulanate] GI Upset and Nausea/vomiting   [5] azelastine, 2 spray, Each Nostril, BID  cefTRIAXone, 1 g, intravenous, q24h  doxycycline, 100 mg, intravenous, q12h  enoxaparin, 40 mg, subcutaneous, q24h  fluticasone, 2 spray, Each  Nostril, Daily  gabapentin, 800 mg, oral, BID  hydroxychloroquine, 300 mg, oral, Daily  ipratropium, 2 spray, Each Nostril, 4x daily  [START ON 8/8/2025] ipratropium-albuteroL, 3 mL, nebulization, TID  [Held by provider] isosorbide mononitrate ER, 60 mg, oral, Daily  [START ON 8/10/2025] methotrexate, 20 mg, intramuscular, Every Sunday  [Held by provider] metoprolol tartrate, 50 mg, oral, q12h  montelukast, 10 mg, oral, Daily  pantoprazole, 40 mg, oral, Daily  potassium chloride, 20 mEq, intravenous, q2h  pravastatin, 20 mg, oral, Daily  [START ON 8/8/2025] primidone, 150 mg, oral, Daily  sennosides-docusate sodium, 1 tablet, oral, Daily     [6]    [7] PRN medications: albuterol, HYDROcodone-acetaminophen, lubricating eye drops, metoclopramide, naloxone, nitroglycerin, polyethylene glycol

## 2025-08-08 ENCOUNTER — APPOINTMENT (OUTPATIENT)
Dept: RADIOLOGY | Facility: HOSPITAL | Age: 75
DRG: 871 | End: 2025-08-08
Payer: MEDICARE

## 2025-08-08 ENCOUNTER — APPOINTMENT (OUTPATIENT)
Dept: NEUROLOGY | Facility: HOSPITAL | Age: 75
DRG: 871 | End: 2025-08-08
Payer: MEDICARE

## 2025-08-08 LAB
ALBUMIN SERPL BCP-MCNC: 3.1 G/DL (ref 3.4–5)
ANION GAP SERPL CALC-SCNC: 11 MMOL/L (ref 10–20)
BACTERIA UR CULT: NO GROWTH
BUN SERPL-MCNC: 14 MG/DL (ref 6–23)
CALCIUM SERPL-MCNC: 7.6 MG/DL (ref 8.6–10.3)
CHLORIDE SERPL-SCNC: 105 MMOL/L (ref 98–107)
CO2 SERPL-SCNC: 21 MMOL/L (ref 21–32)
CREAT SERPL-MCNC: 1.39 MG/DL (ref 0.5–1.05)
EGFRCR SERPLBLD CKD-EPI 2021: 39 ML/MIN/1.73M*2
ERYTHROCYTE [DISTWIDTH] IN BLOOD BY AUTOMATED COUNT: 14.1 % (ref 11.5–14.5)
GLUCOSE SERPL-MCNC: 72 MG/DL (ref 74–99)
HCT VFR BLD AUTO: 35.3 % (ref 36–46)
HGB BLD-MCNC: 11.3 G/DL (ref 12–16)
HOLD SPECIMEN: NORMAL
MAGNESIUM SERPL-MCNC: 2.16 MG/DL (ref 1.6–2.4)
MCH RBC QN AUTO: 33.9 PG (ref 26–34)
MCHC RBC AUTO-ENTMCNC: 32 G/DL (ref 32–36)
MCV RBC AUTO: 106 FL (ref 80–100)
NRBC BLD-RTO: 0 /100 WBCS (ref 0–0)
PHOSPHATE SERPL-MCNC: 2.6 MG/DL (ref 2.5–4.9)
PLATELET # BLD AUTO: 165 X10*3/UL (ref 150–450)
POTASSIUM SERPL-SCNC: 4.2 MMOL/L (ref 3.5–5.3)
PROCALCITONIN SERPL-MCNC: 0.86 NG/ML
RBC # BLD AUTO: 3.33 X10*6/UL (ref 4–5.2)
SODIUM SERPL-SCNC: 133 MMOL/L (ref 136–145)
WBC # BLD AUTO: 15.3 X10*3/UL (ref 4.4–11.3)

## 2025-08-08 PROCEDURE — 36415 COLL VENOUS BLD VENIPUNCTURE: CPT | Performed by: INTERNAL MEDICINE

## 2025-08-08 PROCEDURE — 85027 COMPLETE CBC AUTOMATED: CPT | Performed by: INTERNAL MEDICINE

## 2025-08-08 PROCEDURE — 95819 EEG AWAKE AND ASLEEP: CPT | Performed by: STUDENT IN AN ORGANIZED HEALTH CARE EDUCATION/TRAINING PROGRAM

## 2025-08-08 PROCEDURE — A9575 INJ GADOTERATE MEGLUMI 0.1ML: HCPCS | Performed by: NURSE PRACTITIONER

## 2025-08-08 PROCEDURE — 84145 PROCALCITONIN (PCT): CPT | Mod: PARLAB | Performed by: INTERNAL MEDICINE

## 2025-08-08 PROCEDURE — 94640 AIRWAY INHALATION TREATMENT: CPT

## 2025-08-08 PROCEDURE — 83735 ASSAY OF MAGNESIUM: CPT | Performed by: INTERNAL MEDICINE

## 2025-08-08 PROCEDURE — 99232 SBSQ HOSP IP/OBS MODERATE 35: CPT | Performed by: INTERNAL MEDICINE

## 2025-08-08 PROCEDURE — 2500000001 HC RX 250 WO HCPCS SELF ADMINISTERED DRUGS (ALT 637 FOR MEDICARE OP): Performed by: INTERNAL MEDICINE

## 2025-08-08 PROCEDURE — 2500000004 HC RX 250 GENERAL PHARMACY W/ HCPCS (ALT 636 FOR OP/ED): Performed by: NURSE PRACTITIONER

## 2025-08-08 PROCEDURE — 2500000002 HC RX 250 W HCPCS SELF ADMINISTERED DRUGS (ALT 637 FOR MEDICARE OP, ALT 636 FOR OP/ED): Performed by: INTERNAL MEDICINE

## 2025-08-08 PROCEDURE — 99223 1ST HOSP IP/OBS HIGH 75: CPT | Performed by: NURSE PRACTITIONER

## 2025-08-08 PROCEDURE — 2500000001 HC RX 250 WO HCPCS SELF ADMINISTERED DRUGS (ALT 637 FOR MEDICARE OP): Performed by: NURSE PRACTITIONER

## 2025-08-08 PROCEDURE — 95819 EEG AWAKE AND ASLEEP: CPT

## 2025-08-08 PROCEDURE — 97165 OT EVAL LOW COMPLEX 30 MIN: CPT | Mod: GO

## 2025-08-08 PROCEDURE — 97161 PT EVAL LOW COMPLEX 20 MIN: CPT | Mod: GP

## 2025-08-08 PROCEDURE — 70553 MRI BRAIN STEM W/O & W/DYE: CPT | Performed by: RADIOLOGY

## 2025-08-08 PROCEDURE — 2500000002 HC RX 250 W HCPCS SELF ADMINISTERED DRUGS (ALT 637 FOR MEDICARE OP, ALT 636 FOR OP/ED)

## 2025-08-08 PROCEDURE — 2550000001 HC RX 255 CONTRASTS: Performed by: NURSE PRACTITIONER

## 2025-08-08 PROCEDURE — 2500000004 HC RX 250 GENERAL PHARMACY W/ HCPCS (ALT 636 FOR OP/ED): Performed by: INTERNAL MEDICINE

## 2025-08-08 PROCEDURE — 1200000002 HC GENERAL ROOM WITH TELEMETRY DAILY

## 2025-08-08 PROCEDURE — 70553 MRI BRAIN STEM W/O & W/DYE: CPT

## 2025-08-08 PROCEDURE — 80069 RENAL FUNCTION PANEL: CPT | Performed by: INTERNAL MEDICINE

## 2025-08-08 RX ORDER — PRAVASTATIN SODIUM 20 MG/1
20 TABLET ORAL NIGHTLY
Status: DISPENSED | OUTPATIENT
Start: 2025-08-08

## 2025-08-08 RX ORDER — MAGNESIUM SULFATE HEPTAHYDRATE 40 MG/ML
4 INJECTION, SOLUTION INTRAVENOUS ONCE
Status: DISCONTINUED | OUTPATIENT
Start: 2025-08-08 | End: 2025-08-08

## 2025-08-08 RX ORDER — MAGNESIUM SULFATE HEPTAHYDRATE 40 MG/ML
2 INJECTION, SOLUTION INTRAVENOUS ONCE
Status: COMPLETED | OUTPATIENT
Start: 2025-08-08 | End: 2025-08-08

## 2025-08-08 RX ORDER — CLOPIDOGREL BISULFATE 75 MG/1
75 TABLET ORAL DAILY
Status: DISPENSED | OUTPATIENT
Start: 2025-08-08

## 2025-08-08 RX ORDER — GADOTERATE MEGLUMINE 376.9 MG/ML
13 INJECTION INTRAVENOUS
Status: COMPLETED | OUTPATIENT
Start: 2025-08-08 | End: 2025-08-08

## 2025-08-08 RX ADMIN — AZELASTINE HYDROCHLORIDE 2 SPRAY: 137 SPRAY, METERED NASAL at 09:25

## 2025-08-08 RX ADMIN — FLUTICASONE PROPIONATE 2 SPRAY: 50 SPRAY, METERED NASAL at 09:25

## 2025-08-08 RX ADMIN — SODIUM CHLORIDE 1000 ML: 0.9 INJECTION, SOLUTION INTRAVENOUS at 09:39

## 2025-08-08 RX ADMIN — Medication: at 12:00

## 2025-08-08 RX ADMIN — IPRATROPIUM BROMIDE 2 SPRAY: 42 SPRAY, METERED NASAL at 20:23

## 2025-08-08 RX ADMIN — GADOTERATE MEGLUMINE 13 ML: 376.9 INJECTION INTRAVENOUS at 19:14

## 2025-08-08 RX ADMIN — GABAPENTIN 800 MG: 400 CAPSULE ORAL at 09:26

## 2025-08-08 RX ADMIN — IPRATROPIUM BROMIDE AND ALBUTEROL SULFATE 3 ML: .5; 3 SOLUTION RESPIRATORY (INHALATION) at 13:00

## 2025-08-08 RX ADMIN — CEFTRIAXONE 1 G: 1 INJECTION, SOLUTION INTRAVENOUS at 19:39

## 2025-08-08 RX ADMIN — IPRATROPIUM BROMIDE AND ALBUTEROL SULFATE 3 ML: .5; 3 SOLUTION RESPIRATORY (INHALATION) at 19:34

## 2025-08-08 RX ADMIN — PANTOPRAZOLE SODIUM 40 MG: 40 TABLET, DELAYED RELEASE ORAL at 09:26

## 2025-08-08 RX ADMIN — HYDROXYCHLOROQUINE SULFATE 300 MG: 200 TABLET, FILM COATED ORAL at 09:26

## 2025-08-08 RX ADMIN — DOXYCYCLINE 100 MG: 100 INJECTION, POWDER, LYOPHILIZED, FOR SOLUTION INTRAVENOUS at 06:20

## 2025-08-08 RX ADMIN — MONTELUKAST 10 MG: 10 TABLET, FILM COATED ORAL at 09:26

## 2025-08-08 RX ADMIN — AZELASTINE HYDROCHLORIDE 2 SPRAY: 137 SPRAY, METERED NASAL at 20:23

## 2025-08-08 RX ADMIN — PRIMIDONE 150 MG: 50 TABLET ORAL at 06:03

## 2025-08-08 RX ADMIN — IPRATROPIUM BROMIDE AND ALBUTEROL SULFATE 3 ML: .5; 3 SOLUTION RESPIRATORY (INHALATION) at 06:54

## 2025-08-08 RX ADMIN — HYDROCODONE BITARTRATE AND ACETAMINOPHEN 1 TABLET: 7.5; 325 TABLET ORAL at 09:37

## 2025-08-08 RX ADMIN — GABAPENTIN 800 MG: 400 CAPSULE ORAL at 20:24

## 2025-08-08 RX ADMIN — SENNOSIDES AND DOCUSATE SODIUM 1 TABLET: 50; 8.6 TABLET ORAL at 09:26

## 2025-08-08 RX ADMIN — IPRATROPIUM BROMIDE 2 SPRAY: 42 SPRAY, METERED NASAL at 15:30

## 2025-08-08 RX ADMIN — ENOXAPARIN SODIUM 40 MG: 100 INJECTION SUBCUTANEOUS at 15:44

## 2025-08-08 RX ADMIN — DOXYCYCLINE 100 MG: 100 INJECTION, POWDER, LYOPHILIZED, FOR SOLUTION INTRAVENOUS at 20:22

## 2025-08-08 RX ADMIN — IPRATROPIUM BROMIDE 2 SPRAY: 42 SPRAY, METERED NASAL at 18:09

## 2025-08-08 RX ADMIN — PRAVASTATIN SODIUM 20 MG: 20 TABLET ORAL at 09:26

## 2025-08-08 RX ADMIN — Medication: at 19:34

## 2025-08-08 RX ADMIN — PRAVASTATIN SODIUM 20 MG: 20 TABLET ORAL at 20:24

## 2025-08-08 RX ADMIN — CLOPIDOGREL BISULFATE 75 MG: 75 TABLET, FILM COATED ORAL at 15:30

## 2025-08-08 RX ADMIN — MAGNESIUM SULFATE HEPTAHYDRATE 2 G: 40 INJECTION, SOLUTION INTRAVENOUS at 14:08

## 2025-08-08 ASSESSMENT — COGNITIVE AND FUNCTIONAL STATUS - GENERAL
TOILETING: A LOT
TURNING FROM BACK TO SIDE WHILE IN FLAT BAD: A LITTLE
MOVING TO AND FROM BED TO CHAIR: A LITTLE
HELP NEEDED FOR BATHING: A LOT
DRESSING REGULAR LOWER BODY CLOTHING: A LITTLE
STANDING UP FROM CHAIR USING ARMS: A LITTLE
CLIMB 3 TO 5 STEPS WITH RAILING: A LITTLE
MOBILITY SCORE: 18
DAILY ACTIVITIY SCORE: 19
WALKING IN HOSPITAL ROOM: A LITTLE
MOVING FROM LYING ON BACK TO SITTING ON SIDE OF FLAT BED WITH BEDRAILS: A LITTLE

## 2025-08-08 ASSESSMENT — PAIN - FUNCTIONAL ASSESSMENT
PAIN_FUNCTIONAL_ASSESSMENT: 0-10

## 2025-08-08 ASSESSMENT — PAIN SCALES - GENERAL
PAINLEVEL_OUTOF10: 6
PAINLEVEL_OUTOF10: 8
PAINLEVEL_OUTOF10: 0 - NO PAIN
PAINLEVEL_OUTOF10: 6
PAINLEVEL_OUTOF10: 6

## 2025-08-08 ASSESSMENT — PAIN DESCRIPTION - LOCATION: LOCATION: BACK

## 2025-08-08 ASSESSMENT — PAIN DESCRIPTION - ORIENTATION: ORIENTATION: LOWER

## 2025-08-08 ASSESSMENT — PAIN DESCRIPTION - DESCRIPTORS: DESCRIPTORS: ACHING;DISCOMFORT;SORE

## 2025-08-08 NOTE — PROGRESS NOTES
Physical Therapy    Physical Therapy Evaluation    Patient Name: Radha Deluna  MRN: [id number]  Today's Date: 8/8/2025   Time Calculation  Start Time: 1007  Stop Time: 1022  Time Calculation (min): 15 min  919/919-A    Assessment/Plan   PT Assessment  PT Assessment Results: Decreased strength, Decreased endurance, Impaired balance, Decreased mobility, Pain  Rehab Prognosis: Good  Barriers to Discharge Home: No anticipated barriers  Evaluation/Treatment Tolerance: Patient limited by fatigue  Medical Staff Made Aware: Yes  End of Session Communication: Bedside nurse  Assessment Comment: Pt presents /c above impairments and decline from functional baseline 2nd acute on chronic medical  conditions. Pt will benefit from continued PT services at low intensity to address above and maximize functional mobility.  End of Session Patient Position: Up in chair, Alarm on  IP OR SWING BED PT PLAN  Inpatient or Swing Bed: Inpatient  PT Plan  Treatment/Interventions: Bed mobility, Transfer training, Gait training, Balance training, Neuromuscular re-education, Strengthening, Endurance training, Therapeutic exercise, Therapeutic activity, Stair training  PT Plan: Ongoing PT  PT Frequency: 3 times per week  PT Discharge Recommendations: Low intensity level of continued care  PT Recommended Transfer Status: Assist x1  PT - OK to Discharge: Yes    Subjective     Current Problem:  1. Altered mental status, unspecified altered mental status type        2. Heat stroke, initial encounter        3. Fever, unspecified fever cause          Problem List[1]    General Visit Information:  General  Reason for Referral: PT eval and treat  Referred By: Luis A  Past Medical History Relevant to Rehab: HTN, HLD, CAD, COPD, CVA  Co-Treatment: OT  Co-Treatment Reason: possible two person assist, maximize functional mobility  Prior to Session Communication: Bedside nurse  Patient Position Received: Bed, 2 rail up, Alarm on  General Comment: Pt  presents with confusion, AMS, concern for CAP, possible COPD exacerbation. Pt cleared for therapy by nursing. Pt supine, agreeable.    Home Living:  Home Living  Home Living Comments: Pt lives alone in an apt, 4STE /c rail.    Prior Level of Function:  Prior Function Per Pt/Caregiver Report  Prior Function Comments: Indep /c ADL/IADLs, drives. Denies falls or AD use prior. Has access to WW, cane.    Precautions:  Precautions  Precautions Comment: falls       Objective     Pain:  Pain Assessment  Pain Assessment: 0-10  0-10 (Numeric) Pain Score: 6 (pt c/o chronic LBP)  Pain Interventions: Repositioned  Response to Interventions: Content/relaxed    Cognition:  Cognition  Overall Cognitive Status:  (Pt A&Ox3, follows commands appropriately)    General Assessments:  General Observation  General Observation: activity orders: OOB /c A lines: NC O2, piv  skin integrity: WFL   Activity Tolerance  Endurance: Decreased tolerance for upright activites  Sensation  Sensation Comment: denies n/t  Strength  Strength Comments: BLE at least 3/5           Dynamic Sitting Balance  Dynamic Sitting-Comments: G  Dynamic Standing Balance  Dynamic Standing-Comments: F+    Functional Assessments:     Bed Mobility  Bed Mobility: Yes  Bed Mobility 1  Bed Mobility Comments 1: Supine<>Sit /c SBA, use of rail.  Transfers  Transfer: Yes  Transfer 1  Trials/Comments 1: Sit<>Stand /c CGA  Ambulation/Gait Training  Ambulation/Gait Training Performed:  (Pt ambulates 5' /c CGA. Erect posture, step through gait, slow jessica. Pt up to chair, NAD. Limited 2nd multiple lines.)        Outcome Measures:     [facility] Basic Mobility  Turning from your back to your side while in a flat bed without using bedrails: A little  Moving from lying on your back to sitting on the side of a flat bed without using bedrails: A little  Moving to and from bed to chair (including a wheelchair): A little  Standing up from a chair using your arms (e.g. wheelchair or bedside  chair): A little  To walk in hospital room: A little  Climbing 3-5 steps with railing: A little  Basic Mobility - Total Score: 18                Goals:  Encounter Problems       Encounter Problems (Active)       PT Problem       STG - Pt will transition supine <> sitting with mod I (Progressing)       Start:  08/08/25    Expected End:  08/22/25            STG - Pt will transfer STS with mod I (Progressing)       Start:  08/08/25    Expected End:  08/22/25            STG - Pt will amb 150' using LRAD with mod I (Progressing)       Start:  08/08/25    Expected End:  08/22/25            STG - Pt will ascend/descend 4 steps /c rail, supervision (Progressing)       Start:  08/08/25    Expected End:  08/22/25                     Education Documentation  Mobility Training, taught by Elvira Liu, PT at 8/8/2025  1:07 PM.  Learner: Patient  Readiness: Acceptance  Method: Explanation  Response: Verbalizes Understanding, Needs Reinforcement  Comment: PT POC    Education Comments  No comments found.               [1]   Patient Active Problem List  Diagnosis    AAA (abdominal aortic aneurysm)    Acute ankle pain    Acute non intractable tension-type headache    Acute sinusitis    Chronic rhinitis    Anemia    Antalgic gait    Arthritis of knee, right    Atypical face pain    Headache    Left facial numbness    Numbness and tingling    Bilateral carpal tunnel syndrome    Breast pain    Cerebral infarction    Cervical radiculopathy    Chronic hoarseness    Chronic musculoskeletal pain    Claudication    Chronic obstructive pulmonary disease (Multi)    Chronic tension-type headache, intractable    Contusion, knee and lower leg, right, subsequent encounter    Coronary disorder    Elevated fasting glucose    Essential tremor    Facet degeneration of lumbar region    Facial pressure    Fatigue    Foot pain, bilateral    Gastroesophageal reflux disease    History of migraine headaches    HTN (hypertension), benign     Hypercholesterolemia    Hyperkeratosis    Median neuropathy, left    Nasal discharge    Rhinorrhea    On methotrexate therapy    Onychocryptosis    Onychomycosis    Osteoporosis    Otalgia, left    PAD (peripheral artery disease)    Joint pain, hip    Pain, joint, shoulder    Positive FIT (fecal immunochemical test)    Postlaminectomy syndrome, lumbar    Pulmonary nodules/lesions, multiple    Rheumatoid arthritis with rheumatoid factor    Rheumatoid lung disease (Multi)    Right knee pain    Rotator cuff tendonitis    Sarcoidosis    Lumbar stenosis with neurogenic claudication    Spinal stenosis    Spondylosis of lumbar region without myelopathy or radiculopathy    Rheumatoid nodule, right ankle and foot (Multi)    Rheumatoid nodule, right ankle and foot (Multi)    Tremor    Peripheral neuropathy    Trigeminal neuropathy    Trochanteric bursitis    Vitamin D deficiency    Weight loss, unintentional    Adalimumab (Humira) long-term use    Senile osteoporosis    Asthma without status asthmaticus ([facility])    Bilateral primary osteoarthritis of knee    Palpitations    Angina pectoris    Chronic pain    Chronic obstructive lung disease (Multi)    Pulmonary emphysema (Multi)    Methotrexate, long term, current use    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

## 2025-08-08 NOTE — PROGRESS NOTES
Occupational Therapy    Evaluation    Patient Name: Radha Deluna  MRN: [id number]  Today's Date: 8/8/2025  Time Calculation  Start Time: 1006  Stop Time: 1022  Time Calculation (min): 16 min  919/919-A    Assessment  IP OT Assessment  OT Assessment: This hospitalization 8/7/2025:  presented to [facility] on [facility]/7/2025 with a chief complaint of AMS; observed driving radically into traffic.  Patient would benefit from further OT to address ADL's and functional transfers/mobility due to limited endurance.  Prognosis: Good  End of Session Communication: Bedside nurse  End of Session Patient Position: Up in chair, Alarm on (call-light within reach)    Plan:  Treatment Interventions: ADL retraining, Functional transfer training, Endurance training, Patient/family training, Equipment evaluation/education, Compensatory technique education (energy conservation/pursed-lip breathing techniques training)  OT Frequency: 3 times per week  OT Discharge Recommendations: Low intensity level of continued care  OT - OK to Discharge: Yes (to next level of care when medically cleared by physician/medical team)    Subjective   Current Problem:  1. Altered mental status, unspecified altered mental status type        2. Heat stroke, initial encounter        3. Fever, unspecified fever cause        4.      Acute hypoxic respiratory failure  5.      Concern for CAP  6.      Possible COPD exacerbation    General:  General  Reason for Referral: OT eval/treat  Referred By: Flakito Gunn DO  Past Medical History Relevant to Rehab: chronic lower back pain, COPD, migraine, CAD s/p stents, AAA, CVA, hypertension, hypercholesterolemia, GERD, PAD, rheumatoid lung disease s/p lung biopsy  Family/Caregiver Present: Yes (sister entered room during session)  Co-Treatment: PT  Co-Treatment Reason: Co-eval to maximize safety during mobility tasks  Prior to Session Communication: Bedside nurse (who confirmed that patient is medically stable to participate  in this OT session)  Patient Position Received: Bed, 2 rail up, Alarm on  General Comment: cooperative, motivated    Precautions:  Medical Precautions: Fall precautions, Oxygen therapy device and L/min  Precautions Comment: UE IV    Pain:  Pain Assessment  Pain Assessment: 0-10  0-10 (Numeric) Pain Score: 6  Pain Type: Chronic pain  Pain Location: Back  Pain Orientation: Lower  Pain Interventions: Repositioned    Objective   Cognition:  Overall Cognitive Status: Within Functional Limits  Orientation Level: Oriented X4     Home Living:  Type of Home: Apartment  Lives With: Alone  Home Adaptive Equipment: Walker rolling or standard, Cane  Home Layout: One level  Home Access: Stairs to enter with rails (4)  Bathroom Shower/Tub: Tub/shower unit  Bathroom Toilet:  (built higher height)  Home Living Comments: sleeps in regular bed     Prior Function:  Level of [place]: Independent with ADLs and functional transfers, Independent with homemaking with ambulation  Ambulatory Assistance: Independent (without device)  Prior Function Comments: drives, shops; history of no falls    ADL:  LE Dressing Assistance: Minimal (anticipate due to decrease activity tolerance)  ADL Comments: To further address in OT session using assistive techniques/adaptive equipment as needed    Activity Tolerance:  Endurance: Decreased tolerance for upright activites    Bed Mobility/Transfers:   Bed Mobility  Bed Mobility: Yes  Bed Mobility 1  Bed Mobility 1: Supine to sitting  Level of Assistance 1: Minimal verbal cues (SBA)  Bed Mobility Comments 1: HOB elevated when sat EOB  Transfers  Transfer: Yes  Transfer 1  Transfer From 1: Sit to, Stand to  Transfer to 1: Bed  Transfer Level of Assistance 1: Contact guard, Minimal verbal cues    Ambulation/Gait Training:  Functional Mobility  Functional Mobility Performed: Yes  Functional Mobility 1  Device 1: No device  Assistance 1: Contact guard, Minimal verbal cues  Comments 1: ambulated few steps  within room    Sitting Balance:  Static Sitting Balance  Static Sitting-Level of Assistance: Close supervision    Standing Balance:  Static Standing Balance  Static Standing-Level of Assistance: Contact guard    Sensation:  Light Touch: No apparent deficits    Extremities: RUE   RUE : Within Functional Limits (AROM throughout including shoulder flex ~80 degrees.  Hand  4-/5.) and LUE   LUE: Within Functional Limits (AROM throughout including shoulder flex ~70 degrees. Hand  4-/5.)    Outcome Measures: [facility] Daily Activity  Putting on and taking off regular lower body clothing: A little  Bathing (including washing, rinsing, drying): A lot  Putting on and taking off regular upper body clothing: None  Toileting, which includes using toilet, bedpan or urinal: A lot  Taking care of personal grooming such as brushing teeth: None  Eating Meals: None  Daily Activity - Total Score: 19     EDUCATION:  Education Documentation  Handouts, taught by Joel Mullins OT at 8/8/2025  1:23 PM.  Learner: Patient  Readiness: Acceptance  Method: Explanation, Handout  Response: Verbalizes Understanding, Demonstrated Understanding, Needs Reinforcement  Comment: energy conservation / pursed-lip breathing techniques with handout issued; patient practiced proper breathing techniques    Mobility Training, taught by Joel Mullins OT at 8/8/2025  1:23 PM.  Learner: Patient  Readiness: Acceptance  Method: Explanation, Handout  Response: Verbalizes Understanding, Demonstrated Understanding, Needs Reinforcement  Comment: energy conservation / pursed-lip breathing techniques with handout issued; patient practiced proper breathing techniques    Goals:   Encounter Problems       Encounter Problems (Active)       OT Goals       Patient will complete lower body bathing/dressing; toileting with modified independence using assistive techniques/adaptive equipment as needed  (Progressing)       Start:  08/08/25    Expected End:  08/22/25             Patient will perform bed mobility and functional transfers safely and independently: bed, chair, commode using DME as needed  (Progressing)       Start:  08/08/25    Expected End:  08/22/25            Patient will tolerate standing for 5 mins. and show overall good standing balance during ADL's and functional transfers/mobility  (Progressing)       Start:  08/08/25    Expected End:  08/22/25            Patient will apply energy conservation/pursed-lip breathing techniques to ADL's and functional transfers with minimal cues  (Progressing)       Start:  08/08/25    Expected End:  08/22/25

## 2025-08-08 NOTE — CARE PLAN
The patient's goals for the shift include      The clinical goals for the shift include maintain safety    Over the shift, the patient will be monitored by vital signs

## 2025-08-08 NOTE — PROGRESS NOTES
Radha Deluna is a 75 y.o. female on day 1 of admission presenting with Altered mental status, unspecified altered mental status type.      Subjective   Patient was seen and examined at bedside, she was accompanied by her sister, she was doing better today and back to her baseline mentation, still on 2 L. Denies any f/c, n/v, new onset headaches, vision changes, chest pain, dyspnea, abdominal pain, changes in BM, or urinary changes.          Objective     Last Recorded Vitals  /56 (BP Location: Right arm, Patient Position: Lying)   Pulse 85   Temp 36.2 °C (97.2 °F) (Temporal)   Resp 18   Wt 65.7 kg (144 lb 13.5 oz)   SpO2 98%   Intake/Output last 3 Shifts:    Intake/Output Summary (Last 24 hours) at 8/8/2025 1801  Last data filed at 8/8/2025 1718  Gross per 24 hour   Intake 1250 ml   Output --   Net 1250 ml       Admission Weight  Weight: 68.5 kg (151 lb) (08/07/25 1200)    Daily Weight  08/07/25 : 65.7 kg (144 lb 13.5 oz)    Image Results  CT chest wo IV contrast  Narrative: Interpreted By:  Finkelstein, Evan,   STUDY:  CT CHEST WO IV CONTRAST;  8/7/2025 7:52 pm      INDICATION:  Signs/Symptoms:Acute hypoxic respiratory failure.          COMPARISON:  Chest radiograph 08/07/2025.      ACCESSION NUMBER(S):  [id number]      ORDERING CLINICIAN:  LARS DUMONT      TECHNIQUE:  Axial CT images of the chest obtained  without IV contrast.  Sagittal  and coronal reconstructions were created..      FINDINGS:  CHEST WALL AND LOWER NECK: Hypodense nodule in the right thyroid lobe  measuring up to 2.6 cm. UPPER ABDOMEN: No acute abnormality of the  partially visualized abdomen.      VESSELS: Aorta and pulmonary artery are normal caliber.  Atherosclerotic calcifications in the aorta and coronary arteries.  HEART: Normal size. No pericardial effusion. MEDIASTINUM AND TIAGO: No  pathologically enlarged lymph nodes. LUNG, PLEURA, AND LARGE AIRWAYS:  Small left pleural effusion. 3 cm right upper lobe mass best  seen on  image 99 of series 601 is stable compared to prior imaging 02/28/2024  when imaged in the same plane. 7 mm right upper lobe pulmonary nodule  image 132 is stable compared to prior imaging. Stable partially  calcified 1.3 cm nodule in the right upper lobe image 148. There are  rounded opacities in the left upper and lower lobes with peripheral  ground-glass attenuation that are new from prior imaging 10/28/2024.      BONES: No acute osseous abnormality. Prominent Schmorl's node in the  superior T12 endplate.      Impression: Rounded opacities in the left upper and lower lobes with peripheral  ground-glass attenuation that are new from prior imaging 10/28/2024  concerning for pneumonia. Given the nodular appearance of the  lesions, however, recommend interval follow-up with CT after  treatment to confirm resolution and exclude an underlying neoplastic  process.      Scattered pulmonary nodules and masses throughout the right lung  which are stable compared to prior imaging.      Small left pleural effusion.      2.6 cm hypodense right thyroid nodule likely corresponding to  abnormality on prior thyroid ultrasound 11/11/2024 for which a biopsy  was recommended.      MACRO:  None.      Signed by: Evan Finkelstein 8/8/2025 12:23 AM  Dictation workstation:   [id number]      Physical Exam  Constitutional:       General: She is not in acute distress.     Appearance: She is normal weight.   HENT:      Head: Normocephalic and atraumatic.      Nose: Nose normal.      Mouth/Throat:      Mouth: Mucous membranes are moist.      Pharynx: Oropharynx is clear.      Eyes:      Extraocular Movements: Extraocular movements intact.      Conjunctiva/sclera: Conjunctivae normal.      Pupils: Pupils are equal, round, and reactive to light.         Cardiovascular:      Rate and Rhythm: Regular rhythm. Normal rate.      Pulses: Normal pulses.      Heart sounds: Normal heart sounds.   Pulmonary:      Effort: Pulmonary effort is  normal.      Breath sounds: Rales, no wheezine  Abdominal:      General: Abdomen is flat.      Palpations: Abdomen is soft.      Musculoskeletal:         General: Normal range of motion.      Cervical back: Normal range of motion and neck supple.      Skin:     General: Skin is warm and dry.      Neurological:      General: No focal deficit present.      Mental Status: She is alert and oriented to person, place, and time. Mental status is at baseline.      Psychiatric:         Mood and Affect: Mood normal.         Behavior: Behavior normal.   Relevant Results             This patient currently has cardiac telemetry ordered; if you would like to modify or discontinue the telemetry order, click here to go to the orders activity to modify/discontinue the order.              Assessment & Plan  Altered mental status, unspecified altered mental status type    Radha Deluna is a 75 y.o. female admitted for  AMS and fever     Acute Medical Issues   #AMS(Resolved):  #Heat stroke vs Infection:  -Patient was found to be confused , drove her car in the wrong direction.  -She was in her car for 45 minutes and was feverish, responded to fluids.   - CT head NO ACUTE INTRACRANIAL PROCESS      #Acute hypoxic respiratory failure:  # Concern for CAP  # Possible COPD exacerbation  - Patient was on 2 L in the ED, according to the nurse she was dropping to the 80s on room air, she uses oxygen 2 L at night at home.  - Patient reported chills and productive cough, she was found to have fever.  - Chest x-ray showed; Redemonstration of several nodular opacities over the right lung  the largest measuring 15 mm over the upper lungs. These appear  similar to the prior. Repeat CT is advised for further evaluation of  these findings  - CT chest;  Rounded opacities in the left upper and lower lobes with peripheral  ground-glass attenuation that are new from prior imaging 10/28/2024  concerning for pneumonia. Given the nodular appearance of  the  lesions, however, recommend interval follow-up with CT after  treatment to confirm resolution and exclude an underlying neoplastic  process.  -Start Ceftriaxone and Azithromycin  -Received Zosyn/Vanco  -Mycoplasma, Legionella, legionella , Procal 0.86 , MRSA; pending  - RT evaluate and treat, DuoNeb, albuterol, incentive spirometry.  - Wean oxygen as tolerated        #FAUSTINA   - s. Creat increased  - 1 bolus given.  - Continue to monitor, avoid nephrotoxic meds.      #Sepsis(Resolved):  - SIRS criteria in the ED 3/4; tachycardia, leukocytosis fever:  - Acute hypoxic respiratory failure, normal lactate.        #Acute myocardial injury:  - Likely secondary to demand ischemia type II MI, mildly elevated troponin of 20, no chest pain  - EKG pending.        #Hyponatremia:  #Hypokalemia:  - Continue to monitor, replete potassium as needed     #Seizure?  - Patient on primidone, 150 mg, oral, Daily for tremors  - Neurology consulted, patient will be evaluated for possible seizure due to polypharmacy and infectious process.  - MRI and EEG ordered.        Chronic Medical Issues   # Allergic rhinitis  -Continue azelastine, 2 spray, Each Nostril, BID,  fluticasone, 2 spray, Each Nostril, Daily, montelukast, 10 mg, oral, Daily        #Chronic back pain  -Continue gabapentin, 800 mg, oral, BID     #Rheumatoid arthritis  -Continue hydroxychloroquine, 300 mg, oral, Daily, on methotrexate, 20 mg, intramuscular, Every Sunday        #HTN/CAD/AAA:  -Hold isosorbide mononitrate ER, 60 mg, oral, Daily, metoprolol tartrate, 50 mg, oral, q12h     # GERD:  - pantoprazole, 40 mg, oral, Daily     #HLD  -Continue pravastatin, 20 mg, oral, Daily           F: PO intake & IVF PRN  E: Replete as needed  N: Regular diet  GI ppx: Protonix 40 mg daily   DVT ppx: Lovenox subcutaneous  Antibiotics: Ceftriaxone, doxycycline   Oxygenation: 2L NC     Code Status: DNR and No Intubation   Emergency Contact: Extended Emergency Contact Information  Primary  Emergency Contact: KERWIN FRASER  Address: [place], [place]  Home Phone: [phone]  Work Phone: [phone]  Mobile Phone: [phone]  Relation: Sibling   needed? No      Disposition: 75 y.o.female admitted for AMS , RESOLVED , patient is improving, continue management as above.            Flakito Gunn, DO

## 2025-08-08 NOTE — CARE PLAN
The patient's goals for the shift include  Remain HDS     The clinical goals for the shift include maintain safety       Pt alert and oriented. Pt complained of pain to her lower back. Pt medicated with PRN. No s/s of respiratory distress. Bed locked and low. RN will continue to monitor.

## 2025-08-08 NOTE — PROGRESS NOTES
08/08/25 1450   Discharge Planning   Living Arrangements Alone   Support Systems Family members;Friends/neighbors   Expected Discharge Disposition Home H   Does the patient need discharge transport arranged? No     I attempted to meet with patient at the bedside but she was in the process of having electrodes placed for EEG.  Patient's AMPAC score is 18/19; anticipate patient will return home when medically cleared for discharge.  Care Coordination team to follow for assistance with discharge planning as needed.  Mayco BOB T[facility]

## 2025-08-08 NOTE — NURSING NOTE
Pulmonary Disease Navigator Documentation:    Comments:  Patient unavailable for COPD education at this time.  Will continue to follow and attempt education prior to discharge.

## 2025-08-08 NOTE — CONSULTS
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 early in the morning.. 270 tablet 3 Unknown    ProAir HFA 90 mcg/actuation inhaler Inhale 2 puffs every 4 hours if needed for wheezing or shortness of breath. 8.5 g 5 Unknown    sennosides-docusate sodium (Martha-Colace) 8.6-50 mg tablet Take 1 tablet by mouth once daily. 30 tablet 11 Unknown    theophylline ER (Alok-Dur) 300 mg 12 hr tablet Take 1 tablet (300 mg) by mouth 3 times a day.   Unknown    Trelegy Ellipta 100-62.5-25 mcg blister with device Inhale 1 puff once daily.   Unknown    Xiidra 5 % dropperette Administer 1 drop into both eyes every 12 hours.   Unknown    azelastine (Astelin) 137 mcg (0.1 %) nasal spray Administer 2 sprays into each nostril 2 times a day. (Patient not taking: Reported on 8/7/2025) 72 mL 3 Not Taking    cyclobenzaprine (Flexeril) 10 mg tablet Take 1 tablet (10 mg) by mouth 3 times a day as needed for muscle spasms. (Patient not taking: Reported on 8/7/2025) 45 tablet 1 Not Taking    ipratropium (Atrovent) 0.02 % nebulizer solution Use 1 vial 4 times daily (Patient not taking: Reported on 8/7/2025) 75 mL 5 Not Taking    pantoprazole (ProtoNix) 40 mg EC tablet Take 1 tablet (40 mg) by mouth once daily. Do not crush, chew, or split. (Patient not taking: Reported on 8/7/2025) 30 tablet 5 Not Taking    syringe with needle  "1 mL 25 gauge x 5/8\" syringe       [5] azelastine, 2 spray, Each Nostril, BID  cefTRIAXone, 1 g, intravenous, q24h  doxycycline, 100 mg, intravenous, q12h  enoxaparin, 40 mg, subcutaneous, q24h  fluticasone, 2 spray, Each Nostril, Daily  gabapentin, 800 mg, oral, BID  hydroxychloroquine, 300 mg, oral, Daily  ipratropium, 2 spray, Each Nostril, 4x daily  ipratropium-albuteroL, 3 mL, nebulization, TID  [Held by provider] isosorbide mononitrate ER, 60 mg, oral, Daily  [START ON 8/10/2025] methotrexate, 20 mg, intramuscular, Every Sunday  [Held by provider] metoprolol tartrate, 50 mg, oral, q12h  montelukast, 10 mg, oral, Daily  pantoprazole, 40 mg, oral, Daily  pravastatin, 20 mg, oral, Daily  primidone, 150 mg, oral, Daily  sennosides-docusate sodium, 1 tablet, oral, Daily  [6]    [7] PRN medications: albuterol, HYDROcodone-acetaminophen, ipratropium-albuteroL, lubricating eye drops, metoclopramide, naloxone, nitroglycerin, oxygen, polyethylene glycol    "

## 2025-08-09 LAB
ALBUMIN SERPL BCP-MCNC: 2.9 G/DL (ref 3.4–5)
ANION GAP SERPL CALC-SCNC: 16 MMOL/L (ref 10–20)
BUN SERPL-MCNC: 24 MG/DL (ref 6–23)
CALCIUM SERPL-MCNC: 7.6 MG/DL (ref 8.6–10.3)
CHLORIDE SERPL-SCNC: 104 MMOL/L (ref 98–107)
CO2 SERPL-SCNC: 19 MMOL/L (ref 21–32)
CREAT SERPL-MCNC: 3.1 MG/DL (ref 0.5–1.05)
EGFRCR SERPLBLD CKD-EPI 2021: 15 ML/MIN/1.73M*2
ERYTHROCYTE [DISTWIDTH] IN BLOOD BY AUTOMATED COUNT: 14.2 % (ref 11.5–14.5)
GLUCOSE SERPL-MCNC: 92 MG/DL (ref 74–99)
HCT VFR BLD AUTO: 37 % (ref 36–46)
HGB BLD-MCNC: 11.6 G/DL (ref 12–16)
HOLD SPECIMEN: NORMAL
MAGNESIUM SERPL-MCNC: 2.13 MG/DL (ref 1.6–2.4)
MCH RBC QN AUTO: 33.9 PG (ref 26–34)
MCHC RBC AUTO-ENTMCNC: 31.4 G/DL (ref 32–36)
MCV RBC AUTO: 108 FL (ref 80–100)
NRBC BLD-RTO: 0 /100 WBCS (ref 0–0)
PHOSPHATE SERPL-MCNC: 2.9 MG/DL (ref 2.5–4.9)
PLATELET # BLD AUTO: 152 X10*3/UL (ref 150–450)
POTASSIUM SERPL-SCNC: 4.3 MMOL/L (ref 3.5–5.3)
RBC # BLD AUTO: 3.42 X10*6/UL (ref 4–5.2)
SODIUM SERPL-SCNC: 135 MMOL/L (ref 136–145)
WBC # BLD AUTO: 11.3 X10*3/UL (ref 4.4–11.3)

## 2025-08-09 PROCEDURE — 2500000002 HC RX 250 W HCPCS SELF ADMINISTERED DRUGS (ALT 637 FOR MEDICARE OP, ALT 636 FOR OP/ED): Performed by: INTERNAL MEDICINE

## 2025-08-09 PROCEDURE — 83735 ASSAY OF MAGNESIUM: CPT | Performed by: INTERNAL MEDICINE

## 2025-08-09 PROCEDURE — 99233 SBSQ HOSP IP/OBS HIGH 50: CPT | Performed by: INTERNAL MEDICINE

## 2025-08-09 PROCEDURE — 80069 RENAL FUNCTION PANEL: CPT | Performed by: INTERNAL MEDICINE

## 2025-08-09 PROCEDURE — 87070 CULTURE OTHR SPECIMN AEROBIC: CPT | Mod: PARLAB | Performed by: INTERNAL MEDICINE

## 2025-08-09 PROCEDURE — 99232 SBSQ HOSP IP/OBS MODERATE 35: CPT | Performed by: NURSE PRACTITIONER

## 2025-08-09 PROCEDURE — 2500000004 HC RX 250 GENERAL PHARMACY W/ HCPCS (ALT 636 FOR OP/ED): Performed by: INTERNAL MEDICINE

## 2025-08-09 PROCEDURE — 87081 CULTURE SCREEN ONLY: CPT | Mod: PARLAB | Performed by: INTERNAL MEDICINE

## 2025-08-09 PROCEDURE — 2500000001 HC RX 250 WO HCPCS SELF ADMINISTERED DRUGS (ALT 637 FOR MEDICARE OP): Performed by: INTERNAL MEDICINE

## 2025-08-09 PROCEDURE — 1200000002 HC GENERAL ROOM WITH TELEMETRY DAILY

## 2025-08-09 PROCEDURE — 36415 COLL VENOUS BLD VENIPUNCTURE: CPT | Performed by: INTERNAL MEDICINE

## 2025-08-09 PROCEDURE — 94640 AIRWAY INHALATION TREATMENT: CPT

## 2025-08-09 PROCEDURE — 85027 COMPLETE CBC AUTOMATED: CPT | Performed by: INTERNAL MEDICINE

## 2025-08-09 PROCEDURE — 2500000001 HC RX 250 WO HCPCS SELF ADMINISTERED DRUGS (ALT 637 FOR MEDICARE OP): Performed by: NURSE PRACTITIONER

## 2025-08-09 PROCEDURE — 87205 SMEAR GRAM STAIN: CPT | Mod: PARLAB | Performed by: INTERNAL MEDICINE

## 2025-08-09 RX ORDER — SODIUM CHLORIDE 9 MG/ML
75 INJECTION, SOLUTION INTRAVENOUS CONTINUOUS
Status: DISCONTINUED | OUTPATIENT
Start: 2025-08-09 | End: 2025-08-10 | Stop reason: SDUPTHER

## 2025-08-09 RX ORDER — HEPARIN SODIUM 5000 [USP'U]/ML
5000 INJECTION, SOLUTION INTRAVENOUS; SUBCUTANEOUS EVERY 8 HOURS
Status: DISPENSED | OUTPATIENT
Start: 2025-08-09

## 2025-08-09 RX ADMIN — MONTELUKAST 10 MG: 10 TABLET, FILM COATED ORAL at 10:36

## 2025-08-09 RX ADMIN — IPRATROPIUM BROMIDE AND ALBUTEROL SULFATE 3 ML: .5; 3 SOLUTION RESPIRATORY (INHALATION) at 19:00

## 2025-08-09 RX ADMIN — SENNOSIDES AND DOCUSATE SODIUM 1 TABLET: 50; 8.6 TABLET ORAL at 10:36

## 2025-08-09 RX ADMIN — DOXYCYCLINE 100 MG: 100 INJECTION, POWDER, LYOPHILIZED, FOR SOLUTION INTRAVENOUS at 06:16

## 2025-08-09 RX ADMIN — IPRATROPIUM BROMIDE AND ALBUTEROL SULFATE 3 ML: .5; 3 SOLUTION RESPIRATORY (INHALATION) at 03:19

## 2025-08-09 RX ADMIN — AZELASTINE HYDROCHLORIDE 2 SPRAY: 137 SPRAY, METERED NASAL at 22:00

## 2025-08-09 RX ADMIN — IPRATROPIUM BROMIDE AND ALBUTEROL SULFATE 3 ML: .5; 3 SOLUTION RESPIRATORY (INHALATION) at 06:52

## 2025-08-09 RX ADMIN — HYDROCODONE BITARTRATE AND ACETAMINOPHEN 1 TABLET: 7.5; 325 TABLET ORAL at 11:58

## 2025-08-09 RX ADMIN — PRAVASTATIN SODIUM 20 MG: 20 TABLET ORAL at 22:00

## 2025-08-09 RX ADMIN — DOXYCYCLINE 100 MG: 100 INJECTION, POWDER, LYOPHILIZED, FOR SOLUTION INTRAVENOUS at 18:49

## 2025-08-09 RX ADMIN — IPRATROPIUM BROMIDE 2 SPRAY: 42 SPRAY, METERED NASAL at 22:00

## 2025-08-09 RX ADMIN — IPRATROPIUM BROMIDE 2 SPRAY: 42 SPRAY, METERED NASAL at 16:08

## 2025-08-09 RX ADMIN — IPRATROPIUM BROMIDE AND ALBUTEROL SULFATE 3 ML: .5; 3 SOLUTION RESPIRATORY (INHALATION) at 12:06

## 2025-08-09 RX ADMIN — AZELASTINE HYDROCHLORIDE 2 SPRAY: 137 SPRAY, METERED NASAL at 10:41

## 2025-08-09 RX ADMIN — HYDROXYCHLOROQUINE SULFATE 300 MG: 200 TABLET, FILM COATED ORAL at 10:36

## 2025-08-09 RX ADMIN — CEFTRIAXONE 1 G: 1 INJECTION, SOLUTION INTRAVENOUS at 18:50

## 2025-08-09 RX ADMIN — PANTOPRAZOLE SODIUM 40 MG: 40 TABLET, DELAYED RELEASE ORAL at 10:36

## 2025-08-09 RX ADMIN — HEPARIN SODIUM 5000 UNITS: 5000 INJECTION, SOLUTION INTRAVENOUS; SUBCUTANEOUS at 17:49

## 2025-08-09 RX ADMIN — SODIUM CHLORIDE 75 ML/HR: 0.9 INJECTION, SOLUTION INTRAVENOUS at 17:49

## 2025-08-09 RX ADMIN — METHYLPREDNISOLONE SODIUM SUCCINATE 40 MG: 40 INJECTION, POWDER, FOR SOLUTION INTRAMUSCULAR; INTRAVENOUS at 11:58

## 2025-08-09 RX ADMIN — IPRATROPIUM BROMIDE 2 SPRAY: 42 SPRAY, METERED NASAL at 18:49

## 2025-08-09 RX ADMIN — IPRATROPIUM BROMIDE 2 SPRAY: 42 SPRAY, METERED NASAL at 10:41

## 2025-08-09 RX ADMIN — CLOPIDOGREL BISULFATE 75 MG: 75 TABLET, FILM COATED ORAL at 10:36

## 2025-08-09 RX ADMIN — FLUTICASONE PROPIONATE 2 SPRAY: 50 SPRAY, METERED NASAL at 10:41

## 2025-08-09 RX ADMIN — GABAPENTIN 800 MG: 400 CAPSULE ORAL at 10:36

## 2025-08-09 RX ADMIN — PRIMIDONE 150 MG: 50 TABLET ORAL at 06:20

## 2025-08-09 ASSESSMENT — COGNITIVE AND FUNCTIONAL STATUS - GENERAL
TOILETING: A LITTLE
TOILETING: A LITTLE
WALKING IN HOSPITAL ROOM: A LITTLE
DRESSING REGULAR LOWER BODY CLOTHING: A LITTLE
HELP NEEDED FOR BATHING: A LITTLE
TURNING FROM BACK TO SIDE WHILE IN FLAT BAD: A LITTLE
DRESSING REGULAR UPPER BODY CLOTHING: A LITTLE
DAILY ACTIVITIY SCORE: 21
STANDING UP FROM CHAIR USING ARMS: A LITTLE
WALKING IN HOSPITAL ROOM: A LITTLE
TURNING FROM BACK TO SIDE WHILE IN FLAT BAD: A LITTLE
STANDING UP FROM CHAIR USING ARMS: A LITTLE
DRESSING REGULAR LOWER BODY CLOTHING: A LITTLE
MOVING TO AND FROM BED TO CHAIR: A LITTLE
CLIMB 3 TO 5 STEPS WITH RAILING: A LOT
DAILY ACTIVITIY SCORE: 20
MOVING FROM LYING ON BACK TO SITTING ON SIDE OF FLAT BED WITH BEDRAILS: A LITTLE
MOBILITY SCORE: 17
MOVING FROM LYING ON BACK TO SITTING ON SIDE OF FLAT BED WITH BEDRAILS: A LITTLE
MOVING TO AND FROM BED TO CHAIR: A LITTLE
CLIMB 3 TO 5 STEPS WITH RAILING: A LITTLE
HELP NEEDED FOR BATHING: A LITTLE
MOBILITY SCORE: 18

## 2025-08-09 ASSESSMENT — PAIN - FUNCTIONAL ASSESSMENT
PAIN_FUNCTIONAL_ASSESSMENT: 0-10

## 2025-08-09 ASSESSMENT — PAIN DESCRIPTION - LOCATION: LOCATION: BACK

## 2025-08-09 ASSESSMENT — PAIN DESCRIPTION - DESCRIPTORS: DESCRIPTORS: ACHING;DISCOMFORT

## 2025-08-09 ASSESSMENT — PAIN SCALES - GENERAL
PAINLEVEL_OUTOF10: 0 - NO PAIN
PAINLEVEL_OUTOF10: 6
PAINLEVEL_OUTOF10: 0 - NO PAIN
PAINLEVEL_OUTOF10: 5 - MODERATE PAIN

## 2025-08-09 NOTE — PROGRESS NOTES
"Radha Deluna is a 75 y.o. female on day 2 of admission presenting with Altered mental status, unspecified altered mental status type.      Subjective   MRI brain is negative for any acute findings to explain episode of AMS yesterday.  EEG that was also completed yesterday is negative for any evidence of seizures or underlying epilepsy.  She is stable from a neurologic standpoint.  General neurology to sign off on care.     Objective     Last Recorded Vitals  Blood pressure 98/62, pulse 108, temperature 35.9 °C (96.6 °F), temperature source Temporal, resp. rate 18, height 1.6 m (5' 2.99\"), weight 65.7 kg (144 lb 13.5 oz), SpO2 98%.  Oxygen Therapy  Pulse Ox (24 hr min): 98  Medical Gas Therapy: Supplemental oxygen  Medical Gas Delivery Method: Nasal cannula  O2 Flow Rate (L/min): 2 L/min      Physical exam/neurological exam  Patient seen and examined at this time; upon entering room she is awake and resting quietly in bed with RN at bedside. Appears fully developed and well nourished.   Mental status: A&Ox 3. Memory testing, fund of knowledge and concentration within normal limits. Speech is fluent and negative for any paraphrasic errors.  She is still unable to recall majority of events leading up to her hospitalization yesterday, but does currently note that she and her neighbor went out to run errands together in the morning and she does remember feeling very sweaty and having chills while sitting in her air conditioned vehicle while waiting for her neighbor to come out of the store.     Cranial Nerves:  Optic II/ Oculomotor III: Fundoscopic exam was technically difficult. PERRL +2. Visual fields are full. Convergence and accomodation noted without difficulty. Negative for deficits to visual acuity confrontation via static-finger wiggle test. Eyes appear aligned and free of exophthalmos and ptosis. Sclera are white bilaterally and lens are free from clouding.   Oculomotor III/ Trochlear IV/ Abducens VI: " Extraocular movements are full, with no evidence of nystagmus. Negative for diplopia.   Trigeminal V: Facial sensation is intact to light touch. Corneal reflex responsive when threatened bilaterally.  Facial VII: intact; nose is midline, with no evidence of flattening to nasolabial folds noted and mouth is negative for evidence of droop. Patient is successfully able to follow commands to raise eyebrows, squeeze eyes shut, smile and show teeth, frown, and puff out cheeks.   Acoustic VIII: Hearing is intact bilaterally.  Glossopharngyeal IX/ Vagus X: Palate elevates symmetrically to phonation. Findings are negative for uvula deviation or dysphagia.   Spinal accessory XI: Sternocleidomastoid/ upper trapezius is 5/5 to strength testing. No asymmetry noted to strength, bulk, or tone.   Hypoglossal XII: Tongue is midline and without deviations. Phonation is articulate and is negative for findings of dysarthria or aphasia.      Motor exam: negative for evidence of involuntary movements or fasiculations. BUE flexion of biceps and brachioradialis graded asymmetric, with RUE 4-/5 versus LUE 5-/5, in addition to extension of triceps at elbow and wrists. BUE  strength 5-/5, along with finger abduction and thumb opposition. BLE hip flexion, extension, adduction, and abduction 4-/5. Knee extension & flexion 5-/5. Ankle dorsiflexion and plantarflexion 5-/5. Normal bulk and normal tone.      Sensory exam: Sensation is intact to light touch throughout.  She denies any numbness at this time to her BLE or BUE 2/2 chronic history of carpal tunnel syndrome and neuropathy.     Reflexes: Reflexes are 2+ and symmetric. Bilateral plantar responses are flexor. Ankle jerks symmetric.      Coordination: finger-to-nose testing is mildly positive for signs of dysmetria to RUE.  Pronator drift is negative bilaterally.       Gait exam: Not tested as patient is a high fall risk    Relevant Results  Scheduled medications  Scheduled  Medications[1]  Continuous medications  Continuous Medications[2]  PRN medications  PRN Medications[3]  MR brain w and wo IV contrast  Result Date: 8/9/2025  Interpreted By:  Salvatore Marcelo, STUDY: MR BRAIN W AND WO IV CONTRAST;  8/8/2025 7:13 pm   INDICATION: Signs/Symptoms:AMS, question of seizure.     COMPARISON: CT of August 7, 2020   ACCESSION NUMBER(S): [id number]   ORDERING CLINICIAN: YANCY LIMA   TECHNIQUE: Standard multiplanar multisequence MR imaging was performed through the brain prior to and following administration of 13 ML Dotarem intravenous contrast.   FINDINGS: Parenchyma:  There is a background of age-related volume loss and presumed ischemic white matter demyelination.   Ventricles: There is no hydrocephalus.   Extra-axial spaces: No abnormal extra-axial fluid collection. Basal cisterns are patent.   Vessels: T2 flow voids are maintained.   Orbits:  The patient is status post bilateral lens surgery.   Paranasal sinuses and mastoid air cells: No fluid levels are present.   Skull: There is normal T1 marrow signal without evidence of aggressive lesion.   Soft tissues: Unremarkable       1. Negative head MRI examination for acute change. 2. There is a background of age-related volume loss, atherosclerotic disease, and ischemic white matter demyelination.   MACRO: None   Signed by: Salvatore Marcelo 8/9/2025 6:57 AM Dictation workstation:   [id number]    CT chest wo IV contrast  Result Date: 8/8/2025  Interpreted By:  Finkelstein, Evan, STUDY: CT CHEST WO IV CONTRAST;  8/7/2025 7:52 pm   INDICATION: Signs/Symptoms:Acute hypoxic respiratory failure.     COMPARISON: Chest radiograph 08/07/2025.   ACCESSION NUMBER(S): [id number]   ORDERING CLINICIAN: LARS DUMONT   TECHNIQUE: Axial CT images of the chest obtained  without IV contrast.  Sagittal and coronal reconstructions were created..   FINDINGS: CHEST WALL AND LOWER NECK: Hypodense nodule in the right thyroid lobe measuring up to 2.6 cm. UPPER  ABDOMEN: No acute abnormality of the partially visualized abdomen.   VESSELS: Aorta and pulmonary artery are normal caliber. Atherosclerotic calcifications in the aorta and coronary arteries. HEART: Normal size. No pericardial effusion. MEDIASTINUM AND TIAGO: No pathologically enlarged lymph nodes. LUNG, PLEURA, AND LARGE AIRWAYS: Small left pleural effusion. 3 cm right upper lobe mass best seen on image 99 of series 601 is stable compared to prior imaging 02/28/2024 when imaged in the same plane. 7 mm right upper lobe pulmonary nodule image 132 is stable compared to prior imaging. Stable partially calcified 1.3 cm nodule in the right upper lobe image 148. There are rounded opacities in the left upper and lower lobes with peripheral ground-glass attenuation that are new from prior imaging 10/28/2024.   BONES: No acute osseous abnormality. Prominent Schmorl's node in the superior T12 endplate.       Rounded opacities in the left upper and lower lobes with peripheral ground-glass attenuation that are new from prior imaging 10/28/2024 concerning for pneumonia. Given the nodular appearance of the lesions, however, recommend interval follow-up with CT after treatment to confirm resolution and exclude an underlying neoplastic process.   Scattered pulmonary nodules and masses throughout the right lung which are stable compared to prior imaging.   Small left pleural effusion.   2.6 cm hypodense right thyroid nodule likely corresponding to abnormality on prior thyroid ultrasound 11/11/2024 for which a biopsy was recommended.   MACRO: None.   Signed by: Evan Finkelstein 8/8/2025 12:23 AM Dictation workstation:   [id number]    XR chest 1 view  Result Date: 8/7/2025  Interpreted By:  Rustam Hilliard, STUDY: XR CHEST 1 VIEW;  8/7/2025 1:58 pm   INDICATION: Signs/Symptoms:cough.     COMPARISON: 01/19/2025   ACCESSION NUMBER(S): [id number]   ORDERING CLINICIAN: RADHA SUTHERLAND   FINDINGS:         CARDIOMEDIASTINAL  SILHOUETTE: Cardiomediastinal silhouette is mildly enlarged   LUNGS: Redemonstration of multiple nodular opacities over the right lung similar to the prior. The largest measuring 15 mm over the right upper lung. Increased perihilar vascular congestion and mild edema present.   ABDOMEN: No remarkable upper abdominal findings.   BONES: No acute osseous changes.       1.  Redemonstration of several nodular opacities over the right lung the largest measuring 15 mm over the upper lungs. These appear similar to the prior. Repeat CT is advised for further evaluation of these findings       MACRO: None   Signed by: Rustam Hilliard 8/7/2025 2:08 PM Dictation workstation:   [id number]    CT head wo IV contrast  Result Date: 8/7/2025  Interpreted By:  Ezra Tracy, STUDY: CT HEAD WO IV CONTRAST;  8/7/2025 1:25 pm   INDICATION: Signs/Symptoms:confusion.     COMPARISON: MRI brain 4 April 2022   ACCESSION NUMBER(S): [id number]   ORDERING CLINICIAN: RADHA SUTHERLAND   TECHNIQUE: CT of the brain from the skull vertex to the skull base, without intravenous contrast   FINDINGS: ACUTE INTRA-AXIAL HEMORRHAGE:  Negative   ACUTE EXTRA-AXIAL/SUBDURAL HEMORRHAGE:  Negative   ACUTE INTRACRANIAL MASS EFFECT:  Negative   CT EVIDENCE OF ACUTE / SUBACUTE TERRITORIAL ISCHEMIA:  Negative   VENTRICLES:  Normal caliber and configuration   OTHER BRAIN FINDINGS:  Densely calcified bilateral carotid siphons   INCLUDED PARANASAL SINUSES: Only a fraction of the maxillary sinuses are included in the field of view but on the left, included portion completely opacified. Mucosal thickening and some of the ethmoid air cells   INCLUDED MASTOID AIR CELLS: All clear   SKULL:  No lytic or blastic lesion   EXTRACRANIAL SOFT TISSUES:  Scalp and occular globes grossly normal by CT       NO ACUTE INTRACRANIAL PROCESS   MACRO: None   Signed by: Ezra Trcay 8/7/2025 1:49 PM Dictation workstation:   [id number]    CT abdomen pelvis wo IV contrast  Result Date:  7/13/2025  Interpreted By:  Juan Diego Morales, STUDY: CT ABDOMEN PELVIS WO IV CONTRAST;  7/13/2025 8:05 am   INDICATION: Signs/Symptoms:left flank pain x 2 months.   COMPARISON: 04/21/2023 PET-CT   ACCESSION NUMBER(S): [id number]   ORDERING CLINICIAN: DAMON SILVA   TECHNIQUE: CT of the abdomen and pelvis was performed without IV contrast. Sagittal and coronal reconstructions.   FINDINGS: Limited evaluation for solid organs and vasculature without intravenous contrast.   Lower Chest: Streaky atelectasis in the visualized lower lungs. Coronary artery calcifications. Scarring and calcifications in the right lower lobe.   Liver: Couple of small subcentimeter hepatic hypodensities, too small to characterize.   Gallbladder and Biliary: Dilated CBD likely related to postcholecystectomy change in a patient with normal LFTs.   Pancreas: No abnormality identified in the pancreas.   Spleen: No abnormality identified in the spleen.   Adrenals: No abnormality identified in either adrenal gland.   Urinary: No parenchymal abnormality identified in either kidney. No hydronephrosis.   Gastrointestinal/Peritoneum: No small or large bowel obstruction in the visualized abdomen. In the abdomen, there is no extraluminal air. No significant free fluid. Descending and sigmoid colonic diverticulosis. Appendix not seen. No secondary signs of appendicitis in the pericecal region.   Vascular: Abdominal aorta is normal in caliber. Moderate to severe atherosclerosis.   Lymphatics: No enlarged lymph nodes by size criteria.   MSK/Body Wall: No aggressive bony lesion identified. Multilevel degenerative change in the spine. Grade 1 anterolisthesis L4-L5.       No acute abnormality in the abdomen or pelvis.   Colonic diverticulosis.   Atherosclerosis.   Signed by: Juan Diego Morales 7/13/2025 12:01 PM Dictation workstation:   [id number]    Results for orders placed or performed during the hospital encounter of 08/07/25 (from the past 24  hours)   Magnesium   Result Value Ref Range    Magnesium 2.16 1.60 - 2.40 mg/dL   Magnesium   Result Value Ref Range    Magnesium 2.13 1.60 - 2.40 mg/dL   Lavender Top   Result Value Ref Range    Extra Tube Hold for add-ons.      *Note: Due to a large number of results and/or encounters for the requested time period, some results have not been displayed. A complete set of results can be found in Results Review.     No EEG results found for the past 12 months        NIH Stroke Scale  1A. Level of Consciousness: Alert, Keenly Responsive  1B. Ask Month and Age: Both Questions Right  1C. Blink Eyes & Squeeze Hands: Performs Both Tasks  2. Best Gaze: Normal  3. Visual: No Visual Loss  4. Facial Palsy: Normal Symmetrical Movements  5A. Motor - Left Arm: No Drift  5B. Motor - Right Arm: No Drift  6A. Motor - Left Leg: No Drift  6B. Motor - Right Leg: No Drift  7. Limb Ataxia: Present in One Limb  8. Sensory Loss: Normal  9. Best Language: No Aphasia  10. Dysarthria: Normal  11. Extinction and Inattention: No Abnormality  NIH Stroke Scale: 1           [place] Coma Scale  Best Eye Response: Spontaneous  Best Verbal Response: Oriented  Best Motor Response: Follows commands  [place] Coma Scale Score: 15                             This patient currently has cardiac telemetry ordered; if you would like to modify or discontinue the telemetry order, click here to go to the orders activity to modify/discontinue the order.  Assessment & Plan  Altered mental status, unspecified altered mental status type    -Continue Plavix 75 mg p.o. daily and pravastatin 20 mg p.o. daily for CVA prophylaxis.   -Continue seizure precautions while hospitalized.  Continue to evaluate for any metabolic abnormalities or additional sources of infection that could have lowered seizure threshold in this patient. It is noted that patient is currently receiving empiric treatment of pneumonia via multiple antibiotics.  -Proceed with caution and use of  multiple sedative causing medications.  -Continue promotion of lifestyle modifications, such as: Strict BP and glycemic control, dietary habit changes, incorporation of daily exercise regimen, adherence to all prescription/OTC medication schedules, attendance to all follow-up appointments, cessation from smoking if applicable, abstinence from alcohol and illicit drug use if applicable  -Neuro workup is unremarkable. Patient likely suffered a period of AMS secondary to underlying infection given reported sweating with chills prior to event that was likely worsened by taking her opiate and gabapentin together. Urine cx is normal. Consider swabbing for COVID.  - Patient to follow-up with Dr. Frank Izquierdo as established for management of neuropathy    Neurology to sign off at this time. Thank you for the opportunity to be a part of this patient's multidisciplinary treatment team.  If any additional questions or concerns arise, please do not hesitate to contact me or the on-call neurologist via [employer] Secure Chat.      I spent 30 minutes in the professional and overall care of this patient.      Shanita Tran, APRN-CNP       [1] azelastine, 2 spray, Each Nostril, BID  cefTRIAXone, 1 g, intravenous, q24h  clopidogrel, 75 mg, oral, Daily  doxycycline, 100 mg, intravenous, q12h  enoxaparin, 40 mg, subcutaneous, q24h  fluticasone, 2 spray, Each Nostril, Daily  gabapentin, 800 mg, oral, BID  hydroxychloroquine, 300 mg, oral, Daily  ipratropium, 2 spray, Each Nostril, 4x daily  ipratropium-albuteroL, 3 mL, nebulization, TID  [Held by provider] isosorbide mononitrate ER, 60 mg, oral, Daily  [START ON 8/10/2025] methotrexate, 20 mg, intramuscular, Every Sunday  [Held by provider] metoprolol tartrate, 50 mg, oral, q12h  montelukast, 10 mg, oral, Daily  pantoprazole, 40 mg, oral, Daily  pravastatin, 20 mg, oral, Nightly  primidone, 150 mg, oral, Daily  sennosides-docusate sodium, 1 tablet, oral, Daily  [2]    [3] PRN  medications: albuterol, HYDROcodone-acetaminophen, ipratropium-albuteroL, lubricating eye drops, metoclopramide, naloxone, nitroglycerin, oxygen, polyethylene glycol

## 2025-08-09 NOTE — PROGRESS NOTES
Radha Deluna is a 75 y.o. female on day 2 of admission presenting with Altered mental status, unspecified altered mental status type.      Subjective   Patient was seen and examined at bedside,  she was doing better today and back to her baseline mentation, still on 2 L. Denies any f/c, n/v, new onset headaches, vision changes, chest pain, dyspnea, abdominal pain, changes in BM, or urinary changes.          Objective     Last Recorded Vitals  /55   Pulse 92   Temp 35.8 °C (96.4 °F)   Resp 18   Wt 65.7 kg (144 lb 13.5 oz)   SpO2 94%   Intake/Output last 3 Shifts:    Intake/Output Summary (Last 24 hours) at 8/9/2025 1648  Last data filed at 8/8/2025 1718  Gross per 24 hour   Intake 0 ml   Output --   Net 0 ml       Admission Weight  Weight: 68.5 kg (151 lb) (08/07/25 1200)    Daily Weight  08/07/25 : 65.7 kg (144 lb 13.5 oz)    Image Results  EEG  IMPRESSION    Impression  This awake and sleep routine is normal. No epileptiform discharges or lateralizing signs are seen.    A full report will be scanned into the patient's chart at a later time.    This report has been interpreted and electronically signed by  MR brain w and wo IV contrast  Narrative: Interpreted By:  Salvatore Marcelo,   STUDY:  MR BRAIN W AND WO IV CONTRAST;  8/8/2025 7:13 pm      INDICATION:  Signs/Symptoms:AMS, question of seizure.          COMPARISON:  CT of August 7, 2020      ACCESSION NUMBER(S):  [id number]      ORDERING CLINICIAN:  YANCY LIMA      TECHNIQUE:  Standard multiplanar multisequence MR imaging was performed through  the brain prior to and following administration of 13 ML Dotarem  intravenous contrast.      FINDINGS:  Parenchyma:  There is a background of age-related volume loss and  presumed ischemic white matter demyelination.      Ventricles: There is no hydrocephalus.      Extra-axial spaces: No abnormal extra-axial fluid collection. Basal  cisterns are patent.      Vessels: T2 flow voids are maintained.       Orbits:  The patient is status post bilateral lens surgery.      Paranasal sinuses and mastoid air cells: No fluid levels are present.      Skull: There is normal T1 marrow signal without evidence of  aggressive lesion.      Soft tissues: Unremarkable      Impression: 1. Negative head MRI examination for acute change.  2. There is a background of age-related volume loss, atherosclerotic  disease, and ischemic white matter demyelination.      MACRO:  None      Signed by: Salvatore Marcelo 8/9/2025 6:57 AM  Dictation workstation:   [id number]      Physical Exam  Constitutional:       General: She is not in acute distress.     Appearance: She is normal weight.   HENT:      Head: Normocephalic and atraumatic.      Nose: Nose normal.      Mouth/Throat:      Mouth: Mucous membranes are moist.      Pharynx: Oropharynx is clear.      Eyes:      Extraocular Movements: Extraocular movements intact.      Conjunctiva/sclera: Conjunctivae normal.      Pupils: Pupils are equal, round, and reactive to light.         Cardiovascular:      Rate and Rhythm: Regular rhythm. Normal rate.      Pulses: Normal pulses.      Heart sounds: Normal heart sounds.   Pulmonary:      Effort: Pulmonary effort is normal.      Breath sounds: Rales, no wheezine  Abdominal:      General: Abdomen is flat.      Palpations: Abdomen is soft.      Musculoskeletal:         General: Normal range of motion.      Cervical back: Normal range of motion and neck supple.      Skin:     General: Skin is warm and dry.      Neurological:      General: No focal deficit present.      Mental Status: She is alert and oriented to person, place, and time. Mental status is at baseline.      Psychiatric:         Mood and Affect: Mood normal.         Behavior: Behavior normal.   Relevant Results             This patient currently has cardiac telemetry ordered; if you would like to modify or discontinue the telemetry order, click here to go to the orders activity to  modify/discontinue the order.              Assessment & Plan  Altered mental status, unspecified altered mental status type    Radha Deluna is a 75 y.o. female admitted for  AMS and fever     Acute Medical Issues   #AMS(Resolved):  #Heat stroke vs Infection:  -Patient was found to be confused , drove her car in the wrong direction.  -She was in her car for 45 minutes and was feverish, responded to fluids.   - CT head NO ACUTE INTRACRANIAL PROCESS      #Acute hypoxic respiratory failure:  # Concern for CAP  # Possible COPD exacerbation  - Patient was on 2 L in the ED, according to the nurse she was dropping to the 80s on room air, she uses oxygen 2 L at night at home.  - Steroids, wean oxygen as tolerated.  - Patient reported chills and productive cough, she was found to have fever.  - Chest x-ray showed; Redemonstration of several nodular opacities over the right lung  the largest measuring 15 mm over the upper lungs. These appear  similar to the prior. Repeat CT is advised for further evaluation of  these findings  - CT chest;  Rounded opacities in the left upper and lower lobes with peripheral  ground-glass attenuation that are new from prior imaging 10/28/2024  concerning for pneumonia. Given the nodular appearance of the  lesions, however, recommend interval follow-up with CT after  treatment to confirm resolution and exclude an underlying neoplastic  process.  -Start Ceftriaxone and Azithromycin  -Received Zosyn/Vanco  -Mycoplasma, Legionella, legionella , Procal 0.86 , MRSA; pending  - RT evaluate and treat, DuoNeb, albuterol, incentive spirometry.  - Wean oxygen as tolerated        #FAUSTINA   - s. Creat increased since admission 3.10  from 0.48  - 1 bolus given, start maintenance fluids. NS 75 ml/H  - Continue to monitor, avoid nephrotoxic meds.   - Nephrology consulted; appreciate recs     #Sepsis(Resolved):  - SIRS criteria in the ED 3/4; tachycardia, leukocytosis fever:  - Acute hypoxic respiratory  failure, normal lactate.        #Acute myocardial injury(Resolved):  - Likely secondary to demand ischemia type II MI, mildly elevated troponin of 20, no chest pain  - EKG          #Hyponatremia:  #Hypokalemia(resolved):  - Continue to monitor, replete potassium as needed     #Seizure(ruled out)  - Patient on primidone, 150 mg, oral, Daily for tremors  - Neurology consulted, patient will be evaluated for possible seizure due to polypharmacy and infectious process.  - MRI normall and EEG ordered.        Chronic Medical Issues   # Allergic rhinitis  -Continue azelastine, 2 spray, Each Nostril, BID,  fluticasone, 2 spray, Each Nostril, Daily, montelukast, 10 mg, oral, Daily        #Chronic back pain  - Hold  gabapentin, 800 mg, oral, BID for FAUSTINA     #Rheumatoid arthritis  - Hold hydroxychloroquine, 300 mg, oral, Daily,  and methotrexate, 20 mg, intramuscular, Every Sunday for FAUSTINA         #HTN/CAD/AAA:  -Hold isosorbide mononitrate ER, 60 mg, oral, Daily, metoprolol tartrate, 50 mg, oral, q12h     # GERD:  - pantoprazole, 40 mg, oral, Daily     #HLD  -Continue pravastatin, 20 mg, oral, Daily           F: PO intake & IVF PRN  E: Replete as needed  N: Regular diet  GI ppx: Protonix 40 mg daily   DVT ppx: Lovenox subcutaneous  Antibiotics: Ceftriaxone, doxycycline   Oxygenation: 2L NC     Code Status: DNR and No Intubation   Emergency Contact: Extended Emergency Contact Information  Primary Emergency Contact: KERWIN FRASER  Address: [place], [place]  Home Phone: [phone]  Work Phone: [phone]  Mobile Phone: [phone]  Relation: Sibling   needed? No      Disposition: 75 y.o.female admitted for AMS , RESOLVED , patient has FAUSTINA, continue management as above.            Flakito Gunn, DO

## 2025-08-10 ENCOUNTER — APPOINTMENT (OUTPATIENT)
Dept: RADIOLOGY | Facility: HOSPITAL | Age: 75
DRG: 871 | End: 2025-08-10
Payer: MEDICARE

## 2025-08-10 VITALS
HEIGHT: 63 IN | DIASTOLIC BLOOD PRESSURE: 74 MMHG | HEART RATE: 111 BPM | TEMPERATURE: 97.7 F | SYSTOLIC BLOOD PRESSURE: 121 MMHG | OXYGEN SATURATION: 94 % | BODY MASS INDEX: 25.66 KG/M2 | RESPIRATION RATE: 17 BRPM | WEIGHT: 144.84 LBS

## 2025-08-10 LAB
ALBUMIN SERPL BCP-MCNC: 3.2 G/DL (ref 3.4–5)
ANION GAP SERPL CALC-SCNC: 13 MMOL/L (ref 10–20)
BACTERIA BLD CULT: NORMAL
BACTERIA BLD CULT: NORMAL
BUN SERPL-MCNC: 34 MG/DL (ref 6–23)
CALCIUM SERPL-MCNC: 8 MG/DL (ref 8.6–10.3)
CHLORIDE SERPL-SCNC: 103 MMOL/L (ref 98–107)
CK SERPL-CCNC: 119 U/L (ref 0–215)
CO2 SERPL-SCNC: 19 MMOL/L (ref 21–32)
CREAT SERPL-MCNC: 3.99 MG/DL (ref 0.5–1.05)
CREAT UR-MCNC: 27 MG/DL (ref 20–320)
CREAT UR-MCNC: 27 MG/DL (ref 20–320)
EGFRCR SERPLBLD CKD-EPI 2021: 11 ML/MIN/1.73M*2
ERYTHROCYTE [DISTWIDTH] IN BLOOD BY AUTOMATED COUNT: 13.9 % (ref 11.5–14.5)
GLUCOSE SERPL-MCNC: 109 MG/DL (ref 74–99)
GRAM STN SPEC: NORMAL
GRAM STN SPEC: NORMAL
HCT VFR BLD AUTO: 34.9 % (ref 36–46)
HGB BLD-MCNC: 11.6 G/DL (ref 12–16)
HOLD SPECIMEN: NORMAL
MAGNESIUM SERPL-MCNC: 2.09 MG/DL (ref 1.6–2.4)
MCH RBC QN AUTO: 34.8 PG (ref 26–34)
MCHC RBC AUTO-ENTMCNC: 33.2 G/DL (ref 32–36)
MCV RBC AUTO: 105 FL (ref 80–100)
MICROALBUMIN UR-MCNC: 67 MG/L
MICROALBUMIN/CREAT UR: 248.1 UG/MG CREAT
NRBC BLD-RTO: 0 /100 WBCS (ref 0–0)
PHOSPHATE SERPL-MCNC: 3.3 MG/DL (ref 2.5–4.9)
PLATELET # BLD AUTO: 165 X10*3/UL (ref 150–450)
POTASSIUM SERPL-SCNC: 4.3 MMOL/L (ref 3.5–5.3)
RBC # BLD AUTO: 3.33 X10*6/UL (ref 4–5.2)
SODIUM SERPL-SCNC: 131 MMOL/L (ref 136–145)
SODIUM UR-SCNC: 15 MMOL/L
SODIUM/CREAT UR-RTO: 56 MMOL/G CREAT
TSH SERPL-ACNC: 0.73 MIU/L (ref 0.44–3.98)
WBC # BLD AUTO: 12.4 X10*3/UL (ref 4.4–11.3)

## 2025-08-10 PROCEDURE — 80069 RENAL FUNCTION PANEL: CPT | Performed by: INTERNAL MEDICINE

## 2025-08-10 PROCEDURE — 94640 AIRWAY INHALATION TREATMENT: CPT

## 2025-08-10 PROCEDURE — 36415 COLL VENOUS BLD VENIPUNCTURE: CPT | Performed by: INTERNAL MEDICINE

## 2025-08-10 PROCEDURE — 76770 US EXAM ABDO BACK WALL COMP: CPT

## 2025-08-10 PROCEDURE — 2500000001 HC RX 250 WO HCPCS SELF ADMINISTERED DRUGS (ALT 637 FOR MEDICARE OP): Performed by: INTERNAL MEDICINE

## 2025-08-10 PROCEDURE — 1200000002 HC GENERAL ROOM WITH TELEMETRY DAILY

## 2025-08-10 PROCEDURE — 85027 COMPLETE CBC AUTOMATED: CPT | Performed by: INTERNAL MEDICINE

## 2025-08-10 PROCEDURE — 82550 ASSAY OF CK (CPK): CPT | Performed by: INTERNAL MEDICINE

## 2025-08-10 PROCEDURE — 87449 NOS EACH ORGANISM AG IA: CPT | Mod: PARLAB | Performed by: INTERNAL MEDICINE

## 2025-08-10 PROCEDURE — 84443 ASSAY THYROID STIM HORMONE: CPT | Performed by: INTERNAL MEDICINE

## 2025-08-10 PROCEDURE — 2500000004 HC RX 250 GENERAL PHARMACY W/ HCPCS (ALT 636 FOR OP/ED): Performed by: INTERNAL MEDICINE

## 2025-08-10 PROCEDURE — 87899 AGENT NOS ASSAY W/OPTIC: CPT | Mod: PARLAB | Performed by: INTERNAL MEDICINE

## 2025-08-10 PROCEDURE — 82043 UR ALBUMIN QUANTITATIVE: CPT | Performed by: INTERNAL MEDICINE

## 2025-08-10 PROCEDURE — 2500000001 HC RX 250 WO HCPCS SELF ADMINISTERED DRUGS (ALT 637 FOR MEDICARE OP): Performed by: NURSE PRACTITIONER

## 2025-08-10 PROCEDURE — 2500000002 HC RX 250 W HCPCS SELF ADMINISTERED DRUGS (ALT 637 FOR MEDICARE OP, ALT 636 FOR OP/ED): Performed by: INTERNAL MEDICINE

## 2025-08-10 PROCEDURE — 99233 SBSQ HOSP IP/OBS HIGH 50: CPT | Performed by: INTERNAL MEDICINE

## 2025-08-10 PROCEDURE — 86036 ANCA SCREEN EACH ANTIBODY: CPT | Performed by: INTERNAL MEDICINE

## 2025-08-10 PROCEDURE — 84300 ASSAY OF URINE SODIUM: CPT | Performed by: INTERNAL MEDICINE

## 2025-08-10 PROCEDURE — 76770 US EXAM ABDO BACK WALL COMP: CPT | Performed by: STUDENT IN AN ORGANIZED HEALTH CARE EDUCATION/TRAINING PROGRAM

## 2025-08-10 PROCEDURE — 83735 ASSAY OF MAGNESIUM: CPT | Performed by: INTERNAL MEDICINE

## 2025-08-10 RX ORDER — SODIUM CHLORIDE 9 MG/ML
100 INJECTION, SOLUTION INTRAVENOUS CONTINUOUS
Status: ACTIVE | OUTPATIENT
Start: 2025-08-10 | End: 2025-08-11

## 2025-08-10 RX ORDER — NYSTATIN 100000 [USP'U]/ML
5 SUSPENSION ORAL 3 TIMES DAILY
Status: DISPENSED | OUTPATIENT
Start: 2025-08-10

## 2025-08-10 RX ADMIN — HEPARIN SODIUM 5000 UNITS: 5000 INJECTION, SOLUTION INTRAVENOUS; SUBCUTANEOUS at 18:40

## 2025-08-10 RX ADMIN — IPRATROPIUM BROMIDE AND ALBUTEROL SULFATE 3 ML: .5; 3 SOLUTION RESPIRATORY (INHALATION) at 18:44

## 2025-08-10 RX ADMIN — DOXYCYCLINE 100 MG: 100 INJECTION, POWDER, LYOPHILIZED, FOR SOLUTION INTRAVENOUS at 06:33

## 2025-08-10 RX ADMIN — NYSTATIN 500000 UNITS: 100000 SUSPENSION ORAL at 20:27

## 2025-08-10 RX ADMIN — SENNOSIDES AND DOCUSATE SODIUM 1 TABLET: 50; 8.6 TABLET ORAL at 11:28

## 2025-08-10 RX ADMIN — AZELASTINE HYDROCHLORIDE 2 SPRAY: 137 SPRAY, METERED NASAL at 11:27

## 2025-08-10 RX ADMIN — METHYLPREDNISOLONE SODIUM SUCCINATE 40 MG: 40 INJECTION, POWDER, FOR SOLUTION INTRAMUSCULAR; INTRAVENOUS at 14:13

## 2025-08-10 RX ADMIN — HEPARIN SODIUM 5000 UNITS: 5000 INJECTION, SOLUTION INTRAVENOUS; SUBCUTANEOUS at 11:28

## 2025-08-10 RX ADMIN — IPRATROPIUM BROMIDE 2 SPRAY: 42 SPRAY, METERED NASAL at 18:42

## 2025-08-10 RX ADMIN — HEPARIN SODIUM 5000 UNITS: 5000 INJECTION, SOLUTION INTRAVENOUS; SUBCUTANEOUS at 01:08

## 2025-08-10 RX ADMIN — IPRATROPIUM BROMIDE 2 SPRAY: 42 SPRAY, METERED NASAL at 06:33

## 2025-08-10 RX ADMIN — CEFTRIAXONE 1 G: 1 INJECTION, SOLUTION INTRAVENOUS at 18:40

## 2025-08-10 RX ADMIN — NYSTATIN 500000 UNITS: 100000 SUSPENSION ORAL at 14:13

## 2025-08-10 RX ADMIN — PRAVASTATIN SODIUM 20 MG: 20 TABLET ORAL at 20:27

## 2025-08-10 RX ADMIN — CLOPIDOGREL BISULFATE 75 MG: 75 TABLET, FILM COATED ORAL at 11:28

## 2025-08-10 RX ADMIN — IPRATROPIUM BROMIDE 2 SPRAY: 42 SPRAY, METERED NASAL at 20:27

## 2025-08-10 RX ADMIN — MONTELUKAST 10 MG: 10 TABLET, FILM COATED ORAL at 11:28

## 2025-08-10 RX ADMIN — IPRATROPIUM BROMIDE AND ALBUTEROL SULFATE 3 ML: .5; 3 SOLUTION RESPIRATORY (INHALATION) at 06:38

## 2025-08-10 RX ADMIN — BENZOCAINE AND MENTHOL 1 LOZENGE: 15; 3.6 LOZENGE ORAL at 20:37

## 2025-08-10 RX ADMIN — IPRATROPIUM BROMIDE AND ALBUTEROL SULFATE 3 ML: .5; 3 SOLUTION RESPIRATORY (INHALATION) at 13:06

## 2025-08-10 RX ADMIN — SODIUM CHLORIDE 100 ML/HR: 900 INJECTION, SOLUTION INTRAVENOUS at 11:11

## 2025-08-10 RX ADMIN — PRIMIDONE 150 MG: 50 TABLET ORAL at 06:48

## 2025-08-10 RX ADMIN — FLUTICASONE PROPIONATE 2 SPRAY: 50 SPRAY, METERED NASAL at 11:27

## 2025-08-10 RX ADMIN — IPRATROPIUM BROMIDE 2 SPRAY: 42 SPRAY, METERED NASAL at 14:13

## 2025-08-10 RX ADMIN — DOXYCYCLINE 100 MG: 100 INJECTION, POWDER, LYOPHILIZED, FOR SOLUTION INTRAVENOUS at 20:28

## 2025-08-10 RX ADMIN — AZELASTINE HYDROCHLORIDE 2 SPRAY: 137 SPRAY, METERED NASAL at 20:27

## 2025-08-10 RX ADMIN — Medication: at 18:47

## 2025-08-10 RX ADMIN — SODIUM CHLORIDE 100 ML/HR: 900 INJECTION, SOLUTION INTRAVENOUS at 20:28

## 2025-08-10 RX ADMIN — PANTOPRAZOLE SODIUM 40 MG: 40 TABLET, DELAYED RELEASE ORAL at 11:28

## 2025-08-10 ASSESSMENT — PAIN SCALES - GENERAL
PAINLEVEL_OUTOF10: 0 - NO PAIN

## 2025-08-10 ASSESSMENT — COGNITIVE AND FUNCTIONAL STATUS - GENERAL
DRESSING REGULAR LOWER BODY CLOTHING: A LITTLE
STANDING UP FROM CHAIR USING ARMS: A LOT
CLIMB 3 TO 5 STEPS WITH RAILING: A LOT
WALKING IN HOSPITAL ROOM: A LOT
MOVING TO AND FROM BED TO CHAIR: A LOT
DRESSING REGULAR UPPER BODY CLOTHING: A LITTLE
DAILY ACTIVITIY SCORE: 17
EATING MEALS: A LITTLE
PERSONAL GROOMING: A LITTLE
MOBILITY SCORE: 14
TOILETING: A LOT
HELP NEEDED FOR BATHING: A LITTLE
MOVING FROM LYING ON BACK TO SITTING ON SIDE OF FLAT BED WITH BEDRAILS: A LITTLE
TURNING FROM BACK TO SIDE WHILE IN FLAT BAD: A LITTLE

## 2025-08-10 ASSESSMENT — PAIN - FUNCTIONAL ASSESSMENT: PAIN_FUNCTIONAL_ASSESSMENT: 0-10

## 2025-08-10 NOTE — CONSULTS
Reason For Consult  Acute kidney injury    History Of Present Illness  Radha Deluna is a 75 y.o. female presenting with change in mental status.  Patient was apparently brought in due to significant confusion.  Apparently she was in her car for about 45 minutes with temperatures near 90 degrees.  Initial evaluation included head CT that was unremarkable.  Laboratory studies were fairly unremarkable.  Specifically creatinine was 0.48.  On the second day after admission creatinine went to 1.39 and then to 3.10.  Patient reports urinating normally however urine output has been minimal.  She is given IV fluid challenge.  She has not been exposed any nephrotoxins or contrast.     Past Medical History  She has a past medical history of Chronic obstructive pulmonary disease, unspecified (11/09/2022), Low back pain, Migraine, unspecified, not intractable, without status migrainosus, Other chronic pain (10/21/2015), Personal history of other diseases of the circulatory system, Personal history of other diseases of the nervous system and sense organs, Personal history of other diseases of the respiratory system, Personal history of other diseases of the respiratory system, Personal history of other endocrine, nutritional and metabolic disease, Personal history of other malignant neoplasm of skin, and Sarcoidosis, unspecified (10/05/2022).    Surgical History  She has a past surgical history that includes Ankle surgery (10/21/2015); Appendectomy (10/21/2015); Lung surgery (10/21/2015); Cholecystectomy (10/21/2015); Breast surgery (10/21/2015); Other surgical history (10/21/2015); Other surgical history (02/07/2022); Other surgical history (02/07/2022); Lymph node biopsy (09/29/2017); Other surgical history (10/29/2020); Other surgical history (10/29/2020); Other surgical history (10/29/2020); Other surgical history (10/29/2020); Coronary angioplasty with stent (01/14/2021); Rotator cuff repair (08/10/2017); Other  surgical history (10/21/2015); Carpal tunnel release (10/26/2016); CT guided percutaneous biopsy lung (08/12/2020); CT guided percutaneous biopsy lung (12/01/2020); MR angio head wo IV contrast (04/04/2022); MR angio neck wo IV contrast (08/15/2022); Cataract extraction (Bilateral); and Eye surgery.     Social History  She reports that she has been smoking cigarettes. She has been exposed to tobacco smoke. She has never used smokeless tobacco. She reports that she does not currently use alcohol. She reports that she does not use drugs.    Family History  Family History[1]     Allergies  Erythromycin and Augmentin [amoxicillin-pot clavulanate]    Review of Systems    Patient denies any chest pain or shortness of breath.  No myalgias.  No nausea vomiting or diarrhea.  No headache or visual changes.     Physical Exam    Patient in no obvious distress.  No jugular distention.  Lungs are clear.  Regular cardiac rhythm.  Abdomen is benign.  Minimal peripheral edema.         I&O 24HR    Intake/Output Summary (Last 24 hours) at 8/10/2025 0947  Last data filed at 8/10/2025 0901  Gross per 24 hour   Intake 1405 ml   Output 300 ml   Net 1105 ml       Vitals 24HR  Heart Rate:  [83-92]   Temp:  [35.7 °C (96.3 °F)-36.5 °C (97.7 °F)]   Resp:  [16-20]   BP: (111-134)/(55-71)   SpO2:  [94 %-96 %]              Relevant Results    Patient with a creatinine of 3.1 and a BUN of 24.  Assessment & Plan  Altered mental status, unspecified altered mental status type    Patient admitted with minimal underlying renal disease.  Now with significant acute kidney injury.  Low BUN to creatinine ratio.    Overall patient has been relatively stable from a hemodynamic standpoint.    No obvious nephrotoxins.    Will start by getting renal imaging to make sure there is no obstruction.    IV fluids volume expand.  Will check a CPK to make sure there is no rhabdomyolysis.    Spot urine sodium creatinine to assess renal perfusion.    Will follow with  you.    Rodrigo Harrsi MD         [1]   Family History  Problem Relation Name Age of Onset    Other (cardiac disorder) Mother      Other (cardiac disorder) Father      Stroke Father      Coronary artery disease Father      Hypertension Father      Diabetes Other

## 2025-08-10 NOTE — PROGRESS NOTES
Radha Deluna is a 75 y.o. female on day 3 of admission presenting with Altered mental status, unspecified altered mental status type.      Subjective   Patient was seen and examined at bedside,  she was doing better today and back to her baseline mentation,  she is on RA . Denies any f/c, n/v, new onset headaches, vision changes, chest pain, dyspnea, abdominal pain, changes in BM, or urinary changes.          Objective     Last Recorded Vitals  /66   Pulse 101   Temp 36.5 °C (97.7 °F)   Resp 16   Wt 65.7 kg (144 lb 13.5 oz)   SpO2 97%   Intake/Output last 3 Shifts:    Intake/Output Summary (Last 24 hours) at 8/10/2025 1623  Last data filed at 8/10/2025 1014  Gross per 24 hour   Intake 1405 ml   Output 580 ml   Net 825 ml       Admission Weight  Weight: 68.5 kg (151 lb) (08/07/25 1200)    Daily Weight  08/07/25 : 65.7 kg (144 lb 13.5 oz)    Image Results  EEG  IMPRESSION    Impression  This awake and sleep routine is normal. No epileptiform discharges or lateralizing signs are seen.    A full report will be scanned into the patient's chart at a later time.    This report has been interpreted and electronically signed by   brain w and wo IV contrast  Narrative: Interpreted By:  Salvatore Marcelo,   STUDY:  MR BRAIN W AND WO IV CONTRAST;  8/8/2025 7:13 pm      INDICATION:  Signs/Symptoms:AMS, question of seizure.          COMPARISON:  CT of August 7, 2020      ACCESSION NUMBER(S):  [id number]      ORDERING CLINICIAN:  YANCY LIMA      TECHNIQUE:  Standard multiplanar multisequence MR imaging was performed through  the brain prior to and following administration of 13 ML Dotarem  intravenous contrast.      FINDINGS:  Parenchyma:  There is a background of age-related volume loss and  presumed ischemic white matter demyelination.      Ventricles: There is no hydrocephalus.      Extra-axial spaces: No abnormal extra-axial fluid collection. Basal  cisterns are patent.      Vessels: T2 flow voids are  maintained.      Orbits:  The patient is status post bilateral lens surgery.      Paranasal sinuses and mastoid air cells: No fluid levels are present.      Skull: There is normal T1 marrow signal without evidence of  aggressive lesion.      Soft tissues: Unremarkable      Impression: 1. Negative head MRI examination for acute change.  2. There is a background of age-related volume loss, atherosclerotic  disease, and ischemic white matter demyelination.      MACRO:  None      Signed by: Salvatore Marcelo 8/9/2025 6:57 AM  Dictation workstation:   [id number]      Physical Exam  Constitutional:       General: She is not in acute distress.     Appearance: She is normal weight.   HENT:      Head: Normocephalic and atraumatic.      Nose: Nose normal.      Mouth/Throat:      Mouth: Mucous membranes are moist.      Pharynx: Oropharynx is clear.      Eyes:      Extraocular Movements: Extraocular movements intact.      Conjunctiva/sclera: Conjunctivae normal.      Pupils: Pupils are equal, round, and reactive to light.         Cardiovascular:      Rate and Rhythm: Regular rhythm. Normal rate.      Pulses: Normal pulses.      Heart sounds: Normal heart sounds.   Pulmonary:      Effort: Pulmonary effort is normal.      Breath sounds: Rales, no wheezine  Abdominal:      General: Abdomen is flat.      Palpations: Abdomen is soft.      Musculoskeletal:         General: Normal range of motion.      Cervical back: Normal range of motion and neck supple.      Skin:     General: Skin is warm and dry.      Neurological:      General: No focal deficit present.      Mental Status: She is alert and oriented to person, place, and time. Mental status is at baseline.      Psychiatric:         Mood and Affect: Mood normal.         Behavior: Behavior normal.   Relevant Results             This patient currently has cardiac telemetry ordered; if you would like to modify or discontinue the telemetry order, click here to go to the orders activity  to modify/discontinue the order.              Assessment & Plan  Altered mental status, unspecified altered mental status type    Radha Deluna is a 75 y.o. female admitted for  AMS and fever     Acute Medical Issues     #Acute hypoxic respiratory failure (Resolved):  # Concern for CAP  # Possible COPD exacerbation  - Patient was on 2 L in the ED, according to the nurse she was dropping to the 80s on room air, she uses oxygen 2 L at night at home.  - Steroids, wean oxygen as tolerated.  - Patient reported chills and productive cough, she was found to have fever.  - Chest x-ray showed; Redemonstration of several nodular opacities over the right lung  the largest measuring 15 mm over the upper lungs. These appear  similar to the prior. Repeat CT is advised for further evaluation of  these findings  - CT chest;  Rounded opacities in the left upper and lower lobes with peripheral  ground-glass attenuation that are new from prior imaging 10/28/2024  concerning for pneumonia. Given the nodular appearance of the  lesions, however, recommend interval follow-up with CT after  treatment to confirm resolution and exclude an underlying neoplastic  process.  -Start Ceftriaxone and Azithromycin  -Received Zosyn/Vanco  -Mycoplasma, Legionella, legionella , Procal 0.86 , MRSA; pending  - RT evaluate and treat, DuoNeb, albuterol, incentive spirometry.  - Wean oxygen as tolerated          #FAUSTINA   - s. Creat increased since admission 3.99  from 0.48  - 1 bolus given, start maintenance fluids.  ml/H  - Continue to monitor, avoid nephrotoxic meds.   - Nephrology consulted; appreciate recs  - Renal US, urine electrolytes pending      #Sepsis(Resolved):  - SIRS criteria in the ED 3/4; tachycardia, leukocytosis fever:  - Acute hypoxic respiratory failure, normal lactate.        #Acute myocardial injury(Resolved):  - Likely secondary to demand ischemia type II MI, mildly elevated troponin of 20, no chest pain        #AMS(Resolved):  #Heat stroke vs Infection:  -Patient was found to be confused , drove her car in the wrong direction.  -She was in her car for 45 minutes and was feverish, responded to fluids.   - CT head NO ACUTE INTRACRANIAL PROCESS      #Hyponatremia:  #Hypokalemia(resolved):  - Continue to monitor, replete potassium as needed     #Seizure(ruled out)  - Patient on primidone, 150 mg, oral, Daily for tremors  - Neurology consulted, patient will be evaluated for possible seizure due to polypharmacy and infectious process.  - MRI normall and EEG ordered.        Chronic Medical Issues   # Allergic rhinitis  -Continue azelastine, 2 spray, Each Nostril, BID,  fluticasone, 2 spray, Each Nostril, Daily, montelukast, 10 mg, oral, Daily        #Chronic back pain  - Hold  gabapentin, 800 mg, oral, BID for FAUSTINA     #Rheumatoid arthritis  - Hold hydroxychloroquine, 300 mg, oral, Daily,  and methotrexate, 20 mg, intramuscular, Every Sunday for FAUSTINA         #HTN/CAD/AAA:  -Hold isosorbide mononitrate ER, 60 mg, oral, Daily, metoprolol tartrate, 50 mg, oral, q12h     # GERD:  - pantoprazole, 40 mg, oral, Daily     #HLD  -Continue pravastatin, 20 mg, oral, Daily           F: PO intake & IVF PRN  E: Replete as needed  N: Regular diet  GI ppx: Protonix 40 mg daily   DVT ppx: Lovenox subcutaneous  Antibiotics: Ceftriaxone, doxycycline   Oxygenation: 2L NC     Code Status: DNR and No Intubation   Emergency Contact: Extended Emergency Contact Information  Primary Emergency Contact: KERWIN FRASER  Address: [place], [place]  Home Phone: [phone]  Work Phone: [phone]  Mobile Phone: [phone]  Relation: Sibling   needed? No      Disposition: 75 y.o.female admitted for AMS , RESOLVED , patient has FAUSTINA, continue management as above.            Flakito Gunn, DO

## 2025-08-11 ENCOUNTER — APPOINTMENT (OUTPATIENT)
Dept: RADIOLOGY | Facility: HOSPITAL | Age: 75
DRG: 871 | End: 2025-08-11
Payer: MEDICARE

## 2025-08-11 LAB
ALBUMIN SERPL BCP-MCNC: 3.1 G/DL (ref 3.4–5)
ANION GAP SERPL CALC-SCNC: 17 MMOL/L (ref 10–20)
BACTERIA BLD CULT: NORMAL
BACTERIA BLD CULT: NORMAL
BNP SERPL-MCNC: 264 PG/ML (ref 0–99)
BUN SERPL-MCNC: 36 MG/DL (ref 6–23)
CALCIUM SERPL-MCNC: 7.7 MG/DL (ref 8.6–10.3)
CHLORIDE SERPL-SCNC: 107 MMOL/L (ref 98–107)
CO2 SERPL-SCNC: 17 MMOL/L (ref 21–32)
CREAT SERPL-MCNC: 4 MG/DL (ref 0.5–1.05)
EGFRCR SERPLBLD CKD-EPI 2021: 11 ML/MIN/1.73M*2
ERYTHROCYTE [DISTWIDTH] IN BLOOD BY AUTOMATED COUNT: 14 % (ref 11.5–14.5)
GLUCOSE SERPL-MCNC: 86 MG/DL (ref 74–99)
HCT VFR BLD AUTO: 37.3 % (ref 36–46)
HGB BLD-MCNC: 12.1 G/DL (ref 12–16)
HOLD SPECIMEN: NORMAL
HOLD SPECIMEN: NORMAL
LEGIONELLA AG UR QL: NEGATIVE
MCH RBC QN AUTO: 34.6 PG (ref 26–34)
MCHC RBC AUTO-ENTMCNC: 32.4 G/DL (ref 32–36)
MCV RBC AUTO: 107 FL (ref 80–100)
NRBC BLD-RTO: 0 /100 WBCS (ref 0–0)
PHOSPHATE SERPL-MCNC: 3.4 MG/DL (ref 2.5–4.9)
PLATELET # BLD AUTO: 169 X10*3/UL (ref 150–450)
POTASSIUM SERPL-SCNC: 4.7 MMOL/L (ref 3.5–5.3)
RBC # BLD AUTO: 3.5 X10*6/UL (ref 4–5.2)
S PNEUM AG UR QL: NEGATIVE
SODIUM SERPL-SCNC: 136 MMOL/L (ref 136–145)
STAPHYLOCOCCUS SPEC CULT: ABNORMAL
WBC # BLD AUTO: 11.2 X10*3/UL (ref 4.4–11.3)

## 2025-08-11 PROCEDURE — 71045 X-RAY EXAM CHEST 1 VIEW: CPT

## 2025-08-11 PROCEDURE — 9420000001 HC RT PATIENT EDUCATION 5 MIN

## 2025-08-11 PROCEDURE — 94669 MECHANICAL CHEST WALL OSCILL: CPT

## 2025-08-11 PROCEDURE — 2500000002 HC RX 250 W HCPCS SELF ADMINISTERED DRUGS (ALT 637 FOR MEDICARE OP, ALT 636 FOR OP/ED): Performed by: INTERNAL MEDICINE

## 2025-08-11 PROCEDURE — 71045 X-RAY EXAM CHEST 1 VIEW: CPT | Performed by: STUDENT IN AN ORGANIZED HEALTH CARE EDUCATION/TRAINING PROGRAM

## 2025-08-11 PROCEDURE — 36415 COLL VENOUS BLD VENIPUNCTURE: CPT | Performed by: INTERNAL MEDICINE

## 2025-08-11 PROCEDURE — 2500000004 HC RX 250 GENERAL PHARMACY W/ HCPCS (ALT 636 FOR OP/ED): Performed by: INTERNAL MEDICINE

## 2025-08-11 PROCEDURE — 83880 ASSAY OF NATRIURETIC PEPTIDE: CPT | Performed by: INTERNAL MEDICINE

## 2025-08-11 PROCEDURE — 85027 COMPLETE CBC AUTOMATED: CPT | Performed by: INTERNAL MEDICINE

## 2025-08-11 PROCEDURE — 94640 AIRWAY INHALATION TREATMENT: CPT

## 2025-08-11 PROCEDURE — 2500000001 HC RX 250 WO HCPCS SELF ADMINISTERED DRUGS (ALT 637 FOR MEDICARE OP): Performed by: INTERNAL MEDICINE

## 2025-08-11 PROCEDURE — 80069 RENAL FUNCTION PANEL: CPT | Performed by: INTERNAL MEDICINE

## 2025-08-11 PROCEDURE — 1100000001 HC PRIVATE ROOM DAILY

## 2025-08-11 PROCEDURE — 99233 SBSQ HOSP IP/OBS HIGH 50: CPT | Performed by: INTERNAL MEDICINE

## 2025-08-11 PROCEDURE — 2500000001 HC RX 250 WO HCPCS SELF ADMINISTERED DRUGS (ALT 637 FOR MEDICARE OP): Performed by: NURSE PRACTITIONER

## 2025-08-11 RX ORDER — ACETYLCYSTEINE 200 MG/ML
3 SOLUTION ORAL; RESPIRATORY (INHALATION)
Status: DISCONTINUED | OUTPATIENT
Start: 2025-08-11 | End: 2025-08-11

## 2025-08-11 RX ORDER — DOXYCYCLINE HYCLATE 100 MG
100 TABLET ORAL EVERY 12 HOURS SCHEDULED
Status: COMPLETED | OUTPATIENT
Start: 2025-08-11 | End: 2025-08-11

## 2025-08-11 RX ORDER — SODIUM CHLORIDE 9 MG/ML
75 INJECTION, SOLUTION INTRAVENOUS CONTINUOUS
Status: DISCONTINUED | OUTPATIENT
Start: 2025-08-11 | End: 2025-08-12

## 2025-08-11 RX ORDER — ISOSORBIDE MONONITRATE 60 MG/1
60 TABLET, EXTENDED RELEASE ORAL DAILY
Status: DISCONTINUED | OUTPATIENT
Start: 2025-08-11 | End: 2025-08-21 | Stop reason: HOSPADM

## 2025-08-11 RX ORDER — ACETYLCYSTEINE 200 MG/ML
3 SOLUTION ORAL; RESPIRATORY (INHALATION)
Status: DISCONTINUED | OUTPATIENT
Start: 2025-08-11 | End: 2025-08-21 | Stop reason: HOSPADM

## 2025-08-11 RX ADMIN — CEFTRIAXONE 1 G: 1 INJECTION, SOLUTION INTRAVENOUS at 18:47

## 2025-08-11 RX ADMIN — ISOSORBIDE MONONITRATE 60 MG: 60 TABLET, EXTENDED RELEASE ORAL at 18:51

## 2025-08-11 RX ADMIN — DOXYCYCLINE 100 MG: 100 INJECTION, POWDER, LYOPHILIZED, FOR SOLUTION INTRAVENOUS at 08:18

## 2025-08-11 RX ADMIN — IPRATROPIUM BROMIDE 2 SPRAY: 42 SPRAY, METERED NASAL at 05:17

## 2025-08-11 RX ADMIN — IPRATROPIUM BROMIDE 2 SPRAY: 42 SPRAY, METERED NASAL at 17:08

## 2025-08-11 RX ADMIN — IPRATROPIUM BROMIDE 2 SPRAY: 42 SPRAY, METERED NASAL at 20:42

## 2025-08-11 RX ADMIN — HEPARIN SODIUM 5000 UNITS: 5000 INJECTION, SOLUTION INTRAVENOUS; SUBCUTANEOUS at 01:07

## 2025-08-11 RX ADMIN — HEPARIN SODIUM 5000 UNITS: 5000 INJECTION, SOLUTION INTRAVENOUS; SUBCUTANEOUS at 17:03

## 2025-08-11 RX ADMIN — DOXYCYCLINE HYCLATE 100 MG: 100 TABLET, COATED ORAL at 20:41

## 2025-08-11 RX ADMIN — Medication: at 21:26

## 2025-08-11 RX ADMIN — SENNOSIDES AND DOCUSATE SODIUM 1 TABLET: 50; 8.6 TABLET ORAL at 08:18

## 2025-08-11 RX ADMIN — PANTOPRAZOLE SODIUM 40 MG: 40 TABLET, DELAYED RELEASE ORAL at 08:18

## 2025-08-11 RX ADMIN — METOPROLOL TARTRATE 50 MG: 50 TABLET, FILM COATED ORAL at 18:50

## 2025-08-11 RX ADMIN — IPRATROPIUM BROMIDE AND ALBUTEROL SULFATE 3 ML: .5; 3 SOLUTION RESPIRATORY (INHALATION) at 07:55

## 2025-08-11 RX ADMIN — CLOPIDOGREL BISULFATE 75 MG: 75 TABLET, FILM COATED ORAL at 08:18

## 2025-08-11 RX ADMIN — Medication: at 19:00

## 2025-08-11 RX ADMIN — NYSTATIN 500000 UNITS: 100000 SUSPENSION ORAL at 20:41

## 2025-08-11 RX ADMIN — FLUTICASONE PROPIONATE 2 SPRAY: 50 SPRAY, METERED NASAL at 08:18

## 2025-08-11 RX ADMIN — AZELASTINE HYDROCHLORIDE 2 SPRAY: 137 SPRAY, METERED NASAL at 20:42

## 2025-08-11 RX ADMIN — ACETYLCYSTEINE 600 MG: 200 SOLUTION ORAL; RESPIRATORY (INHALATION) at 12:04

## 2025-08-11 RX ADMIN — HYDROCODONE BITARTRATE AND ACETAMINOPHEN 1 TABLET: 7.5; 325 TABLET ORAL at 11:52

## 2025-08-11 RX ADMIN — NYSTATIN 500000 UNITS: 100000 SUSPENSION ORAL at 08:18

## 2025-08-11 RX ADMIN — IPRATROPIUM BROMIDE AND ALBUTEROL SULFATE 3 ML: .5; 3 SOLUTION RESPIRATORY (INHALATION) at 18:53

## 2025-08-11 RX ADMIN — IPRATROPIUM BROMIDE AND ALBUTEROL SULFATE 3 ML: .5; 3 SOLUTION RESPIRATORY (INHALATION) at 04:25

## 2025-08-11 RX ADMIN — IPRATROPIUM BROMIDE 2 SPRAY: 42 SPRAY, METERED NASAL at 13:23

## 2025-08-11 RX ADMIN — MONTELUKAST 10 MG: 10 TABLET, FILM COATED ORAL at 08:18

## 2025-08-11 RX ADMIN — IPRATROPIUM BROMIDE AND ALBUTEROL SULFATE 3 ML: .5; 3 SOLUTION RESPIRATORY (INHALATION) at 21:26

## 2025-08-11 RX ADMIN — ACETYLCYSTEINE 600 MG: 200 SOLUTION ORAL; RESPIRATORY (INHALATION) at 18:53

## 2025-08-11 RX ADMIN — AZELASTINE HYDROCHLORIDE 2 SPRAY: 137 SPRAY, METERED NASAL at 08:18

## 2025-08-11 RX ADMIN — PRAVASTATIN SODIUM 20 MG: 20 TABLET ORAL at 20:41

## 2025-08-11 RX ADMIN — IPRATROPIUM BROMIDE AND ALBUTEROL SULFATE 3 ML: .5; 3 SOLUTION RESPIRATORY (INHALATION) at 12:04

## 2025-08-11 RX ADMIN — PRIMIDONE 150 MG: 50 TABLET ORAL at 05:17

## 2025-08-11 RX ADMIN — METHYLPREDNISOLONE SODIUM SUCCINATE 40 MG: 40 INJECTION, POWDER, FOR SOLUTION INTRAMUSCULAR; INTRAVENOUS at 11:47

## 2025-08-11 RX ADMIN — SODIUM CHLORIDE 100 ML/HR: 900 INJECTION, SOLUTION INTRAVENOUS at 02:21

## 2025-08-11 ASSESSMENT — COGNITIVE AND FUNCTIONAL STATUS - GENERAL
MOBILITY SCORE: 22
WALKING IN HOSPITAL ROOM: A LITTLE
CLIMB 3 TO 5 STEPS WITH RAILING: A LITTLE
DAILY ACTIVITIY SCORE: 24
DAILY ACTIVITIY SCORE: 24
MOBILITY SCORE: 24

## 2025-08-11 ASSESSMENT — PAIN - FUNCTIONAL ASSESSMENT
PAIN_FUNCTIONAL_ASSESSMENT: 0-10

## 2025-08-11 ASSESSMENT — PAIN SCALES - GENERAL
PAINLEVEL_OUTOF10: 4
PAINLEVEL_OUTOF10: 8
PAINLEVEL_OUTOF10: 4

## 2025-08-11 ASSESSMENT — PAIN DESCRIPTION - DESCRIPTORS: DESCRIPTORS: ACHING

## 2025-08-11 NOTE — NURSING NOTE
Pulmonary Disease Navigator Documentation:    Pulmonary disease education was performed by the Respiratory Therapist with a good understanding: yes  Home oxygen: none prior to admission, currently on room air tolerating well  COPD triggers discussed and when to notify physician? yes  COPD Education booklet given to patient with education? no  Benefits of participating in a Pulmonary Rehab program discussed: not at this time  Pulmonary Rehab Referral written? NA  Home medication usage education done? Yes, Albuterol; Trelegy; Singulair; Theophylline  Pursed lip breathing education done? yes  Vaccination status reviewed.  No PFT on file at this time for review.    Comments: Patient stated she follows with Dr. Powell and his NP, Jocelynn, for her outpatient pulmonary needs.  Patient stated she has an appointment scheduled with their office for early October.  Patient denied need for home respiratory medication refills at this time.    This respiratory therapist met with patient to discuss smoking cessation.  Patient educated on nicotine addiction, dangers associated with smoking, and risk factors associated with exposure to second hand smoke. Discussed patient's behavioral triggers for smoking, behavioral interventions for successful smoking cessation, and support group availability in the area. Patient has been advised that [facility] are smoke-free facilities.  Patient stated she has cut back from 0.5 pack/day to 3 cigarettes per day.  Continued cessation was encouraged.

## 2025-08-11 NOTE — PROGRESS NOTES
Nephrology Consult Progress Note    Radha Deluna is a 75 y.o. female on day 4 of admission presenting with Altered mental status, unspecified altered mental status type.      Subjective   No acute events overnight       Objective          Physical Exam    Vitals 24HR  Heart Rate:  []   Temp:  [36.3 °C (97.3 °F)-36.6 °C (97.9 °F)]   Resp:  [16-18]   BP: (121-144)/(65-86)   SpO2:  [91 %-97 %]        AAOx3, on RA  Decreased AEBL  RRR no murmurs  Abd soft, + BS  No edema           I&O 24HR    Intake/Output Summary (Last 24 hours) at 8/11/2025 0843  Last data filed at 8/11/2025 0815  Gross per 24 hour   Intake 1906.67 ml   Output 1480 ml   Net 426.67 ml         Medications  Prescriptions Prior to Admission[1]   Scheduled medications  Scheduled Medications[2]  Continuous medications  Continuous Medications[3]  PRN medications  PRN Medications[4]    Relevant Results      Admission on 08/07/2025   Component Date Value Ref Range Status    WBC 08/07/2025 14.8 (H)  4.4 - 11.3 x10*3/uL Final    nRBC 08/07/2025 0.0  0.0 - 0.0 /100 WBCs Final    RBC 08/07/2025 3.75 (L)  4.00 - 5.20 x10*6/uL Final    Hemoglobin 08/07/2025 12.8  12.0 - 16.0 g/dL Final    Hematocrit 08/07/2025 39.4  36.0 - 46.0 % Final    MCV 08/07/2025 105 (H)  80 - 100 fL Final    MCH 08/07/2025 34.1 (H)  26.0 - 34.0 pg Final    MCHC 08/07/2025 32.5  32.0 - 36.0 g/dL Final    RDW 08/07/2025 13.7  11.5 - 14.5 % Final    Platelets 08/07/2025 186  150 - 450 x10*3/uL Final    Neutrophils % 08/07/2025 87.5  40.0 - 80.0 % Final    Immature Granulocytes %, Automated 08/07/2025 0.9  0.0 - 0.9 % Final    Immature Granulocyte Count (IG) includes promyelocytes, myelocytes and metamyelocytes but does not include bands. Percent differential counts (%) should be interpreted in the context of the absolute cell counts (cells/UL).    Lymphocytes % 08/07/2025 4.1  13.0 - 44.0 % Final    Monocytes % 08/07/2025 7.0  2.0 - 10.0 % Final    Eosinophils % 08/07/2025 0.1  0.0  - 6.0 % Final    Basophils % 08/07/2025 0.4  0.0 - 2.0 % Final    Neutrophils Absolute 08/07/2025 12.94 (H)  1.60 - 5.50 x10*3/uL Final    Percent differential counts (%) should be interpreted in the context of the absolute cell counts (cells/uL).    Immature Granulocytes Absolute, Au* 08/07/2025 0.13  0.00 - 0.50 x10*3/uL Final    Lymphocytes Absolute 08/07/2025 0.61 (L)  0.80 - 3.00 x10*3/uL Final    Monocytes Absolute 08/07/2025 1.04 (H)  0.05 - 0.80 x10*3/uL Final    Eosinophils Absolute 08/07/2025 0.01  0.00 - 0.40 x10*3/uL Final    Basophils Absolute 08/07/2025 0.06  0.00 - 0.10 x10*3/uL Final    Glucose 08/07/2025 119 (H)  74 - 99 mg/dL Final    Sodium 08/07/2025 133 (L)  136 - 145 mmol/L Final    Potassium 08/07/2025 3.4 (L)  3.5 - 5.3 mmol/L Final    Chloride 08/07/2025 101  98 - 107 mmol/L Final    Bicarbonate 08/07/2025 24  21 - 32 mmol/L Final    Anion Gap 08/07/2025 11  10 - 20 mmol/L Final    Urea Nitrogen 08/07/2025 8  6 - 23 mg/dL Final    Creatinine 08/07/2025 0.48 (L)  0.50 - 1.05 mg/dL Final    eGFR 08/07/2025 >90  >60 mL/min/1.73m*2 Final    Calculations of estimated GFR are performed using the 2021 CKD-EPI Study Refit equation without the race variable for the IDMS-Traceable creatinine methods.  https://jasn.asnjournals.org/content/early/2021/09/22/ASN.4721623429    Calcium 08/07/2025 8.8  8.6 - 10.3 mg/dL Final    Albumin 08/07/2025 3.8  3.4 - 5.0 g/dL Final    Alkaline Phosphatase 08/07/2025 60  33 - 136 U/L Final    Total Protein 08/07/2025 6.9  6.4 - 8.2 g/dL Final    AST 08/07/2025 11  9 - 39 U/L Final    Bilirubin, Total 08/07/2025 0.6  0.0 - 1.2 mg/dL Final    ALT 08/07/2025 6 (L)  7 - 45 U/L Final    Patients treated with Sulfasalazine may generate falsely decreased results for ALT.    Lactate 08/07/2025 1.3  0.4 - 2.0 mmol/L Final    Blood Culture 08/07/2025 No growth at 3 days   Preliminary    Blood Culture 08/07/2025 No growth at 3 days   Preliminary    Troponin I, High Sensitivity  08/07/2025 20 (H)  0 - 13 ng/L Final    POCT pH, Venous 08/07/2025 7.45 (H)  7.33 - 7.43 pH Final    POCT pCO2, Venous 08/07/2025 36 (L)  41 - 51 mm Hg Final    POCT pO2, Venous 08/07/2025 27 (L)  35 - 45 mm Hg Final    POCT SO2, Venous 08/07/2025 57  45 - 75 % Final    POCT Oxy Hemoglobin, Venous 08/07/2025 54.0  45.0 - 75.0 % Final    POCT Hematocrit Calculated, Venous 08/07/2025 40.0  36.0 - 46.0 % Final    POCT Sodium, Venous 08/07/2025 132 (L)  136 - 145 mmol/L Final    POCT Potassium, Venous 08/07/2025 3.4 (L)  3.5 - 5.3 mmol/L Final    POCT Chloride, Venous 08/07/2025 103  98 - 107 mmol/L Final    POCT Ionized Calicum, Venous 08/07/2025 1.14  1.10 - 1.33 mmol/L Final    POCT Glucose, Venous 08/07/2025 128 (H)  74 - 99 mg/dL Final    POCT Lactate, Venous 08/07/2025 1.3  0.4 - 2.0 mmol/L Final    POCT Base Excess, Venous 08/07/2025 1.3  -2.0 - 3.0 mmol/L Final    POCT HCO3 Calculated, Venous 08/07/2025 25.0  22.0 - 26.0 mmol/L Final    POCT Hemoglobin, Venous 08/07/2025 13.4  12.0 - 16.0 g/dL Final    POCT Anion Gap, Venous 08/07/2025 7.0 (L)  10.0 - 25.0 mmol/L Final    Patient Temperature 08/07/2025 37.0  degrees Celsius Final    FiO2 08/07/2025 21  % Final    Color, Urine 08/07/2025 Light-Yellow  Light-Yellow, Yellow, Dark-Yellow Final    Appearance, Urine 08/07/2025 Clear  Clear Final    Specific Gravity, Urine 08/07/2025 1.015  1.005 - 1.035 Final    pH, Urine 08/07/2025 5.5  5.0, 5.5, 6.0, 6.5, 7.0, 7.5, 8.0 Final    Protein, Urine 08/07/2025 10 (TRACE)  NEGATIVE, 10 (TRACE), 20 (TRACE) mg/dL Final    Glucose, Urine 08/07/2025 Normal  Normal mg/dL Final    Blood, Urine 08/07/2025 NEGATIVE  NEGATIVE mg/dL Final    Ketones, Urine 08/07/2025 NEGATIVE  NEGATIVE mg/dL Final    Bilirubin, Urine 08/07/2025 NEGATIVE  NEGATIVE mg/dL Final    Urobilinogen, Urine 08/07/2025 Normal  Normal mg/dL Final    Nitrite, Urine 08/07/2025 NEGATIVE  NEGATIVE Final    Leukocyte Esterase, Urine 08/07/2025 25 Joseluis/uL (A)  NEGATIVE  Final    WBC, Urine 08/07/2025 1-5  1-5, NONE /HPF Final    WBC Clumps, Urine 08/07/2025 OCCASIONAL  Reference range not established. /HPF Final    RBC, Urine 08/07/2025 NONE  NONE, 1-2, 3-5 /HPF Final    Mucus, Urine 08/07/2025 FEW  Reference range not established. /LPF Final    Urine Culture 08/07/2025 No growth   Final    Alcohol 08/07/2025 <10  <=10 mg/dL Final    For medical use only.    Amphetamine Screen, Urine 08/07/2025 Presumptive Negative  Presumptive Negative Final    CUTOFF LEVEL: 500 NG/ML   Cross-reactivity has been reported with high concentrations   of the following drugs: buproprion, chloroquine, chlorpromazine,   ephedrine, mephentermine, fenfluramine, phentermine,   phenylpropanolamine, pseudoephedrine, and propranolol.    Barbiturate Screen, Urine 08/07/2025 Presumptive Negative  Presumptive Negative Final    CUTOFF LEVEL: 200 NG/ML    Benzodiazepines Screen, Urine 08/07/2025 Presumptive Negative  Presumptive Negative Final    CUTOFF LEVEL: 200 NG/ML    Cannabinoid Screen, Urine 08/07/2025 Presumptive Negative  Presumptive Negative Final    CUTOFF LEVEL: 50 NG/ML    Cocaine Metabolite Screen, Urine 08/07/2025 Presumptive Negative  Presumptive Negative Final    CUTOFF LEVEL: 150 NG/ML    Fentanyl Screen, Urine 08/07/2025 Presumptive Negative  Presumptive Negative Final    CUTOFF LEVEL: 5 NG/ML    Opiate Screen, Urine 08/07/2025 Presumptive Positive (A)  Presumptive Negative Final    CUTOFF LEVEL: 300 NG/ML  The opiate screen does not detect fentanyl, meperidine, or   tramadol. Oxycodone is not consistently detected (refer to  Oxycodone Screen, Urine result).    Oxycodone Screen, Urine 08/07/2025 Presumptive Negative  Presumptive Negative Final    CUTOFF LEVEL: 100 NG/ML  This test will accurately detect both oxycodone and oxymorphone.    PCP Screen, Urine 08/07/2025 Presumptive Negative  Presumptive Negative Final    CUTOFF LEVEL:  25 NG/ML  Cross-reactivity has been reported with  dextromethorphan.    Methadone Screen, Urine 08/07/2025 Presumptive Negative  Presumptive Negative Final    CUTOFF LEVEL: 150 NG/ML  The metabolite L-alpha-acetylmethadol (LAAM) is not  detected by this method in concentrations that would  be found in the urine of patients on LAAM therapy.    Gram Stain 08/09/2025 Gram stain indicates specimen consists of lower respiratory tract secretions.   Preliminary    Gram Stain 08/09/2025 No predominant organism   Preliminary    Glucose 08/08/2025 72 (L)  74 - 99 mg/dL Final    Sodium 08/08/2025 133 (L)  136 - 145 mmol/L Final    Potassium 08/08/2025 4.2  3.5 - 5.3 mmol/L Final    Chloride 08/08/2025 105  98 - 107 mmol/L Final    Bicarbonate 08/08/2025 21  21 - 32 mmol/L Final    Anion Gap 08/08/2025 11  10 - 20 mmol/L Final    Urea Nitrogen 08/08/2025 14  6 - 23 mg/dL Final    Creatinine 08/08/2025 1.39 (H)  0.50 - 1.05 mg/dL Final    eGFR 08/08/2025 39 (L)  >60 mL/min/1.73m*2 Final    Calculations of estimated GFR are performed using the 2021 CKD-EPI Study Refit equation without the race variable for the IDMS-Traceable creatinine methods.  https://jasn.asnjournals.org/content/early/2021/09/22/ASN.3684779817    Calcium 08/08/2025 7.6 (L)  8.6 - 10.3 mg/dL Final    Phosphorus 08/08/2025 2.6  2.5 - 4.9 mg/dL Final    Albumin 08/08/2025 3.1 (L)  3.4 - 5.0 g/dL Final    WBC 08/08/2025 15.3 (H)  4.4 - 11.3 x10*3/uL Final    nRBC 08/08/2025 0.0  0.0 - 0.0 /100 WBCs Final    RBC 08/08/2025 3.33 (L)  4.00 - 5.20 x10*6/uL Final    Hemoglobin 08/08/2025 11.3 (L)  12.0 - 16.0 g/dL Final    Hematocrit 08/08/2025 35.3 (L)  36.0 - 46.0 % Final    MCV 08/08/2025 106 (H)  80 - 100 fL Final    MCH 08/08/2025 33.9  26.0 - 34.0 pg Final    MCHC 08/08/2025 32.0  32.0 - 36.0 g/dL Final    RDW 08/08/2025 14.1  11.5 - 14.5 % Final    Platelets 08/08/2025 165  150 - 450 x10*3/uL Final    Procalcitonin 08/08/2025 0.86 (H)  <=0.07 ng/mL Final    Magnesium 08/07/2025 1.50 (L)  1.60 - 2.40 mg/dL Final     Magnesium 08/08/2025 2.16  1.60 - 2.40 mg/dL Final    Magnesium 08/09/2025 2.13  1.60 - 2.40 mg/dL Final    Extra Tube 08/09/2025 Hold for add-ons.   Final    Auto resulted.    WBC 08/09/2025 11.3  4.4 - 11.3 x10*3/uL Final    nRBC 08/09/2025 0.0  0.0 - 0.0 /100 WBCs Final    RBC 08/09/2025 3.42 (L)  4.00 - 5.20 x10*6/uL Final    Hemoglobin 08/09/2025 11.6 (L)  12.0 - 16.0 g/dL Final    Hematocrit 08/09/2025 37.0  36.0 - 46.0 % Final    MCV 08/09/2025 108 (H)  80 - 100 fL Final    MCH 08/09/2025 33.9  26.0 - 34.0 pg Final    MCHC 08/09/2025 31.4 (L)  32.0 - 36.0 g/dL Final    RDW 08/09/2025 14.2  11.5 - 14.5 % Final    Platelets 08/09/2025 152  150 - 450 x10*3/uL Final    Glucose 08/09/2025 92  74 - 99 mg/dL Final    Sodium 08/09/2025 135 (L)  136 - 145 mmol/L Final    Potassium 08/09/2025 4.3  3.5 - 5.3 mmol/L Final    Chloride 08/09/2025 104  98 - 107 mmol/L Final    Bicarbonate 08/09/2025 19 (L)  21 - 32 mmol/L Final    Anion Gap 08/09/2025 16  10 - 20 mmol/L Final    Urea Nitrogen 08/09/2025 24 (H)  6 - 23 mg/dL Final    Creatinine 08/09/2025 3.10 (H)  0.50 - 1.05 mg/dL Final    eGFR 08/09/2025 15 (L)  >60 mL/min/1.73m*2 Final    Calculations of estimated GFR are performed using the 2021 CKD-EPI Study Refit equation without the race variable for the IDMS-Traceable creatinine methods.  https://jasn.asnjournals.org/content/early/2021/09/22/ASN.6093297459    Calcium 08/09/2025 7.6 (L)  8.6 - 10.3 mg/dL Final    Phosphorus 08/09/2025 2.9  2.5 - 4.9 mg/dL Final    Albumin 08/09/2025 2.9 (L)  3.4 - 5.0 g/dL Final    Magnesium 08/10/2025 2.09  1.60 - 2.40 mg/dL Final    Glucose 08/10/2025 109 (H)  74 - 99 mg/dL Final    Sodium 08/10/2025 131 (L)  136 - 145 mmol/L Final    Potassium 08/10/2025 4.3  3.5 - 5.3 mmol/L Final    Chloride 08/10/2025 103  98 - 107 mmol/L Final    Bicarbonate 08/10/2025 19 (L)  21 - 32 mmol/L Final    Anion Gap 08/10/2025 13  10 - 20 mmol/L Final    Urea Nitrogen 08/10/2025 34 (H)  6 - 23  mg/dL Final    Creatinine 08/10/2025 3.99 (H)  0.50 - 1.05 mg/dL Final    eGFR 08/10/2025 11 (L)  >60 mL/min/1.73m*2 Final    Calculations of estimated GFR are performed using the 2021 CKD-EPI Study Refit equation without the race variable for the IDMS-Traceable creatinine methods.  https://jasn.asnjournals.org/content/early/2021/09/22/ASN.6125952300    Calcium 08/10/2025 8.0 (L)  8.6 - 10.3 mg/dL Final    Phosphorus 08/10/2025 3.3  2.5 - 4.9 mg/dL Final    Albumin 08/10/2025 3.2 (L)  3.4 - 5.0 g/dL Final    WBC 08/10/2025 12.4 (H)  4.4 - 11.3 x10*3/uL Final    nRBC 08/10/2025 0.0  0.0 - 0.0 /100 WBCs Final    RBC 08/10/2025 3.33 (L)  4.00 - 5.20 x10*6/uL Final    Hemoglobin 08/10/2025 11.6 (L)  12.0 - 16.0 g/dL Final    Hematocrit 08/10/2025 34.9 (L)  36.0 - 46.0 % Final    MCV 08/10/2025 105 (H)  80 - 100 fL Final    MCH 08/10/2025 34.8 (H)  26.0 - 34.0 pg Final    MCHC 08/10/2025 33.2  32.0 - 36.0 g/dL Final    RDW 08/10/2025 13.9  11.5 - 14.5 % Final    Platelets 08/10/2025 165  150 - 450 x10*3/uL Final    Thyroid Stimulating Hormone 08/10/2025 0.73  0.44 - 3.98 mIU/L Final    Extra Tube 08/10/2025 Hold for add-ons.   Final    Auto resulted.    Sodium, Urine Random 08/10/2025 15  mmol/L Final    Creatinine, Urine Random 08/10/2025 27.0  20.0 - 320.0 mg/dL Final    Sodium/Creatinine Ratio 08/10/2025 56  Not established. mmol/g Creat Final    Albumin, Urine Random 08/10/2025 67.0  Not established mg/L Final    Creatinine, Urine Random 08/10/2025 27.0  20.0 - 320.0 mg/dL Final    Albumin/Creatinine Ratio 08/10/2025 248.1 (H)  <30.0 ug/mg Creat Final    Creatine Kinase 08/10/2025 119  0 - 215 U/L Final    WBC 08/11/2025 11.2  4.4 - 11.3 x10*3/uL Final    nRBC 08/11/2025 0.0  0.0 - 0.0 /100 WBCs Final    RBC 08/11/2025 3.50 (L)  4.00 - 5.20 x10*6/uL Final    Hemoglobin 08/11/2025 12.1  12.0 - 16.0 g/dL Final    Hematocrit 08/11/2025 37.3  36.0 - 46.0 % Final    MCV 08/11/2025 107 (H)  80 - 100 fL Final    MCH  08/11/2025 34.6 (H)  26.0 - 34.0 pg Final    MCHC 08/11/2025 32.4  32.0 - 36.0 g/dL Final    RDW 08/11/2025 14.0  11.5 - 14.5 % Final    Platelets 08/11/2025 169  150 - 450 x10*3/uL Final    Glucose 08/11/2025 86  74 - 99 mg/dL Final    Sodium 08/11/2025 136  136 - 145 mmol/L Final    Potassium 08/11/2025 4.7  3.5 - 5.3 mmol/L Final    Chloride 08/11/2025 107  98 - 107 mmol/L Final    Bicarbonate 08/11/2025 17 (L)  21 - 32 mmol/L Final    Anion Gap 08/11/2025 17  10 - 20 mmol/L Final    Urea Nitrogen 08/11/2025 36 (H)  6 - 23 mg/dL Final    Creatinine 08/11/2025 4.00 (H)  0.50 - 1.05 mg/dL Final    eGFR 08/11/2025 11 (L)  >60 mL/min/1.73m*2 Final    Calculations of estimated GFR are performed using the 2021 CKD-EPI Study Refit equation without the race variable for the IDMS-Traceable creatinine methods.  https://jasn.asnjournals.org/content/early/2021/09/22/ASN.7675443919    Calcium 08/11/2025 7.7 (L)  8.6 - 10.3 mg/dL Final    Phosphorus 08/11/2025 3.4  2.5 - 4.9 mg/dL Final    Albumin 08/11/2025 3.1 (L)  3.4 - 5.0 g/dL Final      Imaging  US renal complete  Result Date: 8/11/2025  1. Left midpole nonobstructive nephroliths measuring 0.8 cm. 2. Trace right perinephric fluid. 3. Small left-sided pleural effusion.   MACRO: None     Signed by: Jesus Prasad 8/11/2025 6:44 AM Dictation workstation:   [id number]    EEG  Result Date: 8/9/2025  IMPRESSION Impression This awake and sleep routine is normal. No epileptiform discharges or lateralizing signs are seen. A full report will be scanned into the patient's chart at a later time. This report has been interpreted and electronically signed by    MR brain w and wo IV contrast  Result Date: 8/9/2025  1. Negative head MRI examination for acute change. 2. There is a background of age-related volume loss, atherosclerotic disease, and ischemic white matter demyelination.   MACRO: None   Signed by: Salvatore Marcelo 8/9/2025 6:57 AM Dictation workstation:   [id number]    CT  chest wo IV contrast  Result Date: 8/8/2025  Rounded opacities in the left upper and lower lobes with peripheral ground-glass attenuation that are new from prior imaging 10/28/2024 concerning for pneumonia. Given the nodular appearance of the lesions, however, recommend interval follow-up with CT after treatment to confirm resolution and exclude an underlying neoplastic process.   Scattered pulmonary nodules and masses throughout the right lung which are stable compared to prior imaging.   Small left pleural effusion.   2.6 cm hypodense right thyroid nodule likely corresponding to abnormality on prior thyroid ultrasound 11/11/2024 for which a biopsy was recommended.   MACRO: None.   Signed by: Evan Finkelstein 8/8/2025 12:23 AM Dictation workstation:   [id number]    XR chest 1 view  Result Date: 8/7/2025  1.  Redemonstration of several nodular opacities over the right lung the largest measuring 15 mm over the upper lungs. These appear similar to the prior. Repeat CT is advised for further evaluation of these findings       MACRO: None   Signed by: Rustam Hilliard 8/7/2025 2:08 PM Dictation workstation:   [id number]    CT head wo IV contrast  Result Date: 8/7/2025  NO ACUTE INTRACRANIAL PROCESS   MACRO: None   Signed by: Ezra Tracy 8/7/2025 1:49 PM Dictation workstation:   [id number]      Cardiology, Vascular, and Other Imaging  No other imaging results found for the past 7 days      Renal US  RIGHT KIDNEY:  The right kidney measures 12.5 cm in length. The renal cortical  echogenicity and thickness are within normal limits. No  hydronephrosis is present; no evidence of nephrolithiasis. Trace  perinephric fluid.      LEFT KIDNEY:  The left kidney measures 12.6 cm in length. The renal cortical  echogenicity and thickness are within normal limits. Midpole  nonobstructive nephrolithiasis measuring 0.8 cm.      BLADDER:  Unremarkable      IMPRESSION:  1. Left midpole nonobstructive nephroliths measuring 0.8 cm.  2.  Trace right perinephric fluid.  3. Small left-sided pleural effusion.    ASSESSMENT AND PLAN    76 yo female w/ PMH of COPD, chronic pain, RA on methotrexate, sarcoidosis, smoking admitted with AMS, found to have FAUSTINA    FAUSTINA, nonoliguric, creatinine on admission 0.4, up to 4 today  Hyponatremia, mild, controlled  Hypocalcemia  Hypomagnesemia, controlled  HTN, BP stable, at home on lopressor BID      Plan  - creatinine stabilizing, hope for improvement in the next day or so as UOP improving  - continue ivf, urine Na remains lower  - renal US as above not concerning  - avoid hypotension and nephrotoxins  - daily lytes and replete as needed, noted low Ca on prolia       Thank you for the opportunity to assist in the care of this patient, please call with questions  Brynn Bar MD PhD            [1]   Medications Prior to Admission   Medication Sig Dispense Refill Last Dose/Taking    Bifidobacterium infantis (ALIGN ORAL) Take 1 capsule by mouth once daily.   Unknown    carboxymethylcellulose sodium (TheraTears) 0.25 % dropperette Administer 1 drop into both eyes 4 times a day as needed (dry eye).   Unknown    clopidogrel (Plavix) 75 mg tablet Take 1 tablet (75 mg) by mouth once daily.   Unknown    clotrimazole (Mycelex) 10 mg mateus Take 1 tablet (10 mg) by mouth once daily. 70 Mateus 3 Unknown    denosumab (Prolia) 60 mg/mL syringe Inject 1 mL (60 mg) under the skin every 6 months.   7/31/2025    diphenhydrAMINE (Sominex) 25 mg tablet Take 1 tablet (25 mg) by mouth as needed at bedtime for sleep.   Unknown    ergocalciferol (Vitamin D-2) 1.25 MG (67358 UT) capsule TAKE 1 CAPSULE WEEKLY. (Patient taking differently: Take 1 capsule (1.25 mg) by mouth every 14 (fourteen) days.) 13 capsule 3 Unknown    famotidine (Pepcid) 40 mg tablet TAKE 1 TABLET BY MOUTH ONCE DAILY AT BEDTIME 90 tablet 3 Unknown    fluticasone (Flonase) 50 mcg/actuation nasal spray Administer 2 sprays into each nostril once daily. 16 g 3 Unknown     folic acid (Folvite) 1 mg tablet Take 1 tablet (1 mg) by mouth once daily.   Unknown    gabapentin (Neurontin) 400 mg capsule Take 2 capsules (800 mg) by mouth 4 times a day. 720 capsule 1 Unknown    Humira,CF, Pen 40 mg/0.4 mL pen injector kit pen-injector Inject 1 Pen (40 mg) under the skin every 14 (fourteen) days.   Unknown    HYDROcodone-acetaminophen (Norco) 7.5-325 mg tablet Take 1 tablet by mouth every 12 hours if needed for severe pain (7 - 10). 90 tablet 0 Unknown    hydroxychloroquine (Plaquenil) 200 mg tablet Take 1.5 tablets (300 mg) by mouth once daily.   Unknown    ipratropium (Atrovent) 42 mcg (0.06 %) nasal spray Administer 2 sprays into each nostril 4 times a day. 15 mL 5 Unknown    isosorbide mononitrate ER (Imdur) 60 mg 24 hr tablet Take 1 tablet (60 mg) by mouth once daily.   Unknown    methotrexate 25 mg/mL injection Inject 0.8 mL (20 mg) into the muscle 1 (one) time per week.   Unknown    metoclopramide (Reglan) 10 mg tablet Take 1 tablet (10 mg) by mouth once daily as needed (nausea). 30 tablet 1 Unknown    metoprolol tartrate (Lopressor) 50 mg tablet Take 1 tablet by mouth every 12 hours.   Unknown    Miebo, PF, 100 % drops Administer 1 drop into both eyes 4 times a day.   Unknown    montelukast (Singulair) 10 mg tablet Take 1 tablet (10 mg) by mouth once daily. 90 tablet 1 Unknown    naloxone (Narcan) 4 mg/0.1 mL nasal spray Administer 1 spray (4 mg) into affected nostril(s) if needed for opioid reversal. May repeat every 2-3 minutes if needed, alternating nostrils, until medical assistance becomes available. 2 each 0 Unknown    nitroglycerin (Nitrostat) 0.4 mg SL tablet Place 1 tablet (0.4 mg) under the tongue every 5 minutes if needed for chest pain. May take up to 3 doses over 15 minutes. Call 911 if pain persists.   Unknown    oxygen (O2) therapy Inhale once daily at bedtime. use at night as directed   Unknown    polyethylene glycol (Glycolax, Miralax) 17 gram packet Take 17 g by mouth  "2 times a day. Mix 1 cap (17g) into 8 ounces of fluid. 60 packet 7 Unknown    polyethylene glycol (Glycolax, Miralax) 17 gram/dose powder Mix 17 g of powder and drink 2 times a day.   Unknown    pravastatin (Pravachol) 20 mg tablet Take 1 tablet (20 mg) by mouth once daily.   Unknown    primidone (Mysoline) 50 mg tablet Take 3 tablets (150 mg) by mouth early in the morning.. 270 tablet 3 Unknown    ProAir HFA 90 mcg/actuation inhaler Inhale 2 puffs every 4 hours if needed for wheezing or shortness of breath. 8.5 g 5 Unknown    sennosides-docusate sodium (Martha-Colace) 8.6-50 mg tablet Take 1 tablet by mouth once daily. 30 tablet 11 Unknown    theophylline ER (Alok-Dur) 300 mg 12 hr tablet Take 1 tablet (300 mg) by mouth 3 times a day.   Unknown    Trelegy Ellipta 100-62.5-25 mcg blister with device Inhale 1 puff once daily.   Unknown    Xiidra 5 % dropperette Administer 1 drop into both eyes every 12 hours.   Unknown    azelastine (Astelin) 137 mcg (0.1 %) nasal spray Administer 2 sprays into each nostril 2 times a day. (Patient not taking: Reported on 8/7/2025) 72 mL 3 Not Taking    cyclobenzaprine (Flexeril) 10 mg tablet Take 1 tablet (10 mg) by mouth 3 times a day as needed for muscle spasms. (Patient not taking: Reported on 8/7/2025) 45 tablet 1 Not Taking    ipratropium (Atrovent) 0.02 % nebulizer solution Use 1 vial 4 times daily (Patient not taking: Reported on 8/7/2025) 75 mL 5 Not Taking    pantoprazole (ProtoNix) 40 mg EC tablet Take 1 tablet (40 mg) by mouth once daily. Do not crush, chew, or split. (Patient not taking: Reported on 8/7/2025) 30 tablet 5 Not Taking    syringe with needle 1 mL 25 gauge x 5/8\" syringe       [2] azelastine, 2 spray, Each Nostril, BID  cefTRIAXone, 1 g, intravenous, q24h  clopidogrel, 75 mg, oral, Daily  doxycycline, 100 mg, intravenous, q12h  fluticasone, 2 spray, Each Nostril, Daily  [Held by provider] gabapentin, 800 mg, oral, BID  heparin (porcine), 5,000 Units, " subcutaneous, q8h  [Held by provider] hydroxychloroquine, 300 mg, oral, Daily  ipratropium, 2 spray, Each Nostril, 4x daily  ipratropium-albuteroL, 3 mL, nebulization, TID  [Held by provider] isosorbide mononitrate ER, 60 mg, oral, Daily  [Held by provider] methotrexate, 20 mg, intramuscular, Every Sunday  methylPREDNISolone sodium succinate (PF), 40 mg, intravenous, q24h  [Held by provider] metoprolol tartrate, 50 mg, oral, q12h  montelukast, 10 mg, oral, Daily  nystatin, 5 mL, Mouth/Throat, TID  pantoprazole, 40 mg, oral, Daily  pravastatin, 20 mg, oral, Nightly  primidone, 150 mg, oral, Daily  sennosides-docusate sodium, 1 tablet, oral, Daily  [3] sodium chloride 0.9%, 100 mL/hr, Last Rate: 100 mL/hr (08/11/25 0413)  [4] PRN medications: albuterol, benzocaine-menthol, HYDROcodone-acetaminophen, ipratropium-albuteroL, lubricating eye drops, metoclopramide, naloxone, nitroglycerin, oxygen, polyethylene glycol

## 2025-08-11 NOTE — CARE PLAN
The patient's goals for the shift include      The clinical goals for the shift include Patient will remain safe this shift

## 2025-08-11 NOTE — PROGRESS NOTES
Radha Deluna is a 75 y.o. female on day 4 of admission presenting with Altered mental status, unspecified altered mental status type.      Subjective   Patient was seen and examined at bedside,  she was doing well, noted she was having some shortness of breath ,  she is on still on RA . Denies any f/c, n/v, new onset headaches, vision changes, chest pain, dyspnea, abdominal pain, changes in BM, or urinary changes. She wasn't happy about being still in the hospital and wants to be discharged.           Objective     Last Recorded Vitals  BP (!) 167/101 (BP Location: Left arm, Patient Position: Lying)   Pulse 96   Temp 36.6 °C (97.9 °F) (Temporal)   Resp 17   Wt 65.7 kg (144 lb 13.5 oz)   SpO2 96%   Intake/Output last 3 Shifts:    Intake/Output Summary (Last 24 hours) at 8/11/2025 1813  Last data filed at 8/11/2025 0815  Gross per 24 hour   Intake 1176.67 ml   Output 800 ml   Net 376.67 ml       Admission Weight  Weight: 68.5 kg (151 lb) (08/07/25 1200)    Daily Weight  08/07/25 : 65.7 kg (144 lb 13.5 oz)    Image Results  XR chest 1 view  Narrative: Interpreted By:  Jesus Prasad,   STUDY:  XR CHEST 1 VIEW; 8/11/2025 12:11 pm      INDICATION:  Signs/Symptoms:Shortness of breath.      COMPARISON:  Chest CT scan 08/07/2025      ACCESSION NUMBER(S):  [id number]      ORDERING CLINICIAN:  LARS DUMONT      TECHNIQUE:  1 view of the chest was performed.      FINDINGS:  Persistent bilateral right-greater-than-left scattered ill-defined  airspace opacities better visualized in prior chest CT scan.. Small  left-sided pleural effusion. No pneumothorax.  The cardiomediastinal  silhouette is stable.      Impression: 1. Persistent right-greater-than-left ill-defined opacities.  2. Stable small left-sided pleural effusion      Signed by: Jesus Prasad 8/11/2025 12:25 PM  Dictation workstation:   [id number]  US renal complete  Narrative: Interpreted By:  Jesus Prasad,   STUDY:  US RENAL COMPLETE;  8/10/2025 11:15  am      INDICATION:  Signs/Symptoms:Acute Kidney Injury.          COMPARISON:  None.      ACCESSION NUMBER(S):  [id number]      ORDERING CLINICIAN:  NICHOLE WALLACE      TECHNIQUE:  Multiple images of the kidneys were obtained  .      FINDINGS:  RIGHT KIDNEY:  The right kidney measures 12.5 cm in length. The renal cortical  echogenicity and thickness are within normal limits. No  hydronephrosis is present; no evidence of nephrolithiasis. Trace  perinephric fluid.      LEFT KIDNEY:  The left kidney measures 12.6 cm in length. The renal cortical  echogenicity and thickness are within normal limits. Midpole  nonobstructive nephrolithiasis measuring 0.8 cm.      BLADDER:  Unremarkable      Impression: 1. Left midpole nonobstructive nephroliths measuring 0.8 cm.  2. Trace right perinephric fluid.  3. Small left-sided pleural effusion.      MACRO:  None          Signed by: Jesus Prasad 8/11/2025 6:44 AM  Dictation workstation:   [id number]      Physical Exam  Constitutional:       General: She is not in acute distress.     Appearance: She is normal weight.   HENT:      Head: Normocephalic and atraumatic.      Nose: Nose normal.      Mouth/Throat:      Mouth: Mucous membranes are moist.      Pharynx: Oropharynx is clear.      Eyes:      Extraocular Movements: Extraocular movements intact.      Conjunctiva/sclera: Conjunctivae normal.      Pupils: Pupils are equal, round, and reactive to light.         Cardiovascular:      Rate and Rhythm: Regular rhythm. Normal rate.      Pulses: Normal pulses.      Heart sounds: Normal heart sounds.   Pulmonary:      Effort: Pulmonary effort is normal.      Breath sounds: Rales, no wheezine  Abdominal:      General: Abdomen is flat.      Palpations: Abdomen is soft.      Musculoskeletal:         General: Normal range of motion.      Cervical back: Normal range of motion and neck supple.      Skin:     General: Skin is warm and dry.      Neurological:      General: No focal deficit  present.      Mental Status: She is alert and oriented to person, place, and time. Mental status is at baseline.      Psychiatric:         Mood and Affect: Mood normal.         Behavior: Behavior normal.   Relevant Results                            Assessment & Plan  Altered mental status, unspecified altered mental status type    Radha Deluna is a 75 y.o. female admitted for  AMS and fever     Acute Medical Issues     #Acute hypoxic respiratory failure (Resolved):  # Concern for CAP  # Possible COPD exacerbation  - Patient was on 2 L in the ED, according to the nurse she was dropping to the 80s on room air, she uses oxygen 2 L at night at home.  - Steroids, currently on RA   - Patient reported chills and productive cough, she was found to have fever.  - Chest x-ray showed 8/11 Persistent right-greater-than-left ill-defined opacities, Stable small left-sided pleural effusion  - CT chest;  Rounded opacities in the left upper and lower lobes with peripheral  ground-glass attenuation that are new from prior imaging 10/28/2024  concerning for pneumonia. Given the nodular appearance of the  lesions, however, recommend interval follow-up with CT after  treatment to confirm resolution and exclude an underlying neoplastic  process.  -Start Ceftriaxone and Azithromycin  -Received Zosyn/Vanco  -Mycoplasma, Legionella, legionella , Procal 0.86 , MRSA; pending  - RT evaluate and treat, DuoNeb, albuterol, incentive spirometry.  - Wean oxygen as tolerated          #FAUSTINA   - s. Creat increased since admission 4 from 0.48  - 1 bolus given, start maintenance fluids. NS 75 ml, had to stop the fluids as the patient became short of breath, CXR was concerning for fluid congestion, bnp 264  - Continue to monitor, avoid nephrotoxic meds.   - Nephrology consulted; appreciate recs  - Renal US, urine electrolytes low sodium, normal CK        #Concern for CHF:  - Patient started having shortness of breath and chest tightness, fluid was  stopped, CXR  was concerning for fluid congestion, bnp 264  - Will obtain echo to evaluate.        #Sepsis(Resolved):  - SIRS criteria in the ED 3/4; tachycardia, leukocytosis fever:  - Acute hypoxic respiratory failure, normal lactate.        #Acute myocardial injury(Resolved):  - Likely secondary to demand ischemia type II MI, mildly elevated troponin of 20, no chest pain       #AMS(Resolved):  #Heat stroke vs Infection:  -Patient was found to be confused , drove her car in the wrong direction.  -She was in her car for 45 minutes and was feverish, responded to fluids.   - CT head NO ACUTE INTRACRANIAL PROCESS      #Hyponatremia:  #Hypokalemia(resolved):  - Continue to monitor, replete potassium as needed     #Seizure(ruled out)  - Patient on primidone, 150 mg, oral, Daily for tremors  - Neurology consulted, patient will be evaluated for possible seizure due to polypharmacy and infectious process.  - MRI normall and EEG ordered.        Chronic Medical Issues   # Allergic rhinitis  -Continue azelastine, 2 spray, Each Nostril, BID,  fluticasone, 2 spray, Each Nostril, Daily, montelukast, 10 mg, oral, Daily        #Chronic back pain  - Hold  gabapentin, 800 mg, oral, BID for FAUSTINA     #Rheumatoid arthritis  - Hold hydroxychloroquine, 300 mg, oral, Daily,  and methotrexate, 20 mg, intramuscular, Every Sunday for FAUSTINA         #HTN/CAD/AAA:  -Resume  isosorbide mononitrate ER, 60 mg, oral, Daily, metoprolol tartrate, 50 mg, oral, q12h     # GERD:  - pantoprazole, 40 mg, oral, Daily     #HLD  -Continue pravastatin, 20 mg, oral, Daily           F: PO intake & IVF PRN  E: Replete as needed  N: Regular diet  GI ppx: Protonix 40 mg daily   DVT ppx: Lovenox subcutaneous  Antibiotics: Ceftriaxone, doxycycline   Oxygenation: 0- 2L NC     Code Status: DNR and No Intubation   Emergency Contact: Extended Emergency Contact Information  Primary Emergency Contact: KERWIN FRASER  Address: [address]           [place], [place]  Home Phone:  [phone]  Work Phone: [phone]  Mobile Phone: [phone]  Relation: Sibling   needed? No      Disposition: 75 y.o.female admitted for AMS , RESOLVED , patient has FAUSTINA, continue management as above.            Flakito Gunn, DO

## 2025-08-11 NOTE — PROGRESS NOTES
IV to PO Conversion    Radha Deluna is a 75 y.o. female who qualifies for IV to PO therapy conversion.    Inclusion and exclusion criteria have been evaluated. Will change existing order for doxycycline 100 mg IV every 12 hours  to doxycycline 100 mg PO every 12 hours  per protocol/policy. Stop date also entered per discussion with the provider.       Please contact Pharmacy with any questions.  Oanh Stubbs, PharmD

## 2025-08-12 ENCOUNTER — APPOINTMENT (OUTPATIENT)
Dept: CARDIOLOGY | Facility: HOSPITAL | Age: 75
DRG: 871 | End: 2025-08-12
Payer: MEDICARE

## 2025-08-12 LAB
ALBUMIN SERPL BCP-MCNC: 3.1 G/DL (ref 3.4–5)
ALBUMIN SERPL BCP-MCNC: 3.7 G/DL (ref 3.4–5)
ANION GAP SERPL CALC-SCNC: 13 MMOL/L (ref 10–20)
ANION GAP SERPL CALC-SCNC: 17 MMOL/L (ref 10–20)
AORTIC VALVE MEAN GRADIENT: 8 MMHG
AORTIC VALVE PEAK VELOCITY: 1.87 M/S
APPEARANCE UR: CLEAR
AV PEAK GRADIENT: 14 MMHG
AVA (PEAK VEL): 2.11 CM2
AVA (VTI): 2.19 CM2
BACTERIA SPEC RESP CULT: NORMAL
BILIRUB UR STRIP.AUTO-MCNC: NEGATIVE MG/DL
BUN SERPL-MCNC: 46 MG/DL (ref 6–23)
BUN SERPL-MCNC: 56 MG/DL (ref 6–23)
CALCIUM SERPL-MCNC: 7.7 MG/DL (ref 8.6–10.3)
CALCIUM SERPL-MCNC: 8.4 MG/DL (ref 8.6–10.3)
CHLORIDE SERPL-SCNC: 107 MMOL/L (ref 98–107)
CHLORIDE SERPL-SCNC: 109 MMOL/L (ref 98–107)
CO2 SERPL-SCNC: 17 MMOL/L (ref 21–32)
CO2 SERPL-SCNC: 18 MMOL/L (ref 21–32)
COLOR UR: COLORLESS
CREAT SERPL-MCNC: 4.36 MG/DL (ref 0.5–1.05)
CREAT SERPL-MCNC: 4.53 MG/DL (ref 0.5–1.05)
EGFRCR SERPLBLD CKD-EPI 2021: 10 ML/MIN/1.73M*2
EGFRCR SERPLBLD CKD-EPI 2021: 9 ML/MIN/1.73M*2
EJECTION FRACTION APICAL 4 CHAMBER: 66.3
EJECTION FRACTION: 63 %
ERYTHROCYTE [DISTWIDTH] IN BLOOD BY AUTOMATED COUNT: 14.3 % (ref 11.5–14.5)
GLUCOSE SERPL-MCNC: 77 MG/DL (ref 74–99)
GLUCOSE SERPL-MCNC: 81 MG/DL (ref 74–99)
GLUCOSE UR STRIP.AUTO-MCNC: NORMAL MG/DL
GRAM STN SPEC: NORMAL
GRAM STN SPEC: NORMAL
HCT VFR BLD AUTO: 36.2 % (ref 36–46)
HGB BLD-MCNC: 12 G/DL (ref 12–16)
KETONES UR STRIP.AUTO-MCNC: NEGATIVE MG/DL
LEFT ATRIUM VOLUME AREA LENGTH INDEX BSA: 23.9 ML/M2
LEFT VENTRICLE INTERNAL DIMENSION DIASTOLE: 4.9 CM (ref 3.5–6)
LEFT VENTRICULAR OUTFLOW TRACT DIAMETER: 2.3 CM
LEUKOCYTE ESTERASE UR QL STRIP.AUTO: ABNORMAL
MCH RBC QN AUTO: 34.6 PG (ref 26–34)
MCHC RBC AUTO-ENTMCNC: 33.1 G/DL (ref 32–36)
MCV RBC AUTO: 104 FL (ref 80–100)
MITRAL VALVE E/A RATIO: 0.8
NITRITE UR QL STRIP.AUTO: NEGATIVE
NRBC BLD-RTO: 0 /100 WBCS (ref 0–0)
PH UR STRIP.AUTO: 5 [PH]
PHOSPHATE SERPL-MCNC: 3.3 MG/DL (ref 2.5–4.9)
PHOSPHATE SERPL-MCNC: 3.5 MG/DL (ref 2.5–4.9)
PLATELET # BLD AUTO: 199 X10*3/UL (ref 150–450)
POTASSIUM SERPL-SCNC: 5 MMOL/L (ref 3.5–5.3)
POTASSIUM SERPL-SCNC: 5.1 MMOL/L (ref 3.5–5.3)
PROT UR STRIP.AUTO-MCNC: NEGATIVE MG/DL
RBC # BLD AUTO: 3.47 X10*6/UL (ref 4–5.2)
RBC # UR STRIP.AUTO: NEGATIVE MG/DL
RBC #/AREA URNS AUTO: ABNORMAL /HPF
RIGHT VENTRICLE FREE WALL PEAK S': 13.2 CM/S
SODIUM SERPL-SCNC: 134 MMOL/L (ref 136–145)
SODIUM SERPL-SCNC: 137 MMOL/L (ref 136–145)
SP GR UR STRIP.AUTO: 1
SQUAMOUS #/AREA URNS AUTO: ABNORMAL /HPF
TRICUSPID ANNULAR PLANE SYSTOLIC EXCURSION: 2.1 CM
UROBILINOGEN UR STRIP.AUTO-MCNC: NORMAL MG/DL
WBC # BLD AUTO: 15.6 X10*3/UL (ref 4.4–11.3)
WBC #/AREA URNS AUTO: ABNORMAL /HPF

## 2025-08-12 PROCEDURE — 1100000001 HC PRIVATE ROOM DAILY

## 2025-08-12 PROCEDURE — 2500000001 HC RX 250 WO HCPCS SELF ADMINISTERED DRUGS (ALT 637 FOR MEDICARE OP): Performed by: NURSE PRACTITIONER

## 2025-08-12 PROCEDURE — 99233 SBSQ HOSP IP/OBS HIGH 50: CPT | Performed by: INTERNAL MEDICINE

## 2025-08-12 PROCEDURE — 81001 URINALYSIS AUTO W/SCOPE: CPT | Performed by: INTERNAL MEDICINE

## 2025-08-12 PROCEDURE — 2500000001 HC RX 250 WO HCPCS SELF ADMINISTERED DRUGS (ALT 637 FOR MEDICARE OP): Performed by: INTERNAL MEDICINE

## 2025-08-12 PROCEDURE — 2500000004 HC RX 250 GENERAL PHARMACY W/ HCPCS (ALT 636 FOR OP/ED): Performed by: INTERNAL MEDICINE

## 2025-08-12 PROCEDURE — 36415 COLL VENOUS BLD VENIPUNCTURE: CPT | Performed by: INTERNAL MEDICINE

## 2025-08-12 PROCEDURE — 85027 COMPLETE CBC AUTOMATED: CPT | Performed by: INTERNAL MEDICINE

## 2025-08-12 PROCEDURE — 84100 ASSAY OF PHOSPHORUS: CPT | Performed by: INTERNAL MEDICINE

## 2025-08-12 PROCEDURE — 2500000002 HC RX 250 W HCPCS SELF ADMINISTERED DRUGS (ALT 637 FOR MEDICARE OP, ALT 636 FOR OP/ED): Performed by: INTERNAL MEDICINE

## 2025-08-12 PROCEDURE — 93306 TTE W/DOPPLER COMPLETE: CPT

## 2025-08-12 PROCEDURE — 94640 AIRWAY INHALATION TREATMENT: CPT

## 2025-08-12 RX ORDER — IPRATROPIUM BROMIDE AND ALBUTEROL SULFATE 2.5; .5 MG/3ML; MG/3ML
3 SOLUTION RESPIRATORY (INHALATION)
Status: DISCONTINUED | OUTPATIENT
Start: 2025-08-13 | End: 2025-08-21 | Stop reason: HOSPADM

## 2025-08-12 RX ORDER — IPRATROPIUM BROMIDE AND ALBUTEROL SULFATE 2.5; .5 MG/3ML; MG/3ML
3 SOLUTION RESPIRATORY (INHALATION)
Status: DISCONTINUED | OUTPATIENT
Start: 2025-08-12 | End: 2025-08-12

## 2025-08-12 RX ORDER — SODIUM CHLORIDE 9 MG/ML
75 INJECTION, SOLUTION INTRAVENOUS CONTINUOUS
Status: DISCONTINUED | OUTPATIENT
Start: 2025-08-12 | End: 2025-08-13

## 2025-08-12 RX ORDER — FUROSEMIDE 10 MG/ML
40 INJECTION INTRAMUSCULAR; INTRAVENOUS ONCE
Status: COMPLETED | OUTPATIENT
Start: 2025-08-12 | End: 2025-08-12

## 2025-08-12 RX ORDER — BUDESONIDE 0.25 MG/2ML
0.25 INHALANT ORAL
Status: DISCONTINUED | OUTPATIENT
Start: 2025-08-12 | End: 2025-08-21 | Stop reason: HOSPADM

## 2025-08-12 RX ORDER — ALBUTEROL SULFATE 90 UG/1
2 INHALANT RESPIRATORY (INHALATION) EVERY 2 HOUR PRN
Status: DISCONTINUED | OUTPATIENT
Start: 2025-08-12 | End: 2025-08-21 | Stop reason: HOSPADM

## 2025-08-12 RX ADMIN — NYSTATIN 500000 UNITS: 100000 SUSPENSION ORAL at 08:35

## 2025-08-12 RX ADMIN — NYSTATIN 500000 UNITS: 100000 SUSPENSION ORAL at 20:39

## 2025-08-12 RX ADMIN — METHYLPREDNISOLONE SODIUM SUCCINATE 40 MG: 40 INJECTION, POWDER, FOR SOLUTION INTRAMUSCULAR; INTRAVENOUS at 23:25

## 2025-08-12 RX ADMIN — IPRATROPIUM BROMIDE 2 SPRAY: 42 SPRAY, METERED NASAL at 12:55

## 2025-08-12 RX ADMIN — PANTOPRAZOLE SODIUM 40 MG: 40 TABLET, DELAYED RELEASE ORAL at 08:35

## 2025-08-12 RX ADMIN — IPRATROPIUM BROMIDE AND ALBUTEROL SULFATE 3 ML: .5; 3 SOLUTION RESPIRATORY (INHALATION) at 06:56

## 2025-08-12 RX ADMIN — METOPROLOL TARTRATE 50 MG: 50 TABLET, FILM COATED ORAL at 17:59

## 2025-08-12 RX ADMIN — PRAVASTATIN SODIUM 20 MG: 20 TABLET ORAL at 20:39

## 2025-08-12 RX ADMIN — IPRATROPIUM BROMIDE AND ALBUTEROL SULFATE 3 ML: .5; 3 SOLUTION RESPIRATORY (INHALATION) at 00:25

## 2025-08-12 RX ADMIN — CEFTRIAXONE 1 G: 1 INJECTION, SOLUTION INTRAVENOUS at 17:59

## 2025-08-12 RX ADMIN — FLUTICASONE PROPIONATE 2 SPRAY: 50 SPRAY, METERED NASAL at 08:34

## 2025-08-12 RX ADMIN — AZELASTINE HYDROCHLORIDE 2 SPRAY: 137 SPRAY, METERED NASAL at 08:35

## 2025-08-12 RX ADMIN — ACETYLCYSTEINE 600 MG: 200 SOLUTION ORAL; RESPIRATORY (INHALATION) at 20:22

## 2025-08-12 RX ADMIN — CLOPIDOGREL BISULFATE 75 MG: 75 TABLET, FILM COATED ORAL at 08:35

## 2025-08-12 RX ADMIN — HEPARIN SODIUM 5000 UNITS: 5000 INJECTION, SOLUTION INTRAVENOUS; SUBCUTANEOUS at 08:35

## 2025-08-12 RX ADMIN — Medication: at 20:22

## 2025-08-12 RX ADMIN — ACETYLCYSTEINE 600 MG: 200 SOLUTION ORAL; RESPIRATORY (INHALATION) at 12:45

## 2025-08-12 RX ADMIN — IPRATROPIUM BROMIDE 2 SPRAY: 42 SPRAY, METERED NASAL at 08:34

## 2025-08-12 RX ADMIN — SODIUM CHLORIDE 75 ML/HR: 0.9 INJECTION, SOLUTION INTRAVENOUS at 17:59

## 2025-08-12 RX ADMIN — METHYLPREDNISOLONE SODIUM SUCCINATE 40 MG: 40 INJECTION, POWDER, FOR SOLUTION INTRAMUSCULAR; INTRAVENOUS at 10:18

## 2025-08-12 RX ADMIN — IPRATROPIUM BROMIDE AND ALBUTEROL SULFATE 3 ML: .5; 3 SOLUTION RESPIRATORY (INHALATION) at 12:46

## 2025-08-12 RX ADMIN — HEPARIN SODIUM 5000 UNITS: 5000 INJECTION, SOLUTION INTRAVENOUS; SUBCUTANEOUS at 16:12

## 2025-08-12 RX ADMIN — HEPARIN SODIUM 5000 UNITS: 5000 INJECTION, SOLUTION INTRAVENOUS; SUBCUTANEOUS at 01:58

## 2025-08-12 RX ADMIN — IPRATROPIUM BROMIDE 2 SPRAY: 42 SPRAY, METERED NASAL at 16:12

## 2025-08-12 RX ADMIN — SENNOSIDES AND DOCUSATE SODIUM 1 TABLET: 50; 8.6 TABLET ORAL at 08:35

## 2025-08-12 RX ADMIN — METOPROLOL TARTRATE 50 MG: 50 TABLET, FILM COATED ORAL at 06:14

## 2025-08-12 RX ADMIN — AZELASTINE HYDROCHLORIDE 2 SPRAY: 137 SPRAY, METERED NASAL at 20:39

## 2025-08-12 RX ADMIN — ISOSORBIDE MONONITRATE 60 MG: 60 TABLET, EXTENDED RELEASE ORAL at 08:35

## 2025-08-12 RX ADMIN — IPRATROPIUM BROMIDE 2 SPRAY: 42 SPRAY, METERED NASAL at 20:39

## 2025-08-12 RX ADMIN — IPRATROPIUM BROMIDE AND ALBUTEROL SULFATE 3 ML: .5; 3 SOLUTION RESPIRATORY (INHALATION) at 20:22

## 2025-08-12 RX ADMIN — MONTELUKAST 10 MG: 10 TABLET, FILM COATED ORAL at 08:35

## 2025-08-12 RX ADMIN — FUROSEMIDE 40 MG: 10 INJECTION, SOLUTION INTRAMUSCULAR; INTRAVENOUS at 10:25

## 2025-08-12 RX ADMIN — ACETYLCYSTEINE 600 MG: 200 SOLUTION ORAL; RESPIRATORY (INHALATION) at 06:56

## 2025-08-12 ASSESSMENT — PAIN SCALES - GENERAL: PAINLEVEL_OUTOF10: 0 - NO PAIN

## 2025-08-12 ASSESSMENT — COGNITIVE AND FUNCTIONAL STATUS - GENERAL
CLIMB 3 TO 5 STEPS WITH RAILING: A LITTLE
MOVING TO AND FROM BED TO CHAIR: A LITTLE
TURNING FROM BACK TO SIDE WHILE IN FLAT BAD: A LOT
STANDING UP FROM CHAIR USING ARMS: A LITTLE
MOVING FROM LYING ON BACK TO SITTING ON SIDE OF FLAT BED WITH BEDRAILS: A LITTLE
WALKING IN HOSPITAL ROOM: A LITTLE
MOBILITY SCORE: 17

## 2025-08-12 NOTE — CARE PLAN
The patient's goals for the shift include  sleep    The clinical goals for the shift include pt will remain free from injury      Problem: Pain - Adult  Goal: Verbalizes/displays adequate comfort level or baseline comfort level  Outcome: Progressing     Problem: Safety - Adult  Goal: Free from fall injury  Outcome: Progressing     Problem: Discharge Planning  Goal: Discharge to home or other facility with appropriate resources  Outcome: Progressing     Problem: Chronic Conditions and Co-morbidities  Goal: Patient's chronic conditions and co-morbidity symptoms are monitored and maintained or improved  Outcome: Progressing     Problem: Nutrition  Goal: Nutrient intake appropriate for maintaining nutritional needs  Outcome: Progressing     Problem: Skin  Goal: Decreased wound size/increased tissue granulation at next dressing change  Outcome: Progressing  Goal: Participates in plan/prevention/treatment measures  Outcome: Progressing  Goal: Prevent/manage excess moisture  Outcome: Progressing  Goal: Prevent/minimize sheer/friction injuries  Outcome: Progressing  Goal: Promote/optimize nutrition  Outcome: Progressing  Goal: Promote skin healing  Outcome: Progressing     Problem: Pain  Goal: Takes deep breaths with improved pain control throughout the shift  Outcome: Progressing  Goal: Turns in bed with improved pain control throughout the shift  Outcome: Progressing  Goal: Walks with improved pain control throughout the shift  Outcome: Progressing  Goal: Performs ADL's with improved pain control throughout shift  Outcome: Progressing  Goal: Participates in PT with improved pain control throughout the shift  Outcome: Progressing  Goal: Free from opioid side effects throughout the shift  Outcome: Progressing  Goal: Free from acute confusion related to pain meds throughout the shift  Outcome: Progressing

## 2025-08-12 NOTE — PROGRESS NOTES
Nephrology Consult Progress Note    Radha Deluna is a 75 y.o. female on day 5 of admission presenting with Altered mental status, unspecified altered mental status type.      Subjective   No acute events overnight, more SOB earlier today, constipation +, nonoliguric       Objective          Physical Exam    Vitals 24HR  Heart Rate:  [86-96]   Temp:  [36.5 °C (97.7 °F)-36.6 °C (97.9 °F)]   Resp:  [16-18]   BP: (134-167)/()   SpO2:  [92 %-96 %]        AAOx3, on NS O2  Decreased AEBL, wheezing+  RRR no murmurs  Abd soft, + BS   edema           I&O 24HR    Intake/Output Summary (Last 24 hours) at 8/12/2025 1205  Last data filed at 8/11/2025 2004  Gross per 24 hour   Intake 200 ml   Output --   Net 200 ml         Medications  Prescriptions Prior to Admission[1]   Scheduled medications  Scheduled Medications[2]  Continuous medications  Continuous Medications[3]  PRN medications  PRN Medications[4]    Relevant Results      No results displayed because visit has over 200 results.         Imaging  XR chest 1 view  Result Date: 8/11/2025  1. Persistent right-greater-than-left ill-defined opacities. 2. Stable small left-sided pleural effusion   Signed by: Jesus Prasad 8/11/2025 12:25 PM Dictation workstation:   [id number]    US renal complete  Result Date: 8/11/2025  1. Left midpole nonobstructive nephroliths measuring 0.8 cm. 2. Trace right perinephric fluid. 3. Small left-sided pleural effusion.   MACRO: None     Signed by: Jesus Prasad 8/11/2025 6:44 AM Dictation workstation:   [id number]    EEG  Result Date: 8/9/2025  IMPRESSION Impression This awake and sleep routine is normal. No epileptiform discharges or lateralizing signs are seen. A full report will be scanned into the patient's chart at a later time. This report has been interpreted and electronically signed by     brain w and wo IV contrast  Result Date: 8/9/2025  1. Negative head MRI examination for acute change. 2. There is a background of  age-related volume loss, atherosclerotic disease, and ischemic white matter demyelination.   MACRO: None   Signed by: Salvatore Marcelo 8/9/2025 6:57 AM Dictation workstation:   [id number]    CT chest wo IV contrast  Result Date: 8/8/2025  Rounded opacities in the left upper and lower lobes with peripheral ground-glass attenuation that are new from prior imaging 10/28/2024 concerning for pneumonia. Given the nodular appearance of the lesions, however, recommend interval follow-up with CT after treatment to confirm resolution and exclude an underlying neoplastic process.   Scattered pulmonary nodules and masses throughout the right lung which are stable compared to prior imaging.   Small left pleural effusion.   2.6 cm hypodense right thyroid nodule likely corresponding to abnormality on prior thyroid ultrasound 11/11/2024 for which a biopsy was recommended.   MACRO: None.   Signed by: Evan Finkelstein 8/8/2025 12:23 AM Dictation workstation:   [id number]    XR chest 1 view  Result Date: 8/7/2025  1.  Redemonstration of several nodular opacities over the right lung the largest measuring 15 mm over the upper lungs. These appear similar to the prior. Repeat CT is advised for further evaluation of these findings       MACRO: None   Signed by: Rustam Hilliard 8/7/2025 2:08 PM Dictation workstation:   [id number]    CT head wo IV contrast  Result Date: 8/7/2025  NO ACUTE INTRACRANIAL PROCESS   MACRO: None   Signed by: Ezra Tracy 8/7/2025 1:49 PM Dictation workstation:   [id number]      Cardiology, Vascular, and Other Imaging  Transthoracic Echo Complete  Result Date: 8/12/2025   [facility], [address] [place] [address]           Tel [phone] and Fax [phone] TRANSTHORACIC ECHOCARDIOGRAM REPORT  Patient Name:       CARMINE VERDUZCO  Reading Physician:    [id number] Sanford Seth MD Study Date:         8/12/2025           Ordering  Provider:    [id number] LARS DUMONT MRN/PID:            [id number]            Fellow: Accession#:         [id number]        Nurse: Date of Birth/Age:  1950 / 75      Sonographer:          Patrick Jacobson                     years                                     ACS, RDCS, BG Gender assigned at  F                   Additional Staff: Birth: Height:             157.48 cm           Admit Date:           8/7/2025 Weight:             65.32 kg            Admission Status:     Inpatient -                                                               Routine BSA / BMI:          1.66 m2 / 26.34     Encounter#:           [id number]                     kg/m2 Blood Pressure:     134/76 mmHg         Department Location:  [facility] Study Type:    TRANSTHORACIC ECHO (TTE) COMPLETE Diagnosis/ICD: Other forms of dyspnea-R06.09 Indication:    Dyspnea CPT Code:      Echo Complete w Full Doppler-[id number] Patient History: Pertinent History: Dyspnea, CAD, HTN, Hyperlipidemia and Chest Pain. Hx coronary                    stents, hx TIA. AMS. Study Detail: The following Echo studies were performed: 2D, M-Mode, Doppler and               color flow. Technically challenging study due to body habitus.  PHYSICIAN INTERPRETATION: Left Ventricle: The left ventricular systolic function is normal with a visually estimated ejection fraction of 60-65%. There are no regional left ventricular wall motion abnormalities. The left ventricular cavity size is normal. There is mildly increased septal and normal posterior left ventricular wall thickness. Spectral Doppler shows a Grade I (impaired relaxation pattern) of left ventricular diastolic filling with normal left atrial filling pressure. Left Atrium: The left atrial size is normal. Right Ventricle: The right ventricle is normal in size. There is normal right ventricular global systolic function. Right Atrium: The right atrium is  normal in size. Aortic Valve: The aortic valve is structurally normal. The aortic valve area by VTI is 2.19 cmÂ² with a peak velocity of 1.87 m/s. The peak and mean gradients are 14 mmHg and 8 mmHg, respectively with a dimensionless index of 0.53. There is trace to mild aortic valve regurgitation. Mitral Valve: The mitral valve is normal in structure. There is no evidence of mitral valve regurgitation. The E Vmax is 0.68 m/s. Tricuspid Valve: The tricuspid valve is structurally normal. No evidence of tricuspid regurgitation. Pulmonic Valve: The pulmonic valve is structurally normal. There is physiologic pulmonic valve regurgitation. Pericardium: There is no pericardial effusion noted. Aorta: The aortic root is normal.  CONCLUSIONS:  1. The left ventricular systolic function is normal with a visually estimated ejection fraction of 60-65%.  2. Spectral Doppler shows a Grade I (impaired relaxation pattern) of left ventricular diastolic filling with normal left atrial filling pressure. QUANTITATIVE DATA SUMMARY:  2D MEASUREMENTS:          Normal Ranges: Ao Root d:       3.70 cm  (2.0-3.7cm) LAs:             2.80 cm  (2.7-4.0cm) IVSd:            0.95 cm  (0.6-1.1cm) LVPWd:           0.80 cm  (0.6-1.1cm) LVIDd:           4.90 cm  (3.9-5.9cm) LVIDs:           3.10 cm LV Mass Index:   92 g/m2 LVEDV Index:     47 ml/m2 LV % FS          36.7 %  LEFT ATRIUM:                  Normal Ranges: LA Vol A4C:        32.0 ml    (22+/-6mL/m2) LA Vol A2C:        48.2 ml LA Vol BP:         39.7 ml LA Vol Index A4C:  19.3ml/m2 LA Vol Index A2C:  29.0 ml/m2 LA Vol Index BP:   23.9 ml/m2 LA Area A4C:       14.0 cm2 LA Area A2C:       17.0 cm2 LA Major Axis A4C: 5.2 cm LA Major Axis A2C: 5.1 cm LA Volume Index:   22.0 ml/m2  RIGHT ATRIUM:          Normal Ranges: RA Area A4C:  11.0 cm2  M-MODE MEASUREMENTS:         Normal Ranges: Ao Root:             3.60 cm (2.0-3.7cm) LAs:                 4.40 cm (2.7-4.0cm)  AORTA MEASUREMENTS:          Normal Ranges: Asc Ao, d:          3.60 cm (2.1-3.4cm)  LV SYSTOLIC FUNCTION:                      Normal Ranges: EF-A4C View:    66 % (>=55%) EF-A2C View:    58 % EF-Biplane:     61 % EF-Visual:      63 % LV EF Reported: 63 %  LV DIASTOLIC FUNCTION:            Normal Ranges: MV Peak E:             0.68 m/s   (0.7-1.2 m/s) MV Peak A:             0.85 m/s   (0.42-0.7 m/s) E/A Ratio:             0.80       (1.0-2.2) MV e'                  0.091 m/s  (>8.0) MV lateral e'          0.10 m/s MV medial e'           0.08 m/s E/e' Ratio:            7.46       (<8.0) PulmV Sys Pollo:         97.10 cm/s PulmV Brasher Pollo:        69.20 cm/s PulmV S/D Pollo:         1.40  MITRAL VALVE:          Normal Ranges: MV DT:        148 msec (150-240msec)  AORTIC VALVE:                      Normal Ranges: AoV Vmax:                1.87 m/s  (<=1.7m/s) AoV Peak P.0 mmHg (<20mmHg) AoV Mean P.0 mmHg  (1.7-11.5mmHg) LVOT Max Pollo:            0.95 m/s  (<=1.1m/s) AoV VTI:                 40.60 cm  (18-25cm) LVOT VTI:                21.40 cm LVOT Diameter:           2.30 cm   (1.8-2.4cm) AoV Area, VTI:           2.19 cm2  (2.5-5.5cm2) AoV Area,Vmax:           2.11 cm2  (2.5-4.5cm2) AoV Dimensionless Index: 0.53  RIGHT VENTRICLE: RV Basal 2.60 cm RV Mid   2.50 cm RV Major 6.2 cm TAPSE:   20.9 mm RV s'    0.13 m/s  TRICUSPID VALVE/RVSP:         Normal Ranges: Est. RA Pressure:     3 IVC Diam:             1.50 cm  PULMONIC VALVE:          Normal Ranges: PV Max Pollo:     0.8 m/s  (0.6-0.9m/s) PV Max P.8 mmHg  PULMONARY VEINS: PulmV Brasher Pollo: 69.20 cm/s PulmV S/D Pollo:  1.40 PulmV Sys Pollo:  97.10 cm/s  [id number] Sanford Seth MD Electronically signed on 2025 at 9:50:03 AM  ** Final **         Renal US  RIGHT KIDNEY:  The right kidney measures 12.5 cm in length. The renal cortical  echogenicity and thickness are within normal limits. No  hydronephrosis is present; no evidence of nephrolithiasis. Trace  perinephric  fluid.      LEFT KIDNEY:  The left kidney measures 12.6 cm in length. The renal cortical  echogenicity and thickness are within normal limits. Midpole  nonobstructive nephrolithiasis measuring 0.8 cm.      BLADDER:  Unremarkable      IMPRESSION:  1. Left midpole nonobstructive nephroliths measuring 0.8 cm.  2. Trace right perinephric fluid.  3. Small left-sided pleural effusion.    ASSESSMENT AND PLAN    76 yo female w/ PMH of COPD, chronic pain, RA on methotrexate, sarcoidosis, smoking, admitted with AMS, found to have FAUSTINA    FAUSTINA, nonoliguric, creatinine on admission 0.4, up to 4.36 today    Hyponatremia, mild, controlled    Hypocalcemia    NAGMA    Hypomagnesemia, controlled    HTN, BP stable, at home on lopressor BID      Plan  - creatinine continues to rise, probably severe ATN, iv lasix earlier today for worsening SOB  - repeat UA to check sediment/casts  - please record UOP each shift  - renal US as above not concerning  - avoid hypotension and nephrotoxins  - daily lytes and replete as needed, noted low Ca on prolia as OP    MARS reviewed, dose for GFR<19, primidone daily, hold methotrexate, plaquenil and neurontin    Thank you for the opportunity to assist in the care of this patient, please call with questions  Brynn Bar MD PhD              [1]   Medications Prior to Admission   Medication Sig Dispense Refill Last Dose/Taking    Bifidobacterium infantis (ALIGN ORAL) Take 1 capsule by mouth once daily.   Unknown    carboxymethylcellulose sodium (TheraTears) 0.25 % dropperette Administer 1 drop into both eyes 4 times a day as needed (dry eye).   Unknown    clopidogrel (Plavix) 75 mg tablet Take 1 tablet (75 mg) by mouth once daily.   Unknown    clotrimazole (Mycelex) 10 mg mateus Take 1 tablet (10 mg) by mouth once daily. 70 Mateus 3 Unknown    denosumab (Prolia) 60 mg/mL syringe Inject 1 mL (60 mg) under the skin every 6 months.   7/31/2025    diphenhydrAMINE (Sominex) 25 mg tablet Take 1 tablet (25 mg)  by mouth as needed at bedtime for sleep.   Unknown    ergocalciferol (Vitamin D-2) 1.25 MG (12372 UT) capsule TAKE 1 CAPSULE WEEKLY. (Patient taking differently: Take 1 capsule (1.25 mg) by mouth every 14 (fourteen) days.) 13 capsule 3 Unknown    famotidine (Pepcid) 40 mg tablet TAKE 1 TABLET BY MOUTH ONCE DAILY AT BEDTIME 90 tablet 3 Unknown    fluticasone (Flonase) 50 mcg/actuation nasal spray Administer 2 sprays into each nostril once daily. 16 g 3 Unknown    folic acid (Folvite) 1 mg tablet Take 1 tablet (1 mg) by mouth once daily.   Unknown    gabapentin (Neurontin) 400 mg capsule Take 2 capsules (800 mg) by mouth 4 times a day. 720 capsule 1 Unknown    Humira,CF, Pen 40 mg/0.4 mL pen injector kit pen-injector Inject 1 Pen (40 mg) under the skin every 14 (fourteen) days.   Unknown    HYDROcodone-acetaminophen (Norco) 7.5-325 mg tablet Take 1 tablet by mouth every 12 hours if needed for severe pain (7 - 10). 90 tablet 0 Unknown    hydroxychloroquine (Plaquenil) 200 mg tablet Take 1.5 tablets (300 mg) by mouth once daily.   Unknown    ipratropium (Atrovent) 42 mcg (0.06 %) nasal spray Administer 2 sprays into each nostril 4 times a day. 15 mL 5 Unknown    isosorbide mononitrate ER (Imdur) 60 mg 24 hr tablet Take 1 tablet (60 mg) by mouth once daily.   Unknown    methotrexate 25 mg/mL injection Inject 0.8 mL (20 mg) into the muscle 1 (one) time per week.   Unknown    metoclopramide (Reglan) 10 mg tablet Take 1 tablet (10 mg) by mouth once daily as needed (nausea). 30 tablet 1 Unknown    metoprolol tartrate (Lopressor) 50 mg tablet Take 1 tablet by mouth every 12 hours.   Unknown    Miebo, PF, 100 % drops Administer 1 drop into both eyes 4 times a day.   Unknown    montelukast (Singulair) 10 mg tablet Take 1 tablet (10 mg) by mouth once daily. 90 tablet 1 Unknown    naloxone (Narcan) 4 mg/0.1 mL nasal spray Administer 1 spray (4 mg) into affected nostril(s) if needed for opioid reversal. May repeat every 2-3  minutes if needed, alternating nostrils, until medical assistance becomes available. 2 each 0 Unknown    nitroglycerin (Nitrostat) 0.4 mg SL tablet Place 1 tablet (0.4 mg) under the tongue every 5 minutes if needed for chest pain. May take up to 3 doses over 15 minutes. Call 911 if pain persists.   Unknown    oxygen (O2) therapy Inhale once daily at bedtime. use at night as directed   Unknown    polyethylene glycol (Glycolax, Miralax) 17 gram packet Take 17 g by mouth 2 times a day. Mix 1 cap (17g) into 8 ounces of fluid. 60 packet 7 Unknown    polyethylene glycol (Glycolax, Miralax) 17 gram/dose powder Mix 17 g of powder and drink 2 times a day.   Unknown    pravastatin (Pravachol) 20 mg tablet Take 1 tablet (20 mg) by mouth once daily.   Unknown    primidone (Mysoline) 50 mg tablet Take 3 tablets (150 mg) by mouth early in the morning.. 270 tablet 3 Unknown    ProAir HFA 90 mcg/actuation inhaler Inhale 2 puffs every 4 hours if needed for wheezing or shortness of breath. 8.5 g 5 Unknown    sennosides-docusate sodium (Martha-Colace) 8.6-50 mg tablet Take 1 tablet by mouth once daily. 30 tablet 11 Unknown    theophylline ER (Alok-Dur) 300 mg 12 hr tablet Take 1 tablet (300 mg) by mouth 3 times a day.   Unknown    Trelegy Ellipta 100-62.5-25 mcg blister with device Inhale 1 puff once daily.   Unknown    Xiidra 5 % dropperette Administer 1 drop into both eyes every 12 hours.   Unknown    azelastine (Astelin) 137 mcg (0.1 %) nasal spray Administer 2 sprays into each nostril 2 times a day. (Patient not taking: Reported on 8/7/2025) 72 mL 3 Not Taking    cyclobenzaprine (Flexeril) 10 mg tablet Take 1 tablet (10 mg) by mouth 3 times a day as needed for muscle spasms. (Patient not taking: Reported on 8/7/2025) 45 tablet 1 Not Taking    ipratropium (Atrovent) 0.02 % nebulizer solution Use 1 vial 4 times daily (Patient not taking: Reported on 8/7/2025) 75 mL 5 Not Taking    pantoprazole (ProtoNix) 40 mg EC tablet Take 1 tablet  "(40 mg) by mouth once daily. Do not crush, chew, or split. (Patient not taking: Reported on 8/7/2025) 30 tablet 5 Not Taking    syringe with needle 1 mL 25 gauge x 5/8\" syringe       [2] acetylcysteine, 3 mL, nebulization, TID  azelastine, 2 spray, Each Nostril, BID  cefTRIAXone, 1 g, intravenous, q24h  clopidogrel, 75 mg, oral, Daily  fluticasone, 2 spray, Each Nostril, Daily  [Held by provider] gabapentin, 800 mg, oral, BID  heparin (porcine), 5,000 Units, subcutaneous, q8h  [Held by provider] hydroxychloroquine, 300 mg, oral, Daily  ipratropium, 2 spray, Each Nostril, 4x daily  ipratropium-albuteroL, 3 mL, nebulization, TID  isosorbide mononitrate ER, 60 mg, oral, Daily  [Held by provider] methotrexate, 20 mg, intramuscular, Every Sunday  methylPREDNISolone sodium succinate (PF), 40 mg, intravenous, q24h  metoprolol tartrate, 50 mg, oral, q12h  montelukast, 10 mg, oral, Daily  nystatin, 5 mL, Mouth/Throat, TID  pantoprazole, 40 mg, oral, Daily  pravastatin, 20 mg, oral, Nightly  primidone, 150 mg, oral, Daily  sennosides-docusate sodium, 1 tablet, oral, Daily     [3]    [4] PRN medications: albuterol, benzocaine-menthol, HYDROcodone-acetaminophen, ipratropium-albuteroL, lubricating eye drops, metoclopramide, naloxone, nitroglycerin, oxygen, polyethylene glycol    "

## 2025-08-12 NOTE — CARE PLAN
The patient's goals for the shift include      The clinical goals for the shift include pt will remain free from injury

## 2025-08-13 LAB
ALBUMIN SERPL BCP-MCNC: 3 G/DL (ref 3.4–5)
ALBUMIN SERPL BCP-MCNC: 3.2 G/DL (ref 3.4–5)
ANION GAP BLDA CALCULATED.4IONS-SCNC: 15 MMO/L (ref 10–25)
ANION GAP SERPL CALC-SCNC: 16 MMOL/L (ref 10–20)
ANION GAP SERPL CALC-SCNC: 18 MMOL/L (ref 10–20)
APPARATUS: ABNORMAL
ARTERIAL PATENCY WRIST A: POSITIVE
BASE EXCESS BLDA CALC-SCNC: -11.7 MMOL/L (ref -2–3)
BASOPHILS # BLD AUTO: 0.03 X10*3/UL (ref 0–0.1)
BASOPHILS NFR BLD AUTO: 0.2 %
BODY TEMPERATURE: 37 DEGREES CELSIUS
BUN SERPL-MCNC: 62 MG/DL (ref 6–23)
BUN SERPL-MCNC: 68 MG/DL (ref 6–23)
CA-I BLDA-SCNC: 1.17 MMOL/L (ref 1.1–1.33)
CALCIUM SERPL-MCNC: 7.6 MG/DL (ref 8.6–10.3)
CALCIUM SERPL-MCNC: 7.8 MG/DL (ref 8.6–10.3)
CHLORIDE BLDA-SCNC: 108 MMOL/L (ref 98–107)
CHLORIDE SERPL-SCNC: 108 MMOL/L (ref 98–107)
CHLORIDE SERPL-SCNC: 109 MMOL/L (ref 98–107)
CO2 SERPL-SCNC: 17 MMOL/L (ref 21–32)
CO2 SERPL-SCNC: 17 MMOL/L (ref 21–32)
CREAT SERPL-MCNC: 4.29 MG/DL (ref 0.5–1.05)
CREAT SERPL-MCNC: 4.31 MG/DL (ref 0.5–1.05)
CRITICAL CALL TIME: 1401
CRITICAL CALLED BY: ABNORMAL
CRITICAL CALLED TO: ABNORMAL
CRITICAL READ BACK: ABNORMAL
CRP SERPL-MCNC: 17.37 MG/DL
EGFRCR SERPLBLD CKD-EPI 2021: 10 ML/MIN/1.73M*2
EGFRCR SERPLBLD CKD-EPI 2021: 10 ML/MIN/1.73M*2
EOSINOPHIL # BLD AUTO: 0 X10*3/UL (ref 0–0.4)
EOSINOPHIL NFR BLD AUTO: 0 %
ERYTHROCYTE [DISTWIDTH] IN BLOOD BY AUTOMATED COUNT: 14.6 % (ref 11.5–14.5)
ERYTHROCYTE [DISTWIDTH] IN BLOOD BY AUTOMATED COUNT: 14.6 % (ref 11.5–14.5)
ERYTHROCYTE [SEDIMENTATION RATE] IN BLOOD BY WESTERGREN METHOD: 76 MM/H (ref 0–30)
GLUCOSE BLDA-MCNC: 106 MG/DL (ref 74–99)
GLUCOSE SERPL-MCNC: 83 MG/DL (ref 74–99)
GLUCOSE SERPL-MCNC: 91 MG/DL (ref 74–99)
HCO3 BLDA-SCNC: 14.6 MMOL/L (ref 22–26)
HCT VFR BLD AUTO: 36.4 % (ref 36–46)
HCT VFR BLD AUTO: 38.6 % (ref 36–46)
HCT VFR BLD EST: 48 % (ref 36–46)
HGB BLD-MCNC: 12.2 G/DL (ref 12–16)
HGB BLD-MCNC: 12.4 G/DL (ref 12–16)
HGB BLDA-MCNC: 15.9 G/DL (ref 12–16)
IMM GRANULOCYTES # BLD AUTO: 0.12 X10*3/UL (ref 0–0.5)
IMM GRANULOCYTES NFR BLD AUTO: 0.8 % (ref 0–0.9)
INHALED O2 CONCENTRATION: 36 %
LACTATE BLDA-SCNC: 0.8 MMOL/L (ref 0.4–2)
LDH SERPL L TO P-CCNC: 497 U/L (ref 84–246)
LYMPHOCYTES # BLD AUTO: 0.8 X10*3/UL (ref 0.8–3)
LYMPHOCYTES NFR BLD AUTO: 5.4 %
MCH RBC QN AUTO: 34.1 PG (ref 26–34)
MCH RBC QN AUTO: 35.1 PG (ref 26–34)
MCHC RBC AUTO-ENTMCNC: 32.1 G/DL (ref 32–36)
MCHC RBC AUTO-ENTMCNC: 33.5 G/DL (ref 32–36)
MCV RBC AUTO: 105 FL (ref 80–100)
MCV RBC AUTO: 106 FL (ref 80–100)
MONOCYTES # BLD AUTO: 0.63 X10*3/UL (ref 0.05–0.8)
MONOCYTES NFR BLD AUTO: 4.3 %
NEUTROPHILS # BLD AUTO: 13.19 X10*3/UL (ref 1.6–5.5)
NEUTROPHILS NFR BLD AUTO: 89.3 %
NRBC BLD-RTO: 0 /100 WBCS (ref 0–0)
NRBC BLD-RTO: 0 /100 WBCS (ref 0–0)
OXYHGB MFR BLDA: 94.5 % (ref 94–98)
PCO2 BLDA: 34 MM HG (ref 38–42)
PH BLDA: 7.24 PH (ref 7.38–7.42)
PHOSPHATE SERPL-MCNC: 4.5 MG/DL (ref 2.5–4.9)
PHOSPHATE SERPL-MCNC: 4.9 MG/DL (ref 2.5–4.9)
PLATELET # BLD AUTO: 237 X10*3/UL (ref 150–450)
PLATELET # BLD AUTO: 237 X10*3/UL (ref 150–450)
PO2 BLDA: 80 MM HG (ref 85–95)
POTASSIUM BLDA-SCNC: 5.5 MMOL/L (ref 3.5–5.3)
POTASSIUM SERPL-SCNC: 5.5 MMOL/L (ref 3.5–5.3)
POTASSIUM SERPL-SCNC: 5.5 MMOL/L (ref 3.5–5.3)
PROCALCITONIN SERPL-MCNC: 0.29 NG/ML
RBC # BLD AUTO: 3.48 X10*6/UL (ref 4–5.2)
RBC # BLD AUTO: 3.64 X10*6/UL (ref 4–5.2)
SAO2 % BLDA: 97 % (ref 94–100)
SODIUM BLDA-SCNC: 132 MMOL/L (ref 136–145)
SODIUM SERPL-SCNC: 136 MMOL/L (ref 136–145)
SODIUM SERPL-SCNC: 137 MMOL/L (ref 136–145)
SPECIMEN DRAWN FROM PATIENT: ABNORMAL
WBC # BLD AUTO: 14.8 X10*3/UL (ref 4.4–11.3)
WBC # BLD AUTO: 15.4 X10*3/UL (ref 4.4–11.3)

## 2025-08-13 PROCEDURE — 85027 COMPLETE CBC AUTOMATED: CPT | Performed by: INTERNAL MEDICINE

## 2025-08-13 PROCEDURE — 80069 RENAL FUNCTION PANEL: CPT | Performed by: INTERNAL MEDICINE

## 2025-08-13 PROCEDURE — 2500000001 HC RX 250 WO HCPCS SELF ADMINISTERED DRUGS (ALT 637 FOR MEDICARE OP): Performed by: INTERNAL MEDICINE

## 2025-08-13 PROCEDURE — 1100000001 HC PRIVATE ROOM DAILY

## 2025-08-13 PROCEDURE — 83615 LACTATE (LD) (LDH) ENZYME: CPT | Performed by: INTERNAL MEDICINE

## 2025-08-13 PROCEDURE — 36415 COLL VENOUS BLD VENIPUNCTURE: CPT | Performed by: INTERNAL MEDICINE

## 2025-08-13 PROCEDURE — 2500000001 HC RX 250 WO HCPCS SELF ADMINISTERED DRUGS (ALT 637 FOR MEDICARE OP): Performed by: NURSE PRACTITIONER

## 2025-08-13 PROCEDURE — 94669 MECHANICAL CHEST WALL OSCILL: CPT

## 2025-08-13 PROCEDURE — 88185 FLOWCYTOMETRY/TC ADD-ON: CPT | Mod: PARLAB | Performed by: INTERNAL MEDICINE

## 2025-08-13 PROCEDURE — 85652 RBC SED RATE AUTOMATED: CPT | Performed by: INTERNAL MEDICINE

## 2025-08-13 PROCEDURE — 99233 SBSQ HOSP IP/OBS HIGH 50: CPT | Performed by: INTERNAL MEDICINE

## 2025-08-13 PROCEDURE — 2500000004 HC RX 250 GENERAL PHARMACY W/ HCPCS (ALT 636 FOR OP/ED)

## 2025-08-13 PROCEDURE — 2500000004 HC RX 250 GENERAL PHARMACY W/ HCPCS (ALT 636 FOR OP/ED): Performed by: INTERNAL MEDICINE

## 2025-08-13 PROCEDURE — 85025 COMPLETE CBC W/AUTO DIFF WBC: CPT | Performed by: INTERNAL MEDICINE

## 2025-08-13 PROCEDURE — 86140 C-REACTIVE PROTEIN: CPT

## 2025-08-13 PROCEDURE — 94640 AIRWAY INHALATION TREATMENT: CPT

## 2025-08-13 PROCEDURE — 36600 WITHDRAWAL OF ARTERIAL BLOOD: CPT

## 2025-08-13 PROCEDURE — 84145 PROCALCITONIN (PCT): CPT | Mod: PARLAB

## 2025-08-13 PROCEDURE — 2500000002 HC RX 250 W HCPCS SELF ADMINISTERED DRUGS (ALT 637 FOR MEDICARE OP, ALT 636 FOR OP/ED): Performed by: INTERNAL MEDICINE

## 2025-08-13 PROCEDURE — 84132 ASSAY OF SERUM POTASSIUM: CPT | Performed by: INTERNAL MEDICINE

## 2025-08-13 PROCEDURE — 9420000001 HC RT PATIENT EDUCATION 5 MIN

## 2025-08-13 RX ORDER — SODIUM BICARBONATE 650 MG/1
325 TABLET ORAL 3 TIMES DAILY
Status: DISCONTINUED | OUTPATIENT
Start: 2025-08-13 | End: 2025-08-13

## 2025-08-13 RX ORDER — DEXTROSE MONOHYDRATE 100 MG/ML
50 INJECTION, SOLUTION INTRAVENOUS CONTINUOUS
Status: DISCONTINUED | OUTPATIENT
Start: 2025-08-13 | End: 2025-08-13

## 2025-08-13 RX ORDER — DEXTROSE 50 % IN WATER (D50W) INTRAVENOUS SYRINGE
25 ONCE
Status: DISCONTINUED | OUTPATIENT
Start: 2025-08-13 | End: 2025-08-13

## 2025-08-13 RX ORDER — CALCIUM GLUCONATE 20 MG/ML
2 INJECTION, SOLUTION INTRAVENOUS ONCE
Status: COMPLETED | OUTPATIENT
Start: 2025-08-13 | End: 2025-08-13

## 2025-08-13 RX ORDER — SODIUM CHLORIDE 9 MG/ML
75 INJECTION, SOLUTION INTRAVENOUS CONTINUOUS
Status: DISCONTINUED | OUTPATIENT
Start: 2025-08-13 | End: 2025-08-13

## 2025-08-13 RX ADMIN — HEPARIN SODIUM 5000 UNITS: 5000 INJECTION, SOLUTION INTRAVENOUS; SUBCUTANEOUS at 16:22

## 2025-08-13 RX ADMIN — ACETYLCYSTEINE 600 MG: 200 SOLUTION ORAL; RESPIRATORY (INHALATION) at 07:01

## 2025-08-13 RX ADMIN — PANTOPRAZOLE SODIUM 40 MG: 40 TABLET, DELAYED RELEASE ORAL at 09:55

## 2025-08-13 RX ADMIN — NYSTATIN 500000 UNITS: 100000 SUSPENSION ORAL at 09:55

## 2025-08-13 RX ADMIN — IPRATROPIUM BROMIDE 2 SPRAY: 42 SPRAY, METERED NASAL at 16:23

## 2025-08-13 RX ADMIN — ISOSORBIDE MONONITRATE 60 MG: 60 TABLET, EXTENDED RELEASE ORAL at 09:55

## 2025-08-13 RX ADMIN — FLUTICASONE PROPIONATE 2 SPRAY: 50 SPRAY, METERED NASAL at 09:59

## 2025-08-13 RX ADMIN — PRIMIDONE 150 MG: 50 TABLET ORAL at 06:47

## 2025-08-13 RX ADMIN — Medication: at 18:59

## 2025-08-13 RX ADMIN — CALCIUM GLUCONATE 2 G: 20 INJECTION, SOLUTION INTRAVENOUS at 10:23

## 2025-08-13 RX ADMIN — CEFTRIAXONE 1 G: 1 INJECTION, SOLUTION INTRAVENOUS at 18:17

## 2025-08-13 RX ADMIN — METHYLPREDNISOLONE SODIUM SUCCINATE 60 MG: 125 INJECTION, POWDER, FOR SOLUTION INTRAMUSCULAR; INTRAVENOUS at 16:43

## 2025-08-13 RX ADMIN — SODIUM CHLORIDE 75 ML/HR: 0.9 INJECTION, SOLUTION INTRAVENOUS at 10:07

## 2025-08-13 RX ADMIN — SODIUM BICARBONATE 75 ML/HR: 84 INJECTION, SOLUTION INTRAVENOUS at 22:47

## 2025-08-13 RX ADMIN — IPRATROPIUM BROMIDE AND ALBUTEROL SULFATE 3 ML: .5; 3 SOLUTION RESPIRATORY (INHALATION) at 07:01

## 2025-08-13 RX ADMIN — SODIUM ZIRCONIUM CYCLOSILICATE 10 G: 10 POWDER, FOR SUSPENSION ORAL at 16:22

## 2025-08-13 RX ADMIN — PRAVASTATIN SODIUM 20 MG: 20 TABLET ORAL at 21:29

## 2025-08-13 RX ADMIN — NYSTATIN 500000 UNITS: 100000 SUSPENSION ORAL at 16:22

## 2025-08-13 RX ADMIN — METHYLPREDNISOLONE SODIUM SUCCINATE 60 MG: 125 INJECTION, POWDER, FOR SOLUTION INTRAMUSCULAR; INTRAVENOUS at 21:29

## 2025-08-13 RX ADMIN — SENNOSIDES AND DOCUSATE SODIUM 1 TABLET: 50; 8.6 TABLET ORAL at 09:55

## 2025-08-13 RX ADMIN — CLOPIDOGREL BISULFATE 75 MG: 75 TABLET, FILM COATED ORAL at 09:55

## 2025-08-13 RX ADMIN — SODIUM BICARBONATE 75 ML/HR: 84 INJECTION, SOLUTION INTRAVENOUS at 17:54

## 2025-08-13 RX ADMIN — HEPARIN SODIUM 5000 UNITS: 5000 INJECTION, SOLUTION INTRAVENOUS; SUBCUTANEOUS at 09:56

## 2025-08-13 RX ADMIN — IPRATROPIUM BROMIDE 2 SPRAY: 42 SPRAY, METERED NASAL at 06:47

## 2025-08-13 RX ADMIN — IPRATROPIUM BROMIDE AND ALBUTEROL SULFATE 3 ML: .5; 3 SOLUTION RESPIRATORY (INHALATION) at 13:37

## 2025-08-13 RX ADMIN — ACETYLCYSTEINE 600 MG: 200 SOLUTION ORAL; RESPIRATORY (INHALATION) at 13:37

## 2025-08-13 RX ADMIN — BUDESONIDE 0.25 MG: 0.25 INHALANT RESPIRATORY (INHALATION) at 07:01

## 2025-08-13 RX ADMIN — MONTELUKAST 10 MG: 10 TABLET, FILM COATED ORAL at 09:55

## 2025-08-13 RX ADMIN — ACETYLCYSTEINE 600 MG: 200 SOLUTION ORAL; RESPIRATORY (INHALATION) at 18:36

## 2025-08-13 RX ADMIN — NYSTATIN 500000 UNITS: 100000 SUSPENSION ORAL at 21:29

## 2025-08-13 RX ADMIN — IPRATROPIUM BROMIDE 2 SPRAY: 42 SPRAY, METERED NASAL at 12:24

## 2025-08-13 RX ADMIN — METOPROLOL TARTRATE 50 MG: 50 TABLET, FILM COATED ORAL at 06:47

## 2025-08-13 RX ADMIN — SODIUM BICARBONATE 650 MG TABLET 325 MG: at 16:22

## 2025-08-13 RX ADMIN — METOPROLOL TARTRATE 50 MG: 50 TABLET, FILM COATED ORAL at 18:17

## 2025-08-13 RX ADMIN — AZELASTINE HYDROCHLORIDE 2 SPRAY: 137 SPRAY, METERED NASAL at 09:59

## 2025-08-13 RX ADMIN — AZELASTINE HYDROCHLORIDE 2 SPRAY: 137 SPRAY, METERED NASAL at 21:29

## 2025-08-13 RX ADMIN — IPRATROPIUM BROMIDE AND ALBUTEROL SULFATE 3 ML: .5; 3 SOLUTION RESPIRATORY (INHALATION) at 18:37

## 2025-08-13 RX ADMIN — METHYLPREDNISOLONE SODIUM SUCCINATE 40 MG: 40 INJECTION, POWDER, FOR SOLUTION INTRAMUSCULAR; INTRAVENOUS at 10:24

## 2025-08-13 RX ADMIN — SODIUM ZIRCONIUM CYCLOSILICATE 10 G: 10 POWDER, FOR SUSPENSION ORAL at 09:54

## 2025-08-13 ASSESSMENT — COGNITIVE AND FUNCTIONAL STATUS - GENERAL
MOVING FROM LYING ON BACK TO SITTING ON SIDE OF FLAT BED WITH BEDRAILS: A LITTLE
DAILY ACTIVITIY SCORE: 24
CLIMB 3 TO 5 STEPS WITH RAILING: A LITTLE
MOVING TO AND FROM BED TO CHAIR: A LITTLE
TURNING FROM BACK TO SIDE WHILE IN FLAT BAD: A LOT
MOBILITY SCORE: 17
WALKING IN HOSPITAL ROOM: A LITTLE
STANDING UP FROM CHAIR USING ARMS: A LITTLE

## 2025-08-13 ASSESSMENT — PAIN - FUNCTIONAL ASSESSMENT
PAIN_FUNCTIONAL_ASSESSMENT: 0-10
PAIN_FUNCTIONAL_ASSESSMENT: 0-10

## 2025-08-13 ASSESSMENT — PAIN SCALES - GENERAL
PAINLEVEL_OUTOF10: 0 - NO PAIN

## 2025-08-13 NOTE — NURSING NOTE
Pulmonary Disease Navigator Documentation:    Comments:  Attempted to see patient for re-education, but condition not appropriate at this time.  ABG drawn with order from physician.  Will continue to follow.

## 2025-08-13 NOTE — DOCUMENTATION CLARIFICATION NOTE
"    PATIENT:               CARMINE VERDUZCO  ACCT #:                  [id number]  MRN:                       [id number]  :                       1950  ADMIT DATE:       2025 12:34 PM  DISCH DATE:  RESPONDING PROVIDER #:        [id number]          PROVIDER RESPONSE TEXT:    Sepsis with acute multi-system organ dysfunction of FAUSTINA, metabolic encephalopathy and type 2 MI    CDI QUERY TEXT:    Clarification    Instruction:    Based on your assessment of the patient and the clinical information, please provide the requested documentation by clicking on the appropriate radio button and enter any additional information if prompted.    Question: Please further clarify if a relationship exists between the Sepsis and acute organ dysfunction    When answering this query, please exercise your independent professional judgment. The fact that a question is being asked, does not imply that any particular answer is desired or expected.    The patient's clinical indicators include:  Clinical Information: H&P: \"Sepsis\"    Clinical Indicators: H&P: \"Sepsis: - SIRS criteria in the ED 3/4; tachycardia, leukocytosis fever: - Acute hypoxic respiratory failure, normal lactate.\"    VS: (admission range) Temp 38.9-36.2, -124, RR 20-35, O2 sat 94-96     Labs: WBC 14.8, Lactate 1.3  Troponin 20, Cr 0.48  : Nares cx MSSA  Cr. 3.10   CXR: \"1.Persistent right-greater-than-left ill-defined opacities. 2.Stable small left-sided pleural effusion\"    ED: \"Oriented to self and place\"    H&P: \"Patient was found to be confused , drove her car in the wrong direction.\"    : IM:  \"Concern for CAP\" \"COPD exacerbation\"  \"Acute myocardial injury(Resolved):  - Likely secondary to demand ischemia type II MI, mildly elevated troponin of 20, no chest pain\" \"FAUSTINA  - s. Creat increased since admission 4 from 0.48\"    Treatment: Labs, monitoring, IV Zosyn, IV Vancomycin, IV Rocephin, Vibramycin IV IVFs    Risk Factors: increased HR, increased " temp, increased creatinine, increased troponin, illness  Options provided:  -- Sepsis with acute renal organ dysfunction of FAUSTINA  -- Sepsis with acute cardiac organ dysfunction of type 2 MI  -- Sepsis with neurological organ dysfunction of acute metabolic encephalopathy  -- Sepsis with acute multi-system organ dysfunction of FAUSTINA, metabolic encephalopathy and type 2 MI  -- Other - I will add my own diagnosis  -- Refer to Clinical Documentation Reviewer    Query created by: Iona Toth on 8/13/2025 10:01 AM      Electronically signed by:  LARS DUMONT DO 8/13/2025 12:20 PM

## 2025-08-13 NOTE — PROGRESS NOTES
Physical Therapy                 Therapy Communication Note    Patient Name: Radha Deluna  MRN: [id number]  Department: [facility]  Room: [address] [place] [address]  Today's Date: 8/13/2025     Discipline: Physical Therapy    Missed Visit: PT Missed Visit: Yes     Missed Visit Reason: Missed Visit Reason: Patient sleeping (Pt sleeping heavily/not arousable.  WIll continue fo follow-up as able/available.)    Missed Time: Attempt    Comment:

## 2025-08-13 NOTE — CARE PLAN
The patient's goals for the shift include      The clinical goals for the shift include maintain comfort and safety    Over the shift, the patient did make progress toward the following goals. Pt safety and comfort maintained.

## 2025-08-13 NOTE — PROGRESS NOTES
Radha Verduzco is a 75 y.o. female on day 5 of admission presenting with Altered mental status, unspecified altered mental status type.      Subjective   Patient was seen and examined at bedside,  she was doing well, noted she was having some shortness of breath ,  she is on still on RA to 2L with exertion . Denies any f/c, n/v, new onset headaches, vision changes, chest pain, dyspnea, abdominal pain, changes in BM, or urinary changes. She wasn't happy about being still in the hospital and wants to be discharged.           Objective     Last Recorded Vitals  /86   Pulse 67   Temp 36.4 °C (97.5 °F)   Resp 16   Wt 65.7 kg (144 lb 13.5 oz)   SpO2 92%   Intake/Output last 3 Shifts:  No intake or output data in the 24 hours ending 08/12/25 2038      Admission Weight  Weight: 68.5 kg (151 lb) (08/07/25 1200)    Daily Weight  08/07/25 : 65.7 kg (144 lb 13.5 oz)    Image Results  Transthoracic Echo [facility], [address] [place] [address]            Tel [phone] and Fax [phone]    TRANSTHORACIC ECHOCARDIOGRAM REPORT       Patient Name:       RADHA VERDUZCO  Reading Physician:    [id number] Sanford Seth MD  Study Date:         8/12/2025           Ordering Provider:    [id number] LARS DUMONT  MRN/PID:            [id number]            Fellow:  Accession#:         [id number]        Nurse:  Date of Birth/Age:  1950 / 75      Sonographer:          Patrick Jacobson                      years                                     ACS, RDCS, FASE  Gender assigned at  F                   Additional Staff:  Birth:  Height:             157.48 cm           Admit Date:           8/7/2025  Weight:             65.32 kg            Admission Status:     Inpatient -                                                                Routine  BSA / BMI:          1.66 m2 /  26.34     Encounter#:           [id number]                      kg/m2  Blood Pressure:     134/76 mmHg         Department Location:  [facility]    Study Type:    TRANSTHORACIC ECHO (TTE) COMPLETE  Diagnosis/ICD: Other forms of dyspnea-R06.09  Indication:    Dyspnea  CPT Code:      Echo Complete w Full Doppler-[id number]    Patient History:  Pertinent History: Dyspnea, CAD, HTN, Hyperlipidemia and Chest Pain. Hx coronary                     stents, hx TIA. AMS.    Study Detail: The following Echo studies were performed: 2D, M-Mode, Doppler and                color flow. Technically challenging study due to body habitus.       PHYSICIAN INTERPRETATION:  Left Ventricle: The left ventricular systolic function is normal with a visually estimated ejection fraction of 60-65%. There are no regional left ventricular wall motion abnormalities. The left ventricular cavity size is normal. There is mildly increased septal and normal posterior left ventricular wall thickness. Spectral Doppler shows a Grade I (impaired relaxation pattern) of left ventricular diastolic filling with normal left atrial filling pressure.  Left Atrium: The left atrial size is normal.  Right Ventricle: The right ventricle is normal in size. There is normal right ventricular global systolic function.  Right Atrium: The right atrium is normal in size.  Aortic Valve: The aortic valve is structurally normal. The aortic valve area by VTI is 2.19 cmÂ² with a peak velocity of 1.87 m/s. The peak and mean gradients are 14 mmHg and 8 mmHg, respectively with a dimensionless index of 0.53. There is trace to mild aortic valve regurgitation.  Mitral Valve: The mitral valve is normal in structure. There is no evidence of mitral valve regurgitation. The E Vmax is 0.68 m/s.  Tricuspid Valve: The tricuspid valve is structurally normal. No evidence of tricuspid regurgitation.  Pulmonic Valve: The pulmonic valve is structurally normal. There is physiologic pulmonic valve  regurgitation.  Pericardium: There is no pericardial effusion noted.  Aorta: The aortic root is normal.       CONCLUSIONS:   1. The left ventricular systolic function is normal with a visually estimated ejection fraction of 60-65%.   2. Spectral Doppler shows a Grade I (impaired relaxation pattern) of left ventricular diastolic filling with normal left atrial filling pressure.    QUANTITATIVE DATA SUMMARY:     2D MEASUREMENTS:          Normal Ranges:  Ao Root d:       3.70 cm  (2.0-3.7cm)  LAs:             2.80 cm  (2.7-4.0cm)  IVSd:            0.95 cm  (0.6-1.1cm)  LVPWd:           0.80 cm  (0.6-1.1cm)  LVIDd:           4.90 cm  (3.9-5.9cm)  LVIDs:           3.10 cm  LV Mass Index:   92 g/m2  LVEDV Index:     47 ml/m2  LV % FS          36.7 %       LEFT ATRIUM:                  Normal Ranges:  LA Vol A4C:        32.0 ml    (22+/-6mL/m2)  LA Vol A2C:        48.2 ml  LA Vol BP:         39.7 ml  LA Vol Index A4C:  19.3ml/m2  LA Vol Index A2C:  29.0 ml/m2  LA Vol Index BP:   23.9 ml/m2  LA Area A4C:       14.0 cm2  LA Area A2C:       17.0 cm2  LA Major Axis A4C: 5.2 cm  LA Major Axis A2C: 5.1 cm  LA Volume Index:   22.0 ml/m2       RIGHT ATRIUM:          Normal Ranges:  RA Area A4C:  11.0 cm2       M-MODE MEASUREMENTS:         Normal Ranges:  Ao Root:             3.60 cm (2.0-3.7cm)  LAs:                 4.40 cm (2.7-4.0cm)       AORTA MEASUREMENTS:         Normal Ranges:  Asc Ao, d:          3.60 cm (2.1-3.4cm)       LV SYSTOLIC FUNCTION:                       Normal Ranges:  EF-A4C View:    66 % (>=55%)  EF-A2C View:    58 %  EF-Biplane:     61 %  EF-Visual:      63 %  LV EF Reported: 63 %       LV DIASTOLIC FUNCTION:            Normal Ranges:  MV Peak E:             0.68 m/s   (0.7-1.2 m/s)  MV Peak A:             0.85 m/s   (0.42-0.7 m/s)  E/A Ratio:             0.80       (1.0-2.2)  MV e'                  0.091 m/s  (>8.0)  MV lateral e'          0.10 m/s  MV medial e'           0.08 m/s  E/e' Ratio:             7.46       (<8.0)  PulmV Sys Pollo:         97.10 cm/s  PulmV Brasher Pollo:        69.20 cm/s  PulmV S/D Pollo:         1.40       MITRAL VALVE:          Normal Ranges:  MV DT:        148 msec (150-240msec)       AORTIC VALVE:                      Normal Ranges:  AoV Vmax:                1.87 m/s  (<=1.7m/s)  AoV Peak P.0 mmHg (<20mmHg)  AoV Mean P.0 mmHg  (1.7-11.5mmHg)  LVOT Max Pollo:            0.95 m/s  (<=1.1m/s)  AoV VTI:                 40.60 cm  (18-25cm)  LVOT VTI:                21.40 cm  LVOT Diameter:           2.30 cm   (1.8-2.4cm)  AoV Area, VTI:           2.19 cm2  (2.5-5.5cm2)  AoV Area,Vmax:           2.11 cm2  (2.5-4.5cm2)  AoV Dimensionless Index: 0.53       RIGHT VENTRICLE:  RV Basal 2.60 cm  RV Mid   2.50 cm  RV Major 6.2 cm  TAPSE:   20.9 mm  RV s'    0.13 m/s       TRICUSPID VALVE/RVSP:         Normal Ranges:  Est. RA Pressure:     3  IVC Diam:             1.50 cm       PULMONIC VALVE:          Normal Ranges:  PV Max Pollo:     0.8 m/s  (0.6-0.9m/s)  PV Max P.8 mmHg       PULMONARY VEINS:  PulmV Brasher Pollo: 69.20 cm/s  PulmV S/D Pollo:  1.40  PulmV Sys Pollo:  97.10 cm/s       [id number] Sanford Seth MD  Electronically signed on 2025 at 9:50:03 AM       ** Final **      Physical Exam  Constitutional:       General: She is not in acute distress.     Appearance: She is normal weight.   HENT:      Head: Normocephalic and atraumatic.      Nose: Nose normal.      Mouth/Throat:      Mouth: Mucous membranes are moist.      Pharynx: Oropharynx is clear.      Eyes:      Extraocular Movements: Extraocular movements intact.      Conjunctiva/sclera: Conjunctivae normal.      Pupils: Pupils are equal, round, and reactive to light.         Cardiovascular:      Rate and Rhythm: Regular rhythm. Normal rate.      Pulses: Normal pulses.      Heart sounds: Normal heart sounds.   Pulmonary:      Effort: Pulmonary effort is normal.      Breath sounds: Rales, no wheezine  Abdominal:       General: Abdomen is flat.      Palpations: Abdomen is soft.      Musculoskeletal:         General: Normal range of motion.      Cervical back: Normal range of motion and neck supple.      Skin:     General: Skin is warm and dry.      Neurological:      General: No focal deficit present.      Mental Status: She is alert and oriented to person, place, and time. Mental status is at baseline.      Psychiatric:         Mood and Affect: Mood normal.         Behavior: Behavior normal.   Relevant Results                            Assessment & Plan  Altered mental status, unspecified altered mental status type    Radha Deluna is a 75 y.o. female admitted for  AMS and fever     Acute Medical Issues     #Acute hypoxic respiratory failure (Fluctuating):  # Concern for CAP  #  COPD exacerbation  - Patient was on 2 L in the ED, according to the nurse she was dropping to the 80s on room air, she uses oxygen 2 L at night at home.  - Increase Steroids to twice daily, currently on RA  to 2L, add pulmicort   - Patient reported chills and productive cough, she was found to have fever.  - Chest x-ray showed 8/11 Persistent right-greater-than-left ill-defined opacities, Stable small left-sided pleural effusion  - CT chest;  Rounded opacities in the left upper and lower lobes with peripheral  ground-glass attenuation that are new from prior imaging 10/28/2024  concerning for pneumonia. Given the nodular appearance of the  lesions, however, recommend interval follow-up with CT after  treatment to confirm resolution and exclude an underlying neoplastic  process.  - continue  Ceftriaxone, finished doxycycline     -Received Zosyn/Vanco  -Mycoplasma, Legionella, legionella , Procal 0.86 , MRSA; negative, MSSA Positive   - RT evaluate and treat, DuoNeb, albuterol, incentive spirometry.  - Wean oxygen as tolerated          #FAUSTINA   - s. Creat increased since admission 4 from 0.48  - 8/11 :1 bolus given, start maintenance fluids. NS 75 ml,  had to stop the fluids as the patient became short of breath, CXR was concerning for fluid congestion, bnp 264  - 8/12 Lasix challenge with worsening Kidney function, resume fluids.  - Continue to monitor, avoid nephrotoxic meds.   - Nephrology consulted; appreciate recs  - Renal US, urine electrolytes low sodium, normal CK        #Concern for CHF:  - Patient started having shortness of breath and chest tightness, fluid was stopped, CXR  was concerning for fluid congestion, bnp 264  -  echo' normal EF and grade 1 diastolic relaxation.         #Sepsis(Resolved):  - SIRS criteria in the ED 3/4; tachycardia, leukocytosis fever:  - Acute hypoxic respiratory failure, normal lactate.        #Acute myocardial injury(Resolved):  - Likely secondary to demand ischemia type II MI, mildly elevated troponin of 20, no chest pain       #AMS(Resolved):  #Heat stroke vs Infection:  -Patient was found to be confused , drove her car in the wrong direction.  -She was in her car for 45 minutes and was feverish, responded to fluids.   - CT head NO ACUTE INTRACRANIAL PROCESS      #Hyponatremia:  #Hypokalemia(resolved):  - Continue to monitor, replete potassium as needed     #Seizure(ruled out)  - Patient on primidone, 150 mg, oral, Daily for tremors  - Neurology consulted, patient will be evaluated for possible seizure due to polypharmacy and infectious process.  - MRI normall and EEG ordered.        Chronic Medical Issues   # Allergic rhinitis  -Continue azelastine, 2 spray, Each Nostril, BID,  fluticasone, 2 spray, Each Nostril, Daily, montelukast, 10 mg, oral, Daily        #Chronic back pain  - Hold  gabapentin, 800 mg, oral, BID for FAUSTINA     #Rheumatoid arthritis  - Hold hydroxychloroquine, 300 mg, oral, Daily,  and methotrexate, 20 mg, intramuscular, Every Sunday for FAUSTINA         #HTN/CAD/AAA:  -Resume  isosorbide mononitrate ER, 60 mg, oral, Daily, metoprolol tartrate, 50 mg, oral, q12h     # GERD:  - pantoprazole, 40 mg, oral,  Daily     #HLD  -Continue pravastatin, 20 mg, oral, Daily           F: PO intake & IVF PRN  E: Replete as needed  N: Regular diet  GI ppx: Protonix 40 mg daily   DVT ppx: Lovenox subcutaneous  Antibiotics: Ceftriaxone, doxycycline   Oxygenation: 0- 2L NC     Code Status: DNR and No Intubation   Emergency Contact: Extended Emergency Contact Information  Primary Emergency Contact: KERWIN FRASER  Address[place]  Home Phone: [phone]  Work Phone: [phone]  Mobile Phone: [phone]  Relation: Sibling   needed? No      Disposition: 75 y.o.female admitted for AMS , RESOLVED , patient has FAUSTINA, and COPD exacerbation  continue management as above.            Flakito Gunn, DO

## 2025-08-13 NOTE — CONSULTS
Pulmonary Critical Care CONSULT Note      Date of Service : 08/13/25  Time of Service : 10:00 AM   Radha Deluna  [id number]      Subjective      History of Present Illness: Radha Deluna is a 75 y.o. female with a PMHx of chronic lower back pain, COPD, migraine, CAD s/p stents, AAA, CVA, hypertension, hypercholesterolemia, GERD, PAD, rheumatoid lung disease s/p lung biopsy who presented to [facility] on 8/7/2025 with a chief complaint of AMS.  Of note, patient was recently admitted on January 2025 to [facility] for concern for acute exacerbation of COPD and pneumonia.  She was treated with antibiotics and steroids and discharged with a prolonged course of antibiotics.    In the ED, she was found to be tachycardic and hypotensive.  CT head showed no acute intracranial process.  However, she was found to have acute hypoxic respiratory failure requiring 2L NC.  CXR showed several nodular opacities over the right lung.  She received Zosyn, vancomycin in the ED.      On the floor: She improved back to her baseline mentation, and her oxygen requirement went from 2 L to room air.  CT chest 8/11 showed rounded opacities in the left upper and lower lobes with peripheral groundglass attenuation, new from 10/2024. Also showed scattered pulmonary nodules and masses throughout right lung,  stable from prior imaging.    Neurology was consulted, who suggested that patient suffered a period of AMS secondary to underlying infection, rather than a primary neurological process. On 8/11, she again reported increased shortness of breath.  Due to concern for pulmonary congestion, fluids were discontinued. Echo was normal EF and grade 1 diastolic relaxation.  She was also started on IV Solu-Medrol 40 mg daily on 8/12, which was increased to twice daily on 8/13.  She was also started on Pulmicort.  In addition to this, she was found to have worsening FAUSTINA likely 2/2 ATN, with creatinine of 4.29.    Pro-Ethan was 0.86.     Mycoplasma, Legionella: Negative  MRSA: Negative  Antibiotics: Ceftriaxone day 7, completed doxycycline 5 days.    ABG 8/13 pending      Past Medical/Surgical History:   Medical History[1]  Surgical History[2]    Family History:   Family medical history includes stroke in father.    Allergies:     Allergies[3]    Social History:   reports that she has been smoking cigarettes. She has been exposed to tobacco smoke. She has never used smokeless tobacco. She reports that she does not currently use alcohol. She reports that she does not use drugs.    Current Medications:   No recently discontinued medications to reconcile    Review of Systems:   Unable to be obtained due to altered mental state.        Objective     Vital signs in last 24 hours:  Temp:  [35.7 °C (96.3 °F)-36.4 °C (97.5 °F)] 35.7 °C (96.3 °F)  Heart Rate:  [67-89] 89  Resp:  [16-18] 18  BP: (117-161)/(55-86) 117/55 Temp:   Temp Readings from Last 3 Encounters:   08/13/25 35.7 °C (96.3 °F)   07/31/25 35.9 °C (96.6 °F) (Temporal)   07/15/25 36 °C (96.8 °F) (Temporal)     Intake/Output last 3 shifts:  I/O last 3 completed shifts:  In: 500 (6.9 mL/kg) [P.O.:500]  Out: 400 (5.5 mL/kg) [Urine:400 (0.2 mL/kg/hr)]  Dosing Weight: 72.6 kg   Intake/Output this shift:  I/O this shift:  In: 360 [P.O.:360]  Out: -      Oxygen requirements:  Oxygen Therapy  Pulse Ox (24 hr min): 92  Medical Gas Therapy: Supplemental oxygen  Medical Gas Delivery Method: Nasal cannula  O2 Flow Rate (L/min): 4 L/min FiO2 (%): 32 %    Ventilator Information:   na       Physical Exam  General appearance: Sitting up in chair, no acute distress.  Pleasantly demented.  HEENT: Atraumatic/normocephalic, EOMI, MEGHANN, pharynx clear, moist mucosa, redness of the uvula appreciated,   Neck: Supple, no jugular venous distension, lymphadenopathy, thyromegaly or carotid bruits  Chest: mildly dyspneic while speaking. Equal normal breath sounds, no wheezing, no crackles and no tenderness over ribs    Cardiovascular: Normal S1, S2, regular rate and rhythm, no murmur, rub or gallop  Abdomen: Normal sounds present, soft, lax with no tenderness, no hepatosplenomegaly, and no masses  Extremities: No edema. Pulses are equally present.   Skin: intact, no rashes   Neurologic: Alert and oriented x 2, marked change from baseline.      Results from last 7 days   Lab Units 08/13/25  0514   SODIUM mmol/L 136   POTASSIUM mmol/L 5.5*   CHLORIDE mmol/L 109*   CO2 mmol/L 17*   BUN mg/dL 62*   CREATININE mg/dL 4.29*   GLUCOSE mg/dL 83   CALCIUM mg/dL 7.6*     Results from last 7 days   Lab Units 08/13/25  0514   WBC AUTO x10*3/uL 15.4*   HEMOGLOBIN g/dL 12.2   HEMATOCRIT % 36.4   PLATELETS AUTO x10*3/uL 237           XR chest 1 view  Result Date: 8/11/2025  1. Persistent right-greater-than-left ill-defined opacities. 2. Stable small left-sided pleural effusion   Signed by: Jesus Prasad 8/11/2025 12:25 PM Dictation workstation:   [id number]     === 08/07/25 ===    CT CHEST WO IV CONTRAST    - Impression -  Rounded opacities in the left upper and lower lobes with peripheral  ground-glass attenuation that are new from prior imaging 10/28/2024  concerning for pneumonia. Given the nodular appearance of the  lesions, however, recommend interval follow-up with CT after  treatment to confirm resolution and exclude an underlying neoplastic  process.    Scattered pulmonary nodules and masses throughout the right lung  which are stable compared to prior imaging.    Small left pleural effusion.    2.6 cm hypodense right thyroid nodule likely corresponding to  abnormality on prior thyroid ultrasound 11/11/2024 for which a biopsy  was recommended.    MACRO:  None.    Signed by: Evan Finkelstein 8/8/2025 12:23 AM  Dictation workstation:   [id number]        Assessment/Plan     Patient is 75 y.o. female  with the following medical Problems:    Acute hypoxic respiratory failure secondary to pneumonia  Concern for COPD exacerbation  Altered  mental status, likely delirium  FAUSTINA  Rheumatoid arthritis    Plan of Care:  Differential for acute hypoxic respiratory failure includes infection versus malignancy versus ILD flare.  Increase on Solu-Medrol to 60mg 4 times daily  Ordered serum LDH, ESR, CRP, CD4/CD8 panel, GARRICK, ANCA  Holding Plavix for now in light of possible bronchoscopy.  AMS could be delirium secondary to pneumonia, due to the presence of pulmonary opacities with leukocytosis to 15.4 (although this could be due to steroid use), or metabolic encephalopathy from renal dysfunction.   ABG significant for pH of 7.24, CO2 34, O2 of 80, HCO3 14.6. Suggestive of high anion gap metabolic acidosis (gap corrected for albumin 17).  Normal lactate, no toxic ingestion, decreased PO intake. Oral bicarb administered, and Bicarb drip started.   DVT prophylaxis with heparin  Start bronchopulmonary hygiene and bronchodilator therapy with Duoneb/Mucomyst/Pulmicort/Atrovent  Oxygen for saturation 89 to 94%  Incentive spirometry  PT/OT/SLP  Minimize benzodiazepine and narcotics  Encourage ambulation  Head of bed elevation and aspiration precautions.      Aric Mosley, DO  PGY-1, Internal Medicine  Please SecureChat for any further questions  This is a preliminary note, please await attending attestation for final A/P       STAFF PHYSICIAN NOTE OF PERSONAL INVOLVEMENT IN CARE  As the attending physician, I certify that I personally reviewed the patient's history and personally examined the patient to confirm the physical findings described above, and that I reviewed the relevant imaging studies and available reports.  I also discussed the differential diagnosis and all of the proposed management plans with the patient and individuals accompanying the patient to this visit.  They had the opportunity to ask questions about the proposed management plans and to have those questions answered.         [1]   Past Medical History:  Diagnosis Date    Chronic obstructive  pulmonary disease, unspecified 11/09/2022    Chronic obstructive pulmonary disease, unspecified COPD type    Low back pain     Migraine, unspecified, not intractable, without status migrainosus     Migraines    Other chronic pain 10/21/2015    Chronic pain    Personal history of other diseases of the circulatory system     History of cardiac disorder    Personal history of other diseases of the nervous system and sense organs     History of otitis media    Personal history of other diseases of the respiratory system     History of asthma    Personal history of other diseases of the respiratory system     History of sinusitis    Personal history of other endocrine, nutritional and metabolic disease     History of thyroid disorder    Personal history of other malignant neoplasm of skin     History of malignant neoplasm of skin    Sarcoidosis, unspecified 10/05/2022    Sarcoidosis   [2]   Past Surgical History:  Procedure Laterality Date    ANKLE SURGERY  10/21/2015    Ankle Surgery    APPENDECTOMY  10/21/2015    Appendectomy    BREAST SURGERY  10/21/2015    Breast Surgery    CARPAL TUNNEL RELEASE  10/26/2016    Neuroplasty Median Nerve At Carpal Tunnel    CATARACT EXTRACTION Bilateral     CHOLECYSTECTOMY  10/21/2015    Cholecystectomy    CORONARY ANGIOPLASTY WITH STENT PLACEMENT  01/14/2021    Cath Placement Of Stent 1    CT GUIDED PERCUTANEOUS BIOPSY LUNG  08/12/2020    CT GUIDED PERCUTANEOUS BIOPSY LUNG 8/12/2020 [facility]    CT GUIDED PERCUTANEOUS BIOPSY LUNG  12/01/2020    CT GUIDED PERCUTANEOUS BIOPSY LUNG 12/1/2020 [facility]    EYE SURGERY      laser    LUNG SURGERY  10/21/2015    Lung Surgery    LYMPH NODE BIOPSY  09/29/2017    Biopsy Lymph Node    MR HEAD ANGIO WO IV CONTRAST  04/04/2022    MR HEAD ANGIO WO IV CONTRAST 4/4/2022 [facility]    MR NECK ANGIO WO IV CONTRAST  08/15/2022    MR NECK ANGIO WO IV CONTRAST 8/15/2022 [facility]    OTHER SURGICAL HISTORY  10/21/2015    Wrist  Surgery    OTHER SURGICAL HISTORY  02/07/2022    Endoscopy    OTHER SURGICAL HISTORY  02/07/2022    Colonoscopy    OTHER SURGICAL HISTORY  10/29/2020    Thyroid surgery    OTHER SURGICAL HISTORY  10/29/2020    Sinus surgery    OTHER SURGICAL HISTORY  10/29/2020    Ear surgery    OTHER SURGICAL HISTORY  10/29/2020    Spinal surgery    OTHER SURGICAL HISTORY  10/21/2015    Thoracoscopy (Therapeutic) With Wedge Resection Of Lung    ROTATOR CUFF REPAIR  08/10/2017    Rotator Cuff Repair   [3]   Allergies  Allergen Reactions    Erythromycin Other     Chest pain    Augmentin [Amoxicillin-Pot Clavulanate] GI Upset and Nausea/vomiting

## 2025-08-13 NOTE — PROGRESS NOTES
Nephrology Consult Progress Note    Radha Deluna is a 75 y.o. female on day 6 of admission presenting with Altered mental status, unspecified altered mental status type.      Subjective   No acute events overnight, persistent SOB, constipation +, nonoliguric       Objective          Physical Exam    Vitals 24HR  Heart Rate:  [67-89]   Temp:  [35.7 °C (96.3 °F)-36.4 °C (97.5 °F)]   Resp:  [16-18]   BP: (117-161)/(55-86)   SpO2:  [92 %-98 %]        AAOx3, sleeping but arousable, on NS O2  Decreased AEBL, wheezing+  RRR no murmurs  Abd soft, + BS   edema           I&O 24HR    Intake/Output Summary (Last 24 hours) at 8/13/2025 1454  Last data filed at 8/13/2025 0900  Gross per 24 hour   Intake 660 ml   Output 400 ml   Net 260 ml         Medications  Prescriptions Prior to Admission[1]   Scheduled medications  Scheduled Medications[2]  Continuous medications  Continuous Medications[3]  PRN medications  PRN Medications[4]    Relevant Results      No results displayed because visit has over 200 results.         Imaging  XR chest 1 view  Result Date: 8/11/2025  1. Persistent right-greater-than-left ill-defined opacities. 2. Stable small left-sided pleural effusion   Signed by: Jesus Prasad 8/11/2025 12:25 PM Dictation workstation:   [id number]    US renal complete  Result Date: 8/11/2025  1. Left midpole nonobstructive nephroliths measuring 0.8 cm. 2. Trace right perinephric fluid. 3. Small left-sided pleural effusion.   MACRO: None     Signed by: Jesus Prasad 8/11/2025 6:44 AM Dictation workstation:   [id number]    EEG  Result Date: 8/9/2025  IMPRESSION Impression This awake and sleep routine is normal. No epileptiform discharges or lateralizing signs are seen. A full report will be scanned into the patient's chart at a later time. This report has been interpreted and electronically signed by     brain w and wo IV contrast  Result Date: 8/9/2025  1. Negative head MRI examination for acute change. 2. There is a  background of age-related volume loss, atherosclerotic disease, and ischemic white matter demyelination.   MACRO: None   Signed by: Salvatore Marcelo 8/9/2025 6:57 AM Dictation workstation:   [id number]    CT chest wo IV contrast  Result Date: 8/8/2025  Rounded opacities in the left upper and lower lobes with peripheral ground-glass attenuation that are new from prior imaging 10/28/2024 concerning for pneumonia. Given the nodular appearance of the lesions, however, recommend interval follow-up with CT after treatment to confirm resolution and exclude an underlying neoplastic process.   Scattered pulmonary nodules and masses throughout the right lung which are stable compared to prior imaging.   Small left pleural effusion.   2.6 cm hypodense right thyroid nodule likely corresponding to abnormality on prior thyroid ultrasound 11/11/2024 for which a biopsy was recommended.   MACRO: None.   Signed by: Evan Finkelstein 8/8/2025 12:23 AM Dictation workstation:   [id number]    XR chest 1 view  Result Date: 8/7/2025  1.  Redemonstration of several nodular opacities over the right lung the largest measuring 15 mm over the upper lungs. These appear similar to the prior. Repeat CT is advised for further evaluation of these findings       MACRO: None   Signed by: Rustam Hilliard 8/7/2025 2:08 PM Dictation workstation:   [id number]    CT head wo IV contrast  Result Date: 8/7/2025  NO ACUTE INTRACRANIAL PROCESS   MACRO: None   Signed by: Ezra Tracy 8/7/2025 1:49 PM Dictation workstation:   [id number]      Cardiology, Vascular, and Other Imaging  Transthoracic Echo Complete  Result Date: 8/12/2025   [facility], [address] [place] [address]           Tel [phone] and Fax [phone] TRANSTHORACIC ECHOCARDIOGRAM REPORT  Patient Name:       CARMINE VERDUZCO  Reading Physician:    [id number] Sanford Seth MD Study Date:         8/12/2025            Ordering Provider:    [id number] LARS DUMONT MRN/PID:            [id number]            Fellow: Accession#:         [id number]        Nurse: Date of Birth/Age:  1950 / 75      Sonographer:          Patrick Jacobson                     years                                     ACS, RDCS, FASE Gender assigned at  F                   Additional Staff: Birth: Height:             157.48 cm           Admit Date:           8/7/2025 Weight:             65.32 kg            Admission Status:     Inpatient -                                                               Routine BSA / BMI:          1.66 m2 / 26.34     Encounter#:           [id number]                     kg/m2 Blood Pressure:     134/76 mmHg         Department Location:  [facility] Study Type:    TRANSTHORACIC ECHO (TTE) COMPLETE Diagnosis/ICD: Other forms of dyspnea-R06.09 Indication:    Dyspnea CPT Code:      Echo Complete w Full Doppler-[id number] Patient History: Pertinent History: Dyspnea, CAD, HTN, Hyperlipidemia and Chest Pain. Hx coronary                    stents, hx TIA. AMS. Study Detail: The following Echo studies were performed: 2D, M-Mode, Doppler and               color flow. Technically challenging study due to body habitus.  PHYSICIAN INTERPRETATION: Left Ventricle: The left ventricular systolic function is normal with a visually estimated ejection fraction of 60-65%. There are no regional left ventricular wall motion abnormalities. The left ventricular cavity size is normal. There is mildly increased septal and normal posterior left ventricular wall thickness. Spectral Doppler shows a Grade I (impaired relaxation pattern) of left ventricular diastolic filling with normal left atrial filling pressure. Left Atrium: The left atrial size is normal. Right Ventricle: The right ventricle is normal in size. There is normal right ventricular global systolic function. Right Atrium: The right  atrium is normal in size. Aortic Valve: The aortic valve is structurally normal. The aortic valve area by VTI is 2.19 cmÂ² with a peak velocity of 1.87 m/s. The peak and mean gradients are 14 mmHg and 8 mmHg, respectively with a dimensionless index of 0.53. There is trace to mild aortic valve regurgitation. Mitral Valve: The mitral valve is normal in structure. There is no evidence of mitral valve regurgitation. The E Vmax is 0.68 m/s. Tricuspid Valve: The tricuspid valve is structurally normal. No evidence of tricuspid regurgitation. Pulmonic Valve: The pulmonic valve is structurally normal. There is physiologic pulmonic valve regurgitation. Pericardium: There is no pericardial effusion noted. Aorta: The aortic root is normal.  CONCLUSIONS:  1. The left ventricular systolic function is normal with a visually estimated ejection fraction of 60-65%.  2. Spectral Doppler shows a Grade I (impaired relaxation pattern) of left ventricular diastolic filling with normal left atrial filling pressure. QUANTITATIVE DATA SUMMARY:  2D MEASUREMENTS:          Normal Ranges: Ao Root d:       3.70 cm  (2.0-3.7cm) LAs:             2.80 cm  (2.7-4.0cm) IVSd:            0.95 cm  (0.6-1.1cm) LVPWd:           0.80 cm  (0.6-1.1cm) LVIDd:           4.90 cm  (3.9-5.9cm) LVIDs:           3.10 cm LV Mass Index:   92 g/m2 LVEDV Index:     47 ml/m2 LV % FS          36.7 %  LEFT ATRIUM:                  Normal Ranges: LA Vol A4C:        32.0 ml    (22+/-6mL/m2) LA Vol A2C:        48.2 ml LA Vol BP:         39.7 ml LA Vol Index A4C:  19.3ml/m2 LA Vol Index A2C:  29.0 ml/m2 LA Vol Index BP:   23.9 ml/m2 LA Area A4C:       14.0 cm2 LA Area A2C:       17.0 cm2 LA Major Axis A4C: 5.2 cm LA Major Axis A2C: 5.1 cm LA Volume Index:   22.0 ml/m2  RIGHT ATRIUM:          Normal Ranges: RA Area A4C:  11.0 cm2  M-MODE MEASUREMENTS:         Normal Ranges: Ao Root:             3.60 cm (2.0-3.7cm) LAs:                 4.40 cm (2.7-4.0cm)  AORTA MEASUREMENTS:          Normal Ranges: Asc Ao, d:          3.60 cm (2.1-3.4cm)  LV SYSTOLIC FUNCTION:                      Normal Ranges: EF-A4C View:    66 % (>=55%) EF-A2C View:    58 % EF-Biplane:     61 % EF-Visual:      63 % LV EF Reported: 63 %  LV DIASTOLIC FUNCTION:            Normal Ranges: MV Peak E:             0.68 m/s   (0.7-1.2 m/s) MV Peak A:             0.85 m/s   (0.42-0.7 m/s) E/A Ratio:             0.80       (1.0-2.2) MV e'                  0.091 m/s  (>8.0) MV lateral e'          0.10 m/s MV medial e'           0.08 m/s E/e' Ratio:            7.46       (<8.0) PulmV Sys Pollo:         97.10 cm/s PulmV Brasher Pollo:        69.20 cm/s PulmV S/D Pollo:         1.40  MITRAL VALVE:          Normal Ranges: MV DT:        148 msec (150-240msec)  AORTIC VALVE:                      Normal Ranges: AoV Vmax:                1.87 m/s  (<=1.7m/s) AoV Peak P.0 mmHg (<20mmHg) AoV Mean P.0 mmHg  (1.7-11.5mmHg) LVOT Max Pollo:            0.95 m/s  (<=1.1m/s) AoV VTI:                 40.60 cm  (18-25cm) LVOT VTI:                21.40 cm LVOT Diameter:           2.30 cm   (1.8-2.4cm) AoV Area, VTI:           2.19 cm2  (2.5-5.5cm2) AoV Area,Vmax:           2.11 cm2  (2.5-4.5cm2) AoV Dimensionless Index: 0.53  RIGHT VENTRICLE: RV Basal 2.60 cm RV Mid   2.50 cm RV Major 6.2 cm TAPSE:   20.9 mm RV s'    0.13 m/s  TRICUSPID VALVE/RVSP:         Normal Ranges: Est. RA Pressure:     3 IVC Diam:             1.50 cm  PULMONIC VALVE:          Normal Ranges: PV Max Pollo:     0.8 m/s  (0.6-0.9m/s) PV Max P.8 mmHg  PULMONARY VEINS: PulmV Brasher Pollo: 69.20 cm/s PulmV S/D Pollo:  1.40 PulmV Sys Pollo:  97.10 cm/s  [id number] Sanford Seth MD Electronically signed on 2025 at 9:50:03 AM  ** Final **         Renal US  RIGHT KIDNEY:  The right kidney measures 12.5 cm in length. The renal cortical  echogenicity and thickness are within normal limits. No  hydronephrosis is present; no evidence of nephrolithiasis. Trace  perinephric  fluid.      LEFT KIDNEY:  The left kidney measures 12.6 cm in length. The renal cortical  echogenicity and thickness are within normal limits. Midpole  nonobstructive nephrolithiasis measuring 0.8 cm.      BLADDER:  Unremarkable      IMPRESSION:  1. Left midpole nonobstructive nephroliths measuring 0.8 cm.  2. Trace right perinephric fluid.  3. Small left-sided pleural effusion.    ASSESSMENT AND PLAN    74 yo female w/ PMH of COPD, chronic pain, RA on methotrexate, sarcoidosis, smoking, admitted with AMS, found to have FAUSTINA    FAUSTINA, nonoliguric, creatinine on admission 0.4, up to 4.36 today    Hyponatremia, mild, controlled    Hypocalcemia    NAGMA    Hypomagnesemia, controlled    HTN, BP stable, at home on lopressor BID    HK      Plan  - creatinine stabilizing, probably severe ATN  - diuretics prn, continue ivf today  - medical management of HK, daily lokelma and renal diet  - po bicarb tid  - repeat UA with no casts, few RBCs  - please record UOP each shift  - renal US as above not concerning  - avoid hypotension and nephrotoxins  - daily lytes and replete as needed, noted low Ca on prolia as OP    MARS reviewed, dose for GFR<19, primidone daily, hold methotrexate, plaquenil and neurontin    Thank you for the opportunity to assist in the care of this patient, please call with questions  Brynn Bar MD PhD                [1]   Medications Prior to Admission   Medication Sig Dispense Refill Last Dose/Taking    Bifidobacterium infantis (ALIGN ORAL) Take 1 capsule by mouth once daily.   Unknown    carboxymethylcellulose sodium (TheraTears) 0.25 % dropperette Administer 1 drop into both eyes 4 times a day as needed (dry eye).   Unknown    clopidogrel (Plavix) 75 mg tablet Take 1 tablet (75 mg) by mouth once daily.   Unknown    clotrimazole (Mycelex) 10 mg mateus Take 1 tablet (10 mg) by mouth once daily. 70 Mateus 3 Unknown    denosumab (Prolia) 60 mg/mL syringe Inject 1 mL (60 mg) under the skin every 6 months.    7/31/2025    diphenhydrAMINE (Sominex) 25 mg tablet Take 1 tablet (25 mg) by mouth as needed at bedtime for sleep.   Unknown    ergocalciferol (Vitamin D-2) 1.25 MG (94419 UT) capsule TAKE 1 CAPSULE WEEKLY. (Patient taking differently: Take 1 capsule (1.25 mg) by mouth every 14 (fourteen) days.) 13 capsule 3 Unknown    famotidine (Pepcid) 40 mg tablet TAKE 1 TABLET BY MOUTH ONCE DAILY AT BEDTIME 90 tablet 3 Unknown    fluticasone (Flonase) 50 mcg/actuation nasal spray Administer 2 sprays into each nostril once daily. 16 g 3 Unknown    folic acid (Folvite) 1 mg tablet Take 1 tablet (1 mg) by mouth once daily.   Unknown    gabapentin (Neurontin) 400 mg capsule Take 2 capsules (800 mg) by mouth 4 times a day. 720 capsule 1 Unknown    Humira,CF, Pen 40 mg/0.4 mL pen injector kit pen-injector Inject 1 Pen (40 mg) under the skin every 14 (fourteen) days.   Unknown    HYDROcodone-acetaminophen (Norco) 7.5-325 mg tablet Take 1 tablet by mouth every 12 hours if needed for severe pain (7 - 10). 90 tablet 0 Unknown    hydroxychloroquine (Plaquenil) 200 mg tablet Take 1.5 tablets (300 mg) by mouth once daily.   Unknown    ipratropium (Atrovent) 42 mcg (0.06 %) nasal spray Administer 2 sprays into each nostril 4 times a day. 15 mL 5 Unknown    isosorbide mononitrate ER (Imdur) 60 mg 24 hr tablet Take 1 tablet (60 mg) by mouth once daily.   Unknown    methotrexate 25 mg/mL injection Inject 0.8 mL (20 mg) into the muscle 1 (one) time per week.   Unknown    metoclopramide (Reglan) 10 mg tablet Take 1 tablet (10 mg) by mouth once daily as needed (nausea). 30 tablet 1 Unknown    metoprolol tartrate (Lopressor) 50 mg tablet Take 1 tablet by mouth every 12 hours.   Unknown    Miebo, PF, 100 % drops Administer 1 drop into both eyes 4 times a day.   Unknown    montelukast (Singulair) 10 mg tablet Take 1 tablet (10 mg) by mouth once daily. 90 tablet 1 Unknown    naloxone (Narcan) 4 mg/0.1 mL nasal spray Administer 1 spray (4 mg) into  affected nostril(s) if needed for opioid reversal. May repeat every 2-3 minutes if needed, alternating nostrils, until medical assistance becomes available. 2 each 0 Unknown    nitroglycerin (Nitrostat) 0.4 mg SL tablet Place 1 tablet (0.4 mg) under the tongue every 5 minutes if needed for chest pain. May take up to 3 doses over 15 minutes. Call 911 if pain persists.   Unknown    oxygen (O2) therapy Inhale once daily at bedtime. use at night as directed   Unknown    polyethylene glycol (Glycolax, Miralax) 17 gram packet Take 17 g by mouth 2 times a day. Mix 1 cap (17g) into 8 ounces of fluid. 60 packet 7 Unknown    polyethylene glycol (Glycolax, Miralax) 17 gram/dose powder Mix 17 g of powder and drink 2 times a day.   Unknown    pravastatin (Pravachol) 20 mg tablet Take 1 tablet (20 mg) by mouth once daily.   Unknown    primidone (Mysoline) 50 mg tablet Take 3 tablets (150 mg) by mouth early in the morning.. 270 tablet 3 Unknown    ProAir HFA 90 mcg/actuation inhaler Inhale 2 puffs every 4 hours if needed for wheezing or shortness of breath. 8.5 g 5 Unknown    sennosides-docusate sodium (Martha-Colace) 8.6-50 mg tablet Take 1 tablet by mouth once daily. 30 tablet 11 Unknown    theophylline ER (Alok-Dur) 300 mg 12 hr tablet Take 1 tablet (300 mg) by mouth 3 times a day.   Unknown    Trelegy Ellipta 100-62.5-25 mcg blister with device Inhale 1 puff once daily.   Unknown    Xiidra 5 % dropperette Administer 1 drop into both eyes every 12 hours.   Unknown    azelastine (Astelin) 137 mcg (0.1 %) nasal spray Administer 2 sprays into each nostril 2 times a day. (Patient not taking: Reported on 8/7/2025) 72 mL 3 Not Taking    cyclobenzaprine (Flexeril) 10 mg tablet Take 1 tablet (10 mg) by mouth 3 times a day as needed for muscle spasms. (Patient not taking: Reported on 8/7/2025) 45 tablet 1 Not Taking    ipratropium (Atrovent) 0.02 % nebulizer solution Use 1 vial 4 times daily (Patient not taking: Reported on 8/7/2025) 75  "mL 5 Not Taking    pantoprazole (ProtoNix) 40 mg EC tablet Take 1 tablet (40 mg) by mouth once daily. Do not crush, chew, or split. (Patient not taking: Reported on 8/7/2025) 30 tablet 5 Not Taking    syringe with needle 1 mL 25 gauge x 5/8\" syringe       [2] acetylcysteine, 3 mL, nebulization, TID  azelastine, 2 spray, Each Nostril, BID  budesonide, 0.25 mg, nebulization, Daily  cefTRIAXone, 1 g, intravenous, q24h  [Held by provider] clopidogrel, 75 mg, oral, Daily  fluticasone, 2 spray, Each Nostril, Daily  [Held by provider] gabapentin, 800 mg, oral, BID  heparin (porcine), 5,000 Units, subcutaneous, q8h  [Held by provider] hydroxychloroquine, 300 mg, oral, Daily  ipratropium, 2 spray, Each Nostril, 4x daily  ipratropium-albuteroL, 3 mL, nebulization, TID  isosorbide mononitrate ER, 60 mg, oral, Daily  [Held by provider] methotrexate, 20 mg, intramuscular, Every Sunday  methylPREDNISolone sodium succinate (PF), 60 mg, intravenous, 4x daily  metoprolol tartrate, 50 mg, oral, q12h  montelukast, 10 mg, oral, Daily  nystatin, 5 mL, Mouth/Throat, TID  pantoprazole, 40 mg, oral, Daily  pravastatin, 20 mg, oral, Nightly  primidone, 150 mg, oral, Daily  sennosides-docusate sodium, 1 tablet, oral, Daily  sodium zirconium cyclosilicate, 10 g, oral, q8h     [3] sodium chloride 0.9%, 75 mL/hr, Last Rate: 75 mL/hr (08/13/25 1007)     [4] PRN medications: albuterol, benzocaine-menthol, HYDROcodone-acetaminophen, ipratropium-albuteroL, lubricating eye drops, metoclopramide, naloxone, nitroglycerin, oxygen, polyethylene glycol    "

## 2025-08-13 NOTE — PROGRESS NOTES
Radha Verduzco is a 75 y.o. female on day 6 of admission presenting with Altered mental status, unspecified altered mental status type.      Subjective   Patient was seen and examined at bedside, she had hallucination overnight, she stated that she was in long-term, she is on still on RA to 2L with exertion . Denies any f/c, n/v, new onset headaches, vision changes, chest pain, dyspnea, abdominal pain, changes in BM, or urinary changes. She wasn't happy about being still in the hospital and wants to be discharged.           Objective     Last Recorded Vitals  /68   Pulse 78   Temp 36.2 °C (97.2 °F)   Resp 18   Wt 65.7 kg (144 lb 13.5 oz)   SpO2 94%   Intake/Output last 3 Shifts:    Intake/Output Summary (Last 24 hours) at 8/13/2025 1856  Last data filed at 8/13/2025 0900  Gross per 24 hour   Intake 660 ml   Output 400 ml   Net 260 ml         Admission Weight  Weight: 68.5 kg (151 lb) (08/07/25 1200)    Daily Weight  08/07/25 : 65.7 kg (144 lb 13.5 oz)    Image Results  Transthoracic Echo Complete     [facility], [address] [place] [address]            Tel [phone] and Fax [phone]    TRANSTHORACIC ECHOCARDIOGRAM REPORT       Patient Name:       RADHA VERDUZCO  Reading Physician:    [id number] Sanford Seth MD  Study Date:         8/12/2025           Ordering Provider:    [id number] LARS DUMONT  MRN/PID:            [id number]            Fellow:  Accession#:         [id number]        Nurse:  Date of Birth/Age:  1950 / 75      Sonographer:          Patrick Jacobson                      years                                     ACS, RDCS, FASE  Gender assigned at  F                   Additional Staff:  Birth:  Height:             157.48 cm           Admit Date:           8/7/2025  Weight:             65.32 kg            Admission Status:     Inpatient -                                                                 Routine  BSA / BMI:          1.66 m2 / 26.34     Encounter#:           [id number]                      kg/m2  Blood Pressure:     134/76 mmHg         Department Location:  [facility]    Study Type:    TRANSTHORACIC ECHO (TTE) COMPLETE  Diagnosis/ICD: Other forms of dyspnea-R06.09  Indication:    Dyspnea  CPT Code:      Echo Complete w Full [id number]    Patient History:  Pertinent History: Dyspnea, CAD, HTN, Hyperlipidemia and Chest Pain. Hx coronary                     stents, hx TIA. AMS.    Study Detail: The following Echo studies were performed: 2D, M-Mode, Doppler and                color flow. Technically challenging study due to body habitus.       PHYSICIAN INTERPRETATION:  Left Ventricle: The left ventricular systolic function is normal with a visually estimated ejection fraction of 60-65%. There are no regional left ventricular wall motion abnormalities. The left ventricular cavity size is normal. There is mildly increased septal and normal posterior left ventricular wall thickness. Spectral Doppler shows a Grade I (impaired relaxation pattern) of left ventricular diastolic filling with normal left atrial filling pressure.  Left Atrium: The left atrial size is normal.  Right Ventricle: The right ventricle is normal in size. There is normal right ventricular global systolic function.  Right Atrium: The right atrium is normal in size.  Aortic Valve: The aortic valve is structurally normal. The aortic valve area by VTI is 2.19 cmÂ² with a peak velocity of 1.87 m/s. The peak and mean gradients are 14 mmHg and 8 mmHg, respectively with a dimensionless index of 0.53. There is trace to mild aortic valve regurgitation.  Mitral Valve: The mitral valve is normal in structure. There is no evidence of mitral valve regurgitation. The E Vmax is 0.68 m/s.  Tricuspid Valve: The tricuspid valve is structurally normal. No evidence of tricuspid  regurgitation.  Pulmonic Valve: The pulmonic valve is structurally normal. There is physiologic pulmonic valve regurgitation.  Pericardium: There is no pericardial effusion noted.  Aorta: The aortic root is normal.       CONCLUSIONS:   1. The left ventricular systolic function is normal with a visually estimated ejection fraction of 60-65%.   2. Spectral Doppler shows a Grade I (impaired relaxation pattern) of left ventricular diastolic filling with normal left atrial filling pressure.    QUANTITATIVE DATA SUMMARY:     2D MEASUREMENTS:          Normal Ranges:  Ao Root d:       3.70 cm  (2.0-3.7cm)  LAs:             2.80 cm  (2.7-4.0cm)  IVSd:            0.95 cm  (0.6-1.1cm)  LVPWd:           0.80 cm  (0.6-1.1cm)  LVIDd:           4.90 cm  (3.9-5.9cm)  LVIDs:           3.10 cm  LV Mass Index:   92 g/m2  LVEDV Index:     47 ml/m2  LV % FS          36.7 %       LEFT ATRIUM:                  Normal Ranges:  LA Vol A4C:        32.0 ml    (22+/-6mL/m2)  LA Vol A2C:        48.2 ml  LA Vol BP:         39.7 ml  LA Vol Index A4C:  19.3ml/m2  LA Vol Index A2C:  29.0 ml/m2  LA Vol Index BP:   23.9 ml/m2  LA Area A4C:       14.0 cm2  LA Area A2C:       17.0 cm2  LA Major Axis A4C: 5.2 cm  LA Major Axis A2C: 5.1 cm  LA Volume Index:   22.0 ml/m2       RIGHT ATRIUM:          Normal Ranges:  RA Area A4C:  11.0 cm2       M-MODE MEASUREMENTS:         Normal Ranges:  Ao Root:             3.60 cm (2.0-3.7cm)  LAs:                 4.40 cm (2.7-4.0cm)       AORTA MEASUREMENTS:         Normal Ranges:  Asc Ao, d:          3.60 cm (2.1-3.4cm)       LV SYSTOLIC FUNCTION:                       Normal Ranges:  EF-A4C View:    66 % (>=55%)  EF-A2C View:    58 %  EF-Biplane:     61 %  EF-Visual:      63 %  LV EF Reported: 63 %       LV DIASTOLIC FUNCTION:            Normal Ranges:  MV Peak E:             0.68 m/s   (0.7-1.2 m/s)  MV Peak A:             0.85 m/s   (0.42-0.7 m/s)  E/A Ratio:             0.80       (1.0-2.2)  MV e'                   0.091 m/s  (>8.0)  MV lateral e'          0.10 m/s  MV medial e'           0.08 m/s  E/e' Ratio:            7.46       (<8.0)  PulmV Sys Pollo:         97.10 cm/s  PulmV Brasher Pollo:        69.20 cm/s  PulmV S/D Pollo:         1.40       MITRAL VALVE:          Normal Ranges:  MV DT:        148 msec (150-240msec)       AORTIC VALVE:                      Normal Ranges:  AoV Vmax:                1.87 m/s  (<=1.7m/s)  AoV Peak P.0 mmHg (<20mmHg)  AoV Mean P.0 mmHg  (1.7-11.5mmHg)  LVOT Max Pollo:            0.95 m/s  (<=1.1m/s)  AoV VTI:                 40.60 cm  (18-25cm)  LVOT VTI:                21.40 cm  LVOT Diameter:           2.30 cm   (1.8-2.4cm)  AoV Area, VTI:           2.19 cm2  (2.5-5.5cm2)  AoV Area,Vmax:           2.11 cm2  (2.5-4.5cm2)  AoV Dimensionless Index: 0.53       RIGHT VENTRICLE:  RV Basal 2.60 cm  RV Mid   2.50 cm  RV Major 6.2 cm  TAPSE:   20.9 mm  RV s'    0.13 m/s       TRICUSPID VALVE/RVSP:         Normal Ranges:  Est. RA Pressure:     3  IVC Diam:             1.50 cm       PULMONIC VALVE:          Normal Ranges:  PV Max Pollo:     0.8 m/s  (0.6-0.9m/s)  PV Max P.8 mmHg       PULMONARY VEINS:  PulmV Brasher Pollo: 69.20 cm/s  PulmV S/D Pollo:  1.40  PulmV Sys Pollo:  97.10 cm/s       [id number] Sanford Seth MD  Electronically signed on 2025 at 9:50:03 AM       ** Final **      Physical Exam  Constitutional:       General: She is not in acute distress.     Appearance: She is normal weight.   HENT:      Head: Normocephalic and atraumatic.      Nose: Nose normal.      Mouth/Throat:      Mouth: Mucous membranes are moist.      Pharynx: Oropharynx is clear.      Eyes:      Extraocular Movements: Extraocular movements intact.      Conjunctiva/sclera: Conjunctivae normal.      Pupils: Pupils are equal, round, and reactive to light.         Cardiovascular:      Rate and Rhythm: Regular rhythm. Normal rate.      Pulses: Normal pulses.      Heart sounds: Normal heart sounds.    Pulmonary:      Effort: Pulmonary effort is normal.      Breath sounds: Rales, no wheezine  Abdominal:      General: Abdomen is flat.      Palpations: Abdomen is soft.      Musculoskeletal:         General: Normal range of motion.      Cervical back: Normal range of motion and neck supple.      Skin:     General: Skin is warm and dry.      Neurological:      General: No focal deficit present.      Mental Status: She is alert and oriented to person, place, and time. Mental status is at baseline.      Psychiatric:         Mood and Affect: Mood normal.         Behavior: Behavior normal.   Relevant Results                            Assessment & Plan  Altered mental status, unspecified altered mental status type    Radha Deluna is a 75 y.o. female admitted for  AMS and fever     Acute Medical Issues     #Acute hypoxic respiratory failure (Fluctuating):  # Concern for CAP  #  COPD exacerbation  - Patient was on 2 L in the ED, according to the nurse she was dropping to the 80s on room air, she uses oxygen 2 L at night at home.  - Increase Steroids 60 mg 4 times daily by pulmonology, currently on RA  to 2L, add pulmicort   - Chest x-ray showed 8/11 Persistent right-greater-than-left ill-defined opacities, Stable small left-sided pleural effusion  - CT chest;  Rounded opacities in the left upper and lower lobes with peripheral  ground-glass attenuation that are new from prior imaging 10/28/2024  concerning for pneumonia. Given the nodular appearance of the  lesions, however, recommend interval follow-up with CT after  treatment to confirm resolution and exclude an underlying neoplastic  process.  - serum LDH, ESR, CRP, CD4/CD8 panel, GARRICK, ANCA  - continue  Ceftriaxone, finished doxycycline     -Received Zosyn/Vanco  -Mycoplasma, Legionella, legionella , Procal 0.86 , MRSA; negative, MSSA Positive   - RT evaluate and treat, DuoNeb, albuterol, incentive spirometry.  - Wean oxygen as tolerated          #FAUSTINA   - s.  Creat increased since admission 4 from 0.48  -Likely severe ATN  - 8/11 :1 bolus given, start maintenance fluids. NS 75 ml, had to stop the fluids as the patient became short of breath, CXR was concerning for fluid congestion, bnp 264  - 8/12 Lasix challenge with worsening Kidney function, resume fluids.  -8/13, serum creat started stabilized and slightly improving, will continue fluid, switched from normal saline to bicarb drip  - Continue to monitor, avoid nephrotoxic meds.   - Nephrology consulted; appreciate recs  - Renal US, urine electrolytes low sodium, normal CK      #Hyperkalemia:   - Patient was found to have hyperkalemia 5.5, started on Lokelma and was given 1 dose of calcium gluconate, no EKG changes.  -Low potassium diet, started on bicarb drip        #NAGMA  - ABG 7.24, CO2 34/80/14 0.6, normal lactate  -      #Concern for CHF:  - Patient started having shortness of breath and chest tightness, fluid was stopped, CXR  was concerning for fluid congestion, bnp 264  -  echo' normal EF and grade 1 diastolic relaxation.         #Sepsis(Resolved):  - SIRS criteria in the ED 3/4; tachycardia, leukocytosis fever:  - Acute hypoxic respiratory failure, normal lactate.        #Acute myocardial injury(Resolved):  - Likely secondary to demand ischemia type II MI, mildly elevated troponin of 20, no chest pain       #AMS(Resolved):  #Heat stroke vs Infection:  -Patient was found to be confused , drove her car in the wrong direction.  -She was in her car for 45 minutes and was feverish, responded to fluids.   - CT head NO ACUTE INTRACRANIAL PROCESS      #Hyponatremia:  #Hypokalemia(resolved):  - Continue to monitor, replete potassium as needed     #Seizure(ruled out)  - Patient on primidone, 150 mg, oral, Daily for tremors  - Neurology consulted, patient will be evaluated for possible seizure due to polypharmacy and infectious process.  - MRI normall and EEG ordered.      #Delirium:  - Patient has delirium overnight,  likely from being in strange environment.  - Continue to redirect, according to her sister she gets time of confusion.  - Will probably need to be in assisted living for long-term, discussed with her  sister over the phone        Chronic Medical Issues   # Allergic rhinitis  -Continue azelastine, 2 spray, Each Nostril, BID,  fluticasone, 2 spray, Each Nostril, Daily, montelukast, 10 mg, oral, Daily        #Chronic back pain  - Hold  gabapentin, 800 mg, oral, BID for FAUSTINA     #Rheumatoid arthritis  - Hold hydroxychloroquine, 300 mg, oral, Daily,  and methotrexate, 20 mg, intramuscular, Every Sunday for FAUSTINA         #HTN/CAD/AAA:  -Resume  isosorbide mononitrate ER, 60 mg, oral, Daily, metoprolol tartrate, 50 mg, oral, q12h     # GERD:  - pantoprazole, 40 mg, oral, Daily     #HLD  -Continue pravastatin, 20 mg, oral, Daily           F: PO intake & IVF PRN  E: Replete as needed  N: Regular diet  GI ppx: Protonix 40 mg daily   DVT ppx: Lovenox subcutaneous  Antibiotics: Ceftriaxone, doxycycline   Oxygenation: 0- 2L NC     Code Status: DNR and No Intubation   Emergency Contact: Extended Emergency Contact Information  Primary Emergency Contact: KERWIN FRASER  Address: [place], [place]  Home Phone: [phone]  Work Phone: [phone]  Mobile Phone: [phone]  Relation: Sibling   needed? No      Disposition: 75 y.o.female admitted for AMS , RESOLVED , patient has FAUSTINA, and COPD exacerbation  continue management as above.   Discussed in details with the patient's sister over the phone, and the nephrology team in person, continue management as above           Flakito Gunn, DO

## 2025-08-14 ENCOUNTER — APPOINTMENT (OUTPATIENT)
Dept: PAIN MEDICINE | Facility: CLINIC | Age: 75
End: 2025-08-14
Payer: MEDICARE

## 2025-08-14 LAB
ALBUMIN SERPL BCP-MCNC: 3 G/DL (ref 3.4–5)
ANION GAP SERPL CALC-SCNC: 16 MMOL/L (ref 10–20)
BUN SERPL-MCNC: 71 MG/DL (ref 6–23)
CA-I BLD-SCNC: 0.86 MMOL/L (ref 1.1–1.33)
CALCIUM SERPL-MCNC: 7.7 MG/DL (ref 8.6–10.3)
CD3+CD4+ CELLS # BLD: 0.38 X10E9/L (ref 0.35–2.74)
CD3+CD4+ CELLS # BLD: 376 /MM3 (ref 350–2740)
CD3+CD4+ CELLS NFR BLD: 47 % (ref 29–57)
CD3+CD4+ CELLS/CD3+CD8+ CLL BLD: 2.35 % (ref 1–3.5)
CD3+CD8+ CELLS # BLD: 0.16 X10E9/L (ref 0.08–1.49)
CD3+CD8+ CELLS NFR BLD: 20 % (ref 7–31)
CHLORIDE SERPL-SCNC: 108 MMOL/L (ref 98–107)
CO2 SERPL-SCNC: 18 MMOL/L (ref 21–32)
CREAT SERPL-MCNC: 3.87 MG/DL (ref 0.5–1.05)
EGFRCR SERPLBLD CKD-EPI 2021: 11 ML/MIN/1.73M*2
ERYTHROCYTE [DISTWIDTH] IN BLOOD BY AUTOMATED COUNT: 14.5 % (ref 11.5–14.5)
GLUCOSE SERPL-MCNC: 146 MG/DL (ref 74–99)
HCT VFR BLD AUTO: 33.3 % (ref 36–46)
HGB BLD-MCNC: 11.1 G/DL (ref 12–16)
LYMPHOCYTES # SPEC AUTO: 0.8 X10*3/UL
MCH RBC QN AUTO: 34.2 PG (ref 26–34)
MCHC RBC AUTO-ENTMCNC: 33.3 G/DL (ref 32–36)
MCV RBC AUTO: 103 FL (ref 80–100)
NRBC BLD-RTO: 0 /100 WBCS (ref 0–0)
PHOSPHATE SERPL-MCNC: 5.6 MG/DL (ref 2.5–4.9)
PLATELET # BLD AUTO: 269 X10*3/UL (ref 150–450)
POTASSIUM SERPL-SCNC: 4.9 MMOL/L (ref 3.5–5.3)
RBC # BLD AUTO: 3.25 X10*6/UL (ref 4–5.2)
SODIUM SERPL-SCNC: 137 MMOL/L (ref 136–145)
WBC # BLD AUTO: 11.9 X10*3/UL (ref 4.4–11.3)

## 2025-08-14 PROCEDURE — 86038 ANTINUCLEAR ANTIBODIES: CPT | Mod: PARLAB | Performed by: INTERNAL MEDICINE

## 2025-08-14 PROCEDURE — 36415 COLL VENOUS BLD VENIPUNCTURE: CPT | Performed by: INTERNAL MEDICINE

## 2025-08-14 PROCEDURE — 80069 RENAL FUNCTION PANEL: CPT | Performed by: INTERNAL MEDICINE

## 2025-08-14 PROCEDURE — 94640 AIRWAY INHALATION TREATMENT: CPT

## 2025-08-14 PROCEDURE — 82330 ASSAY OF CALCIUM: CPT | Performed by: INTERNAL MEDICINE

## 2025-08-14 PROCEDURE — 2500000002 HC RX 250 W HCPCS SELF ADMINISTERED DRUGS (ALT 637 FOR MEDICARE OP, ALT 636 FOR OP/ED): Performed by: INTERNAL MEDICINE

## 2025-08-14 PROCEDURE — 86235 NUCLEAR ANTIGEN ANTIBODY: CPT | Performed by: INTERNAL MEDICINE

## 2025-08-14 PROCEDURE — 2500000004 HC RX 250 GENERAL PHARMACY W/ HCPCS (ALT 636 FOR OP/ED): Performed by: INTERNAL MEDICINE

## 2025-08-14 PROCEDURE — 2500000004 HC RX 250 GENERAL PHARMACY W/ HCPCS (ALT 636 FOR OP/ED)

## 2025-08-14 PROCEDURE — 2500000001 HC RX 250 WO HCPCS SELF ADMINISTERED DRUGS (ALT 637 FOR MEDICARE OP): Performed by: NURSE PRACTITIONER

## 2025-08-14 PROCEDURE — 85027 COMPLETE CBC AUTOMATED: CPT | Performed by: INTERNAL MEDICINE

## 2025-08-14 PROCEDURE — 2500000001 HC RX 250 WO HCPCS SELF ADMINISTERED DRUGS (ALT 637 FOR MEDICARE OP): Performed by: INTERNAL MEDICINE

## 2025-08-14 PROCEDURE — 99233 SBSQ HOSP IP/OBS HIGH 50: CPT | Performed by: INTERNAL MEDICINE

## 2025-08-14 PROCEDURE — 1100000001 HC PRIVATE ROOM DAILY

## 2025-08-14 PROCEDURE — 9420000001 HC RT PATIENT EDUCATION 5 MIN

## 2025-08-14 RX ADMIN — METOPROLOL TARTRATE 50 MG: 50 TABLET, FILM COATED ORAL at 06:32

## 2025-08-14 RX ADMIN — SODIUM ZIRCONIUM CYCLOSILICATE 10 G: 10 POWDER, FOR SUSPENSION ORAL at 09:23

## 2025-08-14 RX ADMIN — IPRATROPIUM BROMIDE AND ALBUTEROL SULFATE 3 ML: .5; 3 SOLUTION RESPIRATORY (INHALATION) at 08:20

## 2025-08-14 RX ADMIN — PRIMIDONE 150 MG: 50 TABLET ORAL at 06:32

## 2025-08-14 RX ADMIN — IPRATROPIUM BROMIDE AND ALBUTEROL SULFATE 3 ML: .5; 3 SOLUTION RESPIRATORY (INHALATION) at 18:28

## 2025-08-14 RX ADMIN — METHYLPREDNISOLONE SODIUM SUCCINATE 60 MG: 125 INJECTION, POWDER, FOR SOLUTION INTRAMUSCULAR; INTRAVENOUS at 20:04

## 2025-08-14 RX ADMIN — HYDROCODONE BITARTRATE AND ACETAMINOPHEN 1 TABLET: 7.5; 325 TABLET ORAL at 18:14

## 2025-08-14 RX ADMIN — SODIUM BICARBONATE 60 ML/HR: 84 INJECTION, SOLUTION INTRAVENOUS at 15:36

## 2025-08-14 RX ADMIN — Medication: at 13:17

## 2025-08-14 RX ADMIN — SODIUM ZIRCONIUM CYCLOSILICATE 10 G: 10 POWDER, FOR SUSPENSION ORAL at 00:49

## 2025-08-14 RX ADMIN — CEFTRIAXONE 1 G: 1 INJECTION, SOLUTION INTRAVENOUS at 20:03

## 2025-08-14 RX ADMIN — IPRATROPIUM BROMIDE AND ALBUTEROL SULFATE 3 ML: .5; 3 SOLUTION RESPIRATORY (INHALATION) at 13:17

## 2025-08-14 RX ADMIN — SENNOSIDES AND DOCUSATE SODIUM 1 TABLET: 50; 8.6 TABLET ORAL at 09:23

## 2025-08-14 RX ADMIN — METHYLPREDNISOLONE SODIUM SUCCINATE 60 MG: 125 INJECTION, POWDER, FOR SOLUTION INTRAMUSCULAR; INTRAVENOUS at 06:32

## 2025-08-14 RX ADMIN — Medication: at 18:28

## 2025-08-14 RX ADMIN — METHYLPREDNISOLONE SODIUM SUCCINATE 60 MG: 125 INJECTION, POWDER, FOR SOLUTION INTRAMUSCULAR; INTRAVENOUS at 18:11

## 2025-08-14 RX ADMIN — HEPARIN SODIUM 5000 UNITS: 5000 INJECTION, SOLUTION INTRAVENOUS; SUBCUTANEOUS at 09:23

## 2025-08-14 RX ADMIN — HEPARIN SODIUM 5000 UNITS: 5000 INJECTION, SOLUTION INTRAVENOUS; SUBCUTANEOUS at 00:49

## 2025-08-14 RX ADMIN — NYSTATIN 500000 UNITS: 100000 SUSPENSION ORAL at 20:04

## 2025-08-14 RX ADMIN — Medication: at 08:20

## 2025-08-14 RX ADMIN — ACETYLCYSTEINE 600 MG: 200 SOLUTION ORAL; RESPIRATORY (INHALATION) at 08:20

## 2025-08-14 RX ADMIN — MONTELUKAST 10 MG: 10 TABLET, FILM COATED ORAL at 09:23

## 2025-08-14 RX ADMIN — ISOSORBIDE MONONITRATE 60 MG: 60 TABLET, EXTENDED RELEASE ORAL at 09:23

## 2025-08-14 RX ADMIN — PANTOPRAZOLE SODIUM 40 MG: 40 TABLET, DELAYED RELEASE ORAL at 09:23

## 2025-08-14 RX ADMIN — ACETYLCYSTEINE 600 MG: 200 SOLUTION ORAL; RESPIRATORY (INHALATION) at 18:28

## 2025-08-14 RX ADMIN — BUDESONIDE 0.25 MG: 0.25 INHALANT RESPIRATORY (INHALATION) at 08:20

## 2025-08-14 RX ADMIN — METHYLPREDNISOLONE SODIUM SUCCINATE 60 MG: 125 INJECTION, POWDER, FOR SOLUTION INTRAMUSCULAR; INTRAVENOUS at 14:01

## 2025-08-14 RX ADMIN — ACETYLCYSTEINE 600 MG: 200 SOLUTION ORAL; RESPIRATORY (INHALATION) at 13:17

## 2025-08-14 RX ADMIN — METOPROLOL TARTRATE 50 MG: 50 TABLET, FILM COATED ORAL at 20:03

## 2025-08-14 RX ADMIN — PRAVASTATIN SODIUM 20 MG: 20 TABLET ORAL at 20:04

## 2025-08-14 RX ADMIN — AZELASTINE HYDROCHLORIDE 2 SPRAY: 137 SPRAY, METERED NASAL at 20:04

## 2025-08-14 ASSESSMENT — PAIN DESCRIPTION - ORIENTATION: ORIENTATION: MID;LOWER

## 2025-08-14 ASSESSMENT — PAIN SCALES - GENERAL
PAINLEVEL_OUTOF10: 0 - NO PAIN
PAINLEVEL_OUTOF10: 3
PAINLEVEL_OUTOF10: 8

## 2025-08-14 ASSESSMENT — PAIN DESCRIPTION - DESCRIPTORS: DESCRIPTORS: ACHING;THROBBING;SORE

## 2025-08-14 ASSESSMENT — PAIN - FUNCTIONAL ASSESSMENT
PAIN_FUNCTIONAL_ASSESSMENT: 0-10
PAIN_FUNCTIONAL_ASSESSMENT: 0-10

## 2025-08-14 ASSESSMENT — PAIN DESCRIPTION - LOCATION: LOCATION: BACK

## 2025-08-14 NOTE — PROGRESS NOTES
"Physical Therapy                 Therapy Communication Note    Patient Name: Radha Deluna  MRN: [id number]  Department: [facility]  [facility]  Today's Date: 8/14/2025     Discipline: Physical Therapy    Missed Visit: PT Missed Visit: Yes     Missed Visit Reason: Patient refused    Missed Time: Attempt    Comment:  two attempts for tx made this date (9:05 and 13:42) with pt adamantly declining participation in tx both times despite encouragement and education regarding benefits of functional mobility.  pt repeatedly stating \"I don't want to\" and growing agitated with further encouragement.          "

## 2025-08-14 NOTE — CONSULTS
Pulmonary Critical Care CONSULT Note      Date of Service : 08/14/25  Time of Service : 11:56 AM   Radha Deluna  [id number]      Subjective      Interval history:  8/14/2025:  Patient was found to be sleeping peacefully in bed.   She was somnolent but arousable. CRP 17.37, .   Her ABG that was recently completed on 8/13 showed primary metabolic acidosis with partial respiratory compensation. Nephrology is on board, started on oral bicarb and bicarb gtt.   WBC and creatinine are both downtrending.   This case was discussed with both nephrologist and primary team.      History of Present Illness: Radha Deluna is a 75 y.o. female with a PMHx of chronic lower back pain, COPD, migraine, CAD s/p stents, AAA, CVA, hypertension, hypercholesterolemia, GERD, PAD, rheumatoid lung disease s/p lung biopsy who presented to [facility] on 8/7/2025 with a chief complaint of AMS.  Of note, patient was recently admitted on January 2025 to [facility] for concern for acute exacerbation of COPD and pneumonia.  She was treated with antibiotics and steroids and discharged with a prolonged course of antibiotics.    In the ED, she was found to be tachycardic and hypotensive.  CT head showed no acute intracranial process.  However, she was found to have acute hypoxic respiratory failure requiring 2L NC.  CXR showed several nodular opacities over the right lung.  She received Zosyn, vancomycin in the ED.      On the floor: She improved back to her baseline mentation, and her oxygen requirement went from 2 L to room air.  CT chest 8/11 showed rounded opacities in the left upper and lower lobes with peripheral groundglass attenuation, new from 10/2024. Also showed scattered pulmonary nodules and masses throughout right lung,  stable from prior imaging.    Neurology was consulted, who suggested that patient suffered a period of AMS secondary to underlying infection, rather than a primary neurological process. On 8/11, she  again reported increased shortness of breath.  Due to concern for pulmonary congestion, fluids were discontinued. Echo was normal EF and grade 1 diastolic relaxation.  She was also started on IV Solu-Medrol 40 mg daily on 8/12, which was increased to twice daily on 8/13.  She was also started on Pulmicort.  In addition to this, she was found to have worsening FAUSTINA likely 2/2 ATN, with creatinine of 4.29.    Pro-Ethan was 0.86.    Mycoplasma, Legionella: Negative  MRSA: Negative  Antibiotics: Ceftriaxone day 7, completed doxycycline 5 days.    ABG 8/13 pending      Past Medical/Surgical History:   Medical History[1]  Surgical History[2]    Family History:   Family medical history includes stroke in father.    Allergies:     Allergies[3]    Social History:   reports that she has been smoking cigarettes. She has been exposed to tobacco smoke. She has never used smokeless tobacco. She reports that she does not currently use alcohol. She reports that she does not use drugs.    Current Medications:   No recently discontinued medications to reconcile    Review of Systems:   Unable to be obtained due to altered mental state.      Objective     Vital signs in last 24 hours:  Temp:  [35.7 °C (96.3 °F)-36.3 °C (97.3 °F)] 36.1 °C (97 °F)  Heart Rate:  [74-80] 80  Resp:  [16] 16  BP: (133-159)/(67-74) 159/74 Temp:   Temp Readings from Last 3 Encounters:   08/14/25 36.1 °C (97 °F) (Temporal)   07/31/25 35.9 °C (96.6 °F) (Temporal)   07/15/25 36 °C (96.8 °F) (Temporal)     Intake/Output last 3 shifts:  I/O last 3 completed shifts:  In: 1106.3 (15.2 mL/kg) [P.O.:660; I.V.:446.3 (6.1 mL/kg)]  Out: 400 (5.5 mL/kg) [Urine:400 (0.2 mL/kg/hr)]  Dosing Weight: 72.6 kg   Intake/Output this shift:  No intake/output data recorded.     Oxygen requirements:  Oxygen Therapy  Pulse Ox (24 hr min): 92  Medical Gas Therapy: Supplemental oxygen  Medical Gas Delivery Method: Nasal cannula  O2 Flow Rate (L/min): 4 L/min FiO2 (%): 32 %    Ventilator  Information:   na       Physical Exam  General appearance: Somnolent but arousable.   In no acute distress.  HEENT: Atraumatic/normocephalic, EOMI, MEGHANN, pharynx clear, moist mucosa, redness of the uvula appreciated,   Neck: Supple, no jugular venous distension, lymphadenopathy, thyromegaly or carotid bruits  Chest:  Equal normal breath sounds, no wheezing, no crackles and no tenderness over ribs   Cardiovascular: Normal S1, S2, regular rate and rhythm, no murmur, rub or gallop  Abdomen: Normal sounds present, soft, lax with no tenderness, no hepatosplenomegaly, and no masses  Extremities: No edema. Pulses are equally present.   Skin: intact, no rashes   Neurologic:  Alert and oriented x 3.  No focal deficit    Results from last 7 days   Lab Units 08/14/25  0447   SODIUM mmol/L 137   POTASSIUM mmol/L 4.9   CHLORIDE mmol/L 108*   CO2 mmol/L 18*   BUN mg/dL 71*   CREATININE mg/dL 3.87*   GLUCOSE mg/dL 146*   CALCIUM mg/dL 7.7*     Results from last 7 days   Lab Units 08/14/25  0447   WBC AUTO x10*3/uL 11.9*   HEMOGLOBIN g/dL 11.1*   HEMATOCRIT % 33.3*   PLATELETS AUTO x10*3/uL 269     Results from last 7 days   Lab Units 08/13/25  1352   POCT PH, ARTERIAL pH 7.24*   POCT PCO2, ARTERIAL mm Hg 34*   POCT PO2, ARTERIAL mm Hg 80*   POCT HCO3 CALCULATED, ARTERIAL mmol/L 14.6*   POCT BASE EXCESS, ARTERIAL mmol/L -11.7*       No results found.     === 08/07/25 ===    CT CHEST WO IV CONTRAST    - Impression -  Rounded opacities in the left upper and lower lobes with peripheral  ground-glass attenuation that are new from prior imaging 10/28/2024  concerning for pneumonia. Given the nodular appearance of the  lesions, however, recommend interval follow-up with CT after  treatment to confirm resolution and exclude an underlying neoplastic  process.    Scattered pulmonary nodules and masses throughout the right lung  which are stable compared to prior imaging.    Small left pleural effusion.    2.6 cm hypodense right thyroid  nodule likely corresponding to  abnormality on prior thyroid ultrasound 11/11/2024 for which a biopsy  was recommended.    MACRO:  None.    Signed by: Evan Finkelstein 8/8/2025 12:23 AM  Dictation workstation:   [id number]        Assessment/Plan     Patient is 75 y.o. female  with the following medical Problems:    Acute hypoxic respiratory failure secondary to pneumonia  Concern for COPD exacerbation  Altered mental status, likely delirium  FAUSTINA  Rheumatoid arthritis    Plan of Care:  Differential for CT chest findings includes infection versus malignancy versus ILD flare. Left-sided peripheral groundglass opacities and rounded consolidations suggest organizing pneumonia. The stable right-sided nodules and masses are more consistent with chronic rheumatoid nodules.  8/14: WBC continues to downtrend  11.9.  Creatinine continues to improve to 3.87 .  Has completed 7 days of ceftriaxone  Recommend follow-up CT after treatment to confirm resolution, exclude underlying neoplastic process.  Ordered serum LDH, ESR, CRP, CD4/CD8 panel, GARRICK, ANCA  for autoimmune workup  Continue Solu-Medrol to 60mg 4 times daily  Holding Plavix for now in light of possible bronchoscopy.  ABG significant for pH of 7.24, CO2 34, O2 of 80, HCO3 14.6. Suggestive of high anion gap metabolic acidosis (gap corrected for albumin 17).  Normal lactate, no toxic ingestion, decreased PO intake. Oral bicarb administered, and Bicarb drip started.   DVT prophylaxis with heparin  Start bronchopulmonary hygiene and bronchodilator therapy with Duoneb/Mucomyst/Pulmicort/Atrovent  Oxygen for saturation 89 to 94%  Incentive spirometry  PT/OT/SLP  Minimize benzodiazepine and narcotics  especially in the setting of recent delirium.  Encourage ambulation  Head of bed elevation and aspiration precautions.      Aric Mosley,   PGY-1, Internal Medicine  Please SecureChat for any further questions  This is a preliminary note, please await attending attestation  for final A/P       STAFF PHYSICIAN NOTE OF PERSONAL INVOLVEMENT IN CARE  As the attending physician, I certify that I personally reviewed the patient's history and personally examined the patient to confirm the physical findings described above, and that I reviewed the relevant imaging studies and available reports.  I also discussed the differential diagnosis and all of the proposed management plans with the patient and individuals accompanying the patient to this visit.  They had the opportunity to ask questions about the proposed management plans and to have those questions answered.           [1]   Past Medical History:  Diagnosis Date    Chronic obstructive pulmonary disease, unspecified 11/09/2022    Chronic obstructive pulmonary disease, unspecified COPD type    Low back pain     Migraine, unspecified, not intractable, without status migrainosus     Migraines    Other chronic pain 10/21/2015    Chronic pain    Personal history of other diseases of the circulatory system     History of cardiac disorder    Personal history of other diseases of the nervous system and sense organs     History of otitis media    Personal history of other diseases of the respiratory system     History of asthma    Personal history of other diseases of the respiratory system     History of sinusitis    Personal history of other endocrine, nutritional and metabolic disease     History of thyroid disorder    Personal history of other malignant neoplasm of skin     History of malignant neoplasm of skin    Sarcoidosis, unspecified 10/05/2022    Sarcoidosis   [2]   Past Surgical History:  Procedure Laterality Date    ANKLE SURGERY  10/21/2015    Ankle Surgery    APPENDECTOMY  10/21/2015    Appendectomy    BREAST SURGERY  10/21/2015    Breast Surgery    CARPAL TUNNEL RELEASE  10/26/2016    Neuroplasty Median Nerve At Carpal Tunnel    CATARACT EXTRACTION Bilateral     CHOLECYSTECTOMY  10/21/2015    Cholecystectomy    CORONARY ANGIOPLASTY  WITH STENT PLACEMENT  01/14/2021    Cath Placement Of Stent 1    CT GUIDED PERCUTANEOUS BIOPSY LUNG  08/12/2020    CT GUIDED PERCUTANEOUS BIOPSY LUNG 8/12/2020 [facility]    CT GUIDED PERCUTANEOUS BIOPSY LUNG  12/01/2020    CT GUIDED PERCUTANEOUS BIOPSY LUNG 12/1/2020 [facility]    EYE SURGERY      laser    LUNG SURGERY  10/21/2015    Lung Surgery    LYMPH NODE BIOPSY  09/29/2017    Biopsy Lymph Node    MR HEAD ANGIO WO IV CONTRAST  04/04/2022    MR HEAD ANGIO WO IV CONTRAST 4/4/2022 [facility]    MR NECK ANGIO WO IV CONTRAST  08/15/2022    MR NECK ANGIO WO IV CONTRAST 8/15/2022 [facility]    OTHER SURGICAL HISTORY  10/21/2015    Wrist Surgery    OTHER SURGICAL HISTORY  02/07/2022    Endoscopy    OTHER SURGICAL HISTORY  02/07/2022    Colonoscopy    OTHER SURGICAL HISTORY  10/29/2020    Thyroid surgery    OTHER SURGICAL HISTORY  10/29/2020    Sinus surgery    OTHER SURGICAL HISTORY  10/29/2020    Ear surgery    OTHER SURGICAL HISTORY  10/29/2020    Spinal surgery    OTHER SURGICAL HISTORY  10/21/2015    Thoracoscopy (Therapeutic) With Wedge Resection Of Lung    ROTATOR CUFF REPAIR  08/10/2017    Rotator Cuff Repair   [3]   Allergies  Allergen Reactions    Erythromycin Other     Chest pain    Augmentin [Amoxicillin-Pot Clavulanate] GI Upset and Nausea/vomiting

## 2025-08-14 NOTE — CONSULTS
"Nutrition Initial Assessment:   Nutrition Assessment    Reason for Assessment: Length of stay (Hospital day #7; MST=0)    Patient is a 75 y.o. female presenting with altered mental status    Pmhx: COPD, RA, FAUSTINA, HTN, chronic pain, sarcoidosis    Nutrition History:  Energy Intake: Fair 50-75 %, Poor < 50 %  Pain affecting nutrition status: N/A  Food and Nutrient History: Pt with altered mental status. Per review of chart, refusing to eat at times. Weight hx reveals stable weight 66-69kg over the past year  Vitamin/Herbal Supplement Use: none noted       Anthropometrics:  Height: 160 cm (5' 2.99\")   Weight: 65.7 kg (144 lb 13.5 oz)   BMI (Calculated): 25.66  IBW/kg (Dietitian Calculated): 52.2 kg  Percent of IBW: 126 %      Weight History:     Weight Change %:  Weight History / % Weight Change: 7/15/25 66.3kg, 6/24/25 66.7kg, 4/30/25 68.9kg, 3/13/25 68.9kg, 2/18/25 69.9kg, 12/17/24 68.5kg, 9/27/24 67.6kg, 8/30/24 67.4kg  Significant Weight Loss: No    Nutrition Focused Physical Exam Findings:    Subcutaneous Fat Loss:   Defer Subcutaneous Fat Loss Assessment: Defer all  Defer All Reason: not appropriate at this time  Muscle Wasting:  Defer Muscle Wasting Assessment: Defer all  Defer All Reason: not appropriate at this time  Edema:  Edema:  (non pitting)  Edema Location: generalized  Physical Findings:  Skin: Negative    Nutrition Significant Labs:  CBC Trend:   Results from last 7 days   Lab Units 08/14/25  0447 08/13/25  1322 08/13/25  0514 08/12/25  0454   WBC AUTO x10*3/uL 11.9* 14.8* 15.4* 15.6*   RBC AUTO x10*6/uL 3.25* 3.64* 3.48* 3.47*   HEMOGLOBIN g/dL 11.1* 12.4 12.2 12.0   HEMATOCRIT % 33.3* 38.6 36.4 36.2   MCV fL 103* 106* 105* 104*   PLATELETS AUTO x10*3/uL 269 237 237 199    , BMP Trend:   Results from last 7 days   Lab Units 08/14/25  0447 08/13/25  0922 08/13/25  0514 08/12/25  1515   GLUCOSE mg/dL 146* 91 83 77   CALCIUM mg/dL 7.7* 7.8* 7.6* 8.4*   SODIUM mmol/L 137 137 136 137   POTASSIUM mmol/L 4.9 " 5.5* 5.5* 5.1   CO2 mmol/L 18* 17* 17* 18*   CHLORIDE mmol/L 108* 108* 109* 107   BUN mg/dL 71* 68* 62* 56*   CREATININE mg/dL 3.87* 4.31* 4.29* 4.53*    , A1C:  Lab Results   Component Value Date    HGBA1C 5.7 (H) 11/04/2024   , Renal Lab Trend:   Results from last 7 days   Lab Units 08/14/25  0447 08/13/25  0922 08/13/25  0514 08/12/25  1515 08/11/25  0449 08/10/25  0900   POTASSIUM mmol/L 4.9 5.5* 5.5* 5.1   < > 4.3   PHOSPHORUS mg/dL 5.6* 4.9 4.5 3.5   < > 3.3   SODIUM mmol/L 137 137 136 137   < > 131*   MAGNESIUM mg/dL  --   --   --   --   --  2.09   EGFR mL/min/1.73m*2 11* 10* 10* 9*   < > 11*   BUN mg/dL 71* 68* 62* 56*   < > 34*   CREATININE mg/dL 3.87* 4.31* 4.29* 4.53*   < > 3.99*    < > = values in this interval not displayed.        Nutrition Specific Medications:  Reviewed     I/O:   Last BM Date: 08/12/25; Stool Appearance: Soft (08/13/25 0254)    Dietary Orders (From admission, onward)       Start     Ordered    08/14/25 1323  Oral nutritional supplements  Until discontinued        Question Answer Comment   Deliver with Breakfast    Deliver with Dinner    Select supplement: Ensure Clear        08/14/25 1323    08/13/25 1458  Adult diet Regular, Potassium restricted 2 gm (50mEq)  Diet effective now        Question Answer Comment   Diet type Regular    Diet type Potassium restricted 2 gm (50mEq)        08/13/25 1458    08/07/25 1842  May Participate in Room Service With Assistance  ( ROOM SERVICE MAY PARTICIPATE WITH ASSISTANCE)  Once        Question:  .  Answer:  Yes    08/07/25 1841                     Estimated Needs:      Method for Estimating Needs: 1640-1775kcals (25-27kcals/kg ABW)     Method for Estimating 24 Hour Protein Needs: 52-65g (0.8-1.0g/kg ABW)     Method for Estimating 24 Hour Fluid Needs: 1 mL/kcal or as per MD  Patient on Order Fluid Restriction: No        Nutrition Diagnosis   Malnutrition Diagnosis  Patient has Malnutrition Diagnosis: No (UTD)    Nutrition Diagnosis  Patient has  Nutrition Diagnosis: Yes  Diagnosis Status (1): New  Nutrition Diagnosis 1: Inadequate oral intake  Related to (1): acute illness  As Evidenced by (1): noted to refuse to eat at times; 66% avg x3 meals recorded on flow sheet       Nutrition Interventions/Recommendations   Nutrition prescription for oral nutrition    Nutrition Recommendations:  Individualized Nutrition Prescription Provided for : K+ restricted diet.  Will order Ensure Clear BID    Nutrition Interventions/Goals:   Meals and Snacks: Mineral-modified diet  Goal: consume 3 meals per day  Goal: consume Ensure clear BID (for an additional 240 kcals, 9 gm protein each)    Education Documentation  Nutrition education will be provided as appropriate      Nutrition Monitoring and Evaluation   Intake / Amount of food: Consumes at least 50% or more of meals/snacks/supplements, Consumes at least 75% or more of meals/snacks/supplements, Meets > 75% estimated energy needs    Body Weight: Body weight - Maintain stable weight    Electrolyte and Renal Panel: Electrolytes within normal limits, BUN, Creatinine, Potassium, Phosphorus         Goal Status: New goal(s) identified    Time Spent (min): 30 minutes

## 2025-08-14 NOTE — CARE PLAN
The patient's goals for the shift include      The clinical goals for the shift include maintain comfort and safety    Over the shift, the patient did not make progress toward the following goals. Barriers to progression include Pt has recent altered mental status; Pt has altered oxygen needs. Recommendations to address these barriers include Maintain safety; Maintain stable vitals.

## 2025-08-14 NOTE — PROGRESS NOTES
Nephrology Consult Progress Note    Radha Deluna is a 75 y.o. female on day 7 of admission presenting with Altered mental status, unspecified altered mental status type.      Subjective   No acute events overnight, on NC O2, improved UOP, BM yesterday       Objective          Physical Exam    Vitals 24HR  Heart Rate:  [74-80]   Temp:  [35.7 °C (96.3 °F)-36.3 °C (97.3 °F)]   Resp:  [16]   BP: (133-159)/(67-74)   SpO2:  [92 %-94 %]        AAOx3, sleeping but arousable, on NS O2 2L  Decreased AEBL, wheezing+  RRR no murmurs  Abd soft, + BS   edema           I&O 24HR    Intake/Output Summary (Last 24 hours) at 8/14/2025 1218  Last data filed at 8/14/2025 0820  Gross per 24 hour   Intake 446.25 ml   Output 800 ml   Net -353.75 ml         Medications  Prescriptions Prior to Admission[1]   Scheduled medications  Scheduled Medications[2]  Continuous medications  Continuous Medications[3]  PRN medications  PRN Medications[4]    Relevant Results      No results displayed because visit has over 200 results.         Imaging  XR chest 1 view  Result Date: 8/11/2025  1. Persistent right-greater-than-left ill-defined opacities. 2. Stable small left-sided pleural effusion   Signed by: Jesus Prasad 8/11/2025 12:25 PM Dictation workstation:   [id number]    US renal complete  Result Date: 8/11/2025  1. Left midpole nonobstructive nephroliths measuring 0.8 cm. 2. Trace right perinephric fluid. 3. Small left-sided pleural effusion.   MACRO: None     Signed by: Jesus Prasad 8/11/2025 6:44 AM Dictation workstation:   [id number]    EEG  Result Date: 8/9/2025  IMPRESSION Impression This awake and sleep routine is normal. No epileptiform discharges or lateralizing signs are seen. A full report will be scanned into the patient's chart at a later time. This report has been interpreted and electronically signed by     brain w and wo IV contrast  Result Date: 8/9/2025  1. Negative head MRI examination for acute change. 2. There is a  background of age-related volume loss, atherosclerotic disease, and ischemic white matter demyelination.   MACRO: None   Signed by: Salvatore Marcelo 8/9/2025 6:57 AM Dictation workstation:   [id number]    CT chest wo IV contrast  Result Date: 8/8/2025  Rounded opacities in the left upper and lower lobes with peripheral ground-glass attenuation that are new from prior imaging 10/28/2024 concerning for pneumonia. Given the nodular appearance of the lesions, however, recommend interval follow-up with CT after treatment to confirm resolution and exclude an underlying neoplastic process.   Scattered pulmonary nodules and masses throughout the right lung which are stable compared to prior imaging.   Small left pleural effusion.   2.6 cm hypodense right thyroid nodule likely corresponding to abnormality on prior thyroid ultrasound 11/11/2024 for which a biopsy was recommended.   MACRO: None.   Signed by: Evan Finkelstein 8/8/2025 12:23 AM Dictation workstation:   [id number]    XR chest 1 view  Result Date: 8/7/2025  1.  Redemonstration of several nodular opacities over the right lung the largest measuring 15 mm over the upper lungs. These appear similar to the prior. Repeat CT is advised for further evaluation of these findings       MACRO: None   Signed by: Rustam Hilliard 8/7/2025 2:08 PM Dictation workstation:   [id number]    CT head wo IV contrast  Result Date: 8/7/2025  NO ACUTE INTRACRANIAL PROCESS   MACRO: None   Signed by: Ezra Tracy 8/7/2025 1:49 PM Dictation workstation:   [id number]      Cardiology, Vascular, and Other Imaging  Transthoracic Echo Complete  Result Date: 8/12/2025   [facility], [address] [place] [address]           Tel [phone] and Fax [phone] TRANSTHORACIC ECHOCARDIOGRAM REPORT  Patient Name:       CARMINE VERDUZCO  Reading Physician:    [id number] Sanford Seth MD Study Date:         8/12/2025            Ordering Provider:    [id number] LARS DUMONT MRN/PID:            [id number]            Fellow: Accession#:         [id number]        Nurse: Date of Birth/Age:  1950 / 75      Sonographer:          Patrick Jacobson                     years                                     ACS, RDCS, FASE Gender assigned at  F                   Additional Staff: Birth: Height:             157.48 cm           Admit Date:           8/7/2025 Weight:             65.32 kg            Admission Status:     Inpatient -                                                               Routine BSA / BMI:          1.66 m2 / 26.34     Encounter#:           [id number]                     kg/m2 Blood Pressure:     134/76 mmHg         Department Location:  [facility] Study Type:    TRANSTHORACIC ECHO (TTE) COMPLETE Diagnosis/ICD: Other forms of dyspnea-R06.09 Indication:    Dyspnea CPT Code:      Echo Complete w Full Doppler-[id number] Patient History: Pertinent History: Dyspnea, CAD, HTN, Hyperlipidemia and Chest Pain. Hx coronary                    stents, hx TIA. AMS. Study Detail: The following Echo studies were performed: 2D, M-Mode, Doppler and               color flow. Technically challenging study due to body habitus.  PHYSICIAN INTERPRETATION: Left Ventricle: The left ventricular systolic function is normal with a visually estimated ejection fraction of 60-65%. There are no regional left ventricular wall motion abnormalities. The left ventricular cavity size is normal. There is mildly increased septal and normal posterior left ventricular wall thickness. Spectral Doppler shows a Grade I (impaired relaxation pattern) of left ventricular diastolic filling with normal left atrial filling pressure. Left Atrium: The left atrial size is normal. Right Ventricle: The right ventricle is normal in size. There is normal right ventricular global systolic function. Right Atrium: The right  atrium is normal in size. Aortic Valve: The aortic valve is structurally normal. The aortic valve area by VTI is 2.19 cmÂ² with a peak velocity of 1.87 m/s. The peak and mean gradients are 14 mmHg and 8 mmHg, respectively with a dimensionless index of 0.53. There is trace to mild aortic valve regurgitation. Mitral Valve: The mitral valve is normal in structure. There is no evidence of mitral valve regurgitation. The E Vmax is 0.68 m/s. Tricuspid Valve: The tricuspid valve is structurally normal. No evidence of tricuspid regurgitation. Pulmonic Valve: The pulmonic valve is structurally normal. There is physiologic pulmonic valve regurgitation. Pericardium: There is no pericardial effusion noted. Aorta: The aortic root is normal.  CONCLUSIONS:  1. The left ventricular systolic function is normal with a visually estimated ejection fraction of 60-65%.  2. Spectral Doppler shows a Grade I (impaired relaxation pattern) of left ventricular diastolic filling with normal left atrial filling pressure. QUANTITATIVE DATA SUMMARY:  2D MEASUREMENTS:          Normal Ranges: Ao Root d:       3.70 cm  (2.0-3.7cm) LAs:             2.80 cm  (2.7-4.0cm) IVSd:            0.95 cm  (0.6-1.1cm) LVPWd:           0.80 cm  (0.6-1.1cm) LVIDd:           4.90 cm  (3.9-5.9cm) LVIDs:           3.10 cm LV Mass Index:   92 g/m2 LVEDV Index:     47 ml/m2 LV % FS          36.7 %  LEFT ATRIUM:                  Normal Ranges: LA Vol A4C:        32.0 ml    (22+/-6mL/m2) LA Vol A2C:        48.2 ml LA Vol BP:         39.7 ml LA Vol Index A4C:  19.3ml/m2 LA Vol Index A2C:  29.0 ml/m2 LA Vol Index BP:   23.9 ml/m2 LA Area A4C:       14.0 cm2 LA Area A2C:       17.0 cm2 LA Major Axis A4C: 5.2 cm LA Major Axis A2C: 5.1 cm LA Volume Index:   22.0 ml/m2  RIGHT ATRIUM:          Normal Ranges: RA Area A4C:  11.0 cm2  M-MODE MEASUREMENTS:         Normal Ranges: Ao Root:             3.60 cm (2.0-3.7cm) LAs:                 4.40 cm (2.7-4.0cm)  AORTA MEASUREMENTS:          Normal Ranges: Asc Ao, d:          3.60 cm (2.1-3.4cm)  LV SYSTOLIC FUNCTION:                      Normal Ranges: EF-A4C View:    66 % (>=55%) EF-A2C View:    58 % EF-Biplane:     61 % EF-Visual:      63 % LV EF Reported: 63 %  LV DIASTOLIC FUNCTION:            Normal Ranges: MV Peak E:             0.68 m/s   (0.7-1.2 m/s) MV Peak A:             0.85 m/s   (0.42-0.7 m/s) E/A Ratio:             0.80       (1.0-2.2) MV e'                  0.091 m/s  (>8.0) MV lateral e'          0.10 m/s MV medial e'           0.08 m/s E/e' Ratio:            7.46       (<8.0) PulmV Sys Pollo:         97.10 cm/s PulmV Brasher Pollo:        69.20 cm/s PulmV S/D Pollo:         1.40  MITRAL VALVE:          Normal Ranges: MV DT:        148 msec (150-240msec)  AORTIC VALVE:                      Normal Ranges: AoV Vmax:                1.87 m/s  (<=1.7m/s) AoV Peak P.0 mmHg (<20mmHg) AoV Mean P.0 mmHg  (1.7-11.5mmHg) LVOT Max Pollo:            0.95 m/s  (<=1.1m/s) AoV VTI:                 40.60 cm  (18-25cm) LVOT VTI:                21.40 cm LVOT Diameter:           2.30 cm   (1.8-2.4cm) AoV Area, VTI:           2.19 cm2  (2.5-5.5cm2) AoV Area,Vmax:           2.11 cm2  (2.5-4.5cm2) AoV Dimensionless Index: 0.53  RIGHT VENTRICLE: RV Basal 2.60 cm RV Mid   2.50 cm RV Major 6.2 cm TAPSE:   20.9 mm RV s'    0.13 m/s  TRICUSPID VALVE/RVSP:         Normal Ranges: Est. RA Pressure:     3 IVC Diam:             1.50 cm  PULMONIC VALVE:          Normal Ranges: PV Max Pollo:     0.8 m/s  (0.6-0.9m/s) PV Max P.8 mmHg  PULMONARY VEINS: PulmV Brasher Pollo: 69.20 cm/s PulmV S/D Pollo:  1.40 PulmV Sys Pollo:  97.10 cm/s  [id number] Sanford Seth MD Electronically signed on 2025 at 9:50:03 AM  ** Final **         Renal US  RIGHT KIDNEY:  The right kidney measures 12.5 cm in length. The renal cortical  echogenicity and thickness are within normal limits. No  hydronephrosis is present; no evidence of nephrolithiasis. Trace  perinephric  fluid.      LEFT KIDNEY:  The left kidney measures 12.6 cm in length. The renal cortical  echogenicity and thickness are within normal limits. Midpole  nonobstructive nephrolithiasis measuring 0.8 cm.      BLADDER:  Unremarkable      IMPRESSION:  1. Left midpole nonobstructive nephroliths measuring 0.8 cm.  2. Trace right perinephric fluid.  3. Small left-sided pleural effusion.    ASSESSMENT AND PLAN    76 yo female w/ PMH of COPD, chronic pain, RA on methotrexate/plaquenil and humira for years (diagnosed when 56 yo), sarcoidosis, smoking, admitted with AMS, found to have FAUSTINA    FAUSTINA, nonoliguric, creatinine on admission 0.4, up to 4.36, improving, BUN high from steroids as well    Hyponatremia, mild, controlled    Hypocalcemia    NAGMA    Hypomagnesemia, controlled    HTN, BP stable, at home on lopressor BID    HK      Plan  - creatinine stabilizing, probably severe ATN, but concerning very high inflammatory markers, hx of neuropathy, joint disease and lung nodule (being followed by CTS and pulmonary as OP), AAV profile sent yesterday and pending; admitted with AMS with normal GFR  - very poor po intake, refusing to eat, improved UOP after ivf overnight, continue low rate today  - diuretics prn, stable on NC 2L (recorded incorrectly at 4L)  - medical management of HK, daily lokelma and renal diet  - please record UOP each shift, check PVR now  - renal US as above not concerning  - avoid hypotension and nephrotoxins  - daily lytes and replete as needed, noted low Ca on prolia as OP, will need to stop, add po supplements    MARS reviewed, dose for GFR<19, primidone daily, hold methotrexate, plaquenil and neurontin    Thank you for the opportunity to assist in the care of this patient, please call with questions  Brynn Bar MD PhD                  [1]   Medications Prior to Admission   Medication Sig Dispense Refill Last Dose/Taking    Bifidobacterium infantis (ALIGN ORAL) Take 1 capsule by mouth once daily.    Unknown    carboxymethylcellulose sodium (TheraTears) 0.25 % dropperette Administer 1 drop into both eyes 4 times a day as needed (dry eye).   Unknown    clopidogrel (Plavix) 75 mg tablet Take 1 tablet (75 mg) by mouth once daily.   Unknown    clotrimazole (Mycelex) 10 mg mateus Take 1 tablet (10 mg) by mouth once daily. 70 Mateus 3 Unknown    denosumab (Prolia) 60 mg/mL syringe Inject 1 mL (60 mg) under the skin every 6 months.   7/31/2025    diphenhydrAMINE (Sominex) 25 mg tablet Take 1 tablet (25 mg) by mouth as needed at bedtime for sleep.   Unknown    ergocalciferol (Vitamin D-2) 1.25 MG (94231 UT) capsule TAKE 1 CAPSULE WEEKLY. (Patient taking differently: Take 1 capsule (1.25 mg) by mouth every 14 (fourteen) days.) 13 capsule 3 Unknown    famotidine (Pepcid) 40 mg tablet TAKE 1 TABLET BY MOUTH ONCE DAILY AT BEDTIME 90 tablet 3 Unknown    fluticasone (Flonase) 50 mcg/actuation nasal spray Administer 2 sprays into each nostril once daily. 16 g 3 Unknown    folic acid (Folvite) 1 mg tablet Take 1 tablet (1 mg) by mouth once daily.   Unknown    gabapentin (Neurontin) 400 mg capsule Take 2 capsules (800 mg) by mouth 4 times a day. 720 capsule 1 Unknown    Humira,CF, Pen 40 mg/0.4 mL pen injector kit pen-injector Inject 1 Pen (40 mg) under the skin every 14 (fourteen) days.   Unknown    HYDROcodone-acetaminophen (Norco) 7.5-325 mg tablet Take 1 tablet by mouth every 12 hours if needed for severe pain (7 - 10). 90 tablet 0 Unknown    hydroxychloroquine (Plaquenil) 200 mg tablet Take 1.5 tablets (300 mg) by mouth once daily.   Unknown    ipratropium (Atrovent) 42 mcg (0.06 %) nasal spray Administer 2 sprays into each nostril 4 times a day. 15 mL 5 Unknown    isosorbide mononitrate ER (Imdur) 60 mg 24 hr tablet Take 1 tablet (60 mg) by mouth once daily.   Unknown    methotrexate 25 mg/mL injection Inject 0.8 mL (20 mg) into the muscle 1 (one) time per week.   Unknown    metoclopramide (Reglan) 10 mg tablet Take 1  tablet (10 mg) by mouth once daily as needed (nausea). 30 tablet 1 Unknown    metoprolol tartrate (Lopressor) 50 mg tablet Take 1 tablet by mouth every 12 hours.   Unknown    Miebo, PF, 100 % drops Administer 1 drop into both eyes 4 times a day.   Unknown    montelukast (Singulair) 10 mg tablet Take 1 tablet (10 mg) by mouth once daily. 90 tablet 1 Unknown    naloxone (Narcan) 4 mg/0.1 mL nasal spray Administer 1 spray (4 mg) into affected nostril(s) if needed for opioid reversal. May repeat every 2-3 minutes if needed, alternating nostrils, until medical assistance becomes available. 2 each 0 Unknown    nitroglycerin (Nitrostat) 0.4 mg SL tablet Place 1 tablet (0.4 mg) under the tongue every 5 minutes if needed for chest pain. May take up to 3 doses over 15 minutes. Call 911 if pain persists.   Unknown    oxygen (O2) therapy Inhale once daily at bedtime. use at night as directed   Unknown    polyethylene glycol (Glycolax, Miralax) 17 gram packet Take 17 g by mouth 2 times a day. Mix 1 cap (17g) into 8 ounces of fluid. 60 packet 7 Unknown    polyethylene glycol (Glycolax, Miralax) 17 gram/dose powder Mix 17 g of powder and drink 2 times a day.   Unknown    pravastatin (Pravachol) 20 mg tablet Take 1 tablet (20 mg) by mouth once daily.   Unknown    primidone (Mysoline) 50 mg tablet Take 3 tablets (150 mg) by mouth early in the morning.. 270 tablet 3 Unknown    ProAir HFA 90 mcg/actuation inhaler Inhale 2 puffs every 4 hours if needed for wheezing or shortness of breath. 8.5 g 5 Unknown    sennosides-docusate sodium (Martha-Colace) 8.6-50 mg tablet Take 1 tablet by mouth once daily. 30 tablet 11 Unknown    theophylline ER (Alok-Dur) 300 mg 12 hr tablet Take 1 tablet (300 mg) by mouth 3 times a day.   Unknown    Trelegy Ellipta 100-62.5-25 mcg blister with device Inhale 1 puff once daily.   Unknown    Xiidra 5 % dropperette Administer 1 drop into both eyes every 12 hours.   Unknown    azelastine (Astelin) 137 mcg (0.1 %)  "nasal spray Administer 2 sprays into each nostril 2 times a day. (Patient not taking: Reported on 8/7/2025) 72 mL 3 Not Taking    cyclobenzaprine (Flexeril) 10 mg tablet Take 1 tablet (10 mg) by mouth 3 times a day as needed for muscle spasms. (Patient not taking: Reported on 8/7/2025) 45 tablet 1 Not Taking    ipratropium (Atrovent) 0.02 % nebulizer solution Use 1 vial 4 times daily (Patient not taking: Reported on 8/7/2025) 75 mL 5 Not Taking    pantoprazole (ProtoNix) 40 mg EC tablet Take 1 tablet (40 mg) by mouth once daily. Do not crush, chew, or split. (Patient not taking: Reported on 8/7/2025) 30 tablet 5 Not Taking    syringe with needle 1 mL 25 gauge x 5/8\" syringe       [2] acetylcysteine, 3 mL, nebulization, TID  azelastine, 2 spray, Each Nostril, BID  budesonide, 0.25 mg, nebulization, Daily  cefTRIAXone, 1 g, intravenous, q24h  [Held by provider] clopidogrel, 75 mg, oral, Daily  fluticasone, 2 spray, Each Nostril, Daily  [Held by provider] gabapentin, 800 mg, oral, BID  heparin (porcine), 5,000 Units, subcutaneous, q8h  [Held by provider] hydroxychloroquine, 300 mg, oral, Daily  ipratropium, 2 spray, Each Nostril, 4x daily  ipratropium-albuteroL, 3 mL, nebulization, TID  isosorbide mononitrate ER, 60 mg, oral, Daily  [Held by provider] methotrexate, 20 mg, intramuscular, Every Sunday  methylPREDNISolone sodium succinate (PF), 60 mg, intravenous, 4x daily  metoprolol tartrate, 50 mg, oral, q12h  montelukast, 10 mg, oral, Daily  nystatin, 5 mL, Mouth/Throat, TID  pantoprazole, 40 mg, oral, Daily  pravastatin, 20 mg, oral, Nightly  primidone, 150 mg, oral, Daily  sennosides-docusate sodium, 1 tablet, oral, Daily  sodium zirconium cyclosilicate, 10 g, oral, q8h     [3] sodium bicarbonate 1 mEq/mL (8.4 %) 150 mEq in dextrose 5% 1,150 mL infusion, 75 mL/hr, Last Rate: Stopped (08/14/25 0643)     [4] PRN medications: albuterol, benzocaine-menthol, HYDROcodone-acetaminophen, ipratropium-albuteroL, lubricating " eye drops, metoclopramide, naloxone, nitroglycerin, oxygen, polyethylene glycol

## 2025-08-14 NOTE — NURSING NOTE
Pulmonary Disease Navigator Documentation:    Comments:  Followed up with patient from yesterday.  Patient resting comfortably and on 1.5 lpm nc with an SPO2 of 93%.  Will continue to follow and assist with any home-going respiratory needs as appropriate.

## 2025-08-14 NOTE — CARE PLAN
The patient's goals for the shift include  Normal electrolyte levels    The clinical goals for the shift include Maintain stable vitals; Maintain safety

## 2025-08-14 NOTE — PROGRESS NOTES
Pulmonary consulted, may need bronch.  WBC and Cr are declining.  Cont with IV HCO3 gtt.  Will have therapy treat pt.  Will follow up for DC planning.,   pt made need home 02 and now will see if pt can go home with [facility].    Maryse Sahu RN TCC

## 2025-08-14 NOTE — PROGRESS NOTES
Occupational Therapy                 Therapy Communication Note    Patient Name: Radha Deluna  MRN: [id number]  Department: [facility] 9  Room: [facility]/919-A  Today's Date: 8/14/2025     Discipline: Occupational Therapy    Missed Visit: Yes      Missed Visit Reason: Pt refused stated she was too tired.      Missed Time: Attempt    Comment:

## 2025-08-15 ENCOUNTER — ANESTHESIA EVENT (OUTPATIENT)
Dept: GASTROENTEROLOGY | Facility: HOSPITAL | Age: 75
End: 2025-08-15
Payer: MEDICARE

## 2025-08-15 ENCOUNTER — APPOINTMENT (OUTPATIENT)
Dept: GASTROENTEROLOGY | Facility: HOSPITAL | Age: 75
DRG: 871 | End: 2025-08-15
Payer: MEDICARE

## 2025-08-15 ENCOUNTER — APPOINTMENT (OUTPATIENT)
Dept: RADIOLOGY | Facility: HOSPITAL | Age: 75
DRG: 871 | End: 2025-08-15
Payer: MEDICARE

## 2025-08-15 ENCOUNTER — ANESTHESIA (OUTPATIENT)
Dept: GASTROENTEROLOGY | Facility: HOSPITAL | Age: 75
End: 2025-08-15
Payer: MEDICARE

## 2025-08-15 LAB
ALBUMIN SERPL BCP-MCNC: 2.8 G/DL (ref 3.4–5)
ANION GAP SERPL CALC-SCNC: 15 MMOL/L (ref 10–20)
BUN SERPL-MCNC: 75 MG/DL (ref 6–23)
CALCIUM SERPL-MCNC: 7.7 MG/DL (ref 8.6–10.3)
CHLORIDE SERPL-SCNC: 107 MMOL/L (ref 98–107)
CO2 SERPL-SCNC: 22 MMOL/L (ref 21–32)
CREAT SERPL-MCNC: 3.21 MG/DL (ref 0.5–1.05)
EGFRCR SERPLBLD CKD-EPI 2021: 14 ML/MIN/1.73M*2
GLUCOSE SERPL-MCNC: 92 MG/DL (ref 74–99)
PHOSPHATE SERPL-MCNC: 4.7 MG/DL (ref 2.5–4.9)
POTASSIUM SERPL-SCNC: 5.6 MMOL/L (ref 3.5–5.3)
SODIUM SERPL-SCNC: 138 MMOL/L (ref 136–145)

## 2025-08-15 PROCEDURE — 3700000002 HC GENERAL ANESTHESIA TIME - EACH INCREMENTAL 1 MINUTE

## 2025-08-15 PROCEDURE — 88112 CYTOPATH CELL ENHANCE TECH: CPT | Performed by: PATHOLOGY

## 2025-08-15 PROCEDURE — 2500000001 HC RX 250 WO HCPCS SELF ADMINISTERED DRUGS (ALT 637 FOR MEDICARE OP): Performed by: NURSE PRACTITIONER

## 2025-08-15 PROCEDURE — 94660 CPAP INITIATION&MGMT: CPT

## 2025-08-15 PROCEDURE — 71045 X-RAY EXAM CHEST 1 VIEW: CPT

## 2025-08-15 PROCEDURE — 87305 ASPERGILLUS AG IA: CPT | Performed by: INTERNAL MEDICINE

## 2025-08-15 PROCEDURE — 3700000001 HC GENERAL ANESTHESIA TIME - INITIAL BASE CHARGE

## 2025-08-15 PROCEDURE — 31623 DX BRONCHOSCOPE/BRUSH: CPT | Performed by: INTERNAL MEDICINE

## 2025-08-15 PROCEDURE — 7100000002 HC RECOVERY ROOM TIME - EACH INCREMENTAL 1 MINUTE

## 2025-08-15 PROCEDURE — 1100000001 HC PRIVATE ROOM DAILY

## 2025-08-15 PROCEDURE — 87486 CHLMYD PNEUM DNA AMP PROBE: CPT | Performed by: INTERNAL MEDICINE

## 2025-08-15 PROCEDURE — 7100000001 HC RECOVERY ROOM TIME - INITIAL BASE CHARGE

## 2025-08-15 PROCEDURE — 87632 RESP VIRUS 6-11 TARGETS: CPT | Performed by: INTERNAL MEDICINE

## 2025-08-15 PROCEDURE — 31624 DX BRONCHOSCOPE/LAVAGE: CPT | Performed by: INTERNAL MEDICINE

## 2025-08-15 PROCEDURE — 2500000002 HC RX 250 W HCPCS SELF ADMINISTERED DRUGS (ALT 637 FOR MEDICARE OP, ALT 636 FOR OP/ED): Performed by: INTERNAL MEDICINE

## 2025-08-15 PROCEDURE — 2500000004 HC RX 250 GENERAL PHARMACY W/ HCPCS (ALT 636 FOR OP/ED): Performed by: NURSE ANESTHETIST, CERTIFIED REGISTERED

## 2025-08-15 PROCEDURE — 87533 HHV-6 DNA QUANT: CPT | Performed by: INTERNAL MEDICINE

## 2025-08-15 PROCEDURE — 88112 CYTOPATH CELL ENHANCE TECH: CPT | Mod: TC | Performed by: INTERNAL MEDICINE

## 2025-08-15 PROCEDURE — 87799 DETECT AGENT NOS DNA QUANT: CPT | Performed by: INTERNAL MEDICINE

## 2025-08-15 PROCEDURE — 87077 CULTURE AEROBIC IDENTIFY: CPT | Mod: PARLAB | Performed by: INTERNAL MEDICINE

## 2025-08-15 PROCEDURE — 2500000004 HC RX 250 GENERAL PHARMACY W/ HCPCS (ALT 636 FOR OP/ED): Performed by: STUDENT IN AN ORGANIZED HEALTH CARE EDUCATION/TRAINING PROGRAM

## 2025-08-15 PROCEDURE — 94640 AIRWAY INHALATION TREATMENT: CPT

## 2025-08-15 PROCEDURE — 5A09357 ASSISTANCE WITH RESPIRATORY VENTILATION, LESS THAN 24 CONSECUTIVE HOURS, CONTINUOUS POSITIVE AIRWAY PRESSURE: ICD-10-PCS | Performed by: STUDENT IN AN ORGANIZED HEALTH CARE EDUCATION/TRAINING PROGRAM

## 2025-08-15 PROCEDURE — 80069 RENAL FUNCTION PANEL: CPT | Performed by: INTERNAL MEDICINE

## 2025-08-15 PROCEDURE — 88312 SPECIAL STAINS GROUP 1: CPT | Performed by: PATHOLOGY

## 2025-08-15 PROCEDURE — 2500000004 HC RX 250 GENERAL PHARMACY W/ HCPCS (ALT 636 FOR OP/ED): Performed by: INTERNAL MEDICINE

## 2025-08-15 PROCEDURE — 87102 FUNGUS ISOLATION CULTURE: CPT | Mod: PARLAB | Performed by: INTERNAL MEDICINE

## 2025-08-15 PROCEDURE — 87801 DETECT AGNT MULT DNA AMPLI: CPT | Performed by: INTERNAL MEDICINE

## 2025-08-15 PROCEDURE — 87116 MYCOBACTERIA CULTURE: CPT | Mod: PARLAB | Performed by: INTERNAL MEDICINE

## 2025-08-15 PROCEDURE — 36415 COLL VENOUS BLD VENIPUNCTURE: CPT | Performed by: INTERNAL MEDICINE

## 2025-08-15 PROCEDURE — 2500000004 HC RX 250 GENERAL PHARMACY W/ HCPCS (ALT 636 FOR OP/ED)

## 2025-08-15 PROCEDURE — 71045 X-RAY EXAM CHEST 1 VIEW: CPT | Performed by: RADIOLOGY

## 2025-08-15 PROCEDURE — 2500000001 HC RX 250 WO HCPCS SELF ADMINISTERED DRUGS (ALT 637 FOR MEDICARE OP): Performed by: INTERNAL MEDICINE

## 2025-08-15 PROCEDURE — 0B9H8ZX DRAINAGE OF LUNG LINGULA, VIA NATURAL OR ARTIFICIAL OPENING ENDOSCOPIC, DIAGNOSTIC: ICD-10-PCS | Performed by: INTERNAL MEDICINE

## 2025-08-15 PROCEDURE — 99233 SBSQ HOSP IP/OBS HIGH 50: CPT | Performed by: STUDENT IN AN ORGANIZED HEALTH CARE EDUCATION/TRAINING PROGRAM

## 2025-08-15 RX ORDER — PROPOFOL 10 MG/ML
INJECTION, EMULSION INTRAVENOUS AS NEEDED
Status: DISCONTINUED | OUTPATIENT
Start: 2025-08-15 | End: 2025-08-15

## 2025-08-15 RX ORDER — DEXTROSE MONOHYDRATE 50 MG/ML
50 INJECTION, SOLUTION INTRAVENOUS CONTINUOUS
Status: DISCONTINUED | OUTPATIENT
Start: 2025-08-15 | End: 2025-08-16

## 2025-08-15 RX ADMIN — NYSTATIN 500000 UNITS: 100000 SUSPENSION ORAL at 09:29

## 2025-08-15 RX ADMIN — IPRATROPIUM BROMIDE AND ALBUTEROL SULFATE 3 ML: .5; 3 SOLUTION RESPIRATORY (INHALATION) at 19:19

## 2025-08-15 RX ADMIN — METOPROLOL TARTRATE 50 MG: 50 TABLET, FILM COATED ORAL at 06:54

## 2025-08-15 RX ADMIN — ISOSORBIDE MONONITRATE 60 MG: 60 TABLET, EXTENDED RELEASE ORAL at 09:29

## 2025-08-15 RX ADMIN — ACETYLCYSTEINE 600 MG: 200 SOLUTION ORAL; RESPIRATORY (INHALATION) at 19:18

## 2025-08-15 RX ADMIN — PANTOPRAZOLE SODIUM 40 MG: 40 TABLET, DELAYED RELEASE ORAL at 09:29

## 2025-08-15 RX ADMIN — PRAVASTATIN SODIUM 20 MG: 20 TABLET ORAL at 20:28

## 2025-08-15 RX ADMIN — MONTELUKAST 10 MG: 10 TABLET, FILM COATED ORAL at 09:29

## 2025-08-15 RX ADMIN — HEPARIN SODIUM 5000 UNITS: 5000 INJECTION, SOLUTION INTRAVENOUS; SUBCUTANEOUS at 09:29

## 2025-08-15 RX ADMIN — HEPARIN SODIUM 5000 UNITS: 5000 INJECTION, SOLUTION INTRAVENOUS; SUBCUTANEOUS at 02:28

## 2025-08-15 RX ADMIN — IPRATROPIUM BROMIDE AND ALBUTEROL SULFATE 3 ML: .5; 3 SOLUTION RESPIRATORY (INHALATION) at 06:38

## 2025-08-15 RX ADMIN — PROPOFOL 50 MG: 10 INJECTION, EMULSION INTRAVENOUS at 12:45

## 2025-08-15 RX ADMIN — ACETYLCYSTEINE 600 MG: 200 SOLUTION ORAL; RESPIRATORY (INHALATION) at 06:37

## 2025-08-15 RX ADMIN — CALCIUM CHLORIDE 1 G: 100 INJECTION, SOLUTION INTRAVENOUS at 00:04

## 2025-08-15 RX ADMIN — NYSTATIN 500000 UNITS: 100000 SUSPENSION ORAL at 14:33

## 2025-08-15 RX ADMIN — METHYLPREDNISOLONE SODIUM SUCCINATE 60 MG: 125 INJECTION, POWDER, FOR SOLUTION INTRAMUSCULAR; INTRAVENOUS at 09:29

## 2025-08-15 RX ADMIN — METHYLPREDNISOLONE SODIUM SUCCINATE 60 MG: 125 INJECTION, POWDER, FOR SOLUTION INTRAMUSCULAR; INTRAVENOUS at 14:32

## 2025-08-15 RX ADMIN — DEXTROSE 50 ML/HR: 5 SOLUTION INTRAVENOUS at 14:33

## 2025-08-15 RX ADMIN — METHYLPREDNISOLONE SODIUM SUCCINATE 60 MG: 125 INJECTION, POWDER, FOR SOLUTION INTRAMUSCULAR; INTRAVENOUS at 06:54

## 2025-08-15 RX ADMIN — AZELASTINE HYDROCHLORIDE 2 SPRAY: 137 SPRAY, METERED NASAL at 20:29

## 2025-08-15 RX ADMIN — METHYLPREDNISOLONE SODIUM SUCCINATE 60 MG: 125 INJECTION, POWDER, FOR SOLUTION INTRAMUSCULAR; INTRAVENOUS at 20:28

## 2025-08-15 RX ADMIN — NYSTATIN 500000 UNITS: 100000 SUSPENSION ORAL at 20:28

## 2025-08-15 RX ADMIN — Medication: at 15:00

## 2025-08-15 RX ADMIN — BUDESONIDE 0.25 MG: 0.25 INHALANT RESPIRATORY (INHALATION) at 06:38

## 2025-08-15 RX ADMIN — Medication: at 06:37

## 2025-08-15 RX ADMIN — METOPROLOL TARTRATE 50 MG: 50 TABLET, FILM COATED ORAL at 20:29

## 2025-08-15 RX ADMIN — PRIMIDONE 150 MG: 50 TABLET ORAL at 09:30

## 2025-08-15 RX ADMIN — PROPOFOL 70 MCG/KG/MIN: 10 INJECTION, EMULSION INTRAVENOUS at 12:47

## 2025-08-15 SDOH — HEALTH STABILITY: MENTAL HEALTH: CURRENT SMOKER: 0

## 2025-08-15 ASSESSMENT — PAIN - FUNCTIONAL ASSESSMENT
PAIN_FUNCTIONAL_ASSESSMENT: 0-10

## 2025-08-15 ASSESSMENT — PAIN SCALES - GENERAL
PAINLEVEL_OUTOF10: 0 - NO PAIN

## 2025-08-15 NOTE — PROGRESS NOTES
Occupational Therapy                 Therapy Communication Note    Patient Name: Radha Deluna  MRN: [id number]  Department: [facility] 9  Room: [facility]919-  Today's Date: 8/15/2025     Discipline: Occupational Therapy    Missed Visit Reason:  Chart reviewed and attempted OT session at 11:56 am for MoCA test however patient not available: at bronchoscopy procedure. Plan: Continue to attempt in pm.

## 2025-08-15 NOTE — CONSULTS
Consults  Referred by Dr Flakito Epperson MD: Annie Mcmahan, APRN-CNP    Reason For Consult  Pulmonary nodules and concern of opportunistic infection    History Of Present Illness  Radha Deluna is a 75 y.o. female with rheumatoid arthritis, immunocompromised on Humira, methotrexate, Plaquenil, who presented with acute hypoxic respiratory failure and delirium on 8/7.  Lab work significant for  CT chest performed admission is concerning of subacute rounded opacities in the left upper and lower lobes with peripheral ground-glass attenuation that are new from prior imaging 10/28/2024 concerning for pneumonia. Scattered pulmonary nodules and masses throughout the right lung which are stable compared to prior imaging.  Patient has been treated with ceftriaxone, completed 8 days course of therapy today.  Completed 5 days course of doxycycline on 8/11.  Patient had been on Plavix and was refusing bronchoscopy. Agreable now with plan on bronchoscopy today.      Past Medical History  She has a past medical history of Chronic obstructive pulmonary disease, unspecified (11/09/2022), Low back pain, Migraine, unspecified, not intractable, without status migrainosus, Other chronic pain (10/21/2015), Personal history of other diseases of the circulatory system, Personal history of other diseases of the nervous system and sense organs, Personal history of other diseases of the respiratory system, Personal history of other diseases of the respiratory system, Personal history of other endocrine, nutritional and metabolic disease, Personal history of other malignant neoplasm of skin, and Sarcoidosis, unspecified (10/05/2022).    Surgical History  She has a past surgical history that includes Ankle surgery (10/21/2015); Appendectomy (10/21/2015); Lung surgery (10/21/2015); Cholecystectomy (10/21/2015); Breast surgery (10/21/2015); Other surgical history (10/21/2015); Other surgical history (02/07/2022);  Other surgical history (02/07/2022); Lymph node biopsy (09/29/2017); Other surgical history (10/29/2020); Other surgical history (10/29/2020); Other surgical history (10/29/2020); Other surgical history (10/29/2020); Coronary angioplasty with stent (01/14/2021); Rotator cuff repair (08/10/2017); Other surgical history (10/21/2015); Carpal tunnel release (10/26/2016); CT guided percutaneous biopsy lung (08/12/2020); CT guided percutaneous biopsy lung (12/01/2020); MR angio head wo IV contrast (04/04/2022); MR angio neck wo IV contrast (08/15/2022); Cataract extraction (Bilateral); and Eye surgery.     Social History     Occupational History    Not on file   Tobacco Use    Smoking status: Every Day     Types: Cigarettes     Passive exposure: Current    Smokeless tobacco: Never   Vaping Use    Vaping status: Never Used   Substance and Sexual Activity    Alcohol use: Not Currently    Drug use: Never    Sexual activity: Not on file     Travel History   Travel since 07/15/25    No documented travel since 07/15/25         Family History  Family History[1]  Allergies  Erythromycin and Augmentin [amoxicillin-pot clavulanate]     Immunization History   Administered Date(s) Administered    Flu vaccine (IIV4), preservative free *Check age/dose* 09/28/2017    Flu vaccine, quadrivalent, high-dose, preservative free, age 65y+ (FLUZONE) 09/07/2021, 11/09/2022, 09/13/2023    Flu vaccine, trivalent, preservative free, HIGH-DOSE, age 65y+ (Fluzone) 08/26/2016, 09/15/2018, 12/27/2019, 09/27/2024    Hepatitis B vaccine, adult *Check Product/Dose* 03/13/2001, 05/15/2001, 11/20/2001    Influenza, seasonal, injectable 10/09/2014, 11/09/2022    Pfizer Purple Cap SARS-CoV-2 01/20/2021, 02/15/2021    Pneumococcal conjugate vaccine, 13-valent (PREVNAR 13) 02/24/2018    Pneumococcal conjugate vaccine, 20-valent (PREVNAR 20) 11/09/2022    Pneumococcal polysaccharide vaccine, 23-valent, age 2 years and older (PNEUMOVAX 23) 12/27/2019    RSV, 60  "Years And Older (AREXVY) 02/13/2024    Zoster vaccine, recombinant, adult (SHINGRIX) 03/11/2018, 08/29/2018    Zoster, live 08/29/2018     Medications  Home medications:  Prescriptions Prior to Admission[2]  Current medications:  Scheduled medications  Scheduled Medications[3]  Continuous medications  Continuous Medications[4]  PRN medications  PRN Medications[5]    Review of Systems  12 points reviewed negative except in HPI    Objective  Range of Vitals (last 24 hours)  Heart Rate:  [63-83]   Temp:  [35.8 °C (96.4 °F)-36.5 °C (97.7 °F)]   Resp:  [16-20]   BP: (110-169)/(66-77)   Height:  [160 cm (5' 2.99\")]   Weight:  [65.7 kg (144 lb 13.5 oz)]   SpO2:  [92 %-97 %]   Oxygen Therapy  Pulse Ox (24 hr min): 92  Medical Gas Therapy: Supplemental oxygen  Medical Gas Delivery Method: Nasal cannula  O2 Flow Rate (L/min): 2 L/min FiO2 (%): 32 %    Daily Weight  08/14/25 : 65.7 kg (144 lb 13.5 oz)    Body mass index is 25.66 kg/m².     Physical Exam     General appearance: Sitting up in chair, no acute distress.  Pleasantly demented.  HEENT: Atraumatic/normocephalic, EOMI, MEGHANN, pharynx clear, moist mucosa, redness of the uvula appreciated,   Neck: Supple, no jugular venous distension, lymphadenopathy, thyromegaly or carotid bruits  Chest: mildly dyspneic while speaking. Equal normal breath sounds, no wheezing, no crackles and no tenderness over ribs   Cardiovascular: Normal S1, S2, regular rate and rhythm, no murmur, rub or gallop  Abdomen: Normal sounds present, soft, lax with no tenderness, no hepatosplenomegaly, and no masses  Extremities: No edema. Pulses are equally present.   Skin: intact, no rashes   Neuro: no focal deficits, CN 2-12 intact    Relevant Results  Outside Hospital Results  Yes -   Labs  Results from last 72 hours   Lab Units 08/14/25  0447 08/13/25  1322 08/13/25  0514   WBC AUTO x10*3/uL 11.9* 14.8* 15.4*   HEMOGLOBIN g/dL 11.1* 12.4 12.2   HEMATOCRIT % 33.3* 38.6 36.4   PLATELETS AUTO x10*3/uL 269 " "237 237   NEUTROS PCT AUTO %  --  89.3  --    LYMPHS PCT AUTO %  --  5.4  --    MONOS PCT AUTO %  --  4.3  --    EOS PCT AUTO %  --  0.0  --      Results from last 72 hours   Lab Units 08/15/25  0454 08/14/25  0447 08/13/25  0922   SODIUM mmol/L 138 137 137   POTASSIUM mmol/L 5.6* 4.9 5.5*   CHLORIDE mmol/L 107 108* 108*   CO2 mmol/L 22 18* 17*   BUN mg/dL 75* 71* 68*   CREATININE mg/dL 3.21* 3.87* 4.31*   GLUCOSE mg/dL 92 146* 91   CALCIUM mg/dL 7.7* 7.7* 7.8*   ANION GAP mmol/L 15 16 18   EGFR mL/min/1.73m*2 14* 11* 10*   PHOSPHORUS mg/dL 4.7 5.6* 4.9     Results from last 72 hours   Lab Units 08/15/25  0454 08/14/25  0447 08/13/25  0922   ALBUMIN g/dL 2.8* 3.0* 3.2*     Estimated Creatinine Clearance: 13.4 mL/min (A) (by C-G formula based on SCr of 3.21 mg/dL (H)).  C-Reactive Protein   Date Value Ref Range Status   08/13/2025 17.37 (H) <1.00 mg/dL Final     CRP   Date Value Ref Range Status   01/21/2020 0.24 mg/dL Final     Comment:     REF VALUE  < 1.00       Sedimentation Rate   Date Value Ref Range Status   08/13/2025 76 (H) 0 - 30 mm/h Final   03/03/2022 11 0 - 30 mm/h Final   11/07/2020 32 (H) 0 - 30 mm/h Final     No results found for: \"HIV1X2\", \"HIVCONF\", \"ILEYIT6IM\"  Hepatitis C Ab   Date Value Ref Range Status   12/16/2021 NONREACTIVE NONREACTIVE Final     Comment:      Results from patients taking biotin supplements or receiving   high-dose biotin therapy should be interpreted with caution   due to possible interference with this test. Providers may    contact their local laboratory for further information.       Microbiology  Susceptibility data from last 90 days.  Collected Specimen Info Organism   08/09/25 Swab from Anterior Nares Methicillin Susceptible Staphylococcus aureus (MSSA)     Blood culture 8/7 no growth  Urine culture 8/7 no growth  Nasal PCR MSSA positive  Streptococcus pneumonia urine antigen 8/10 negative  Legionella urine antigen 8/10 negative  Respiratory culture 8/9 no " growth    Imaging    CT chest on 8/8:  IMPRESSION:  Rounded opacities in the left upper and lower lobes with peripheral ground-glass attenuation that are new from prior imaging 10/28/2024 concerning for pneumonia. Given the nodular appearance of the  lesions, however, recommend interval follow-up with CT after treatment to confirm resolution and exclude an underlying neoplastic process.  Scattered pulmonary nodules and masses throughout the right lung which are stable compared to prior imaging. Small left pleural effusion. 2.6 cm hypodense right thyroid nodule likely corresponding to abnormality on prior thyroid ultrasound 11/11/2024 for which a biopsy was recommended.      Assessment/Plan   75-year-old immunocompromised female with RA, on Humira, methotrexate, Plaquenil who presented with acute hypoxic respiratory failure. Chest  CT findings concerning of subacute rounded opacities in the left upper and lower lobes with peripheral ground-glass attenuation that are new from prior imaging 10/28/2024 concerning for pneumonia. Scattered pulmonary nodules and masses throughout the right lung which are stable compared to prior imaging.  No significant improvement on empiric ceftriaxone and doxycycline therapy.   ID consulted with concern of possible fungal pneumonia.  Delayed bronc due to patient declining in the study and being on Plavix    Plan :   Ordered noninvasive fungal tests including histoplasma antigen of urine and serum, serum Aspergillus galactomannan, serum cryptococcal antigen and Coccidioides antibodies.  There is a plan on performing bronchoscopy with BAL today although no plan on biopsy in the setting of Plavix therapy.  Recommend fungal and AFB culture as well as expanded respiratory panel on  BAL, Test includes Adenovirus, CMV, EBV, HHV-6, Legionella, Atypical Pneumo Panel, Pneumocystis Jiroveci, Aspergillis Galactamann and Respiratory Viral Panel,   which I have ordered now.   Monitor off  antimicrobial therapy, completed 8 days course of ceftriaxone today and 5 days course of doxycycline on 8/11.   Discussed with Dr Calhoun.       Problems:   Immunocompromised host  Rheumatoid arthritis  Suspected fungal pneumonia      I spent 55  minutes in the professional and overall care of this patient.          Aster Ramirez MD         [1]   Family History  Problem Relation Name Age of Onset    Other (cardiac disorder) Mother      Other (cardiac disorder) Father      Stroke Father      Coronary artery disease Father      Hypertension Father      Diabetes Other     [2]   Medications Prior to Admission   Medication Sig Dispense Refill Last Dose/Taking    Bifidobacterium infantis (ALIGN ORAL) Take 1 capsule by mouth once daily.   Unknown    carboxymethylcellulose sodium (TheraTears) 0.25 % dropperette Administer 1 drop into both eyes 4 times a day as needed (dry eye).   Unknown    clopidogrel (Plavix) 75 mg tablet Take 1 tablet (75 mg) by mouth once daily.   Unknown    clotrimazole (Mycelex) 10 mg mateus Take 1 tablet (10 mg) by mouth once daily. 70 Mateus 3 Unknown    denosumab (Prolia) 60 mg/mL syringe Inject 1 mL (60 mg) under the skin every 6 months.   7/31/2025    diphenhydrAMINE (Sominex) 25 mg tablet Take 1 tablet (25 mg) by mouth as needed at bedtime for sleep.   Unknown    ergocalciferol (Vitamin D-2) 1.25 MG (57342 UT) capsule TAKE 1 CAPSULE WEEKLY. (Patient taking differently: Take 1 capsule (1.25 mg) by mouth every 14 (fourteen) days.) 13 capsule 3 Unknown    famotidine (Pepcid) 40 mg tablet TAKE 1 TABLET BY MOUTH ONCE DAILY AT BEDTIME 90 tablet 3 Unknown    fluticasone (Flonase) 50 mcg/actuation nasal spray Administer 2 sprays into each nostril once daily. 16 g 3 Unknown    folic acid (Folvite) 1 mg tablet Take 1 tablet (1 mg) by mouth once daily.   Unknown    gabapentin (Neurontin) 400 mg capsule Take 2 capsules (800 mg) by mouth 4 times a day. 720 capsule 1 Unknown    Humira,CF, Pen 40 mg/0.4 mL  pen injector kit pen-injector Inject 1 Pen (40 mg) under the skin every 14 (fourteen) days.   Unknown    HYDROcodone-acetaminophen (Norco) 7.5-325 mg tablet Take 1 tablet by mouth every 12 hours if needed for severe pain (7 - 10). 90 tablet 0 Unknown    hydroxychloroquine (Plaquenil) 200 mg tablet Take 1.5 tablets (300 mg) by mouth once daily.   Unknown    ipratropium (Atrovent) 42 mcg (0.06 %) nasal spray Administer 2 sprays into each nostril 4 times a day. 15 mL 5 Unknown    isosorbide mononitrate ER (Imdur) 60 mg 24 hr tablet Take 1 tablet (60 mg) by mouth once daily.   Unknown    methotrexate 25 mg/mL injection Inject 0.8 mL (20 mg) into the muscle 1 (one) time per week.   Unknown    metoclopramide (Reglan) 10 mg tablet Take 1 tablet (10 mg) by mouth once daily as needed (nausea). 30 tablet 1 Unknown    metoprolol tartrate (Lopressor) 50 mg tablet Take 1 tablet by mouth every 12 hours.   Unknown    Miebo, PF, 100 % drops Administer 1 drop into both eyes 4 times a day.   Unknown    montelukast (Singulair) 10 mg tablet Take 1 tablet (10 mg) by mouth once daily. 90 tablet 1 Unknown    naloxone (Narcan) 4 mg/0.1 mL nasal spray Administer 1 spray (4 mg) into affected nostril(s) if needed for opioid reversal. May repeat every 2-3 minutes if needed, alternating nostrils, until medical assistance becomes available. 2 each 0 Unknown    nitroglycerin (Nitrostat) 0.4 mg SL tablet Place 1 tablet (0.4 mg) under the tongue every 5 minutes if needed for chest pain. May take up to 3 doses over 15 minutes. Call 911 if pain persists.   Unknown    oxygen (O2) therapy Inhale once daily at bedtime. use at night as directed   Unknown    polyethylene glycol (Glycolax, Miralax) 17 gram packet Take 17 g by mouth 2 times a day. Mix 1 cap (17g) into 8 ounces of fluid. 60 packet 7 Unknown    polyethylene glycol (Glycolax, Miralax) 17 gram/dose powder Mix 17 g of powder and drink 2 times a day.   Unknown    pravastatin (Pravachol) 20 mg  "tablet Take 1 tablet (20 mg) by mouth once daily.   Unknown    primidone (Mysoline) 50 mg tablet Take 3 tablets (150 mg) by mouth early in the morning.. 270 tablet 3 Unknown    ProAir HFA 90 mcg/actuation inhaler Inhale 2 puffs every 4 hours if needed for wheezing or shortness of breath. 8.5 g 5 Unknown    sennosides-docusate sodium (Martha-Colace) 8.6-50 mg tablet Take 1 tablet by mouth once daily. 30 tablet 11 Unknown    theophylline ER (Alok-Dur) 300 mg 12 hr tablet Take 1 tablet (300 mg) by mouth 3 times a day.   Unknown    Trelegy Ellipta 100-62.5-25 mcg blister with device Inhale 1 puff once daily.   Unknown    Xiidra 5 % dropperette Administer 1 drop into both eyes every 12 hours.   Unknown    azelastine (Astelin) 137 mcg (0.1 %) nasal spray Administer 2 sprays into each nostril 2 times a day. (Patient not taking: Reported on 8/7/2025) 72 mL 3 Not Taking    cyclobenzaprine (Flexeril) 10 mg tablet Take 1 tablet (10 mg) by mouth 3 times a day as needed for muscle spasms. (Patient not taking: Reported on 8/7/2025) 45 tablet 1 Not Taking    ipratropium (Atrovent) 0.02 % nebulizer solution Use 1 vial 4 times daily (Patient not taking: Reported on 8/7/2025) 75 mL 5 Not Taking    pantoprazole (ProtoNix) 40 mg EC tablet Take 1 tablet (40 mg) by mouth once daily. Do not crush, chew, or split. (Patient not taking: Reported on 8/7/2025) 30 tablet 5 Not Taking    syringe with needle 1 mL 25 gauge x 5/8\" syringe       [3] acetylcysteine, 3 mL, nebulization, TID  azelastine, 2 spray, Each Nostril, BID  budesonide, 0.25 mg, nebulization, Daily  [Held by provider] clopidogrel, 75 mg, oral, Daily  fluticasone, 2 spray, Each Nostril, Daily  [Held by provider] gabapentin, 800 mg, oral, BID  heparin (porcine), 5,000 Units, subcutaneous, q8h  [Held by provider] hydroxychloroquine, 300 mg, oral, Daily  ipratropium, 2 spray, Each Nostril, 4x daily  ipratropium-albuteroL, 3 mL, nebulization, TID  isosorbide mononitrate ER, 60 mg, " oral, Daily  [Held by provider] methotrexate, 20 mg, intramuscular, Every Sunday  methylPREDNISolone sodium succinate (PF), 60 mg, intravenous, q6h  metoprolol tartrate, 50 mg, oral, q12h  montelukast, 10 mg, oral, Daily  nystatin, 5 mL, Mouth/Throat, TID  pantoprazole, 40 mg, oral, Daily  pravastatin, 20 mg, oral, Nightly  primidone, 150 mg, oral, Daily  sennosides-docusate sodium, 1 tablet, oral, Daily    [4] dextrose 5%, 50 mL/hr  sodium bicarbonate 1 mEq/mL (8.4 %) 150 mEq in dextrose 5% 1,150 mL infusion, 60 mL/hr, Last Rate: Stopped (08/14/25 4201)    [5] PRN medications: albuterol, benzocaine-menthol, HYDROcodone-acetaminophen, ipratropium-albuteroL, lubricating eye drops, metoclopramide, naloxone, nitroglycerin, oxygen, polyethylene glycol

## 2025-08-15 NOTE — PROGRESS NOTES
"Nephrology Consult Progress Note    Radha Deluna is a 75 y.o. female on day 8 of admission presenting with Altered mental status, unspecified altered mental status type.      Subjective   No acute events overnight, on NC O2, improved UOP, BM yesterday       Objective          Physical Exam    Vitals 24HR  Heart Rate:  [63-83]   Temp:  [35.8 °C (96.4 °F)-36.5 °C (97.7 °F)]   Resp:  [16-20]   BP: (110-169)/(66-77)   Height:  [160 cm (5' 2.99\")]   Weight:  [65.7 kg (144 lb 13.5 oz)]   SpO2:  [92 %-97 %]        AAOx3, sleeping but arousable, on NS O2 2L  Decreased AEBL, wheezing+  RRR no murmurs  Abd soft, + BS   edema           I&O 24HR    Intake/Output Summary (Last 24 hours) at 8/15/2025 1021  Last data filed at 8/15/2025 0034  Gross per 24 hour   Intake 1303.75 ml   Output 275 ml   Net 1028.75 ml         Medications  Prescriptions Prior to Admission[1]   Scheduled medications  Scheduled Medications[2]  Continuous medications  Continuous Medications[3]  PRN medications  PRN Medications[4]    Relevant Results      No results displayed because visit has over 200 results.         Imaging  XR chest 1 view  Result Date: 8/11/2025  1. Persistent right-greater-than-left ill-defined opacities. 2. Stable small left-sided pleural effusion   Signed by: Jesus Prasad 8/11/2025 12:25 PM Dictation workstation:   [id number]    US renal complete  Result Date: 8/11/2025  1. Left midpole nonobstructive nephroliths measuring 0.8 cm. 2. Trace right perinephric fluid. 3. Small left-sided pleural effusion.   MACRO: None     Signed by: Jesus Prasad 8/11/2025 6:44 AM Dictation workstation:   [id number]    EEG  Result Date: 8/9/2025  IMPRESSION Impression This awake and sleep routine is normal. No epileptiform discharges or lateralizing signs are seen. A full report will be scanned into the patient's chart at a later time. This report has been interpreted and electronically signed by    MR brain w and wo IV contrast  Result Date: " 8/9/2025  1. Negative head MRI examination for acute change. 2. There is a background of age-related volume loss, atherosclerotic disease, and ischemic white matter demyelination.   MACRO: None   Signed by: Salvatore Marcelo 8/9/2025 6:57 AM Dictation workstation:   [id number]      Cardiology, Vascular, and Other Imaging  Transthoracic Echo Complete  Result Date: 8/12/2025   [facility], [address] [address]           Tel [phone] and Fax [phone] TRANSTHORACIC ECHOCARDIOGRAM REPORT  Patient Name:       CARMINE VERDUZCO  Reading Physician:    [id number] Sanford Seth MD Study Date:         8/12/2025           Ordering Provider:    [id number] LARS DUMONT MRN/PID:            [id number]            Fellow: Accession#:         [id number]        Nurse: Date of Birth/Age:  1950 / 75      Sonographer:          Patrick hansen                                     ACS, RDCS, FASE Gender assigned at  F                   Additional Staff: Birth: Height:             157.48 cm           Admit Date:           8/7/2025 Weight:             65.32 kg            Admission Status:     Inpatient -                                                               Routine BSA / BMI:          1.66 m2 / 26.34     Encounter#:           [id number]                     kg/m2 Blood Pressure:     134/76 mmHg         Department Location:  [facility] Study Type:    TRANSTHORACIC ECHO (TTE) COMPLETE Diagnosis/ICD: Other forms of dyspnea-R06.09 Indication:    Dyspnea CPT Code:      Echo Complete w Full Doppler-[id number] Patient History: Pertinent History: Dyspnea, CAD, HTN, Hyperlipidemia and Chest Pain. Hx coronary                    stents, hx TIA. AMS. Study Detail: The following Echo studies were performed: 2D, M-Mode, Doppler and               color flow. Technically  challenging study due to body habitus.  PHYSICIAN INTERPRETATION: Left Ventricle: The left ventricular systolic function is normal with a visually estimated ejection fraction of 60-65%. There are no regional left ventricular wall motion abnormalities. The left ventricular cavity size is normal. There is mildly increased septal and normal posterior left ventricular wall thickness. Spectral Doppler shows a Grade I (impaired relaxation pattern) of left ventricular diastolic filling with normal left atrial filling pressure. Left Atrium: The left atrial size is normal. Right Ventricle: The right ventricle is normal in size. There is normal right ventricular global systolic function. Right Atrium: The right atrium is normal in size. Aortic Valve: The aortic valve is structurally normal. The aortic valve area by VTI is 2.19 cmÂ² with a peak velocity of 1.87 m/s. The peak and mean gradients are 14 mmHg and 8 mmHg, respectively with a dimensionless index of 0.53. There is trace to mild aortic valve regurgitation. Mitral Valve: The mitral valve is normal in structure. There is no evidence of mitral valve regurgitation. The E Vmax is 0.68 m/s. Tricuspid Valve: The tricuspid valve is structurally normal. No evidence of tricuspid regurgitation. Pulmonic Valve: The pulmonic valve is structurally normal. There is physiologic pulmonic valve regurgitation. Pericardium: There is no pericardial effusion noted. Aorta: The aortic root is normal.  CONCLUSIONS:  1. The left ventricular systolic function is normal with a visually estimated ejection fraction of 60-65%.  2. Spectral Doppler shows a Grade I (impaired relaxation pattern) of left ventricular diastolic filling with normal left atrial filling pressure. QUANTITATIVE DATA SUMMARY:  2D MEASUREMENTS:          Normal Ranges: Ao Root d:       3.70 cm  (2.0-3.7cm) LAs:             2.80 cm  (2.7-4.0cm) IVSd:            0.95 cm  (0.6-1.1cm) LVPWd:           0.80 cm  (0.6-1.1cm) LVIDd:            4.90 cm  (3.9-5.9cm) LVIDs:           3.10 cm LV Mass Index:   92 g/m2 LVEDV Index:     47 ml/m2 LV % FS          36.7 %  LEFT ATRIUM:                  Normal Ranges: LA Vol A4C:        32.0 ml    (22+/-6mL/m2) LA Vol A2C:        48.2 ml LA Vol BP:         39.7 ml LA Vol Index A4C:  19.3ml/m2 LA Vol Index A2C:  29.0 ml/m2 LA Vol Index BP:   23.9 ml/m2 LA Area A4C:       14.0 cm2 LA Area A2C:       17.0 cm2 LA Major Axis A4C: 5.2 cm LA Major Axis A2C: 5.1 cm LA Volume Index:   22.0 ml/m2  RIGHT ATRIUM:          Normal Ranges: RA Area A4C:  11.0 cm2  M-MODE MEASUREMENTS:         Normal Ranges: Ao Root:             3.60 cm (2.0-3.7cm) LAs:                 4.40 cm (2.7-4.0cm)  AORTA MEASUREMENTS:         Normal Ranges: Asc Ao, d:          3.60 cm (2.1-3.4cm)  LV SYSTOLIC FUNCTION:                      Normal Ranges: EF-A4C View:    66 % (>=55%) EF-A2C View:    58 % EF-Biplane:     61 % EF-Visual:      63 % LV EF Reported: 63 %  LV DIASTOLIC FUNCTION:            Normal Ranges: MV Peak E:             0.68 m/s   (0.7-1.2 m/s) MV Peak A:             0.85 m/s   (0.42-0.7 m/s) E/A Ratio:             0.80       (1.0-2.2) MV e'                  0.091 m/s  (>8.0) MV lateral e'          0.10 m/s MV medial e'           0.08 m/s E/e' Ratio:            7.46       (<8.0) PulmV Sys Pollo:         97.10 cm/s PulmV Brasher Pollo:        69.20 cm/s PulmV S/D Pollo:         1.40  MITRAL VALVE:          Normal Ranges: MV DT:        148 msec (150-240msec)  AORTIC VALVE:                      Normal Ranges: AoV Vmax:                1.87 m/s  (<=1.7m/s) AoV Peak P.0 mmHg (<20mmHg) AoV Mean P.0 mmHg  (1.7-11.5mmHg) LVOT Max Pollo:            0.95 m/s  (<=1.1m/s) AoV VTI:                 40.60 cm  (18-25cm) LVOT VTI:                21.40 cm LVOT Diameter:           2.30 cm   (1.8-2.4cm) AoV Area, VTI:           2.19 cm2  (2.5-5.5cm2) AoV Area,Vmax:           2.11 cm2  (2.5-4.5cm2) AoV Dimensionless Index: 0.53  RIGHT  VENTRICLE: RV Basal 2.60 cm RV Mid   2.50 cm RV Major 6.2 cm TAPSE:   20.9 mm RV s'    0.13 m/s  TRICUSPID VALVE/RVSP:         Normal Ranges: Est. RA Pressure:     3 IVC Diam:             1.50 cm  PULMONIC VALVE:          Normal Ranges: PV Max Pollo:     0.8 m/s  (0.6-0.9m/s) PV Max P.8 mmHg  PULMONARY VEINS: PulmV Brasher Pollo: 69.20 cm/s PulmV S/D Pollo:  1.40 PulmV Sys Pollo:  97.10 cm/s  [id number] Sanford Seth MD Electronically signed on 2025 at 9:50:03 AM  ** Final **         Renal US  RIGHT KIDNEY:  The right kidney measures 12.5 cm in length. The renal cortical  echogenicity and thickness are within normal limits. No  hydronephrosis is present; no evidence of nephrolithiasis. Trace  perinephric fluid.      LEFT KIDNEY:  The left kidney measures 12.6 cm in length. The renal cortical  echogenicity and thickness are within normal limits. Midpole  nonobstructive nephrolithiasis measuring 0.8 cm.      BLADDER:  Unremarkable      IMPRESSION:  1. Left midpole nonobstructive nephroliths measuring 0.8 cm.  2. Trace right perinephric fluid.  3. Small left-sided pleural effusion.    ASSESSMENT AND PLAN    74 yo female w/ PMH of COPD, chronic pain, RA on methotrexate/plaquenil and humira for years (diagnosed when 54 yo), sarcoidosis, smoking, admitted with AMS, found to have FAUSTINA    FAUSTINA, nonoliguric, creatinine on admission 0.4, up to 4.36, improving, BUN high from steroids as well    Hyponatremia, mild, controlled    Hypocalcemia    NAGMA    Hypomagnesemia, controlled    HTN, BP stable, at home on lopressor BID    HK      Plan  - creatinine improving, probably severe ATN, but concerning very high inflammatory markers, hx of neuropathy, joint disease and lung nodule (being followed by CTS and pulmonary as OP), AAV profile sent and pending; admitted with AMS with normal GFR  - noted plas for bronchoscopy today  - diuretics prn, stable on NC 2L (recorded incorrectly at 4L)  - K hemolysed today, daily lokelma and low K diet  once able to resume  - please record UOP each shift  - renal US as above not concerning  - avoid hypotension and nephrotoxins  - daily lytes and replete as needed, noted low Ca on prolia as OP, will need to stop, add po supplements    MARS reviewed, dose for GFR<19, primidone daily, hold methotrexate, plaquenil and neurontin    Thank you for the opportunity to assist in the care of this patient, please call with questions  Brynn Bar MD PhD                    [1]   Medications Prior to Admission   Medication Sig Dispense Refill Last Dose/Taking    Bifidobacterium infantis (ALIGN ORAL) Take 1 capsule by mouth once daily.   Unknown    carboxymethylcellulose sodium (TheraTears) 0.25 % dropperette Administer 1 drop into both eyes 4 times a day as needed (dry eye).   Unknown    clopidogrel (Plavix) 75 mg tablet Take 1 tablet (75 mg) by mouth once daily.   Unknown    clotrimazole (Mycelex) 10 mg mateus Take 1 tablet (10 mg) by mouth once daily. 70 Mateus 3 Unknown    denosumab (Prolia) 60 mg/mL syringe Inject 1 mL (60 mg) under the skin every 6 months.   7/31/2025    diphenhydrAMINE (Sominex) 25 mg tablet Take 1 tablet (25 mg) by mouth as needed at bedtime for sleep.   Unknown    ergocalciferol (Vitamin D-2) 1.25 MG (78955 UT) capsule TAKE 1 CAPSULE WEEKLY. (Patient taking differently: Take 1 capsule (1.25 mg) by mouth every 14 (fourteen) days.) 13 capsule 3 Unknown    famotidine (Pepcid) 40 mg tablet TAKE 1 TABLET BY MOUTH ONCE DAILY AT BEDTIME 90 tablet 3 Unknown    fluticasone (Flonase) 50 mcg/actuation nasal spray Administer 2 sprays into each nostril once daily. 16 g 3 Unknown    folic acid (Folvite) 1 mg tablet Take 1 tablet (1 mg) by mouth once daily.   Unknown    gabapentin (Neurontin) 400 mg capsule Take 2 capsules (800 mg) by mouth 4 times a day. 720 capsule 1 Unknown    Humira,CF, Pen 40 mg/0.4 mL pen injector kit pen-injector Inject 1 Pen (40 mg) under the skin every 14 (fourteen) days.   Unknown     HYDROcodone-acetaminophen (Norco) 7.5-325 mg tablet Take 1 tablet by mouth every 12 hours if needed for severe pain (7 - 10). 90 tablet 0 Unknown    hydroxychloroquine (Plaquenil) 200 mg tablet Take 1.5 tablets (300 mg) by mouth once daily.   Unknown    ipratropium (Atrovent) 42 mcg (0.06 %) nasal spray Administer 2 sprays into each nostril 4 times a day. 15 mL 5 Unknown    isosorbide mononitrate ER (Imdur) 60 mg 24 hr tablet Take 1 tablet (60 mg) by mouth once daily.   Unknown    methotrexate 25 mg/mL injection Inject 0.8 mL (20 mg) into the muscle 1 (one) time per week.   Unknown    metoclopramide (Reglan) 10 mg tablet Take 1 tablet (10 mg) by mouth once daily as needed (nausea). 30 tablet 1 Unknown    metoprolol tartrate (Lopressor) 50 mg tablet Take 1 tablet by mouth every 12 hours.   Unknown    Miebo, PF, 100 % drops Administer 1 drop into both eyes 4 times a day.   Unknown    montelukast (Singulair) 10 mg tablet Take 1 tablet (10 mg) by mouth once daily. 90 tablet 1 Unknown    naloxone (Narcan) 4 mg/0.1 mL nasal spray Administer 1 spray (4 mg) into affected nostril(s) if needed for opioid reversal. May repeat every 2-3 minutes if needed, alternating nostrils, until medical assistance becomes available. 2 each 0 Unknown    nitroglycerin (Nitrostat) 0.4 mg SL tablet Place 1 tablet (0.4 mg) under the tongue every 5 minutes if needed for chest pain. May take up to 3 doses over 15 minutes. Call 911 if pain persists.   Unknown    oxygen (O2) therapy Inhale once daily at bedtime. use at night as directed   Unknown    polyethylene glycol (Glycolax, Miralax) 17 gram packet Take 17 g by mouth 2 times a day. Mix 1 cap (17g) into 8 ounces of fluid. 60 packet 7 Unknown    polyethylene glycol (Glycolax, Miralax) 17 gram/dose powder Mix 17 g of powder and drink 2 times a day.   Unknown    pravastatin (Pravachol) 20 mg tablet Take 1 tablet (20 mg) by mouth once daily.   Unknown    primidone (Mysoline) 50 mg tablet Take 3  "tablets (150 mg) by mouth early in the morning.. 270 tablet 3 Unknown    ProAir HFA 90 mcg/actuation inhaler Inhale 2 puffs every 4 hours if needed for wheezing or shortness of breath. 8.5 g 5 Unknown    sennosides-docusate sodium (Martha-Colace) 8.6-50 mg tablet Take 1 tablet by mouth once daily. 30 tablet 11 Unknown    theophylline ER (Alok-Dur) 300 mg 12 hr tablet Take 1 tablet (300 mg) by mouth 3 times a day.   Unknown    Trelegy Ellipta 100-62.5-25 mcg blister with device Inhale 1 puff once daily.   Unknown    Xiidra 5 % dropperette Administer 1 drop into both eyes every 12 hours.   Unknown    azelastine (Astelin) 137 mcg (0.1 %) nasal spray Administer 2 sprays into each nostril 2 times a day. (Patient not taking: Reported on 8/7/2025) 72 mL 3 Not Taking    cyclobenzaprine (Flexeril) 10 mg tablet Take 1 tablet (10 mg) by mouth 3 times a day as needed for muscle spasms. (Patient not taking: Reported on 8/7/2025) 45 tablet 1 Not Taking    ipratropium (Atrovent) 0.02 % nebulizer solution Use 1 vial 4 times daily (Patient not taking: Reported on 8/7/2025) 75 mL 5 Not Taking    pantoprazole (ProtoNix) 40 mg EC tablet Take 1 tablet (40 mg) by mouth once daily. Do not crush, chew, or split. (Patient not taking: Reported on 8/7/2025) 30 tablet 5 Not Taking    syringe with needle 1 mL 25 gauge x 5/8\" syringe       [2] acetylcysteine, 3 mL, nebulization, TID  azelastine, 2 spray, Each Nostril, BID  budesonide, 0.25 mg, nebulization, Daily  cefTRIAXone, 1 g, intravenous, q24h  [Held by provider] clopidogrel, 75 mg, oral, Daily  fluticasone, 2 spray, Each Nostril, Daily  [Held by provider] gabapentin, 800 mg, oral, BID  heparin (porcine), 5,000 Units, subcutaneous, q8h  [Held by provider] hydroxychloroquine, 300 mg, oral, Daily  ipratropium, 2 spray, Each Nostril, 4x daily  ipratropium-albuteroL, 3 mL, nebulization, TID  isosorbide mononitrate ER, 60 mg, oral, Daily  [Held by provider] methotrexate, 20 mg, intramuscular, " Every Sunday  methylPREDNISolone sodium succinate (PF), 60 mg, intravenous, q6h  metoprolol tartrate, 50 mg, oral, q12h  montelukast, 10 mg, oral, Daily  nystatin, 5 mL, Mouth/Throat, TID  pantoprazole, 40 mg, oral, Daily  pravastatin, 20 mg, oral, Nightly  primidone, 150 mg, oral, Daily  sennosides-docusate sodium, 1 tablet, oral, Daily     [3] sodium bicarbonate 1 mEq/mL (8.4 %) 150 mEq in dextrose 5% 1,150 mL infusion, 60 mL/hr, Last Rate: Stopped (08/14/25 7345)     [4] PRN medications: albuterol, benzocaine-menthol, HYDROcodone-acetaminophen, ipratropium-albuteroL, lubricating eye drops, metoclopramide, naloxone, nitroglycerin, oxygen, polyethylene glycol

## 2025-08-15 NOTE — PROGRESS NOTES
Radha Deluna is a 75 y.o. female on day 8 of admission presenting with Altered mental status, unspecified altered mental status type.    Subjective   Pt is still refusing bronchoscopy and PT/OT today. She did not give me a clear reason why she is refusing.        Objective     Physical Exam  Vitals and nursing note reviewed.   Constitutional:       General: She is not in acute distress.     Appearance: Normal appearance. She is not ill-appearing or toxic-appearing.   HENT:      Head: Normocephalic and atraumatic.      Nose: Nose normal.      Mouth/Throat:      Mouth: Mucous membranes are moist.     Eyes:      Extraocular Movements: Extraocular movements intact.      Conjunctiva/sclera: Conjunctivae normal.      Pupils: Pupils are equal, round, and reactive to light.       Cardiovascular:      Rate and Rhythm: Normal rate and regular rhythm.      Heart sounds: No murmur heard.     No gallop.   Pulmonary:      Effort: Pulmonary effort is normal. No respiratory distress.      Breath sounds: Rhonchi present. No wheezing or rales.      Comments: Decreased breath sounds at bilateral lung bases  Abdominal:      General: Abdomen is flat. Bowel sounds are normal. There is no distension.      Palpations: Abdomen is soft. There is no mass.      Tenderness: There is no abdominal tenderness.     Musculoskeletal:         General: No swelling or tenderness. Normal range of motion.      Cervical back: Normal range of motion and neck supple.     Skin:     General: Skin is warm and dry.     Neurological:      General: No focal deficit present.      Mental Status: She is alert and oriented to person, place, and time. Mental status is at baseline.     Psychiatric:         Mood and Affect: Mood normal.         Behavior: Behavior normal.         Thought Content: Thought content normal.         Judgment: Judgment normal.         Last Recorded Vitals:  /77 (BP Location: Left arm, Patient Position: Lying)   Pulse 63   Temp  "36.5 °C (97.7 °F) (Temporal)   Resp 20   Ht 1.6 m (5' 2.99\")   Wt 65.7 kg (144 lb 13.5 oz)   SpO2 97%   BMI 25.66 kg/m²      Scheduled medications:  Scheduled Medications[1]  Continuous medications:  Continuous Medications[2]  PRN medications:  PRN Medications[3]     Relevant Results:  Results for orders placed or performed during the hospital encounter of 08/07/25 (from the past 24 hours)   Renal Function Panel   Result Value Ref Range    Glucose 92 74 - 99 mg/dL    Sodium 138 136 - 145 mmol/L    Potassium 5.6 (H) 3.5 - 5.3 mmol/L    Chloride 107 98 - 107 mmol/L    Bicarbonate 22 21 - 32 mmol/L    Anion Gap 15 10 - 20 mmol/L    Urea Nitrogen 75 (H) 6 - 23 mg/dL    Creatinine 3.21 (H) 0.50 - 1.05 mg/dL    eGFR 14 (L) >60 mL/min/1.73m*2    Calcium 7.7 (L) 8.6 - 10.3 mg/dL    Phosphorus 4.7 2.5 - 4.9 mg/dL    Albumin 2.8 (L) 3.4 - 5.0 g/dL       Imaging  No results found.    Cardiology, Vascular, and Other Imaging  No other imaging results found for the past 2 days                     Assessment/Plan   Assessment & Plan  Altered mental status, unspecified altered mental status type    Acute hypoxic respiratory failure 2/2 CAP, COPD exacerbation  HFpEF exacerbation was given lasix but cr worsened  ILD flare  Concern other opportunistic infections  - on 2L NC  - completed CTX/doxycyline  - Pulm following  - solumedrol   - pulm recommended bronch but pt is refusing  - duoneb  - autoimmune work up pending  - palliative consult  - ID consulted    FAUSTINA likely severe ATN  - nephro following  - cr stabilizing    Acute metabolic encephalopathy: mentation is improving but pt is refusing procedures/treatment  Concern for underlying dementia   MRI shows no acute CVA  EEG: NAP  - neuro recs appreciated  - statin  - hold plavix  - avoid sedating meds  - OT for moca    HyperK  - was given lokelma several days ago, but pt has poor po intake, trial D5W at slow rate    HFpEF  Intermittent exacerbation as pt was having SOB but lasix " worsened cr  - monitor    NAGMA  - 8/13/2025 ABG 7.24, CO2 34/80/14 0.6, normal lactate     Hypocalcemia  - Ionized calcium 0.86  - Calcium chloride 1 g was given    Leukocytosis  - monitor    Macrocytic anemia  - monitor    HTN  - imdur  - metoprolol    CAD  - statin  - hold plavix in case pt does have procedure    RA  - hold hydroxychloroquine, methotrexate    Chronic back pain  - hold gabapentin due to FAUSTINA    Allergic rhinitis  - azelastine  - flonase  - singulair    GERD  - ppi    DVT ppx: SQ hep  Dispo: monitor clinically          Cherry Lin MD  Hospitalist         [1] acetylcysteine, 3 mL, nebulization, TID  azelastine, 2 spray, Each Nostril, BID  budesonide, 0.25 mg, nebulization, Daily  cefTRIAXone, 1 g, intravenous, q24h  [Held by provider] clopidogrel, 75 mg, oral, Daily  fluticasone, 2 spray, Each Nostril, Daily  [Held by provider] gabapentin, 800 mg, oral, BID  heparin (porcine), 5,000 Units, subcutaneous, q8h  [Held by provider] hydroxychloroquine, 300 mg, oral, Daily  ipratropium, 2 spray, Each Nostril, 4x daily  ipratropium-albuteroL, 3 mL, nebulization, TID  isosorbide mononitrate ER, 60 mg, oral, Daily  [Held by provider] methotrexate, 20 mg, intramuscular, Every Sunday  methylPREDNISolone sodium succinate (PF), 60 mg, intravenous, q6h  metoprolol tartrate, 50 mg, oral, q12h  montelukast, 10 mg, oral, Daily  nystatin, 5 mL, Mouth/Throat, TID  pantoprazole, 40 mg, oral, Daily  pravastatin, 20 mg, oral, Nightly  primidone, 150 mg, oral, Daily  sennosides-docusate sodium, 1 tablet, oral, Daily  [2] sodium bicarbonate 1 mEq/mL (8.4 %) 150 mEq in dextrose 5% 1,150 mL infusion, 60 mL/hr, Last Rate: Stopped (08/14/25 8366)  [3] PRN medications: albuterol, benzocaine-menthol, HYDROcodone-acetaminophen, ipratropium-albuteroL, lubricating eye drops, metoclopramide, naloxone, nitroglycerin, oxygen, polyethylene glycol

## 2025-08-15 NOTE — PROGRESS NOTES
Occupational Therapy                 Therapy Communication Note    Patient Name: Radha Deluna  MRN: [id number]  Department: [facility] OR  Room: [facility]/919-  Today's Date: 8/15/2025     Discipline: Occupational Therapy    Missed Visit: OT Missed Visit: Yes     Missed Visit Reason: Missed Visit Reason:  (Dispite Max encouragement pt adamantly refuse OT this day.)    Missed Time: Attempt    Comment:       [FreeTextEntry1] : -Unclear etiology, no precipitating factors noted Felt back to baseline almost immediately Check labs, EKG

## 2025-08-15 NOTE — PROGRESS NOTES
Physical Therapy                 Therapy Communication Note    Patient Name: Radha Deluna  MRN: [id number]  Department: [facility]  Room: [address] [place] [address]  Today's Date: 8/15/2025     Discipline: Physical Therapy    Missed Visit: PT Missed Visit: Yes     Missed Visit Reason: Missed Visit Reason:  (patient adamantly refused)    Missed Time: Attempt    Comment:

## 2025-08-15 NOTE — TREATMENT PLAN
I attempted to evaluate the patient today, and she was off the floor for endoscopy.  I will plan on evaluating the patient on Monday.  Thank you for allowing this service to participate in the plan of care for the patient.

## 2025-08-15 NOTE — CARE PLAN
The patient's goals for the shift include    rest  The clinical goals for the shift include maintain safety

## 2025-08-15 NOTE — CARE PLAN
Problem: Pain - Adult  Goal: Verbalizes/displays adequate comfort level or baseline comfort level  Outcome: Progressing   The patient's goals for the shift include  Rest    The clinical goals for the shift include maintain safety

## 2025-08-15 NOTE — PROGRESS NOTES
Radha Verduzco is a 75 y.o. female on day 7 of admission presenting with Altered mental status, unspecified altered mental status type.      Subjective   Patient was seen and examined at bedside, some confusion reported randomly but the patient was AOL x 4 during rounding, she remains on 2L with exertion . Denies any f/c, n/v, new onset headaches, vision changes, chest pain, dyspnea, abdominal pain, changes in BM, or urinary changes.  She has poor p.o. intake and refusing to eat.          Objective     Last Recorded Vitals  /66   Pulse 71   Temp 35.8 °C (96.4 °F)   Resp 16   Wt 65.7 kg (144 lb 13.5 oz)   SpO2 92%   Intake/Output last 3 Shifts:    Intake/Output Summary (Last 24 hours) at 8/14/2025 2005  Last data filed at 8/14/2025 1657  Gross per 24 hour   Intake 446.25 ml   Output 1075 ml   Net -628.75 ml         Admission Weight  Weight: 68.5 kg (151 lb) (08/07/25 1200)    Daily Weight  08/14/25 : 65.7 kg (144 lb 13.5 oz)    Image Results  Transthoracic Echo Complete     [facility], [address] [place] [address]            Tel [phone] and Fax [phone]    TRANSTHORACIC ECHOCARDIOGRAM REPORT       Patient Name:       RADHA VERDUZCO  Reading Physician:    [id number] Sanford Seth MD  Study Date:         8/12/2025           Ordering Provider:    [id number] LARS DUMONT  MRN/PID:            [id number]            Fellow:  Accession#:         [id number]        Nurse:  Date of Birth/Age:  1950 / 75      Sonographer:          Patrick Jacobson                      years                                     ACS, RDCS, FASE  Gender assigned at  F                   Additional Staff:  Birth:  Height:             157.48 cm           Admit Date:           8/7/2025  Weight:             65.32 kg            Admission Status:     Inpatient -                                                                 Routine  BSA / BMI:          1.66 m2 / 26.34     Encounter#:           [id number]                      kg/m2  Blood Pressure:     134/76 mmHg         Department Location:  [facility]    Study Type:    TRANSTHORACIC ECHO (TTE) COMPLETE  Diagnosis/ICD: Other forms of dyspnea-R06.09  Indication:    Dyspnea  CPT Code:      Echo Complete w Full Doppler-78533    Patient History:  Pertinent History: Dyspnea, CAD, HTN, Hyperlipidemia and Chest Pain. Hx coronary                     stents, hx TIA. AMS.    Study Detail: The following Echo studies were performed: 2D, M-Mode, Doppler and                color flow. Technically challenging study due to body habitus.       PHYSICIAN INTERPRETATION:  Left Ventricle: The left ventricular systolic function is normal with a visually estimated ejection fraction of 60-65%. There are no regional left ventricular wall motion abnormalities. The left ventricular cavity size is normal. There is mildly increased septal and normal posterior left ventricular wall thickness. Spectral Doppler shows a Grade I (impaired relaxation pattern) of left ventricular diastolic filling with normal left atrial filling pressure.  Left Atrium: The left atrial size is normal.  Right Ventricle: The right ventricle is normal in size. There is normal right ventricular global systolic function.  Right Atrium: The right atrium is normal in size.  Aortic Valve: The aortic valve is structurally normal. The aortic valve area by VTI is 2.19 cmÂ² with a peak velocity of 1.87 m/s. The peak and mean gradients are 14 mmHg and 8 mmHg, respectively with a dimensionless index of 0.53. There is trace to mild aortic valve regurgitation.  Mitral Valve: The mitral valve is normal in structure. There is no evidence of mitral valve regurgitation. The E Vmax is 0.68 m/s.  Tricuspid Valve: The tricuspid valve is structurally normal. No evidence of tricuspid regurgitation.  Pulmonic Valve: The  pulmonic valve is structurally normal. There is physiologic pulmonic valve regurgitation.  Pericardium: There is no pericardial effusion noted.  Aorta: The aortic root is normal.       CONCLUSIONS:   1. The left ventricular systolic function is normal with a visually estimated ejection fraction of 60-65%.   2. Spectral Doppler shows a Grade I (impaired relaxation pattern) of left ventricular diastolic filling with normal left atrial filling pressure.    QUANTITATIVE DATA SUMMARY:     2D MEASUREMENTS:          Normal Ranges:  Ao Root d:       3.70 cm  (2.0-3.7cm)  LAs:             2.80 cm  (2.7-4.0cm)  IVSd:            0.95 cm  (0.6-1.1cm)  LVPWd:           0.80 cm  (0.6-1.1cm)  LVIDd:           4.90 cm  (3.9-5.9cm)  LVIDs:           3.10 cm  LV Mass Index:   92 g/m2  LVEDV Index:     47 ml/m2  LV % FS          36.7 %       LEFT ATRIUM:                  Normal Ranges:  LA Vol A4C:        32.0 ml    (22+/-6mL/m2)  LA Vol A2C:        48.2 ml  LA Vol BP:         39.7 ml  LA Vol Index A4C:  19.3ml/m2  LA Vol Index A2C:  29.0 ml/m2  LA Vol Index BP:   23.9 ml/m2  LA Area A4C:       14.0 cm2  LA Area A2C:       17.0 cm2  LA Major Axis A4C: 5.2 cm  LA Major Axis A2C: 5.1 cm  LA Volume Index:   22.0 ml/m2       RIGHT ATRIUM:          Normal Ranges:  RA Area A4C:  11.0 cm2       M-MODE MEASUREMENTS:         Normal Ranges:  Ao Root:             3.60 cm (2.0-3.7cm)  LAs:                 4.40 cm (2.7-4.0cm)       AORTA MEASUREMENTS:         Normal Ranges:  Asc Ao, d:          3.60 cm (2.1-3.4cm)       LV SYSTOLIC FUNCTION:                       Normal Ranges:  EF-A4C View:    66 % (>=55%)  EF-A2C View:    58 %  EF-Biplane:     61 %  EF-Visual:      63 %  LV EF Reported: 63 %       LV DIASTOLIC FUNCTION:            Normal Ranges:  MV Peak E:             0.68 m/s   (0.7-1.2 m/s)  MV Peak A:             0.85 m/s   (0.42-0.7 m/s)  E/A Ratio:             0.80       (1.0-2.2)  MV e'                  0.091 m/s  (>8.0)  MV lateral e'           0.10 m/s  MV medial e'           0.08 m/s  E/e' Ratio:            7.46       (<8.0)  PulmV Sys Pollo:         97.10 cm/s  PulmV Brasher Pollo:        69.20 cm/s  PulmV S/D Pollo:         1.40       MITRAL VALVE:          Normal Ranges:  MV DT:        148 msec (150-240msec)       AORTIC VALVE:                      Normal Ranges:  AoV Vmax:                1.87 m/s  (<=1.7m/s)  AoV Peak P.0 mmHg (<20mmHg)  AoV Mean P.0 mmHg  (1.7-11.5mmHg)  LVOT Max Pollo:            0.95 m/s  (<=1.1m/s)  AoV VTI:                 40.60 cm  (18-25cm)  LVOT VTI:                21.40 cm  LVOT Diameter:           2.30 cm   (1.8-2.4cm)  AoV Area, VTI:           2.19 cm2  (2.5-5.5cm2)  AoV Area,Vmax:           2.11 cm2  (2.5-4.5cm2)  AoV Dimensionless Index: 0.53       RIGHT VENTRICLE:  RV Basal 2.60 cm  RV Mid   2.50 cm  RV Major 6.2 cm  TAPSE:   20.9 mm  RV s'    0.13 m/s       TRICUSPID VALVE/RVSP:         Normal Ranges:  Est. RA Pressure:     3  IVC Diam:             1.50 cm       PULMONIC VALVE:          Normal Ranges:  PV Max Pollo:     0.8 m/s  (0.6-0.9m/s)  PV Max P.8 mmHg       PULMONARY VEINS:  PulmV Brasher Pollo: 69.20 cm/s  PulmV S/D Pollo:  1.40  PulmV Sys Pollo:  97.10 cm/s       [id number] Sanford Seth MD  Electronically signed on 2025 at 9:50:03 AM       ** Final **      Physical Exam  Constitutional:       General: She is not in acute distress.     Appearance: She is normal weight.   HENT:      Head: Normocephalic and atraumatic.      Nose: Nose normal.      Mouth/Throat:      Mouth: Mucous membranes are moist.      Pharynx: Oropharynx is clear.      Eyes:      Extraocular Movements: Extraocular movements intact.      Conjunctiva/sclera: Conjunctivae normal.      Pupils: Pupils are equal, round, and reactive to light.         Cardiovascular:      Rate and Rhythm: Regular rhythm. Normal rate.      Pulses: Normal pulses.      Heart sounds: Normal heart sounds.   Pulmonary:      Effort: Pulmonary  effort is normal.      Breath sounds: Rales, no wheezine  Abdominal:      General: Abdomen is flat.      Palpations: Abdomen is soft.      Musculoskeletal:         General: Normal range of motion.      Cervical back: Normal range of motion and neck supple.      Skin:     General: Skin is warm and dry.      Neurological:      General: No focal deficit present.      Mental Status: She is alert and oriented to person, place, and time. Mental status is at baseline.      Psychiatric:         Mood and Affect: Mood normal.         Behavior: Behavior normal.   Relevant Results                            Assessment & Plan  Altered mental status, unspecified altered mental status type    Radha Deluna is a 75 y.o. female admitted for  AMS and fever     Acute Medical Issues     #Acute hypoxic respiratory failure (Fluctuating):  # Concern for CAP  #  COPD exacerbation  - Patient was on 2 L in the ED, according to the nurse she was dropping to the 80s on room air, she uses oxygen 2 L at night at home.  - Increase Steroids 60 mg 4 times daily by pulmonology, currently on RA  to 2L, add pulmicort   - Chest x-ray showed 8/11 Persistent right-greater-than-left ill-defined opacities, Stable small left-sided pleural effusion  - CT chest;  Rounded opacities in the left upper and lower lobes with peripheral  ground-glass attenuation that are new from prior imaging 10/28/2024  concerning for pneumonia. Given the nodular appearance of the  lesions, however, recommend interval follow-up with CT after  treatment to confirm resolution and exclude an underlying neoplastic  process.  - serum LDH elevated, elevated CRP and ESR concerning for inflammatory process, CD4/CD8 panel, GARRICK, ANCA  - continue  Ceftriaxone, finished doxycycline     -Received Zosyn/Vanco  -Mycoplasma, Legionella, legionella , Procal 0.86 , MRSA; negative, MSSA Positive   - RT evaluate and treat, DuoNeb, albuterol, incentive spirometry.  - Wean oxygen as tolerated  -ID  consulted as LDH was elevated for concern of opportunistic infection, pulmonology wants to perform bronchoscopy but the patient refused, will try to reach out to her sister.          #FAUSTINA   - s. Creat peaked to 4.5, now downtrending with hydration  -Likely severe ATN  - 8/11 :1 bolus given, start maintenance fluids. NS 75 ml, had to stop the fluids as the patient became short of breath, CXR was concerning for fluid congestion, bnp 264  - 8/12 Lasix challenge with worsening Kidney function, resume fluids.  -8/13, serum creat started stabilized and slightly improving, will continue fluid, switched from normal saline to bicarb drip  8/14 serum creatinine stabilized and down to 3.87  - Continue to monitor, avoid nephrotoxic meds.   - Nephrology consulted; appreciate recs  - Renal US, urine electrolytes low sodium, normal CK      #Hyperkalemia(improving):   - Patient was found to have hyperkalemia 5.5, started on Lokelma and was given 1 dose of calcium gluconate, no EKG changes.  -Low potassium diet, started on bicarb drip        #NAGMA  - 8/13/2025 ABG 7.24, CO2 34/80/14 0.6, normal lactate  -    #Hypocalcemia:  - Ionized calcium 0.86  - Calcium chloride 1 g given.    #Concern for CHF:  - Patient started having shortness of breath and chest tightness, fluid was stopped, CXR  was concerning for fluid congestion, bnp 264  -  echo' normal EF and grade 1 diastolic relaxation.         #Sepsis(Resolved):  - SIRS criteria in the ED 3/4; tachycardia, leukocytosis fever:  - Acute hypoxic respiratory failure, normal lactate.        #Acute myocardial injury(Resolved):  - Likely secondary to demand ischemia type II MI, mildly elevated troponin of 20, no chest pain       #AMS(Resolved):  #Heat stroke vs Infection:  -Patient was found to be confused , drove her car in the wrong direction.  -She was in her car for 45 minutes and was feverish, responded to fluids.   - CT head NO ACUTE INTRACRANIAL PROCESS       #Hyponatremia:  #Hypokalemia(resolved):  - Continue to monitor, replete potassium as needed     #Seizure(ruled out)  - Patient on primidone, 150 mg, oral, Daily for tremors  - Neurology consulted, patient will be evaluated for possible seizure due to polypharmacy and infectious process.  - MRI normall and EEG ordered.      #Delirium:  - Patient has delirium overnight, likely from being in strange environment.  - Continue to redirect, according to her sister she gets time of confusion.  - Will probably need to be in assisted living for long-term, discussed with her  sister over the phone        Chronic Medical Issues   # Allergic rhinitis  -Continue azelastine, 2 spray, Each Nostril, BID,  fluticasone, 2 spray, Each Nostril, Daily, montelukast, 10 mg, oral, Daily        #Chronic back pain  - Hold  gabapentin, 800 mg, oral, BID for FAUSTINA     #Rheumatoid arthritis  - Hold hydroxychloroquine, 300 mg, oral, Daily,  and methotrexate, 20 mg, intramuscular, Every Sunday for FAUSTINA         #HTN/CAD/AAA:  -Resume  isosorbide mononitrate ER, 60 mg, oral, Daily, metoprolol tartrate, 50 mg, oral, q12h     # GERD:  - pantoprazole, 40 mg, oral, Daily     #HLD  -Continue pravastatin, 20 mg, oral, Daily           F: PO intake & IVF PRN  E: Replete as needed  N: Regular diet  GI ppx: Protonix 40 mg daily   DVT ppx: Lovenox subcutaneous  Antibiotics: Ceftriaxone, doxycycline   Oxygenation: 0- 2L NC     Code Status: DNR and No Intubation   Emergency Contact: Extended Emergency Contact Information  Primary Emergency Contact: KERWIN FRASER  Address: [place], [place]  Home Phone: [phone]  Work Phone: [phone]  Mobile Phone: [phone]  Relation: Sibling   needed? No      Disposition: 75 y.o.female admitted for AMS , RESOLVED , patient has FAUSTINA, and COPD exacerbation  continue management as above.   Discussed with pulmonology team, continue management as above.         Flakito Gunn, DO

## 2025-08-15 NOTE — PROGRESS NOTES
Occupational Therapy                 Therapy Communication Note    Patient Name: Radha Deluna  MRN: [id number]  Department: [facility]  Room: [facility]-  Today's Date: 8/15/2025     Discipline: Occupational Therapy    Missed Visit Reason:  Case conference with RN who stated patient recently returning from procedure.  Patient seen bedside at 14:25 to attempt MoCA test; receptive to participate.  Test initiated however unable to fully complete; patient requested to stop due to fatigue.  Noted increase lethargy as session progressed.  Plan:  Continue to attempt as tolerated.  Updated TCC/Maryse.    Missed Time: Attempt

## 2025-08-15 NOTE — PROGRESS NOTES
Pt continues to refuse treatments, procedures.  Medical team aware and a MOCA was ordered.   Team to follow for DC planning,  asking for therapies --again pt will not cooperate,  last PT [facility] 18--tentative plan was for [facility] .  Await MOCA.  Pt lives alone, has sister as contact.    aMryse Sahu RN TCC

## 2025-08-16 LAB
ACID FAST STN SPEC: NORMAL
ACID FAST STN SPEC: NORMAL
ANION GAP SERPL CALC-SCNC: 12 MMOL/L (ref 10–20)
BACTERIA SPEC RESP CULT: ABNORMAL
BASOPHILS # BLD AUTO: 0.01 X10*3/UL (ref 0–0.1)
BASOPHILS NFR BLD AUTO: 0.2 %
BUN SERPL-MCNC: 64 MG/DL (ref 6–23)
CALCIUM SERPL-MCNC: 7.2 MG/DL (ref 8.6–10.3)
CHLORIDE SERPL-SCNC: 104 MMOL/L (ref 98–107)
CO2 SERPL-SCNC: 24 MMOL/L (ref 21–32)
CREAT SERPL-MCNC: 2.55 MG/DL (ref 0.5–1.05)
EGFRCR SERPLBLD CKD-EPI 2021: 19 ML/MIN/1.73M*2
EOSINOPHIL # BLD AUTO: 0 X10*3/UL (ref 0–0.4)
EOSINOPHIL NFR BLD AUTO: 0 %
ERYTHROCYTE [DISTWIDTH] IN BLOOD BY AUTOMATED COUNT: 14.5 % (ref 11.5–14.5)
FUNGUS SPEC FUNGUS STN: NORMAL
FUNGUS SPEC FUNGUS STN: NORMAL
GLUCOSE SERPL-MCNC: 251 MG/DL (ref 74–99)
GRAM STN SPEC: ABNORMAL
GRAM STN SPEC: ABNORMAL
HCT VFR BLD AUTO: 33.2 % (ref 36–46)
HGB BLD-MCNC: 10.9 G/DL (ref 12–16)
HOLD SPECIMEN: NORMAL
HOLD SPECIMEN: NORMAL
IMM GRANULOCYTES # BLD AUTO: 0.05 X10*3/UL (ref 0–0.5)
IMM GRANULOCYTES NFR BLD AUTO: 0.8 % (ref 0–0.9)
LYMPHOCYTES # BLD AUTO: 0.34 X10*3/UL (ref 0.8–3)
LYMPHOCYTES NFR BLD AUTO: 5.5 %
MCH RBC QN AUTO: 34.4 PG (ref 26–34)
MCHC RBC AUTO-ENTMCNC: 32.8 G/DL (ref 32–36)
MCV RBC AUTO: 105 FL (ref 80–100)
MONOCYTES # BLD AUTO: 0.08 X10*3/UL (ref 0.05–0.8)
MONOCYTES NFR BLD AUTO: 1.3 %
MYCOBACTERIUM SPEC CULT: NORMAL
MYCOBACTERIUM SPEC CULT: NORMAL
NEUTROPHILS # BLD AUTO: 5.74 X10*3/UL (ref 1.6–5.5)
NEUTROPHILS NFR BLD AUTO: 92.2 %
NRBC BLD-RTO: 0 /100 WBCS (ref 0–0)
PLATELET # BLD AUTO: 236 X10*3/UL (ref 150–450)
POTASSIUM SERPL-SCNC: 4.5 MMOL/L (ref 3.5–5.3)
RBC # BLD AUTO: 3.17 X10*6/UL (ref 4–5.2)
SODIUM SERPL-SCNC: 135 MMOL/L (ref 136–145)
WBC # BLD AUTO: 6.2 X10*3/UL (ref 4.4–11.3)

## 2025-08-16 PROCEDURE — 2500000004 HC RX 250 GENERAL PHARMACY W/ HCPCS (ALT 636 FOR OP/ED): Performed by: INTERNAL MEDICINE

## 2025-08-16 PROCEDURE — 94640 AIRWAY INHALATION TREATMENT: CPT

## 2025-08-16 PROCEDURE — 2500000004 HC RX 250 GENERAL PHARMACY W/ HCPCS (ALT 636 FOR OP/ED): Performed by: STUDENT IN AN ORGANIZED HEALTH CARE EDUCATION/TRAINING PROGRAM

## 2025-08-16 PROCEDURE — 2500000005 HC RX 250 GENERAL PHARMACY W/O HCPCS: Performed by: INTERNAL MEDICINE

## 2025-08-16 PROCEDURE — 94669 MECHANICAL CHEST WALL OSCILL: CPT

## 2025-08-16 PROCEDURE — 87305 ASPERGILLUS AG IA: CPT | Performed by: INTERNAL MEDICINE

## 2025-08-16 PROCEDURE — 99233 SBSQ HOSP IP/OBS HIGH 50: CPT | Performed by: HOSPITALIST

## 2025-08-16 PROCEDURE — 36415 COLL VENOUS BLD VENIPUNCTURE: CPT | Performed by: INTERNAL MEDICINE

## 2025-08-16 PROCEDURE — 85025 COMPLETE CBC W/AUTO DIFF WBC: CPT | Performed by: HOSPITALIST

## 2025-08-16 PROCEDURE — 2500000001 HC RX 250 WO HCPCS SELF ADMINISTERED DRUGS (ALT 637 FOR MEDICARE OP): Performed by: INTERNAL MEDICINE

## 2025-08-16 PROCEDURE — 2500000002 HC RX 250 W HCPCS SELF ADMINISTERED DRUGS (ALT 637 FOR MEDICARE OP, ALT 636 FOR OP/ED): Performed by: INTERNAL MEDICINE

## 2025-08-16 PROCEDURE — 1100000001 HC PRIVATE ROOM DAILY

## 2025-08-16 PROCEDURE — 2500000001 HC RX 250 WO HCPCS SELF ADMINISTERED DRUGS (ALT 637 FOR MEDICARE OP): Performed by: NURSE PRACTITIONER

## 2025-08-16 PROCEDURE — 80048 BASIC METABOLIC PNL TOTAL CA: CPT | Performed by: HOSPITALIST

## 2025-08-16 PROCEDURE — 86635 COCCIDIOIDES ANTIBODY: CPT | Performed by: INTERNAL MEDICINE

## 2025-08-16 RX ADMIN — IPRATROPIUM BROMIDE AND ALBUTEROL SULFATE 3 ML: .5; 3 SOLUTION RESPIRATORY (INHALATION) at 06:36

## 2025-08-16 RX ADMIN — PANTOPRAZOLE SODIUM 40 MG: 40 TABLET, DELAYED RELEASE ORAL at 09:29

## 2025-08-16 RX ADMIN — HEPARIN SODIUM 5000 UNITS: 5000 INJECTION, SOLUTION INTRAVENOUS; SUBCUTANEOUS at 17:10

## 2025-08-16 RX ADMIN — METHYLPREDNISOLONE SODIUM SUCCINATE 60 MG: 125 INJECTION, POWDER, FOR SOLUTION INTRAMUSCULAR; INTRAVENOUS at 09:27

## 2025-08-16 RX ADMIN — PRIMIDONE 150 MG: 50 TABLET ORAL at 06:31

## 2025-08-16 RX ADMIN — ACETYLCYSTEINE 600 MG: 200 SOLUTION ORAL; RESPIRATORY (INHALATION) at 06:36

## 2025-08-16 RX ADMIN — METOPROLOL TARTRATE 50 MG: 50 TABLET, FILM COATED ORAL at 22:13

## 2025-08-16 RX ADMIN — PRAVASTATIN SODIUM 20 MG: 20 TABLET ORAL at 21:59

## 2025-08-16 RX ADMIN — IPRATROPIUM BROMIDE 2 SPRAY: 42 SPRAY, METERED NASAL at 22:00

## 2025-08-16 RX ADMIN — ACETYLCYSTEINE 600 MG: 200 SOLUTION ORAL; RESPIRATORY (INHALATION) at 12:58

## 2025-08-16 RX ADMIN — ACETYLCYSTEINE 600 MG: 200 SOLUTION ORAL; RESPIRATORY (INHALATION) at 20:11

## 2025-08-16 RX ADMIN — METHYLPREDNISOLONE SODIUM SUCCINATE 60 MG: 125 INJECTION, POWDER, FOR SOLUTION INTRAMUSCULAR; INTRAVENOUS at 02:01

## 2025-08-16 RX ADMIN — NYSTATIN 500000 UNITS: 100000 SUSPENSION ORAL at 09:27

## 2025-08-16 RX ADMIN — HEPARIN SODIUM 5000 UNITS: 5000 INJECTION, SOLUTION INTRAVENOUS; SUBCUTANEOUS at 09:29

## 2025-08-16 RX ADMIN — SENNOSIDES AND DOCUSATE SODIUM 1 TABLET: 50; 8.6 TABLET ORAL at 09:29

## 2025-08-16 RX ADMIN — ISOSORBIDE MONONITRATE 60 MG: 60 TABLET, EXTENDED RELEASE ORAL at 09:28

## 2025-08-16 RX ADMIN — Medication: at 12:58

## 2025-08-16 RX ADMIN — Medication: at 03:00

## 2025-08-16 RX ADMIN — IPRATROPIUM BROMIDE AND ALBUTEROL SULFATE 3 ML: .5; 3 SOLUTION RESPIRATORY (INHALATION) at 12:58

## 2025-08-16 RX ADMIN — AZELASTINE HYDROCHLORIDE 2 SPRAY: 137 SPRAY, METERED NASAL at 21:59

## 2025-08-16 RX ADMIN — IPRATROPIUM BROMIDE AND ALBUTEROL SULFATE 3 ML: .5; 3 SOLUTION RESPIRATORY (INHALATION) at 20:11

## 2025-08-16 RX ADMIN — NYSTATIN 500000 UNITS: 100000 SUSPENSION ORAL at 21:59

## 2025-08-16 RX ADMIN — MONTELUKAST 10 MG: 10 TABLET, FILM COATED ORAL at 09:29

## 2025-08-16 RX ADMIN — METOPROLOL TARTRATE 50 MG: 50 TABLET, FILM COATED ORAL at 06:31

## 2025-08-16 RX ADMIN — NYSTATIN 500000 UNITS: 100000 SUSPENSION ORAL at 17:10

## 2025-08-16 RX ADMIN — IPRATROPIUM BROMIDE 2 SPRAY: 42 SPRAY, METERED NASAL at 21:59

## 2025-08-16 RX ADMIN — HYDROCODONE BITARTRATE AND ACETAMINOPHEN 1 TABLET: 7.5; 325 TABLET ORAL at 12:59

## 2025-08-16 RX ADMIN — Medication: at 20:16

## 2025-08-16 RX ADMIN — METHYLPREDNISOLONE SODIUM SUCCINATE 60 MG: 125 INJECTION, POWDER, FOR SOLUTION INTRAMUSCULAR; INTRAVENOUS at 17:17

## 2025-08-16 RX ADMIN — BUDESONIDE 0.25 MG: 0.25 INHALANT RESPIRATORY (INHALATION) at 06:36

## 2025-08-16 RX ADMIN — HEPARIN SODIUM 5000 UNITS: 5000 INJECTION, SOLUTION INTRAVENOUS; SUBCUTANEOUS at 02:01

## 2025-08-16 RX ADMIN — METHYLPREDNISOLONE SODIUM SUCCINATE 60 MG: 125 INJECTION, POWDER, FOR SOLUTION INTRAMUSCULAR; INTRAVENOUS at 21:59

## 2025-08-16 ASSESSMENT — PAIN DESCRIPTION - DESCRIPTORS: DESCRIPTORS: ACHING

## 2025-08-16 ASSESSMENT — COGNITIVE AND FUNCTIONAL STATUS - GENERAL
CLIMB 3 TO 5 STEPS WITH RAILING: A LITTLE
MOBILITY SCORE: 18
STANDING UP FROM CHAIR USING ARMS: A LITTLE
CLIMB 3 TO 5 STEPS WITH RAILING: A LITTLE
TURNING FROM BACK TO SIDE WHILE IN FLAT BAD: A LITTLE
WALKING IN HOSPITAL ROOM: A LITTLE
WALKING IN HOSPITAL ROOM: A LITTLE
TURNING FROM BACK TO SIDE WHILE IN FLAT BAD: A LITTLE
DAILY ACTIVITIY SCORE: 24
MOVING TO AND FROM BED TO CHAIR: A LITTLE
DAILY ACTIVITIY SCORE: 24
STANDING UP FROM CHAIR USING ARMS: A LITTLE
TOILETING: A LITTLE
TURNING FROM BACK TO SIDE WHILE IN FLAT BAD: A LITTLE
MOBILITY SCORE: 19
MOVING FROM LYING ON BACK TO SITTING ON SIDE OF FLAT BED WITH BEDRAILS: A LITTLE
MOVING FROM LYING ON BACK TO SITTING ON SIDE OF FLAT BED WITH BEDRAILS: A LITTLE
WALKING IN HOSPITAL ROOM: A LITTLE
DAILY ACTIVITIY SCORE: 21
HELP NEEDED FOR BATHING: A LITTLE
MOVING TO AND FROM BED TO CHAIR: A LITTLE
STANDING UP FROM CHAIR USING ARMS: A LITTLE
DRESSING REGULAR LOWER BODY CLOTHING: A LITTLE
CLIMB 3 TO 5 STEPS WITH RAILING: A LITTLE

## 2025-08-16 ASSESSMENT — PAIN DESCRIPTION - LOCATION: LOCATION: BACK

## 2025-08-16 ASSESSMENT — PAIN - FUNCTIONAL ASSESSMENT
PAIN_FUNCTIONAL_ASSESSMENT: 0-10
PAIN_FUNCTIONAL_ASSESSMENT: 0-10

## 2025-08-16 ASSESSMENT — PAIN SCALES - GENERAL
PAINLEVEL_OUTOF10: 6
PAINLEVEL_OUTOF10: 0 - NO PAIN
PAINLEVEL_OUTOF10: 0 - NO PAIN

## 2025-08-16 NOTE — CARE PLAN
The patient's goals for the shift include      The clinical goals for the shift include maintain safety    Problem: Safety - Adult  Goal: Free from fall injury  Outcome: Progressing     Problem: Chronic Conditions and Co-morbidities  Goal: Patient's chronic conditions and co-morbidity symptoms are monitored and maintained or improved  Outcome: Progressing     Problem: Nutrition  Goal: Nutrient intake appropriate for maintaining nutritional needs  Outcome: Progressing     Problem: Skin  Goal: Decreased wound size/increased tissue granulation at next dressing change  Outcome: Progressing  Flowsheets (Taken 8/16/2025 1132)  Decreased wound size/increased tissue granulation at next dressing change: Promote sleep for wound healing     Problem: Skin  Goal: Participates in plan/prevention/treatment measures  Outcome: Progressing  Flowsheets (Taken 8/16/2025 1132)  Participates in plan/prevention/treatment measures:   Discuss with provider PT/OT consult   Elevate heels

## 2025-08-16 NOTE — PROGRESS NOTES
[facility] Hospitalist Progress Note        Name: Radha Deluna  :  1950(75 y.o.)  MRN:  [id number]    Date: 25     ASSESSMENT & PLAN   75 y.o. female with rheumatoid arthritis, immunocompromised on Humira, methotrexate, Plaquenil, who presented with acute hypoxic respiratory failure and delirium on . CT chest performed admission is concerning of subacute rounded opacities in the left upper and lower lobes with peripheral ground-glass attenuation that are new from prior imaging 10/28/2024 concerning for pneumonia.    Acute hypoxic respiratory failure 2/2 CAP, COPD exacerbation, ILD flare  Concern other opportunistic infections  - Pulm and ID consulted. Bronchoscopy on 8/15/25  - Fungal and AFB culture as well as expanded respiratory panel on  BAL, Test includes Adenovirus, CMV, EBV, HHV-6, Legionella, Atypical Pneumo Panel, Pneumocystis Jiroveci, Aspergillis Galactamann and Respiratory Viral Panel all pending  - wean o2 for goal o2 sat 88-92%; continue pulmonary hygiene     FAUSTINA likely severe ATN  - Nephrology consulted; Cr peaked at 4.3, now improved to 2.55  - Stop IV fluids. Avoid NSAIDs, nephrotoxins. Monitor bmp.      Acute metabolic encephalopathy  Concern for underlying dementia   - MRI shows no acute CVA  - EEG: no epileptiform activity  - avoid sedating medications per neurology    Hyperkalemia  - now resolved s/p lokelma     HyperK  - was given lokelma several days ago, but pt has poor po intake, trial D5W at slow rate     Chronic HFpEF  - doubt acute exacerbation as Cr worsening with IV lasix an has since been improving with IV fluids     Hypocalcemia  - Ionized calcium 0.86  - Calcium chloride 1 g was given; repeat ical in AM     Macrocytic anemia  - check b12/folate     HTN  - continue metoprolol     CAD  - continue plavix, statin, bb, imdur     RA  - hold hydroxychloroquine, methotrexate     Chronic back pain  - hold gabapentin due to FAUSTINA; on norco prn     Allergic rhinitis  - azelastine,  flonase singulair     GERD  - ppi     DVT Prophylaxis: subcutaneous heparin  Code status: DNR and No Intubation  Diet: Adult diet Regular, Potassium restricted 2 gm (50mEq)     Disposition: continue to monitor inpatient, await consultant recommendations, await test results, and await clinical improvement    The patient/family had opportunity to ask questions. All questions were answered to the best of my ability.    Between 7AM-7PM please message me via Epic Secure Chat.  After 7PM please page Nocturnist on call.    SUBJECTIVE   Interval History:   Vitals and chart notes from overnight reviewed. No acute issues overnight. Patient seen and evaluated at bedside. No chest pain or shortness of breath. No fevers or chills. No nausea, vomiting. On 2L NC    Review of Systems:   Other than patient's chronic conditions and those complaints in the history above, the rest of the 10 systems review were done and were negative.     Current medications:  Scheduled Meds:Scheduled Medications[1]  Continuous Infusions:Continuous Medications[2]  PRN Meds:PRN Medications[3]    OBJECTIVE     Vitals:    08/16/25 0410 08/16/25 0636 08/16/25 0803 08/16/25 1258   BP: 142/65  138/63    BP Location: Left arm  Left arm    Patient Position: Lying  Lying    Pulse: 84  77    Resp: 18  18    Temp: 35.9 °C (96.6 °F)  36.1 °C (97 °F)    TempSrc: Temporal  Temporal    SpO2: 97% 96% 99% 98%   Weight:       Height:            Physical Exam  Vitals and nursing note reviewed.   HENT:      Mouth/Throat:      Mouth: Mucous membranes are moist.      Pharynx: Oropharynx is clear.     Cardiovascular:      Rate and Rhythm: Normal rate and regular rhythm.   Pulmonary:      Effort: Pulmonary effort is normal.      Comments: Decreased breath sounds at bases  Abdominal:      Palpations: Abdomen is soft.     Musculoskeletal:      Right lower leg: No edema.      Left lower leg: No edema.     Neurological:      Mental Status: She is alert.         Labs:   Lab  Results   Component Value Date     (L) 08/16/2025    K 4.5 08/16/2025     08/16/2025    CO2 24 08/16/2025    BUN 64 (H) 08/16/2025    CREATININE 2.55 (H) 08/16/2025    GLUCOSE 251 (H) 08/16/2025    CALCIUM 7.2 (L) 08/16/2025    PROT 6.9 08/07/2025    BILITOT 0.6 08/07/2025    ALKPHOS 60 08/07/2025    AST 11 08/07/2025    ALT 6 (L) 08/07/2025       Lab Results   Component Value Date    WBC 6.2 08/16/2025    HGB 10.9 (L) 08/16/2025    HCT 33.2 (L) 08/16/2025     (H) 08/16/2025     08/16/2025       Imaging (within the past 24 hours):   Imaging  XR chest 1 view  Result Date: 8/15/2025  1.  Redemonstration scattered ill-defined airspace and interstitial opacities right greater left overall slightly worsened compared to prior. Bilateral nodular opacities redemonstrated as described. Probable trace left effusion slightly improved. Continued clinical correlation follow-up advised.       MACRO: None   Signed by: Jonatan Smith 8/15/2025 2:31 PM Dictation workstation:   [id number]      Cardiology, Vascular, and Other Imaging  Bronchoscopy Diagnostic, Therapeutic, w BAL  Result Date: 8/15/2025  Table formatting from the original result was not included. Impression Bronchoalveolar lavage was performed x1 in the inferior lingular segment (LB5) and the fluid appeared cloudy and thick Performed brushings with cytology and microbiology brushes in the superior lingular segment (LB4) and inferior lingular segment (LB5) Moderate, thick and clear secretions present with less than 25% obstruction in the larynx, left lung and right lung; performed washing Normal Findings Bronchoalveolar lavage was performed x1 in the inferior lingular segment (LB5) with 60 mL of saline instilled and a total return of 18 mL. The fluid appeared cloudy and thick without suspected diffuse alveolar hemorrhage but not bloody, purulent or serosanguinous. Performed 2 brushings with cytology and microbiology brushes in the superior  lingular segment (LB4) and inferior lingular segment (LB5) Moderate, thick and clear secretions present with less than 25% obstruction in the larynx, left lung and right lung; secretions were easily removed; performed washing Dilated bronchi was seen especially at the lower lung zones consistent with bronchiectasis Recommendation Follow up bronchoscopy Indication Atypical pneumonia Post-Op Diagnosis Bronchiectasis, retained secretions Staff Staff Role Lester Calhoun MD Proceduralist Medications See Anesthesia Record. Preprocedure A history and physical has been performed, and patient medication allergies have been reviewed. The patient's tolerance of previous anesthesia has been reviewed. The risks and benefits of the procedure and the sedation options and risks were discussed with the patient. All questions were answered and informed consent obtained. Details of the Procedure The patient underwent monitored anesthesia care, which was administered by an anesthesia professional. The patient's blood pressure, heart rate, level of consciousness, oxygen saturation, respirations, ECG and ETCO2 were monitored throughout the procedure. The patient experienced no blood loss. The scope was introduced through the mouth. The procedure was not difficult. The patient tolerated the procedure well. There were no apparent adverse events. Events Procedure Events Event Event Time ENDO SCOPE IN TIME 8/15/2025 12:48 PM Specimens ID Type Source Tests Collected by Time 1 : LEFT UPPER LOBE LINGULA BAL Non-Gynecologic Cytology BRONCHO-ALVEOLAR LAVAGE OF LEFT LOWER LOBE CYTOLOGY CONSULTATION (NON-GYNECOLOGIC) Lester Calhoun MD 8/15/2025 1253 2 : LEFT UPPER LOBE LINGULA BRUSHING Non-Gynecologic Cytology BRONCHIAL BRUSH LEFT UPPER LOBE CYTOLOGY CONSULTATION (NON-GYNECOLOGIC) Lester Calhoun MD 8/15/2025 1258 A : LEFT UPPER LOBE LINGULA BRUSHING Fluid BRONCHIAL BRUSH LEFT UPPER LOBE AFB CULTURE/SMEAR, FUNGAL CULTURE/SMEAR, RESPIRATORY  CULTURE/SMEAR Lester Calhoun MD 8/15/2025 1259 B : LEFT UPPER LOBE LINGULA Fluid BRONCHO-ALVEOLAR LAVAGE OF LEFT UPPER LOBE AFB CULTURE/SMEAR, FUNGAL CULTURE/SMEAR, RESPIRATORY CULTURE/SMEAR Lester Calhoun MD 8/15/2025 1300 Procedure Location [facility] [address] [facility] [id number] [phone] Referring Provider Lester Calhoun MD Procedure Provider Lester Calhoun MD         Electronically signed by Ralph Callejas DO on 08/16/25 at 2:54 PM        [1] acetylcysteine, 3 mL, nebulization, TID  azelastine, 2 spray, Each Nostril, BID  budesonide, 0.25 mg, nebulization, Daily  [Held by provider] clopidogrel, 75 mg, oral, Daily  fluticasone, 2 spray, Each Nostril, Daily  [Held by provider] gabapentin, 800 mg, oral, BID  heparin (porcine), 5,000 Units, subcutaneous, q8h  [Held by provider] hydroxychloroquine, 300 mg, oral, Daily  ipratropium, 2 spray, Each Nostril, 4x daily  ipratropium-albuteroL, 3 mL, nebulization, TID  isosorbide mononitrate ER, 60 mg, oral, Daily  [Held by provider] methotrexate, 20 mg, intramuscular, Every Sunday  methylPREDNISolone sodium succinate (PF), 60 mg, intravenous, q6h  metoprolol tartrate, 50 mg, oral, q12h  montelukast, 10 mg, oral, Daily  nystatin, 5 mL, Mouth/Throat, TID  pantoprazole, 40 mg, oral, Daily  pravastatin, 20 mg, oral, Nightly  primidone, 150 mg, oral, Daily  sennosides-docusate sodium, 1 tablet, oral, Daily  [2]    [3] PRN medications: albuterol, benzocaine-menthol, HYDROcodone-acetaminophen, ipratropium-albuteroL, lubricating eye drops, metoclopramide, naloxone, nitroglycerin, oxygen, polyethylene glycol

## 2025-08-16 NOTE — PROGRESS NOTES
Nephrology Consult Progress Note    Radha Deluna is a 75 y.o. female on day 9 of admission presenting with Altered mental status, unspecified altered mental status type.      Subjective   No acute events overnight, on NC O2, improved UOP, BM yesterday       Objective          Physical Exam    Vitals 24HR  Heart Rate:  [66-84]   Temp:  [35.9 °C (96.6 °F)-36.7 °C (98.1 °F)]   Resp:  [18]   BP: (135-147)/(63-70)   SpO2:  [96 %-99 %]        AAOx3, sleeping but arousable, on NS O2 2L  Decreased AEBL, wheezing+  RRR no murmurs  Abd soft, + BS   edema           I&O 24HR    Intake/Output Summary (Last 24 hours) at 8/16/2025 1415  Last data filed at 8/16/2025 0800  Gross per 24 hour   Intake 402.5 ml   Output --   Net 402.5 ml         Medications  Prescriptions Prior to Admission[1]   Scheduled medications  Scheduled Medications[2]  Continuous medications  Continuous Medications[3]  PRN medications  PRN Medications[4]    Relevant Results      No results displayed because visit has over 200 results.         Imaging  XR chest 1 view  Result Date: 8/15/2025  1.  Redemonstration scattered ill-defined airspace and interstitial opacities right greater left overall slightly worsened compared to prior. Bilateral nodular opacities redemonstrated as described. Probable trace left effusion slightly improved. Continued clinical correlation follow-up advised.       MACRO: None   Signed by: Jonatan Smith 8/15/2025 2:31 PM Dictation workstation:   [id number]    XR chest 1 view  Result Date: 8/11/2025  1. Persistent right-greater-than-left ill-defined opacities. 2. Stable small left-sided pleural effusion   Signed by: Jesus Prasad 8/11/2025 12:25 PM Dictation workstation:   [id number]    US renal complete  Result Date: 8/11/2025  1. Left midpole nonobstructive nephroliths measuring 0.8 cm. 2. Trace right perinephric fluid. 3. Small left-sided pleural effusion.   MACRO: None     Signed by: Jesus Prasad 8/11/2025 6:44 AM Dictation  workstation:   [id number]      Cardiology, Vascular, and Other Imaging  Bronchoscopy Diagnostic, Therapeutic, w BAL  Result Date: 8/15/2025  Table formatting from the original result was not included. Impression Bronchoalveolar lavage was performed x1 in the inferior lingular segment (LB5) and the fluid appeared cloudy and thick Performed brushings with cytology and microbiology brushes in the superior lingular segment (LB4) and inferior lingular segment (LB5) Moderate, thick and clear secretions present with less than 25% obstruction in the larynx, left lung and right lung; performed washing Normal Findings Bronchoalveolar lavage was performed x1 in the inferior lingular segment (LB5) with 60 mL of saline instilled and a total return of 18 mL. The fluid appeared cloudy and thick without suspected diffuse alveolar hemorrhage but not bloody, purulent or serosanguinous. Performed 2 brushings with cytology and microbiology brushes in the superior lingular segment (LB4) and inferior lingular segment (LB5) Moderate, thick and clear secretions present with less than 25% obstruction in the larynx, left lung and right lung; secretions were easily removed; performed washing Dilated bronchi was seen especially at the lower lung zones consistent with bronchiectasis Recommendation Follow up bronchoscopy Indication Atypical pneumonia Post-Op Diagnosis Bronchiectasis, retained secretions Staff Staff Role Lester Calhoun MD Proceduralist Medications See Anesthesia Record. Preprocedure A history and physical has been performed, and patient medication allergies have been reviewed. The patient's tolerance of previous anesthesia has been reviewed. The risks and benefits of the procedure and the sedation options and risks were discussed with the patient. All questions were answered and informed consent obtained. Details of the Procedure The patient underwent monitored anesthesia care, which was administered by an anesthesia  professional. The patient's blood pressure, heart rate, level of consciousness, oxygen saturation, respirations, ECG and ETCO2 were monitored throughout the procedure. The patient experienced no blood loss. The scope was introduced through the mouth. The procedure was not difficult. The patient tolerated the procedure well. There were no apparent adverse events. Events Procedure Events Event Event Time ENDO SCOPE IN TIME 8/15/2025 12:48 PM Specimens ID Type Source Tests Collected by Time 1 : LEFT UPPER LOBE LINGULA BAL Non-Gynecologic Cytology BRONCHO-ALVEOLAR LAVAGE OF LEFT LOWER LOBE CYTOLOGY CONSULTATION (NON-GYNECOLOGIC) Lester Calhoun MD 8/15/2025 1253 2 : LEFT UPPER LOBE LINGULA BRUSHING Non-Gynecologic Cytology BRONCHIAL BRUSH LEFT UPPER LOBE CYTOLOGY CONSULTATION (NON-GYNECOLOGIC) Lester Calhoun MD 8/15/2025 1258 A : LEFT UPPER LOBE LINGULA BRUSHING Fluid BRONCHIAL BRUSH LEFT UPPER LOBE AFB CULTURE/SMEAR, FUNGAL CULTURE/SMEAR, RESPIRATORY CULTURE/SMEAR Lester Calhoun MD 8/15/2025 1259 B : LEFT UPPER LOBE LINGULA Fluid BRONCHO-ALVEOLAR LAVAGE OF LEFT UPPER LOBE AFB CULTURE/SMEAR, FUNGAL CULTURE/SMEAR, RESPIRATORY CULTURE/SMEAR Lester Calhoun MD 8/15/2025 1300 Procedure Location [facility] [address] [facility] [address] [phone] Referring Provider Lester Calhoun MD Procedure Provider Lester Calhoun MD     Transthoracic Echo Complete  Result Date: 8/12/2025   [facility], [address] [facility] [address]           Tel [phone] and Fax [phone] TRANSTHORACIC ECHOCARDIOGRAM REPORT  Patient Name:       CARMINE Tucker Physician:    [id number] Sanford Seth MD Study Date:         8/12/2025           Ordering Provider:    [id number] LARS DUMONT MRN/PID:            [id number]            Fellow: Accession#:         [id number]         Nurse: Date of Birth/Age:  1950 / 75      Sonographer:          Patrick Jacobson                     years                                     ACS, RDCS, FASE Gender assigned at  F                   Additional Staff: Birth: Height:             157.48 cm           Admit Date:           8/7/2025 Weight:             65.32 kg            Admission Status:     Inpatient -                                                               Routine BSA / BMI:          1.66 m2 / 26.34     Encounter#:           [id number]                     kg/m2 Blood Pressure:     134/76 mmHg         Department Location:  [facility] Study Type:    TRANSTHORACIC ECHO (TTE) COMPLETE Diagnosis/ICD: Other forms of dyspnea-R06.09 Indication:    Dyspnea CPT Code:      Echo Complete w Full Doppler-[id number] Patient History: Pertinent History: Dyspnea, CAD, HTN, Hyperlipidemia and Chest Pain. Hx coronary                    stents, hx TIA. AMS. Study Detail: The following Echo studies were performed: 2D, M-Mode, Doppler and               color flow. Technically challenging study due to body habitus.  PHYSICIAN INTERPRETATION: Left Ventricle: The left ventricular systolic function is normal with a visually estimated ejection fraction of 60-65%. There are no regional left ventricular wall motion abnormalities. The left ventricular cavity size is normal. There is mildly increased septal and normal posterior left ventricular wall thickness. Spectral Doppler shows a Grade I (impaired relaxation pattern) of left ventricular diastolic filling with normal left atrial filling pressure. Left Atrium: The left atrial size is normal. Right Ventricle: The right ventricle is normal in size. There is normal right ventricular global systolic function. Right Atrium: The right atrium is normal in size. Aortic Valve: The aortic valve is structurally normal. The aortic valve area by VTI is 2.19 cmÂ² with a peak velocity of 1.87 m/s. The peak and mean gradients are 14 mmHg  and 8 mmHg, respectively with a dimensionless index of 0.53. There is trace to mild aortic valve regurgitation. Mitral Valve: The mitral valve is normal in structure. There is no evidence of mitral valve regurgitation. The E Vmax is 0.68 m/s. Tricuspid Valve: The tricuspid valve is structurally normal. No evidence of tricuspid regurgitation. Pulmonic Valve: The pulmonic valve is structurally normal. There is physiologic pulmonic valve regurgitation. Pericardium: There is no pericardial effusion noted. Aorta: The aortic root is normal.  CONCLUSIONS:  1. The left ventricular systolic function is normal with a visually estimated ejection fraction of 60-65%.  2. Spectral Doppler shows a Grade I (impaired relaxation pattern) of left ventricular diastolic filling with normal left atrial filling pressure. QUANTITATIVE DATA SUMMARY:  2D MEASUREMENTS:          Normal Ranges: Ao Root d:       3.70 cm  (2.0-3.7cm) LAs:             2.80 cm  (2.7-4.0cm) IVSd:            0.95 cm  (0.6-1.1cm) LVPWd:           0.80 cm  (0.6-1.1cm) LVIDd:           4.90 cm  (3.9-5.9cm) LVIDs:           3.10 cm LV Mass Index:   92 g/m2 LVEDV Index:     47 ml/m2 LV % FS          36.7 %  LEFT ATRIUM:                  Normal Ranges: LA Vol A4C:        32.0 ml    (22+/-6mL/m2) LA Vol A2C:        48.2 ml LA Vol BP:         39.7 ml LA Vol Index A4C:  19.3ml/m2 LA Vol Index A2C:  29.0 ml/m2 LA Vol Index BP:   23.9 ml/m2 LA Area A4C:       14.0 cm2 LA Area A2C:       17.0 cm2 LA Major Axis A4C: 5.2 cm LA Major Axis A2C: 5.1 cm LA Volume Index:   22.0 ml/m2  RIGHT ATRIUM:          Normal Ranges: RA Area A4C:  11.0 cm2  M-MODE MEASUREMENTS:         Normal Ranges: Ao Root:             3.60 cm (2.0-3.7cm) LAs:                 4.40 cm (2.7-4.0cm)  AORTA MEASUREMENTS:         Normal Ranges: Asc Ao, d:          3.60 cm (2.1-3.4cm)  LV SYSTOLIC FUNCTION:                      Normal Ranges: EF-A4C View:    66 % (>=55%) EF-A2C View:    58 % EF-Biplane:     61 %  EF-Visual:      63 % LV EF Reported: 63 %  LV DIASTOLIC FUNCTION:            Normal Ranges: MV Peak E:             0.68 m/s   (0.7-1.2 m/s) MV Peak A:             0.85 m/s   (0.42-0.7 m/s) E/A Ratio:             0.80       (1.0-2.2) MV e'                  0.091 m/s  (>8.0) MV lateral e'          0.10 m/s MV medial e'           0.08 m/s E/e' Ratio:            7.46       (<8.0) PulmV Sys Pollo:         97.10 cm/s PulmV Brasher Pollo:        69.20 cm/s PulmV S/D Pollo:         1.40  MITRAL VALVE:          Normal Ranges: MV DT:        148 msec (150-240msec)  AORTIC VALVE:                      Normal Ranges: AoV Vmax:                1.87 m/s  (<=1.7m/s) AoV Peak P.0 mmHg (<20mmHg) AoV Mean P.0 mmHg  (1.7-11.5mmHg) LVOT Max Pollo:            0.95 m/s  (<=1.1m/s) AoV VTI:                 40.60 cm  (18-25cm) LVOT VTI:                21.40 cm LVOT Diameter:           2.30 cm   (1.8-2.4cm) AoV Area, VTI:           2.19 cm2  (2.5-5.5cm2) AoV Area,Vmax:           2.11 cm2  (2.5-4.5cm2) AoV Dimensionless Index: 0.53  RIGHT VENTRICLE: RV Basal 2.60 cm RV Mid   2.50 cm RV Major 6.2 cm TAPSE:   20.9 mm RV s'    0.13 m/s  TRICUSPID VALVE/RVSP:         Normal Ranges: Est. RA Pressure:     3 IVC Diam:             1.50 cm  PULMONIC VALVE:          Normal Ranges: PV Max Pollo:     0.8 m/s  (0.6-0.9m/s) PV Max P.8 mmHg  PULMONARY VEINS: PulmV Brasher Pollo: 69.20 cm/s PulmV S/D Pollo:  1.40 PulmV Sys Pollo:  97.10 cm/s  [id number] Sanford Seth MD Electronically signed on 2025 at 9:50:03 AM  ** Final **         Renal US  RIGHT KIDNEY:  The right kidney measures 12.5 cm in length. The renal cortical  echogenicity and thickness are within normal limits. No  hydronephrosis is present; no evidence of nephrolithiasis. Trace  perinephric fluid.      LEFT KIDNEY:  The left kidney measures 12.6 cm in length. The renal cortical  echogenicity and thickness are within normal limits. Midpole  nonobstructive nephrolithiasis  measuring 0.8 cm.      BLADDER:  Unremarkable      IMPRESSION:  1. Left midpole nonobstructive nephroliths measuring 0.8 cm.  2. Trace right perinephric fluid.  3. Small left-sided pleural effusion.    ASSESSMENT AND PLAN    76 yo female w/ PMH of COPD, chronic pain, RA on methotrexate/plaquenil and humira for years (diagnosed when 54 yo), sarcoidosis, smoking, admitted with AMS, found to have FAUSTINA    FAUSTINA, nonoliguric, creatinine on admission 0.4, up to 4.36, improving, BUN high from steroids as well    Hyponatremia, mild, controlled    Hypocalcemia    NAGMA    Hypomagnesemia, controlled    HTN, BP stable, at home on lopressor BID    HK      Plan  - creatinine improving, probably severe ATN, but concerning very high inflammatory markers, hx of neuropathy, joint disease and lung nodule (being followed by CTS and pulmonary as OP), AAV profile sent and pending; admitted with AMS with normal GFR  - s/p bronchoscopy   - diuretics prn, stable on NC 2L (recorded incorrectly at 4L)  - K stable on daily lokelma and low K diet  - please record UOP each shift  - renal US as above not concerning  - avoid hypotension and nephrotoxins  - daily lytes and replete as needed, noted low Ca on prolia as OP, will need to stop, add po supplements    MARS reviewed, dose for GFR<19, primidone daily, hold methotrexate, plaquenil and neurontin    Thank you for the opportunity to assist in the care of this patient, please call with questions  Brynn Bar MD PhD                      [1]   Medications Prior to Admission   Medication Sig Dispense Refill Last Dose/Taking    Bifidobacterium infantis (ALIGN ORAL) Take 1 capsule by mouth once daily.   Unknown    carboxymethylcellulose sodium (TheraTears) 0.25 % dropperette Administer 1 drop into both eyes 4 times a day as needed (dry eye).   Unknown    clopidogrel (Plavix) 75 mg tablet Take 1 tablet (75 mg) by mouth once daily.   Unknown    clotrimazole (Mycelex) 10 mg timbo Take 1 tablet (10 mg)  by mouth once daily. 70 Mateus 3 Unknown    denosumab (Prolia) 60 mg/mL syringe Inject 1 mL (60 mg) under the skin every 6 months.   7/31/2025    diphenhydrAMINE (Sominex) 25 mg tablet Take 1 tablet (25 mg) by mouth as needed at bedtime for sleep.   Unknown    ergocalciferol (Vitamin D-2) 1.25 MG (14695 UT) capsule TAKE 1 CAPSULE WEEKLY. (Patient taking differently: Take 1 capsule (1.25 mg) by mouth every 14 (fourteen) days.) 13 capsule 3 Unknown    famotidine (Pepcid) 40 mg tablet TAKE 1 TABLET BY MOUTH ONCE DAILY AT BEDTIME 90 tablet 3 Unknown    fluticasone (Flonase) 50 mcg/actuation nasal spray Administer 2 sprays into each nostril once daily. 16 g 3 Unknown    folic acid (Folvite) 1 mg tablet Take 1 tablet (1 mg) by mouth once daily.   Unknown    gabapentin (Neurontin) 400 mg capsule Take 2 capsules (800 mg) by mouth 4 times a day. 720 capsule 1 Unknown    Humira,CF, Pen 40 mg/0.4 mL pen injector kit pen-injector Inject 1 Pen (40 mg) under the skin every 14 (fourteen) days.   Unknown    HYDROcodone-acetaminophen (Norco) 7.5-325 mg tablet Take 1 tablet by mouth every 12 hours if needed for severe pain (7 - 10). 90 tablet 0 Unknown    hydroxychloroquine (Plaquenil) 200 mg tablet Take 1.5 tablets (300 mg) by mouth once daily.   Unknown    ipratropium (Atrovent) 42 mcg (0.06 %) nasal spray Administer 2 sprays into each nostril 4 times a day. 15 mL 5 Unknown    isosorbide mononitrate ER (Imdur) 60 mg 24 hr tablet Take 1 tablet (60 mg) by mouth once daily.   Unknown    methotrexate 25 mg/mL injection Inject 0.8 mL (20 mg) into the muscle 1 (one) time per week.   Unknown    metoclopramide (Reglan) 10 mg tablet Take 1 tablet (10 mg) by mouth once daily as needed (nausea). 30 tablet 1 Unknown    metoprolol tartrate (Lopressor) 50 mg tablet Take 1 tablet by mouth every 12 hours.   Unknown    Miebo, PF, 100 % drops Administer 1 drop into both eyes 4 times a day.   Unknown    montelukast (Singulair) 10 mg tablet Take 1  tablet (10 mg) by mouth once daily. 90 tablet 1 Unknown    naloxone (Narcan) 4 mg/0.1 mL nasal spray Administer 1 spray (4 mg) into affected nostril(s) if needed for opioid reversal. May repeat every 2-3 minutes if needed, alternating nostrils, until medical assistance becomes available. 2 each 0 Unknown    nitroglycerin (Nitrostat) 0.4 mg SL tablet Place 1 tablet (0.4 mg) under the tongue every 5 minutes if needed for chest pain. May take up to 3 doses over 15 minutes. Call 911 if pain persists.   Unknown    oxygen (O2) therapy Inhale once daily at bedtime. use at night as directed   Unknown    polyethylene glycol (Glycolax, Miralax) 17 gram packet Take 17 g by mouth 2 times a day. Mix 1 cap (17g) into 8 ounces of fluid. 60 packet 7 Unknown    polyethylene glycol (Glycolax, Miralax) 17 gram/dose powder Mix 17 g of powder and drink 2 times a day.   Unknown    pravastatin (Pravachol) 20 mg tablet Take 1 tablet (20 mg) by mouth once daily.   Unknown    primidone (Mysoline) 50 mg tablet Take 3 tablets (150 mg) by mouth early in the morning.. 270 tablet 3 Unknown    ProAir HFA 90 mcg/actuation inhaler Inhale 2 puffs every 4 hours if needed for wheezing or shortness of breath. 8.5 g 5 Unknown    sennosides-docusate sodium (Martha-Colace) 8.6-50 mg tablet Take 1 tablet by mouth once daily. 30 tablet 11 Unknown    theophylline ER (Alok-Dur) 300 mg 12 hr tablet Take 1 tablet (300 mg) by mouth 3 times a day.   Unknown    Trelegy Ellipta 100-62.5-25 mcg blister with device Inhale 1 puff once daily.   Unknown    Xiidra 5 % dropperette Administer 1 drop into both eyes every 12 hours.   Unknown    azelastine (Astelin) 137 mcg (0.1 %) nasal spray Administer 2 sprays into each nostril 2 times a day. (Patient not taking: Reported on 8/7/2025) 72 mL 3 Not Taking    cyclobenzaprine (Flexeril) 10 mg tablet Take 1 tablet (10 mg) by mouth 3 times a day as needed for muscle spasms. (Patient not taking: Reported on 8/7/2025) 45 tablet 1 Not  "Taking    ipratropium (Atrovent) 0.02 % nebulizer solution Use 1 vial 4 times daily (Patient not taking: Reported on 8/7/2025) 75 mL 5 Not Taking    pantoprazole (ProtoNix) 40 mg EC tablet Take 1 tablet (40 mg) by mouth once daily. Do not crush, chew, or split. (Patient not taking: Reported on 8/7/2025) 30 tablet 5 Not Taking    syringe with needle 1 mL 25 gauge x 5/8\" syringe       [2] acetylcysteine, 3 mL, nebulization, TID  azelastine, 2 spray, Each Nostril, BID  budesonide, 0.25 mg, nebulization, Daily  [Held by provider] clopidogrel, 75 mg, oral, Daily  fluticasone, 2 spray, Each Nostril, Daily  [Held by provider] gabapentin, 800 mg, oral, BID  heparin (porcine), 5,000 Units, subcutaneous, q8h  [Held by provider] hydroxychloroquine, 300 mg, oral, Daily  ipratropium, 2 spray, Each Nostril, 4x daily  ipratropium-albuteroL, 3 mL, nebulization, TID  isosorbide mononitrate ER, 60 mg, oral, Daily  [Held by provider] methotrexate, 20 mg, intramuscular, Every Sunday  methylPREDNISolone sodium succinate (PF), 60 mg, intravenous, q6h  metoprolol tartrate, 50 mg, oral, q12h  montelukast, 10 mg, oral, Daily  nystatin, 5 mL, Mouth/Throat, TID  pantoprazole, 40 mg, oral, Daily  pravastatin, 20 mg, oral, Nightly  primidone, 150 mg, oral, Daily  sennosides-docusate sodium, 1 tablet, oral, Daily     [3] dextrose 5%, 50 mL/hr, Last Rate: 50 mL/hr (08/15/25 6038)     [4] PRN medications: albuterol, benzocaine-menthol, HYDROcodone-acetaminophen, ipratropium-albuteroL, lubricating eye drops, metoclopramide, naloxone, nitroglycerin, oxygen, polyethylene glycol    "

## 2025-08-16 NOTE — CARE PLAN
The patient's goals for the shift include      The clinical goals for the shift include safety    Problem: Pain - Adult  Goal: Verbalizes/displays adequate comfort level or baseline comfort level  8/16/2025 0301 by Dikc Rodriguez RN  Outcome: Progressing  8/16/2025 0254 by Dick Rodriguez RN  Outcome: Progressing  8/16/2025 0250 by Dick Rodriguez RN  Outcome: Progressing     Problem: Safety - Adult  Goal: Free from fall injury  8/16/2025 0301 by Dick Rodriguez RN  Outcome: Progressing  8/16/2025 0254 by Dick Rodriguez RN  Outcome: Progressing  8/16/2025 0250 by Dick Rodriguez RN  Outcome: Progressing     Problem: Discharge Planning  Goal: Discharge to home or other facility with appropriate resources  8/16/2025 0301 by Dick Rodriguez RN  Outcome: Progressing  8/16/2025 0254 by Dick Rodriguez RN  Outcome: Progressing  8/16/2025 0250 by Dick Rodriguez RN  Outcome: Progressing     Problem: Chronic Conditions and Co-morbidities  Goal: Patient's chronic conditions and co-morbidity symptoms are monitored and maintained or improved  8/16/2025 0301 by Dick Rodriguez RN  Outcome: Progressing  8/16/2025 0254 by Dick Rodriguez RN  Outcome: Progressing  8/16/2025 0250 by Dick Rodriguez RN  Outcome: Progressing     Problem: Nutrition  Goal: Nutrient intake appropriate for maintaining nutritional needs  8/16/2025 0301 by Dick Rodriugez RN  Outcome: Progressing  8/16/2025 0254 by Dick Rodriguez RN  Outcome: Progressing  8/16/2025 0250 by Dick Rodriguez RN  Outcome: Progressing     Problem: Skin  Goal: Decreased wound size/increased tissue granulation at next dressing change  8/16/2025 0301 by Dick Rodriguez RN  Outcome: Progressing  Flowsheets (Taken 8/16/2025 0301)  Decreased wound size/increased tissue granulation at next dressing change:   Promote sleep for wound healing   Protective dressings over bony prominences   Utilize specialty bed per algorithm  8/16/2025 0254 by Dick Rodriguez RN  Outcome: Progressing  8/16/2025 0250 by  Dick Rodriguez RN  Outcome: Progressing  Goal: Participates in plan/prevention/treatment measures  8/16/2025 0301 by Dick Rodriguez RN  Outcome: Progressing  8/16/2025 0254 by Dick Rodriguez RN  Outcome: Progressing  8/16/2025 0250 by Dick Rodriguez RN  Outcome: Progressing  Goal: Prevent/manage excess moisture  8/16/2025 0301 by Dick Rodriguez, LISBETH  Outcome: Progressing  8/16/2025 0254 by Dick Rodriguez RN  Outcome: Progressing  8/16/2025 0250 by Dick Rodriguez RN  Outcome: Progressing  Goal: Prevent/minimize sheer/friction injuries  8/16/2025 0301 by Dick Rodriguez RN  Outcome: Progressing  8/16/2025 0254 by Dick Rodriguez RN  Outcome: Progressing  8/16/2025 0250 by Dick Rodriguez RN  Outcome: Progressing  Goal: Promote/optimize nutrition  8/16/2025 0301 by Dick Rodriguez, LISBETH  Outcome: Progressing  8/16/2025 0254 by Dick Rodriguez RN  Outcome: Progressing  8/16/2025 0250 by Dick Rodriguez RN  Outcome: Progressing  Goal: Promote skin healing  8/16/2025 0301 by Dick Rodriguez RN  Outcome: Progressing  8/16/2025 0254 by Dick Rodriguez RN  Outcome: Progressing  8/16/2025 0250 by Dick Rodriguez RN  Outcome: Progressing     Problem: Pain  Goal: Takes deep breaths with improved pain control throughout the shift  8/16/2025 0301 by Dick Rodriguez RN  Outcome: Progressing  8/16/2025 0254 by Dick Rodriguez RN  Outcome: Progressing  8/16/2025 0250 by Dick Rodriguez RN  Outcome: Progressing  Goal: Turns in bed with improved pain control throughout the shift  8/16/2025 0301 by Dick Rodriguez RN  Outcome: Progressing  8/16/2025 0254 by Dick Rodriguez RN  Outcome: Progressing  8/16/2025 0250 by Dick Rodriguez RN  Outcome: Progressing  Goal: Walks with improved pain control throughout the shift  8/16/2025 0301 by Dick Rodriguez RN  Outcome: Progressing  8/16/2025 0254 by Dick Rodriguez RN  Outcome: Progressing  8/16/2025 0250 by Dick Rodriguez, RN  Outcome: Progressing  Goal: Performs ADL's with improved pain control throughout  shift  8/16/2025 0301 by Dick Rodriguez RN  Outcome: Progressing  8/16/2025 0254 by Dick Rodriguez RN  Outcome: Progressing  8/16/2025 0250 by Dick Rodriguez RN  Outcome: Progressing  Goal: Participates in PT with improved pain control throughout the shift  8/16/2025 0301 by Dick Rodriguez RN  Outcome: Progressing  8/16/2025 0254 by Dick Rodriguez RN  Outcome: Progressing  8/16/2025 0250 by Dick Rodriguez RN  Outcome: Progressing  Goal: Free from opioid side effects throughout the shift  8/16/2025 0301 by Dick Rodriguez RN  Outcome: Progressing  8/16/2025 0254 by Dick Rodriguez RN  Outcome: Progressing  8/16/2025 0250 by Dick Rodriguez RN  Outcome: Progressing  Goal: Free from acute confusion related to pain meds throughout the shift  8/16/2025 0301 by Dick Rodriguez RN  Outcome: Progressing  8/16/2025 0254 by Dick Rodriguez RN  Outcome: Progressing  8/16/2025 0250 by Dick Rodriguez RN  Outcome: Progressing

## 2025-08-17 VITALS
HEART RATE: 60 BPM | DIASTOLIC BLOOD PRESSURE: 67 MMHG | SYSTOLIC BLOOD PRESSURE: 141 MMHG | OXYGEN SATURATION: 99 % | BODY MASS INDEX: 25.66 KG/M2 | TEMPERATURE: 96.8 F | RESPIRATION RATE: 18 BRPM | HEIGHT: 63 IN | WEIGHT: 144.84 LBS

## 2025-08-17 LAB
ANCA AB PATTERN SER IF-IMP: NORMAL
ANCA IGG TITR SER IF: NORMAL {TITER}
ANION GAP SERPL CALC-SCNC: 10 MMOL/L (ref 10–20)
BACTERIA SPEC RESP CULT: ABNORMAL
BASOPHILS # BLD AUTO: 0.01 X10*3/UL (ref 0–0.1)
BASOPHILS NFR BLD AUTO: 0.1 %
BUN SERPL-MCNC: 60 MG/DL (ref 6–23)
CA-I BLD-SCNC: 1.03 MMOL/L (ref 1.1–1.33)
CALCIUM SERPL-MCNC: 7.4 MG/DL (ref 8.6–10.3)
CHLORIDE SERPL-SCNC: 108 MMOL/L (ref 98–107)
CO2 SERPL-SCNC: 27 MMOL/L (ref 21–32)
CREAT SERPL-MCNC: 2.26 MG/DL (ref 0.5–1.05)
EGFRCR SERPLBLD CKD-EPI 2021: 22 ML/MIN/1.73M*2
EOSINOPHIL # BLD AUTO: 0 X10*3/UL (ref 0–0.4)
EOSINOPHIL NFR BLD AUTO: 0 %
ERYTHROCYTE [DISTWIDTH] IN BLOOD BY AUTOMATED COUNT: 14.4 % (ref 11.5–14.5)
FOLATE SERPL-MCNC: 10.6 NG/ML
GLUCOSE SERPL-MCNC: 125 MG/DL (ref 74–99)
GRAM STN SPEC: ABNORMAL
GRAM STN SPEC: ABNORMAL
HCT VFR BLD AUTO: 32.2 % (ref 36–46)
HGB BLD-MCNC: 10.6 G/DL (ref 12–16)
IMM GRANULOCYTES # BLD AUTO: 0.08 X10*3/UL (ref 0–0.5)
IMM GRANULOCYTES NFR BLD AUTO: 0.8 % (ref 0–0.9)
LYMPHOCYTES # BLD AUTO: 0.67 X10*3/UL (ref 0.8–3)
LYMPHOCYTES NFR BLD AUTO: 6.4 %
MCH RBC QN AUTO: 34 PG (ref 26–34)
MCHC RBC AUTO-ENTMCNC: 32.9 G/DL (ref 32–36)
MCV RBC AUTO: 103 FL (ref 80–100)
MONOCYTES # BLD AUTO: 0.52 X10*3/UL (ref 0.05–0.8)
MONOCYTES NFR BLD AUTO: 5 %
MYELOPEROXIDASE AB SER-ACNC: 0 AU/ML (ref 0–19)
NEUTROPHILS # BLD AUTO: 9.21 X10*3/UL (ref 1.6–5.5)
NEUTROPHILS NFR BLD AUTO: 87.7 %
NRBC BLD-RTO: 0 /100 WBCS (ref 0–0)
PLATELET # BLD AUTO: 263 X10*3/UL (ref 150–450)
POTASSIUM SERPL-SCNC: 4.4 MMOL/L (ref 3.5–5.3)
PROTEINASE3 AB SER-ACNC: 0 AU/ML (ref 0–19)
RBC # BLD AUTO: 3.12 X10*6/UL (ref 4–5.2)
SODIUM SERPL-SCNC: 141 MMOL/L (ref 136–145)
VIT B12 SERPL-MCNC: 509 PG/ML (ref 211–911)
WBC # BLD AUTO: 10.5 X10*3/UL (ref 4.4–11.3)

## 2025-08-17 PROCEDURE — 99233 SBSQ HOSP IP/OBS HIGH 50: CPT | Performed by: STUDENT IN AN ORGANIZED HEALTH CARE EDUCATION/TRAINING PROGRAM

## 2025-08-17 PROCEDURE — 82330 ASSAY OF CALCIUM: CPT | Performed by: HOSPITALIST

## 2025-08-17 PROCEDURE — 2500000004 HC RX 250 GENERAL PHARMACY W/ HCPCS (ALT 636 FOR OP/ED): Performed by: STUDENT IN AN ORGANIZED HEALTH CARE EDUCATION/TRAINING PROGRAM

## 2025-08-17 PROCEDURE — 2500000001 HC RX 250 WO HCPCS SELF ADMINISTERED DRUGS (ALT 637 FOR MEDICARE OP): Performed by: INTERNAL MEDICINE

## 2025-08-17 PROCEDURE — 97110 THERAPEUTIC EXERCISES: CPT | Mod: GP,CQ

## 2025-08-17 PROCEDURE — 85025 COMPLETE CBC W/AUTO DIFF WBC: CPT | Performed by: HOSPITALIST

## 2025-08-17 PROCEDURE — 82607 VITAMIN B-12: CPT | Mod: PARLAB | Performed by: HOSPITALIST

## 2025-08-17 PROCEDURE — 2500000005 HC RX 250 GENERAL PHARMACY W/O HCPCS: Performed by: INTERNAL MEDICINE

## 2025-08-17 PROCEDURE — 82746 ASSAY OF FOLIC ACID SERUM: CPT | Mod: PARLAB | Performed by: HOSPITALIST

## 2025-08-17 PROCEDURE — 2500000001 HC RX 250 WO HCPCS SELF ADMINISTERED DRUGS (ALT 637 FOR MEDICARE OP): Performed by: STUDENT IN AN ORGANIZED HEALTH CARE EDUCATION/TRAINING PROGRAM

## 2025-08-17 PROCEDURE — 97535 SELF CARE MNGMENT TRAINING: CPT | Mod: GO

## 2025-08-17 PROCEDURE — 36415 COLL VENOUS BLD VENIPUNCTURE: CPT | Performed by: HOSPITALIST

## 2025-08-17 PROCEDURE — 82374 ASSAY BLOOD CARBON DIOXIDE: CPT | Performed by: HOSPITALIST

## 2025-08-17 PROCEDURE — 94640 AIRWAY INHALATION TREATMENT: CPT

## 2025-08-17 PROCEDURE — 94669 MECHANICAL CHEST WALL OSCILL: CPT

## 2025-08-17 PROCEDURE — 2500000001 HC RX 250 WO HCPCS SELF ADMINISTERED DRUGS (ALT 637 FOR MEDICARE OP): Performed by: HOSPITALIST

## 2025-08-17 PROCEDURE — 1100000001 HC PRIVATE ROOM DAILY

## 2025-08-17 PROCEDURE — 2500000002 HC RX 250 W HCPCS SELF ADMINISTERED DRUGS (ALT 637 FOR MEDICARE OP, ALT 636 FOR OP/ED): Performed by: INTERNAL MEDICINE

## 2025-08-17 PROCEDURE — 97116 GAIT TRAINING THERAPY: CPT | Mod: GP,CQ

## 2025-08-17 PROCEDURE — 2500000004 HC RX 250 GENERAL PHARMACY W/ HCPCS (ALT 636 FOR OP/ED): Performed by: INTERNAL MEDICINE

## 2025-08-17 PROCEDURE — 2500000001 HC RX 250 WO HCPCS SELF ADMINISTERED DRUGS (ALT 637 FOR MEDICARE OP): Performed by: NURSE PRACTITIONER

## 2025-08-17 RX ORDER — GABAPENTIN 300 MG/1
300 CAPSULE ORAL 2 TIMES DAILY
Status: DISCONTINUED | OUTPATIENT
Start: 2025-08-17 | End: 2025-08-21 | Stop reason: HOSPADM

## 2025-08-17 RX ADMIN — PRAVASTATIN SODIUM 20 MG: 20 TABLET ORAL at 20:25

## 2025-08-17 RX ADMIN — METHYLPREDNISOLONE SODIUM SUCCINATE 60 MG: 125 INJECTION, POWDER, FOR SOLUTION INTRAMUSCULAR; INTRAVENOUS at 03:00

## 2025-08-17 RX ADMIN — METHYLPREDNISOLONE SODIUM SUCCINATE 60 MG: 125 INJECTION, POWDER, FOR SOLUTION INTRAMUSCULAR; INTRAVENOUS at 20:25

## 2025-08-17 RX ADMIN — HYDROCODONE BITARTRATE AND ACETAMINOPHEN 1 TABLET: 7.5; 325 TABLET ORAL at 20:35

## 2025-08-17 RX ADMIN — ISOSORBIDE MONONITRATE 60 MG: 60 TABLET, EXTENDED RELEASE ORAL at 10:15

## 2025-08-17 RX ADMIN — Medication 1 DOSE: at 17:00

## 2025-08-17 RX ADMIN — GABAPENTIN 300 MG: 300 CAPSULE ORAL at 20:25

## 2025-08-17 RX ADMIN — HEPARIN SODIUM 5000 UNITS: 5000 INJECTION, SOLUTION INTRAVENOUS; SUBCUTANEOUS at 10:15

## 2025-08-17 RX ADMIN — IPRATROPIUM BROMIDE AND ALBUTEROL SULFATE 3 ML: .5; 3 SOLUTION RESPIRATORY (INHALATION) at 18:55

## 2025-08-17 RX ADMIN — ACETYLCYSTEINE 600 MG: 200 SOLUTION ORAL; RESPIRATORY (INHALATION) at 11:00

## 2025-08-17 RX ADMIN — SENNOSIDES AND DOCUSATE SODIUM 1 TABLET: 50; 8.6 TABLET ORAL at 10:15

## 2025-08-17 RX ADMIN — CLOPIDOGREL BISULFATE 75 MG: 75 TABLET, FILM COATED ORAL at 10:14

## 2025-08-17 RX ADMIN — MONTELUKAST 10 MG: 10 TABLET, FILM COATED ORAL at 10:14

## 2025-08-17 RX ADMIN — HEPARIN SODIUM 5000 UNITS: 5000 INJECTION, SOLUTION INTRAVENOUS; SUBCUTANEOUS at 18:30

## 2025-08-17 RX ADMIN — IPRATROPIUM BROMIDE 2 SPRAY: 42 SPRAY, METERED NASAL at 12:54

## 2025-08-17 RX ADMIN — AZELASTINE HYDROCHLORIDE 2 SPRAY: 137 SPRAY, METERED NASAL at 20:28

## 2025-08-17 RX ADMIN — METOPROLOL TARTRATE 50 MG: 50 TABLET, FILM COATED ORAL at 20:27

## 2025-08-17 RX ADMIN — FLUTICASONE PROPIONATE 2 SPRAY: 50 SPRAY, METERED NASAL at 10:15

## 2025-08-17 RX ADMIN — HEPARIN SODIUM 5000 UNITS: 5000 INJECTION, SOLUTION INTRAVENOUS; SUBCUTANEOUS at 03:00

## 2025-08-17 RX ADMIN — HYDROCODONE BITARTRATE AND ACETAMINOPHEN 1 TABLET: 7.5; 325 TABLET ORAL at 04:16

## 2025-08-17 RX ADMIN — PRIMIDONE 150 MG: 50 TABLET ORAL at 06:39

## 2025-08-17 RX ADMIN — IPRATROPIUM BROMIDE 2 SPRAY: 42 SPRAY, METERED NASAL at 18:30

## 2025-08-17 RX ADMIN — NYSTATIN 500000 UNITS: 100000 SUSPENSION ORAL at 10:14

## 2025-08-17 RX ADMIN — NYSTATIN 500000 UNITS: 100000 SUSPENSION ORAL at 20:28

## 2025-08-17 RX ADMIN — Medication: at 18:55

## 2025-08-17 RX ADMIN — IPRATROPIUM BROMIDE 2 SPRAY: 42 SPRAY, METERED NASAL at 20:28

## 2025-08-17 RX ADMIN — PANTOPRAZOLE SODIUM 40 MG: 40 TABLET, DELAYED RELEASE ORAL at 10:14

## 2025-08-17 RX ADMIN — ACETYLCYSTEINE 600 MG: 200 SOLUTION ORAL; RESPIRATORY (INHALATION) at 18:55

## 2025-08-17 RX ADMIN — METHYLPREDNISOLONE SODIUM SUCCINATE 60 MG: 125 INJECTION, POWDER, FOR SOLUTION INTRAMUSCULAR; INTRAVENOUS at 14:57

## 2025-08-17 RX ADMIN — AZELASTINE HYDROCHLORIDE 2 SPRAY: 137 SPRAY, METERED NASAL at 10:22

## 2025-08-17 RX ADMIN — NYSTATIN 500000 UNITS: 100000 SUSPENSION ORAL at 14:57

## 2025-08-17 RX ADMIN — IPRATROPIUM BROMIDE AND ALBUTEROL SULFATE 3 ML: .5; 3 SOLUTION RESPIRATORY (INHALATION) at 11:00

## 2025-08-17 RX ADMIN — METHYLPREDNISOLONE SODIUM SUCCINATE 60 MG: 125 INJECTION, POWDER, FOR SOLUTION INTRAMUSCULAR; INTRAVENOUS at 10:14

## 2025-08-17 RX ADMIN — METOPROLOL TARTRATE 50 MG: 50 TABLET, FILM COATED ORAL at 06:39

## 2025-08-17 ASSESSMENT — COGNITIVE AND FUNCTIONAL STATUS - GENERAL
HELP NEEDED FOR BATHING: A LITTLE
TOILETING: A LITTLE
DRESSING REGULAR UPPER BODY CLOTHING: A LITTLE
DAILY ACTIVITIY SCORE: 20
STANDING UP FROM CHAIR USING ARMS: A LITTLE
CLIMB 3 TO 5 STEPS WITH RAILING: A LOT
DRESSING REGULAR LOWER BODY CLOTHING: A LITTLE
WALKING IN HOSPITAL ROOM: A LITTLE
DRESSING REGULAR LOWER BODY CLOTHING: A LITTLE
HELP NEEDED FOR BATHING: A LITTLE
MOVING FROM LYING ON BACK TO SITTING ON SIDE OF FLAT BED WITH BEDRAILS: A LITTLE
MOBILITY SCORE: 18
CLIMB 3 TO 5 STEPS WITH RAILING: A LOT
PERSONAL GROOMING: A LITTLE
STANDING UP FROM CHAIR USING ARMS: A LITTLE
DRESSING REGULAR UPPER BODY CLOTHING: A LITTLE
STANDING UP FROM CHAIR USING ARMS: A LITTLE
TOILETING: A LITTLE
HELP NEEDED FOR BATHING: A LOT
MOVING TO AND FROM BED TO CHAIR: A LITTLE
DAILY ACTIVITIY SCORE: 17
DRESSING REGULAR UPPER BODY CLOTHING: A LITTLE
DRESSING REGULAR LOWER BODY CLOTHING: A LITTLE
CLIMB 3 TO 5 STEPS WITH RAILING: A LITTLE
DAILY ACTIVITIY SCORE: 20
MOVING TO AND FROM BED TO CHAIR: A LITTLE
TURNING FROM BACK TO SIDE WHILE IN FLAT BAD: A LITTLE
MOVING TO AND FROM BED TO CHAIR: A LITTLE
TURNING FROM BACK TO SIDE WHILE IN FLAT BAD: A LITTLE
WALKING IN HOSPITAL ROOM: A LITTLE
MOVING FROM LYING ON BACK TO SITTING ON SIDE OF FLAT BED WITH BEDRAILS: A LITTLE
MOBILITY SCORE: 16
TURNING FROM BACK TO SIDE WHILE IN FLAT BAD: A LITTLE
TOILETING: A LOT
WALKING IN HOSPITAL ROOM: A LOT
MOBILITY SCORE: 18

## 2025-08-17 ASSESSMENT — PAIN - FUNCTIONAL ASSESSMENT
PAIN_FUNCTIONAL_ASSESSMENT: 0-10
PAIN_FUNCTIONAL_ASSESSMENT: 0-10

## 2025-08-17 ASSESSMENT — PAIN SCALES - GENERAL
PAINLEVEL_OUTOF10: 0 - NO PAIN

## 2025-08-17 ASSESSMENT — ACTIVITIES OF DAILY LIVING (ADL): HOME_MANAGEMENT_TIME_ENTRY: 47

## 2025-08-17 NOTE — PROGRESS NOTES
[facility] Hospitalist Progress Note        Name: Radha Deluna  :  1950(75 y.o.)  MRN:  [id number]    Date: 25     ASSESSMENT & PLAN   75 y.o. female with rheumatoid arthritis, immunocompromised on Humira, methotrexate, Plaquenil, who presented with acute hypoxic respiratory failure and delirium on . CT chest performed admission is concerning of subacute rounded opacities in the left upper and lower lobes with peripheral ground-glass attenuation that are new from prior imaging 10/28/2024 concerning for pneumonia.    Acute hypoxic respiratory failure 2/2 CAP, COPD exacerbation, ILD flare  Concern other opportunistic infections  - Pulm and ID following. Bronchoscopy on 8/15/25  -Serologic workup pending  -On high dose of Solumedrol, will need to discuss weaning with pulm.  - wean o2 for goal o2 sat 88-92%; continue pulmonary hygiene     FAUSTINA likely severe ATN  - Nephrology following, Cr peaked at 4.3, gradually improving     Acute metabolic encephalopathy  Concern for underlying dementia   -MRI shows no acute CVA  -EEG: no epileptiform activity  -Avoid sedating medications per neurology     HTN  - continue metoprolol     CAD  -Continue plavix, statin, bb, imdur     RA  -Hold hydroxychloroquine, methotrexate     Chronic back pain  -Renally dosed gabapentin, continue home norco     Allergic rhinitis  - azelastine, flonase, singulair     DVT Prophylaxis: subcutaneous heparin  Code status: DNR and No Intubation      SUBJECTIVE   Interval History:   Stable on 2L O2. Resumed home gabapentin at renal dosing. Feels her breathing has improved. Will discuss possible steroid wean with pulm.    Review of Systems:   Other than patient's chronic conditions and those complaints in the history above, the rest of the 10 systems review were done and were negative.     Current medications:  Scheduled Meds:Scheduled Medications[1]  Continuous Infusions:Continuous Medications[2]  PRN Meds:PRN Medications[3]    OBJECTIVE      Vitals:    08/16/25 2105 08/17/25 0403 08/17/25 0821 08/17/25 1100   BP: 136/63 141/76 138/65    BP Location: Left arm Left arm Left arm    Patient Position: Lying Lying Lying    Pulse: 64 51 55    Resp: 20 20 20    Temp: 35.9 °C (96.6 °F) 36 °C (96.8 °F) 36.1 °C (97 °F)    TempSrc: Temporal Temporal Temporal    SpO2: 99% 91% 98% 94%   Weight:       Height:            Physical Exam  Vitals and nursing note reviewed.   HENT:      Mouth/Throat:      Mouth: Mucous membranes are moist.      Pharynx: Oropharynx is clear.     Cardiovascular:      Rate and Rhythm: Normal rate and regular rhythm.   Pulmonary:      Effort: Pulmonary effort is normal.      Comments: Decreased breath sounds at bases  Abdominal:      Palpations: Abdomen is soft.     Musculoskeletal:      Right lower leg: No edema.      Left lower leg: No edema.     Neurological:      Mental Status: She is alert.         Labs:   Lab Results   Component Value Date     08/17/2025    K 4.4 08/17/2025     (H) 08/17/2025    CO2 27 08/17/2025    BUN 60 (H) 08/17/2025    CREATININE 2.26 (H) 08/17/2025    GLUCOSE 125 (H) 08/17/2025    CALCIUM 7.4 (L) 08/17/2025    PROT 6.9 08/07/2025    BILITOT 0.6 08/07/2025    ALKPHOS 60 08/07/2025    AST 11 08/07/2025    ALT 6 (L) 08/07/2025       Lab Results   Component Value Date    WBC 10.5 08/17/2025    HGB 10.6 (L) 08/17/2025    HCT 32.2 (L) 08/17/2025     (H) 08/17/2025     08/17/2025       Imaging (within the past 24 hours):   Imaging  No results found.      Cardiology, Vascular, and Other Imaging  No other imaging results found for the past 2 days        Electronically signed by Mariusz Moore DO on 08/17/25 at 3:40 PM          [1] acetylcysteine, 3 mL, nebulization, TID  azelastine, 2 spray, Each Nostril, BID  budesonide, 0.25 mg, nebulization, Daily  clopidogrel, 75 mg, oral, Daily  fluticasone, 2 spray, Each Nostril, Daily  [Held by provider] gabapentin, 800 mg, oral, BID  heparin (porcine),  5,000 Units, subcutaneous, q8h  [Held by provider] hydroxychloroquine, 300 mg, oral, Daily  ipratropium, 2 spray, Each Nostril, 4x daily  ipratropium-albuteroL, 3 mL, nebulization, TID  isosorbide mononitrate ER, 60 mg, oral, Daily  [Held by provider] methotrexate, 20 mg, intramuscular, Every Sunday  methylPREDNISolone sodium succinate (PF), 60 mg, intravenous, q6h  metoprolol tartrate, 50 mg, oral, q12h  montelukast, 10 mg, oral, Daily  nystatin, 5 mL, Mouth/Throat, TID  pantoprazole, 40 mg, oral, Daily  pravastatin, 20 mg, oral, Nightly  primidone, 150 mg, oral, Daily  sennosides-docusate sodium, 1 tablet, oral, Daily     [2]    [3] PRN medications: albuterol, benzocaine-menthol, HYDROcodone-acetaminophen, ipratropium-albuteroL, lubricating eye drops, metoclopramide, naloxone, nitroglycerin, oxygen, polyethylene glycol

## 2025-08-17 NOTE — CARE PLAN
The patient's goals for the shift include  receive results for respiratory viral panel    The clinical goals for the shift include patient to remain HDS through shift      Problem: Safety - Adult  Goal: Free from fall injury  Outcome: Progressing     Problem: Chronic Conditions and Co-morbidities  Goal: Patient's chronic conditions and co-morbidity symptoms are monitored and maintained or improved  Outcome: Progressing

## 2025-08-17 NOTE — PROGRESS NOTES
Physical Therapy    Physical Therapy Treatment    Patient Name: Radha Deluna  MRN: [id number]  Department: [facility]  Room: [address] [place] [address]  Today's Date: 8/17/2025            Assessment/Plan   PT Assessment  End of Session Communication: Bedside nurse  End of Session Patient Position: Up in chair, Alarm on     PT Plan  Treatment/Interventions: Bed mobility, Transfer training, Gait training, Balance training, Neuromuscular re-education, Strengthening, Endurance training, Therapeutic exercise, Therapeutic activity, Stair training  PT Plan: Ongoing PT  PT Frequency for Current Admission: 3 times per week during this acute inpatient hospitalization  PT Discharge Recommendations: Low intensity level of continued care  PT Recommended Transfer Status: Assist x1  PT - OK to Discharge: Yes    PT Visit Info:  PT Received On: 08/17/25     General Visit Information:   General  Prior to Session Communication: Bedside nurse  Patient Position Received: Bed, 2 rail up, Alarm on  General Comment: Pt pleasant and agreeable to therapy    Subjective   Precautions:  Precautions  Medical Precautions: Fall precautions, Oxygen therapy device and L/min        Objective   Pain:  Pain Assessment  Pain Assessment: 0-10  0-10 (Numeric) Pain Score: 0 - No pain  Cognition:  Cognition  Orientation Level: Oriented X4    Treatments:  Bed Mobility  Bed Mobility: Yes  Bed Mobility 1  Bed Mobility 1: Supine to sitting  Level of Assistance 1: Minimal verbal cues (MIN VC)  Bed Mobility Comments 1: SBA with VC for bedrail use    Ambulation/Gait Training  Ambulation/Gait Training Performed: Yes  Ambulation/Gait Training 1  Surface 1: Level tile  Device 1: Standard walker  Assistance 1: Close supervision  Quality of Gait 1: Decreased step length, Shuffling gait  Comments/Distance (ft) 1: Pt amb ~5ft from bed to Chair  Transfers  Transfer: Yes  Transfer 1  Technique 1: Sit to stand, Stand to sit  Transfer Device 1: Walker  Transfer Level of Assistance 1: Close  supervision, Minimal verbal cues  Trials/Comments 1: completed 3x min VC for hand/feet placement for safety    Outcome Measures:  [facility] Basic Mobility  Turning from your back to your side while in a flat bed without using bedrails: A little  Moving from lying on your back to sitting on the side of a flat bed without using bedrails: A little  Moving to and from bed to chair (including a wheelchair): A little  Standing up from a chair using your arms (e.g. wheelchair or bedside chair): A little  To walk in hospital room: A little  Climbing 3-5 steps with railing: A little  Basic Mobility - Total Score: 18    Education Documentation  No documentation found.  Education Comments  No comments found.          Encounter Problems       Encounter Problems (Active)       PT Problem       STG - Pt will transition supine <> sitting with mod I (Progressing)       Start:  08/08/25    Expected End:  08/22/25            STG - Pt will transfer STS with mod I (Progressing)       Start:  08/08/25    Expected End:  08/22/25            STG - Pt will amb 150' using LRAD with mod I (Progressing)       Start:  08/08/25    Expected End:  08/22/25            STG - Pt will ascend/descend 4 steps /c rail, supervision (Progressing)       Start:  08/08/25    Expected End:  08/22/25

## 2025-08-17 NOTE — PROGRESS NOTES
Occupational Therapy    Occupational Therapy Treatment  MoCA Assessment    Name: Radha Deluna  MRN: [id number]  Department: [facility]  Room: [facility]919  Date: 08/17/25  Time Calculation  Start Time: 1334  Stop Time: 1421  Time Calculation (min): 47 min    Assessment:   Pt continues to demonstrate a decline in adl/functional mobility. MoCA results indicate that pt has mild cognitive impairment score19/30 points (per MoCA scoring protocol- range Mild impairment:18-25 points. One point was added for 12 or less years of education - per MoCA scoring protocol)with a MIS sub score of 10/15 points. Continue w/OT tx and increased assist in discharge environment    MoCA results :Pt will  require a supervised environment with supervised medication /meal management,  1:1 supervision  heating/reheating beverages and food is recommended and car activities only as a passenger     Plan:  Treatment Interventions: ADL retraining, Functional transfer training, Endurance training, Patient/family training, Equipment evaluation/education, Compensatory technique education (energy conservation/pursed-lip breathing techniques training)  OT Frequency for Current Admission: 3 times per week during this acute inpatient hospitalization  OT Discharge Recommendations: Low intensity level of continued care  OT - OK to Discharge: Yes (to next level of care when medically cleared by physician/medical team)    Subjective     OT Visit Info:     General:  General  Prior to Session Communication: Bedside nurse  Patient Position Received: Bed, 2 rail up, Alarm off, not on at start of session  General Comment: Pt pleasant and agreeable to OT therapy. Purpose and benefits explained of MoCA participation with pt in agreement to participate  Precautions:  Medical Precautions:  (fall, O@)  Precautions Comment: contact/droplet precautions     Date/Time Vitals Session Patient Position Pulse Resp SpO2 BP MAP (mmHg)    08/17/25 1855 --  --  --  --  98 %  --  --            Pain Assessment:  Pain Assessment  Pain Assessment: 0-10  0-10 (Numeric) Pain Score: 0 - No pain    Objective   Cognition:  Overall Cognitive Status:  (x3 , refer to MoCA results)  Activities of Daily Living:    Grooming;supervison to brush hair bedside   Upper body dressing:sba simulated pullover shirt   LE dressing:pants with min a over legs/feet/hips, min a r sock, sba left sock   Functional Standing Tolerance:    Fair   Bed Mobility/Transfers: Bed Mobility  Bed Mobility: Yes  Bed Mobility 1  Bed Mobility 1:  (supine to sit SBA with HOB elevated, verbal cues for bedrail use)    Transfers  Transfer: Yes  Transfer 1  Transfer From 1:  (sba  sit to stand with support of standard walker)  Outcome Measures:  [facility] Daily Activity  Putting on and taking off regular lower body clothing: A little  Bathing (including washing, rinsing, drying): A lot  Putting on and taking off regular upper body clothing: A little  Toileting, which includes using toilet, bedpan or urinal: A lot  Taking care of personal grooming such as brushing teeth: A little (sink level)  Eating Meals: None  Daily Activity - Total Score: 17                          MoCA  Visuospatial/Executive: 1  Naming: 3  Memory (Score '0' as this is an Unscored Section): 0  Attention: Read List of Digits: 2  Attention: Read List of Letters: 1  Attention: Serial Sevens: 2  Language: Repeat: 0  Language: Fluency: 0  Abstraction: 0  Delayed Recall: 3  Orientation: 6  Add 1 Point if </=12 yr Education: 1  MOCA Total Score: 19    Education Documentation  Body Mechanics, taught by Mary Jane Benz OT at 8/17/2025  7:14 PM.  Learner: Patient  Readiness: Acceptance  Method: Demonstration  Response: Demonstrated Understanding, Needs Reinforcement    Precautions, taught by Mary Jane Benz OT at 8/17/2025  7:14 PM.  Learner: Patient  Readiness: Acceptance  Method: Demonstration  Response: Demonstrated Understanding, Needs Reinforcement    ADL Training, taught by  Mary Jane Benz OT at 8/17/2025  7:14 PM.  Learner: Patient  Readiness: Acceptance  Method: Demonstration  Response: Demonstrated Understanding, Needs Reinforcement    Handouts, taught by Mary Jane Benz OT at 8/17/2025  7:14 PM.  Learner: Patient  Readiness: Acceptance  Method: Demonstration  Response: Demonstrated Understanding, Needs Reinforcement    Mobility Training, taught by Mary Jane Benz OT at 8/17/2025  7:14 PM.  Learner: Patient  Readiness: Acceptance  Method: Demonstration  Response: Demonstrated Understanding, Needs Reinforcement    Education Comments  No comments found.    Pt educated in diaphragmatic breathing to assist in ADL completion , demonstrated good comprehension. Pt educated in MoCA results and functional application to daily living skills. Pt demonstrated good comprehension of  with resistance to acceptance of recommendations.Pt lives alone.  Goals:  Encounter Problems       Encounter Problems (Active)       OT Goals       Patient will complete lower body bathing/dressing; toileting with modified independence using assistive techniques/adaptive equipment as needed  (Progressing)       Start:  08/08/25    Expected End:  08/22/25            Patient will perform bed mobility and functional transfers safely and independently: bed, chair, commode using DME as needed  (Progressing)       Start:  08/08/25    Expected End:  08/22/25            Patient will tolerate standing for 5 mins. and show overall good standing balance during ADL's and functional transfers/mobility  (Progressing)       Start:  08/08/25    Expected End:  08/22/25            Patient will apply energy conservation/pursed-lip breathing techniques to ADL's and functional transfers with minimal cues  (Progressing)       Start:  08/08/25    Expected End:  08/22/25                  Encounter Problems (Resolved)       impaired functional daily living skills       Pt will complete the MoCA cognitive assessment to  assist in  safe discharge planning (Met)       Start:  08/17/25    Expected End:  08/17/25    Resolved:  08/17/25

## 2025-08-17 NOTE — CARE PLAN
The patient's goals for the shift include      The clinical goals for the shift include maintain safety    Over the shift, the patient did not make progress toward the following goals. Barriers to progression include Pt has potential respiratory viral infection. Recommendations to address these barriers include Maintain safety; Maintain stable vitals.

## 2025-08-17 NOTE — PROGRESS NOTES
Nephrology Consult Progress Note    Radha Deluna is a 75 y.o. female on day 10 of admission presenting with Altered mental status, unspecified altered mental status type.      Subjective   No acute events overnight, on NC O2, improved UOP, BM yesterday       Objective          Physical Exam    Vitals 24HR  Heart Rate:  [51-64]   Temp:  [35.9 °C (96.6 °F)-36.2 °C (97.2 °F)]   Resp:  [18-20]   BP: (107-141)/(52-76)   SpO2:  [91 %-100 %]        AAOx3, sleeping but arousable, on NS O2 2L  Decreased AEBL, wheezing+  RRR no murmurs  Abd soft, + BS   edema           I&O 24HR    Intake/Output Summary (Last 24 hours) at 8/17/2025 1232  Last data filed at 8/17/2025 0821  Gross per 24 hour   Intake 100 ml   Output 200 ml   Net -100 ml         Medications  Prescriptions Prior to Admission[1]   Scheduled medications  Scheduled Medications[2]  Continuous medications  Continuous Medications[3]  PRN medications  PRN Medications[4]    Relevant Results      No results displayed because visit has over 200 results.         Imaging  XR chest 1 view  Result Date: 8/15/2025  1.  Redemonstration scattered ill-defined airspace and interstitial opacities right greater left overall slightly worsened compared to prior. Bilateral nodular opacities redemonstrated as described. Probable trace left effusion slightly improved. Continued clinical correlation follow-up advised.       MACRO: None   Signed by: Jonatan Smith 8/15/2025 2:31 PM Dictation workstation:   [id number]    XR chest 1 view  Result Date: 8/11/2025  1. Persistent right-greater-than-left ill-defined opacities. 2. Stable small left-sided pleural effusion   Signed by: Jesus Prasad 8/11/2025 12:25 PM Dictation workstation:   [id number]      Cardiology, Vascular, and Other Imaging  Bronchoscopy Diagnostic, Therapeutic, w BAL  Result Date: 8/15/2025  Table formatting from the original result was not included. Impression Bronchoalveolar lavage was performed x1 in the inferior  lingular segment (LB5) and the fluid appeared cloudy and thick Performed brushings with cytology and microbiology brushes in the superior lingular segment (LB4) and inferior lingular segment (LB5) Moderate, thick and clear secretions present with less than 25% obstruction in the larynx, left lung and right lung; performed washing Normal Findings Bronchoalveolar lavage was performed x1 in the inferior lingular segment (LB5) with 60 mL of saline instilled and a total return of 18 mL. The fluid appeared cloudy and thick without suspected diffuse alveolar hemorrhage but not bloody, purulent or serosanguinous. Performed 2 brushings with cytology and microbiology brushes in the superior lingular segment (LB4) and inferior lingular segment (LB5) Moderate, thick and clear secretions present with less than 25% obstruction in the larynx, left lung and right lung; secretions were easily removed; performed washing Dilated bronchi was seen especially at the lower lung zones consistent with bronchiectasis Recommendation Follow up bronchoscopy Indication Atypical pneumonia Post-Op Diagnosis Bronchiectasis, retained secretions Staff Staff Role Lester Calhoun MD Proceduralist Medications See Anesthesia Record. Preprocedure A history and physical has been performed, and patient medication allergies have been reviewed. The patient's tolerance of previous anesthesia has been reviewed. The risks and benefits of the procedure and the sedation options and risks were discussed with the patient. All questions were answered and informed consent obtained. Details of the Procedure The patient underwent monitored anesthesia care, which was administered by an anesthesia professional. The patient's blood pressure, heart rate, level of consciousness, oxygen saturation, respirations, ECG and ETCO2 were monitored throughout the procedure. The patient experienced no blood loss. The scope was introduced through the mouth. The procedure was not  difficult. The patient tolerated the procedure well. There were no apparent adverse events. Events Procedure Events Event Event Time ENDO SCOPE IN TIME 8/15/2025 12:48 PM Specimens ID Type Source Tests Collected by Time 1 : LEFT UPPER LOBE LINGULA BAL Non-Gynecologic Cytology BRONCHO-ALVEOLAR LAVAGE OF LEFT LOWER LOBE CYTOLOGY CONSULTATION (NON-GYNECOLOGIC) Lester Calhoun MD 8/15/2025 1253 2 : LEFT UPPER LOBE LINGULA BRUSHING Non-Gynecologic Cytology BRONCHIAL BRUSH LEFT UPPER LOBE CYTOLOGY CONSULTATION (NON-GYNECOLOGIC) Lester Calhoun MD 8/15/2025 1258 A : LEFT UPPER LOBE LINGULA BRUSHING Fluid BRONCHIAL BRUSH LEFT UPPER LOBE AFB CULTURE/SMEAR, FUNGAL CULTURE/SMEAR, RESPIRATORY CULTURE/SMEAR Lester Calhoun MD 8/15/2025 1259 B : LEFT UPPER LOBE LINGULA Fluid BRONCHO-ALVEOLAR LAVAGE OF LEFT UPPER LOBE AFB CULTURE/SMEAR, FUNGAL CULTURE/SMEAR, RESPIRATORY CULTURE/SMEAR Lester Calhoun MD 8/15/2025 1300 Procedure Location [facility] [address] [id number] [phone] Referring Provider Lester Calhoun MD Procedure Provider Lester Calhoun MD     Transthoracic Echo Complete  Result Date: 8/12/2025   [facility], [address], [facility] [address]           Tel [phone] and Fax [phone] TRANSTHORACIC ECHOCARDIOGRAM REPORT  Patient Name:       CARMINE VELASQUEZ DAX Tucker Physician:    [id number] Sanford Seth MD Study Date:         8/12/2025           Ordering Provider:    [id number] LARS DUMONT MRN/PID:            [id number]            Fellow: Accession#:         [id number]        Nurse: Date of Birth/Age:  1950 / 75      Sonographer:          Patrick hansen                                     ACS, RDCS, FASE Gender assigned at  F                   Additional Staff: Birth: Height:             157.48 cm           Admit  Date:           8/7/2025 Weight:             65.32 kg            Admission Status:     Inpatient -                                                               Routine BSA / BMI:          1.66 m2 / 26.34     Encounter#:           [id number]                     kg/m2 Blood Pressure:     134/76 mmHg         Department Location:  [facility] Study Type:    TRANSTHORACIC ECHO (TTE) COMPLETE Diagnosis/ICD: Other forms of dyspnea-R06.09 Indication:    Dyspnea CPT Code:      Echo Complete w Full Doppler-[id number] Patient History: Pertinent History: Dyspnea, CAD, HTN, Hyperlipidemia and Chest Pain. Hx coronary                    stents, hx TIA. AMS. Study Detail: The following Echo studies were performed: 2D, M-Mode, Doppler and               color flow. Technically challenging study due to body habitus.  PHYSICIAN INTERPRETATION: Left Ventricle: The left ventricular systolic function is normal with a visually estimated ejection fraction of 60-65%. There are no regional left ventricular wall motion abnormalities. The left ventricular cavity size is normal. There is mildly increased septal and normal posterior left ventricular wall thickness. Spectral Doppler shows a Grade I (impaired relaxation pattern) of left ventricular diastolic filling with normal left atrial filling pressure. Left Atrium: The left atrial size is normal. Right Ventricle: The right ventricle is normal in size. There is normal right ventricular global systolic function. Right Atrium: The right atrium is normal in size. Aortic Valve: The aortic valve is structurally normal. The aortic valve area by VTI is 2.19 cmÂ² with a peak velocity of 1.87 m/s. The peak and mean gradients are 14 mmHg and 8 mmHg, respectively with a dimensionless index of 0.53. There is trace to mild aortic valve regurgitation. Mitral Valve: The mitral valve is normal in structure. There is no evidence of mitral valve regurgitation. The E Vmax is 0.68 m/s. Tricuspid Valve: The  tricuspid valve is structurally normal. No evidence of tricuspid regurgitation. Pulmonic Valve: The pulmonic valve is structurally normal. There is physiologic pulmonic valve regurgitation. Pericardium: There is no pericardial effusion noted. Aorta: The aortic root is normal.  CONCLUSIONS:  1. The left ventricular systolic function is normal with a visually estimated ejection fraction of 60-65%.  2. Spectral Doppler shows a Grade I (impaired relaxation pattern) of left ventricular diastolic filling with normal left atrial filling pressure. QUANTITATIVE DATA SUMMARY:  2D MEASUREMENTS:          Normal Ranges: Ao Root d:       3.70 cm  (2.0-3.7cm) LAs:             2.80 cm  (2.7-4.0cm) IVSd:            0.95 cm  (0.6-1.1cm) LVPWd:           0.80 cm  (0.6-1.1cm) LVIDd:           4.90 cm  (3.9-5.9cm) LVIDs:           3.10 cm LV Mass Index:   92 g/m2 LVEDV Index:     47 ml/m2 LV % FS          36.7 %  LEFT ATRIUM:                  Normal Ranges: LA Vol A4C:        32.0 ml    (22+/-6mL/m2) LA Vol A2C:        48.2 ml LA Vol BP:         39.7 ml LA Vol Index A4C:  19.3ml/m2 LA Vol Index A2C:  29.0 ml/m2 LA Vol Index BP:   23.9 ml/m2 LA Area A4C:       14.0 cm2 LA Area A2C:       17.0 cm2 LA Major Axis A4C: 5.2 cm LA Major Axis A2C: 5.1 cm LA Volume Index:   22.0 ml/m2  RIGHT ATRIUM:          Normal Ranges: RA Area A4C:  11.0 cm2  M-MODE MEASUREMENTS:         Normal Ranges: Ao Root:             3.60 cm (2.0-3.7cm) LAs:                 4.40 cm (2.7-4.0cm)  AORTA MEASUREMENTS:         Normal Ranges: Asc Ao, d:          3.60 cm (2.1-3.4cm)  LV SYSTOLIC FUNCTION:                      Normal Ranges: EF-A4C View:    66 % (>=55%) EF-A2C View:    58 % EF-Biplane:     61 % EF-Visual:      63 % LV EF Reported: 63 %  LV DIASTOLIC FUNCTION:            Normal Ranges: MV Peak E:             0.68 m/s   (0.7-1.2 m/s) MV Peak A:             0.85 m/s   (0.42-0.7 m/s) E/A Ratio:             0.80       (1.0-2.2) MV e'                  0.091 m/s   (>8.0) MV lateral e'          0.10 m/s MV medial e'           0.08 m/s E/e' Ratio:            7.46       (<8.0) PulmV Sys Pollo:         97.10 cm/s PulmV Brasher Pollo:        69.20 cm/s PulmV S/D Pollo:         1.40  MITRAL VALVE:          Normal Ranges: MV DT:        148 msec (150-240msec)  AORTIC VALVE:                      Normal Ranges: AoV Vmax:                1.87 m/s  (<=1.7m/s) AoV Peak P.0 mmHg (<20mmHg) AoV Mean P.0 mmHg  (1.7-11.5mmHg) LVOT Max Pollo:            0.95 m/s  (<=1.1m/s) AoV VTI:                 40.60 cm  (18-25cm) LVOT VTI:                21.40 cm LVOT Diameter:           2.30 cm   (1.8-2.4cm) AoV Area, VTI:           2.19 cm2  (2.5-5.5cm2) AoV Area,Vmax:           2.11 cm2  (2.5-4.5cm2) AoV Dimensionless Index: 0.53  RIGHT VENTRICLE: RV Basal 2.60 cm RV Mid   2.50 cm RV Major 6.2 cm TAPSE:   20.9 mm RV s'    0.13 m/s  TRICUSPID VALVE/RVSP:         Normal Ranges: Est. RA Pressure:     3 IVC Diam:             1.50 cm  PULMONIC VALVE:          Normal Ranges: PV Max Pollo:     0.8 m/s  (0.6-0.9m/s) PV Max P.8 mmHg  PULMONARY VEINS: PulmV Brasher Pollo: 69.20 cm/s PulmV S/D Pollo:  1.40 PulmV Sys Pollo:  97.10 cm/s  [id number] Sanford Seth MD Electronically signed on 2025 at 9:50:03 AM  ** Final **         Renal US  RIGHT KIDNEY:  The right kidney measures 12.5 cm in length. The renal cortical  echogenicity and thickness are within normal limits. No  hydronephrosis is present; no evidence of nephrolithiasis. Trace  perinephric fluid.      LEFT KIDNEY:  The left kidney measures 12.6 cm in length. The renal cortical  echogenicity and thickness are within normal limits. Midpole  nonobstructive nephrolithiasis measuring 0.8 cm.      BLADDER:  Unremarkable      IMPRESSION:  1. Left midpole nonobstructive nephroliths measuring 0.8 cm.  2. Trace right perinephric fluid.  3. Small left-sided pleural effusion.    ASSESSMENT AND PLAN    76 yo female w/ PMH of COPD, chronic pain, RA on  methotrexate/plaquenil and humira for years (diagnosed when 56 yo), sarcoidosis, smoking, admitted with AMS, found to have FAUSTINA    FAUSTINA, nonoliguric, creatinine on admission 0.4, up to 4.36, improving, BUN high from steroids as well    Hyponatremia, mild, controlled    Hypocalcemia    NAGMA    Hypomagnesemia, controlled    HTN, BP stable, at home on lopressor BID    HK      Plan  - creatinine improving, resolving severe ATN, but concerning very high inflammatory markers, hx of neuropathy, joint disease and lung nodule (being followed by CTS and pulmonary as OP), AAV profile sent and pending; admitted with AMS with normal GFR  - s/p bronchoscopy   - diuretics prn, stable on NC 2L (recorded incorrectly at 4L)  - K stable on daily lokelma and low K diet  - please record UOP each shift  - renal US as above not concerning  - avoid hypotension and nephrotoxins  - daily lytes and replete as needed, noted low Ca on prolia as OP, continue to hold after the dc, add po supplements    MARS reviewed, dose for GFR<19, primidone daily, hold methotrexate, plaquenil and neurontin    Thank you for the opportunity to assist in the care of this patient, please call with questions  Brynn Bar MD PhD                        [1]   Medications Prior to Admission   Medication Sig Dispense Refill Last Dose/Taking    Bifidobacterium infantis (ALIGN ORAL) Take 1 capsule by mouth once daily.   Unknown    carboxymethylcellulose sodium (TheraTears) 0.25 % dropperette Administer 1 drop into both eyes 4 times a day as needed (dry eye).   Unknown    clopidogrel (Plavix) 75 mg tablet Take 1 tablet (75 mg) by mouth once daily.   Unknown    clotrimazole (Mycelex) 10 mg mateus Take 1 tablet (10 mg) by mouth once daily. 70 Mateus 3 Unknown    denosumab (Prolia) 60 mg/mL syringe Inject 1 mL (60 mg) under the skin every 6 months.   7/31/2025    diphenhydrAMINE (Sominex) 25 mg tablet Take 1 tablet (25 mg) by mouth as needed at bedtime for sleep.   Unknown     ergocalciferol (Vitamin D-2) 1.25 MG (97929 UT) capsule TAKE 1 CAPSULE WEEKLY. (Patient taking differently: Take 1 capsule (1.25 mg) by mouth every 14 (fourteen) days.) 13 capsule 3 Unknown    famotidine (Pepcid) 40 mg tablet TAKE 1 TABLET BY MOUTH ONCE DAILY AT BEDTIME 90 tablet 3 Unknown    fluticasone (Flonase) 50 mcg/actuation nasal spray Administer 2 sprays into each nostril once daily. 16 g 3 Unknown    folic acid (Folvite) 1 mg tablet Take 1 tablet (1 mg) by mouth once daily.   Unknown    gabapentin (Neurontin) 400 mg capsule Take 2 capsules (800 mg) by mouth 4 times a day. 720 capsule 1 Unknown    Humira,CF, Pen 40 mg/0.4 mL pen injector kit pen-injector Inject 1 Pen (40 mg) under the skin every 14 (fourteen) days.   Unknown    HYDROcodone-acetaminophen (Norco) 7.5-325 mg tablet Take 1 tablet by mouth every 12 hours if needed for severe pain (7 - 10). 90 tablet 0 Unknown    hydroxychloroquine (Plaquenil) 200 mg tablet Take 1.5 tablets (300 mg) by mouth once daily.   Unknown    ipratropium (Atrovent) 42 mcg (0.06 %) nasal spray Administer 2 sprays into each nostril 4 times a day. 15 mL 5 Unknown    isosorbide mononitrate ER (Imdur) 60 mg 24 hr tablet Take 1 tablet (60 mg) by mouth once daily.   Unknown    methotrexate 25 mg/mL injection Inject 0.8 mL (20 mg) into the muscle 1 (one) time per week.   Unknown    metoclopramide (Reglan) 10 mg tablet Take 1 tablet (10 mg) by mouth once daily as needed (nausea). 30 tablet 1 Unknown    metoprolol tartrate (Lopressor) 50 mg tablet Take 1 tablet by mouth every 12 hours.   Unknown    Miebo, PF, 100 % drops Administer 1 drop into both eyes 4 times a day.   Unknown    montelukast (Singulair) 10 mg tablet Take 1 tablet (10 mg) by mouth once daily. 90 tablet 1 Unknown    naloxone (Narcan) 4 mg/0.1 mL nasal spray Administer 1 spray (4 mg) into affected nostril(s) if needed for opioid reversal. May repeat every 2-3 minutes if needed, alternating nostrils, until medical  assistance becomes available. 2 each 0 Unknown    nitroglycerin (Nitrostat) 0.4 mg SL tablet Place 1 tablet (0.4 mg) under the tongue every 5 minutes if needed for chest pain. May take up to 3 doses over 15 minutes. Call 911 if pain persists.   Unknown    oxygen (O2) therapy Inhale once daily at bedtime. use at night as directed   Unknown    polyethylene glycol (Glycolax, Miralax) 17 gram packet Take 17 g by mouth 2 times a day. Mix 1 cap (17g) into 8 ounces of fluid. 60 packet 7 Unknown    polyethylene glycol (Glycolax, Miralax) 17 gram/dose powder Mix 17 g of powder and drink 2 times a day.   Unknown    pravastatin (Pravachol) 20 mg tablet Take 1 tablet (20 mg) by mouth once daily.   Unknown    primidone (Mysoline) 50 mg tablet Take 3 tablets (150 mg) by mouth early in the morning.. 270 tablet 3 Unknown    ProAir HFA 90 mcg/actuation inhaler Inhale 2 puffs every 4 hours if needed for wheezing or shortness of breath. 8.5 g 5 Unknown    sennosides-docusate sodium (Martha-Colace) 8.6-50 mg tablet Take 1 tablet by mouth once daily. 30 tablet 11 Unknown    theophylline ER (Alok-Dur) 300 mg 12 hr tablet Take 1 tablet (300 mg) by mouth 3 times a day.   Unknown    Trelegy Ellipta 100-62.5-25 mcg blister with device Inhale 1 puff once daily.   Unknown    Xiidra 5 % dropperette Administer 1 drop into both eyes every 12 hours.   Unknown    azelastine (Astelin) 137 mcg (0.1 %) nasal spray Administer 2 sprays into each nostril 2 times a day. (Patient not taking: Reported on 8/7/2025) 72 mL 3 Not Taking    cyclobenzaprine (Flexeril) 10 mg tablet Take 1 tablet (10 mg) by mouth 3 times a day as needed for muscle spasms. (Patient not taking: Reported on 8/7/2025) 45 tablet 1 Not Taking    ipratropium (Atrovent) 0.02 % nebulizer solution Use 1 vial 4 times daily (Patient not taking: Reported on 8/7/2025) 75 mL 5 Not Taking    pantoprazole (ProtoNix) 40 mg EC tablet Take 1 tablet (40 mg) by mouth once daily. Do not crush, chew, or  "split. (Patient not taking: Reported on 8/7/2025) 30 tablet 5 Not Taking    syringe with needle 1 mL 25 gauge x 5/8\" syringe       [2] acetylcysteine, 3 mL, nebulization, TID  azelastine, 2 spray, Each Nostril, BID  budesonide, 0.25 mg, nebulization, Daily  clopidogrel, 75 mg, oral, Daily  fluticasone, 2 spray, Each Nostril, Daily  [Held by provider] gabapentin, 800 mg, oral, BID  heparin (porcine), 5,000 Units, subcutaneous, q8h  [Held by provider] hydroxychloroquine, 300 mg, oral, Daily  ipratropium, 2 spray, Each Nostril, 4x daily  ipratropium-albuteroL, 3 mL, nebulization, TID  isosorbide mononitrate ER, 60 mg, oral, Daily  [Held by provider] methotrexate, 20 mg, intramuscular, Every Sunday  methylPREDNISolone sodium succinate (PF), 60 mg, intravenous, q6h  metoprolol tartrate, 50 mg, oral, q12h  montelukast, 10 mg, oral, Daily  nystatin, 5 mL, Mouth/Throat, TID  pantoprazole, 40 mg, oral, Daily  pravastatin, 20 mg, oral, Nightly  primidone, 150 mg, oral, Daily  sennosides-docusate sodium, 1 tablet, oral, Daily     [3]    [4] PRN medications: albuterol, benzocaine-menthol, HYDROcodone-acetaminophen, ipratropium-albuteroL, lubricating eye drops, metoclopramide, naloxone, nitroglycerin, oxygen, polyethylene glycol    "

## 2025-08-18 LAB
ANA PATTERN: ABNORMAL
ANA SER QL HEP2 SUBST: POSITIVE
ANA TITR SER IF: ABNORMAL {TITER}
ANION GAP SERPL CALC-SCNC: 9 MMOL/L (ref 10–20)
BACTERIA SPEC RESP CULT: ABNORMAL
BACTERIA SPEC RESP CULT: ABNORMAL
BASOPHILS # BLD AUTO: 0.01 X10*3/UL (ref 0–0.1)
BASOPHILS NFR BLD AUTO: 0.1 %
BUN SERPL-MCNC: 50 MG/DL (ref 6–23)
CALCIUM SERPL-MCNC: 7.1 MG/DL (ref 8.6–10.3)
CENTROMERE B AB SER-ACNC: <0.2 AI
CHLORIDE SERPL-SCNC: 109 MMOL/L (ref 98–107)
CHROMATIN AB SERPL-ACNC: <0.2 AI
CO2 SERPL-SCNC: 28 MMOL/L (ref 21–32)
CREAT SERPL-MCNC: 1.7 MG/DL (ref 0.5–1.05)
DSDNA AB SER-ACNC: 1 IU/ML
EGFRCR SERPLBLD CKD-EPI 2021: 31 ML/MIN/1.73M*2
ENA JO1 AB SER QL IA: <0.2 AI
ENA RNP AB SER IA-ACNC: <0.2 AI
ENA SCL70 AB SER QL IA: <0.2 AI
ENA SM AB SER IA-ACNC: <0.2 AI
ENA SM+RNP AB SER QL IA: <0.2 AI
ENA SS-A AB SER IA-ACNC: <0.2 AI
ENA SS-B AB SER IA-ACNC: <0.2 AI
EOSINOPHIL # BLD AUTO: 0 X10*3/UL (ref 0–0.4)
EOSINOPHIL NFR BLD AUTO: 0 %
ERYTHROCYTE [DISTWIDTH] IN BLOOD BY AUTOMATED COUNT: 14.4 % (ref 11.5–14.5)
GLUCOSE SERPL-MCNC: 109 MG/DL (ref 74–99)
GRAM STN SPEC: ABNORMAL
GRAM STN SPEC: ABNORMAL
HCT VFR BLD AUTO: 30.6 % (ref 36–46)
HGB BLD-MCNC: 9.8 G/DL (ref 12–16)
HOLD SPECIMEN: NORMAL
HOLD SPECIMEN: NORMAL
IMM GRANULOCYTES # BLD AUTO: 0.05 X10*3/UL (ref 0–0.5)
IMM GRANULOCYTES NFR BLD AUTO: 0.6 % (ref 0–0.9)
LYMPHOCYTES # BLD AUTO: 1.03 X10*3/UL (ref 0.8–3)
LYMPHOCYTES NFR BLD AUTO: 11.5 %
MCH RBC QN AUTO: 33.3 PG (ref 26–34)
MCHC RBC AUTO-ENTMCNC: 32 G/DL (ref 32–36)
MCV RBC AUTO: 104 FL (ref 80–100)
MONOCYTES # BLD AUTO: 0.54 X10*3/UL (ref 0.05–0.8)
MONOCYTES NFR BLD AUTO: 6 %
NEUTROPHILS # BLD AUTO: 7.35 X10*3/UL (ref 1.6–5.5)
NEUTROPHILS NFR BLD AUTO: 81.8 %
NRBC BLD-RTO: 0 /100 WBCS (ref 0–0)
PLATELET # BLD AUTO: 235 X10*3/UL (ref 150–450)
POTASSIUM SERPL-SCNC: 4.7 MMOL/L (ref 3.5–5.3)
RBC # BLD AUTO: 2.94 X10*6/UL (ref 4–5.2)
RIBOSOMAL P AB SER-ACNC: <0.2 AI
SODIUM SERPL-SCNC: 141 MMOL/L (ref 136–145)
WBC # BLD AUTO: 9 X10*3/UL (ref 4.4–11.3)

## 2025-08-18 PROCEDURE — 2500000001 HC RX 250 WO HCPCS SELF ADMINISTERED DRUGS (ALT 637 FOR MEDICARE OP): Performed by: HOSPITALIST

## 2025-08-18 PROCEDURE — 82374 ASSAY BLOOD CARBON DIOXIDE: CPT | Performed by: HOSPITALIST

## 2025-08-18 PROCEDURE — 2500000001 HC RX 250 WO HCPCS SELF ADMINISTERED DRUGS (ALT 637 FOR MEDICARE OP): Performed by: INTERNAL MEDICINE

## 2025-08-18 PROCEDURE — 99233 SBSQ HOSP IP/OBS HIGH 50: CPT | Performed by: STUDENT IN AN ORGANIZED HEALTH CARE EDUCATION/TRAINING PROGRAM

## 2025-08-18 PROCEDURE — 94669 MECHANICAL CHEST WALL OSCILL: CPT

## 2025-08-18 PROCEDURE — 86738 MYCOPLASMA ANTIBODY: CPT | Performed by: INTERNAL MEDICINE

## 2025-08-18 PROCEDURE — 2500000004 HC RX 250 GENERAL PHARMACY W/ HCPCS (ALT 636 FOR OP/ED): Performed by: STUDENT IN AN ORGANIZED HEALTH CARE EDUCATION/TRAINING PROGRAM

## 2025-08-18 PROCEDURE — 36415 COLL VENOUS BLD VENIPUNCTURE: CPT | Performed by: INTERNAL MEDICINE

## 2025-08-18 PROCEDURE — 2500000004 HC RX 250 GENERAL PHARMACY W/ HCPCS (ALT 636 FOR OP/ED): Performed by: INTERNAL MEDICINE

## 2025-08-18 PROCEDURE — 1100000001 HC PRIVATE ROOM DAILY

## 2025-08-18 PROCEDURE — 94640 AIRWAY INHALATION TREATMENT: CPT

## 2025-08-18 PROCEDURE — 2500000001 HC RX 250 WO HCPCS SELF ADMINISTERED DRUGS (ALT 637 FOR MEDICARE OP): Performed by: NURSE PRACTITIONER

## 2025-08-18 PROCEDURE — 36415 COLL VENOUS BLD VENIPUNCTURE: CPT | Performed by: HOSPITALIST

## 2025-08-18 PROCEDURE — 2500000001 HC RX 250 WO HCPCS SELF ADMINISTERED DRUGS (ALT 637 FOR MEDICARE OP): Performed by: STUDENT IN AN ORGANIZED HEALTH CARE EDUCATION/TRAINING PROGRAM

## 2025-08-18 PROCEDURE — 99222 1ST HOSP IP/OBS MODERATE 55: CPT

## 2025-08-18 PROCEDURE — 85025 COMPLETE CBC W/AUTO DIFF WBC: CPT | Performed by: HOSPITALIST

## 2025-08-18 PROCEDURE — 2500000002 HC RX 250 W HCPCS SELF ADMINISTERED DRUGS (ALT 637 FOR MEDICARE OP, ALT 636 FOR OP/ED): Performed by: INTERNAL MEDICINE

## 2025-08-18 RX ORDER — MINOCYCLINE HYDROCHLORIDE 100 MG/1
200 TABLET ORAL EVERY 12 HOURS SCHEDULED
Status: DISCONTINUED | OUTPATIENT
Start: 2025-08-18 | End: 2025-08-21 | Stop reason: HOSPADM

## 2025-08-18 RX ORDER — PANTOPRAZOLE SODIUM 40 MG/1
40 TABLET, DELAYED RELEASE ORAL DAILY
Status: DISCONTINUED | OUTPATIENT
Start: 2025-08-19 | End: 2025-08-21 | Stop reason: HOSPADM

## 2025-08-18 RX ORDER — SULFAMETHOXAZOLE AND TRIMETHOPRIM 800; 160 MG/1; MG/1
2 TABLET ORAL EVERY 12 HOURS SCHEDULED
Status: DISCONTINUED | OUTPATIENT
Start: 2025-08-18 | End: 2025-08-20

## 2025-08-18 RX ORDER — CEFEPIME HYDROCHLORIDE 1 G/50ML
1 INJECTION, SOLUTION INTRAVENOUS EVERY 12 HOURS
Status: DISCONTINUED | OUTPATIENT
Start: 2025-08-18 | End: 2025-08-21 | Stop reason: HOSPADM

## 2025-08-18 RX ORDER — METOPROLOL TARTRATE 50 MG/1
50 TABLET ORAL 2 TIMES DAILY
Status: DISCONTINUED | OUTPATIENT
Start: 2025-08-18 | End: 2025-08-21 | Stop reason: HOSPADM

## 2025-08-18 RX ADMIN — SULFAMETHOXAZOLE AND TRIMETHOPRIM 2 TABLET: 800; 160 TABLET ORAL at 14:33

## 2025-08-18 RX ADMIN — NYSTATIN 500000 UNITS: 100000 SUSPENSION ORAL at 20:44

## 2025-08-18 RX ADMIN — ACETYLCYSTEINE 600 MG: 200 SOLUTION ORAL; RESPIRATORY (INHALATION) at 06:39

## 2025-08-18 RX ADMIN — GABAPENTIN 300 MG: 300 CAPSULE ORAL at 09:34

## 2025-08-18 RX ADMIN — METOPROLOL TARTRATE 50 MG: 50 TABLET, FILM COATED ORAL at 06:46

## 2025-08-18 RX ADMIN — CLOPIDOGREL BISULFATE 75 MG: 75 TABLET, FILM COATED ORAL at 09:33

## 2025-08-18 RX ADMIN — HEPARIN SODIUM 5000 UNITS: 5000 INJECTION, SOLUTION INTRAVENOUS; SUBCUTANEOUS at 17:15

## 2025-08-18 RX ADMIN — ACETYLCYSTEINE 600 MG: 200 SOLUTION ORAL; RESPIRATORY (INHALATION) at 11:57

## 2025-08-18 RX ADMIN — ACETYLCYSTEINE 600 MG: 200 SOLUTION ORAL; RESPIRATORY (INHALATION) at 19:31

## 2025-08-18 RX ADMIN — HEPARIN SODIUM 5000 UNITS: 5000 INJECTION, SOLUTION INTRAVENOUS; SUBCUTANEOUS at 04:00

## 2025-08-18 RX ADMIN — IPRATROPIUM BROMIDE 2 SPRAY: 42 SPRAY, METERED NASAL at 17:15

## 2025-08-18 RX ADMIN — AZELASTINE HYDROCHLORIDE 2 SPRAY: 137 SPRAY, METERED NASAL at 20:45

## 2025-08-18 RX ADMIN — Medication: at 11:59

## 2025-08-18 RX ADMIN — METHYLPREDNISOLONE SODIUM SUCCINATE 60 MG: 125 INJECTION, POWDER, FOR SOLUTION INTRAMUSCULAR; INTRAVENOUS at 14:34

## 2025-08-18 RX ADMIN — IPRATROPIUM BROMIDE AND ALBUTEROL SULFATE 3 ML: .5; 3 SOLUTION RESPIRATORY (INHALATION) at 19:31

## 2025-08-18 RX ADMIN — IPRATROPIUM BROMIDE 2 SPRAY: 42 SPRAY, METERED NASAL at 14:33

## 2025-08-18 RX ADMIN — GABAPENTIN 300 MG: 300 CAPSULE ORAL at 20:44

## 2025-08-18 RX ADMIN — Medication: at 19:36

## 2025-08-18 RX ADMIN — MONTELUKAST 10 MG: 10 TABLET, FILM COATED ORAL at 09:34

## 2025-08-18 RX ADMIN — MINOCYCLINE HYDROCHLORIDE 200 MG: 100 TABLET, FILM COATED ORAL at 20:44

## 2025-08-18 RX ADMIN — PRIMIDONE 150 MG: 50 TABLET ORAL at 06:46

## 2025-08-18 RX ADMIN — HYDROCODONE BITARTRATE AND ACETAMINOPHEN 1 TABLET: 7.5; 325 TABLET ORAL at 09:32

## 2025-08-18 RX ADMIN — SULFAMETHOXAZOLE AND TRIMETHOPRIM 2 TABLET: 800; 160 TABLET ORAL at 21:43

## 2025-08-18 RX ADMIN — IPRATROPIUM BROMIDE 2 SPRAY: 42 SPRAY, METERED NASAL at 20:50

## 2025-08-18 RX ADMIN — PANTOPRAZOLE SODIUM 40 MG: 40 TABLET, DELAYED RELEASE ORAL at 09:34

## 2025-08-18 RX ADMIN — FLUTICASONE PROPIONATE 2 SPRAY: 50 SPRAY, METERED NASAL at 09:37

## 2025-08-18 RX ADMIN — METHYLPREDNISOLONE SODIUM SUCCINATE 60 MG: 125 INJECTION, POWDER, FOR SOLUTION INTRAMUSCULAR; INTRAVENOUS at 04:00

## 2025-08-18 RX ADMIN — IPRATROPIUM BROMIDE AND ALBUTEROL SULFATE 3 ML: .5; 3 SOLUTION RESPIRATORY (INHALATION) at 06:39

## 2025-08-18 RX ADMIN — IPRATROPIUM BROMIDE AND ALBUTEROL SULFATE 3 ML: .5; 3 SOLUTION RESPIRATORY (INHALATION) at 11:55

## 2025-08-18 RX ADMIN — Medication: at 06:42

## 2025-08-18 RX ADMIN — IPRATROPIUM BROMIDE 2 SPRAY: 42 SPRAY, METERED NASAL at 09:37

## 2025-08-18 RX ADMIN — METHYLPREDNISOLONE SODIUM SUCCINATE 60 MG: 125 INJECTION, POWDER, FOR SOLUTION INTRAMUSCULAR; INTRAVENOUS at 09:41

## 2025-08-18 RX ADMIN — METOPROLOL TARTRATE 50 MG: 50 TABLET, FILM COATED ORAL at 20:44

## 2025-08-18 RX ADMIN — BUDESONIDE 0.25 MG: 0.25 INHALANT RESPIRATORY (INHALATION) at 06:38

## 2025-08-18 RX ADMIN — HYDROCODONE BITARTRATE AND ACETAMINOPHEN 1 TABLET: 7.5; 325 TABLET ORAL at 20:44

## 2025-08-18 RX ADMIN — NYSTATIN 500000 UNITS: 100000 SUSPENSION ORAL at 14:33

## 2025-08-18 RX ADMIN — ISOSORBIDE MONONITRATE 60 MG: 60 TABLET, EXTENDED RELEASE ORAL at 09:33

## 2025-08-18 RX ADMIN — CEFEPIME HYDROCHLORIDE 1 G: 1 INJECTION, SOLUTION INTRAVENOUS at 17:22

## 2025-08-18 RX ADMIN — PRAVASTATIN SODIUM 20 MG: 20 TABLET ORAL at 20:44

## 2025-08-18 RX ADMIN — NYSTATIN 500000 UNITS: 100000 SUSPENSION ORAL at 09:33

## 2025-08-18 RX ADMIN — AZELASTINE HYDROCHLORIDE 2 SPRAY: 137 SPRAY, METERED NASAL at 09:37

## 2025-08-18 ASSESSMENT — ENCOUNTER SYMPTOMS
EYES NEGATIVE: 1
RESPIRATORY NEGATIVE: 1
GASTROINTESTINAL NEGATIVE: 1
PSYCHIATRIC NEGATIVE: 1
HEMATOLOGIC/LYMPHATIC NEGATIVE: 1
CARDIOVASCULAR NEGATIVE: 1
CONSTITUTIONAL NEGATIVE: 1
MUSCULOSKELETAL NEGATIVE: 1
WEAKNESS: 1
ENDOCRINE NEGATIVE: 1

## 2025-08-18 ASSESSMENT — PAIN - FUNCTIONAL ASSESSMENT
PAIN_FUNCTIONAL_ASSESSMENT: 0-10

## 2025-08-18 ASSESSMENT — PAIN SCALES - GENERAL
PAINLEVEL_OUTOF10: 6
PAINLEVEL_OUTOF10: 0 - NO PAIN
PAINLEVEL_OUTOF10: 6

## 2025-08-18 ASSESSMENT — COGNITIVE AND FUNCTIONAL STATUS - GENERAL
HELP NEEDED FOR BATHING: A LITTLE
PERSONAL GROOMING: A LITTLE
DRESSING REGULAR UPPER BODY CLOTHING: A LITTLE
TOILETING: A LITTLE
DAILY ACTIVITIY SCORE: 19
CLIMB 3 TO 5 STEPS WITH RAILING: A LITTLE
MOBILITY SCORE: 20
MOVING TO AND FROM BED TO CHAIR: A LITTLE
DRESSING REGULAR LOWER BODY CLOTHING: A LITTLE
WALKING IN HOSPITAL ROOM: A LITTLE
STANDING UP FROM CHAIR USING ARMS: A LITTLE

## 2025-08-18 ASSESSMENT — PAIN DESCRIPTION - ORIENTATION: ORIENTATION: LOWER

## 2025-08-18 ASSESSMENT — PAIN DESCRIPTION - DESCRIPTORS
DESCRIPTORS: ACHING
DESCRIPTORS: ACHING

## 2025-08-18 ASSESSMENT — PAIN DESCRIPTION - LOCATION: LOCATION: BACK

## 2025-08-18 NOTE — CARE PLAN
The patient's goals for the shift include      The clinical goals for the shift include patient to remain HDS through shift    Over the shift, the patient did not make progress toward the following goals. Barriers to progression include Pt has altered oxygen needs; Pt has viral panel pending. Recommendations to address these barriers include Maintain safety; Maintain stable vitals.

## 2025-08-18 NOTE — CONSULTS
Pulmonary Critical Care CONSULT Note      Date of Service : 08/18/25  Time of Service : 3:00 PM   Radha Deluna  [id number]      Subjective      Interval history:  8/18/2025: Patient continues to be slightly confused.    8/14/2025:  Patient was found to be sleeping peacefully in bed.   She was somnolent but arousable. CRP 17.37, .   Her ABG that was recently completed on 8/13 showed primary metabolic acidosis with partial respiratory compensation. Nephrology is on board, started on oral bicarb and bicarb gtt.   WBC and creatinine are both downtrending.   This case was discussed with both nephrologist and primary team.      History of Present Illness: Radha Deluna is a 75 y.o. female with a PMHx of chronic lower back pain, COPD, migraine, CAD s/p stents, AAA, CVA, hypertension, hypercholesterolemia, GERD, PAD, rheumatoid lung disease s/p lung biopsy who presented to [facility] on 8/7/2025 with a chief complaint of AMS.  Of note, patient was recently admitted on January 2025 to [facility] for concern for acute exacerbation of COPD and pneumonia.  She was treated with antibiotics and steroids and discharged with a prolonged course of antibiotics.    In the ED, she was found to be tachycardic and hypotensive.  CT head showed no acute intracranial process.  However, she was found to have acute hypoxic respiratory failure requiring 2L NC.  CXR showed several nodular opacities over the right lung.  She received Zosyn, vancomycin in the ED.      On the floor: She improved back to her baseline mentation, and her oxygen requirement went from 2 L to room air.  CT chest 8/11 showed rounded opacities in the left upper and lower lobes with peripheral groundglass attenuation, new from 10/2024. Also showed scattered pulmonary nodules and masses throughout right lung,  stable from prior imaging.    Neurology was consulted, who suggested that patient suffered a period of AMS secondary to underlying infection,  rather than a primary neurological process. On 8/11, she again reported increased shortness of breath.  Due to concern for pulmonary congestion, fluids were discontinued. Echo was normal EF and grade 1 diastolic relaxation.  She was also started on IV Solu-Medrol 40 mg daily on 8/12, which was increased to twice daily on 8/13.  She was also started on Pulmicort.  In addition to this, she was found to have worsening FAUSTINA likely 2/2 ATN, with creatinine of 4.29.    Pro-Ethan was 0.86.    Mycoplasma, Legionella: Negative  MRSA: Negative  Antibiotics: Ceftriaxone day 7, completed doxycycline 5 days.    Daily progress:  August 18, 2025: Patient continues to be on supplemental oxygen.  She continues to be intermittently confused.  CT scan from August 8, 2025 showed scattered atelectatic changes and pneumonic infiltrate.  Patient remains on cefepime and Bactrim as per ID team.  Steroid was reduced to 20 mg IV twice daily.  Mycoplasma IgM was added.  Legionella and strep urinary antigen are negative.    Past Medical/Surgical History:   Medical History[1]  Surgical History[2]    Family History:   Family medical history includes stroke in father.    Allergies:     Allergies[3]    Social History:   reports that she has been smoking cigarettes. She has been exposed to tobacco smoke. She has never used smokeless tobacco. She reports that she does not currently use alcohol. She reports that she does not use drugs.    Current Medications:   No recently discontinued medications to reconcile    Review of Systems:   Unable to be obtained due to altered mental state.      Objective     Vital signs in last 24 hours:  Temp:  [35.3 °C (95.5 °F)-36.2 °C (97.2 °F)] 35.3 °C (95.5 °F)  Heart Rate:  [57-60] 59  Resp:  [16-20] 20  BP: (130-156)/(60-73) 156/73  FiO2 (%):  [28 %-32 %] 28 % Temp:   Temp Readings from Last 3 Encounters:   08/18/25 35.3 °C (95.5 °F) (Temporal)   07/31/25 35.9 °C (96.6 °F) (Temporal)   07/15/25 36 °C (96.8 °F)  (Temporal)     Intake/Output last 3 shifts:  I/O last 3 completed shifts:  In: 100 (1.4 mL/kg) [P.O.:100]  Out: 600 (8.3 mL/kg) [Urine:600 (0.2 mL/kg/hr)]  Dosing Weight: 72.6 kg   Intake/Output this shift:  I/O this shift:  In: 240 [P.O.:240]  Out: 650 [Urine:650]     Oxygen requirements:  Oxygen Therapy  Pulse Ox (24 hr min): 94  Medical Gas Therapy: Supplemental oxygen  Medical Gas Delivery Method: Nasal cannula  O2 Flow Rate (L/min): 3 L/min FiO2 (%): 28 %    Ventilator Information:  FiO2 (%):  [28 %-32 %] 28 %na       Physical Exam  General appearance: Somnolent but arousable.   In no acute distress.  HEENT: Atraumatic/normocephalic, EOMI, MEGHANN, pharynx clear, moist mucosa, redness of the uvula appreciated,   Neck: Supple, no jugular venous distension, lymphadenopathy, thyromegaly or carotid bruits  Chest:  Equal normal breath sounds, no wheezing, no crackles and no tenderness over ribs   Cardiovascular: Normal S1, S2, regular rate and rhythm, no murmur, rub or gallop  Abdomen: Normal sounds present, soft, lax with no tenderness, no hepatosplenomegaly, and no masses  Extremities: No edema. Pulses are equally present.   Skin: intact, no rashes   Neurologic:  Alert and oriented x 3.  No focal deficit    Results from last 7 days   Lab Units 08/18/25  0445   SODIUM mmol/L 141   POTASSIUM mmol/L 4.7   CHLORIDE mmol/L 109*   CO2 mmol/L 28   BUN mg/dL 50*   CREATININE mg/dL 1.70*   GLUCOSE mg/dL 109*   CALCIUM mg/dL 7.1*     Results from last 7 days   Lab Units 08/18/25  0446   WBC AUTO x10*3/uL 9.0   HEMOGLOBIN g/dL 9.8*   HEMATOCRIT % 30.6*   PLATELETS AUTO x10*3/uL 235     Results from last 7 days   Lab Units 08/13/25  1352   POCT PH, ARTERIAL pH 7.24*   POCT PCO2, ARTERIAL mm Hg 34*   POCT PO2, ARTERIAL mm Hg 80*   POCT HCO3 CALCULATED, ARTERIAL mmol/L 14.6*   POCT BASE EXCESS, ARTERIAL mmol/L -11.7*       No results found.     === 08/07/25 ===    CT CHEST WO IV CONTRAST    - Impression -  Rounded opacities in  the left upper and lower lobes with peripheral  ground-glass attenuation that are new from prior imaging 10/28/2024  concerning for pneumonia. Given the nodular appearance of the  lesions, however, recommend interval follow-up with CT after  treatment to confirm resolution and exclude an underlying neoplastic  process.    Scattered pulmonary nodules and masses throughout the right lung  which are stable compared to prior imaging.    Small left pleural effusion.    2.6 cm hypodense right thyroid nodule likely corresponding to  abnormality on prior thyroid ultrasound 11/11/2024 for which a biopsy  was recommended.    MACRO:  None.    Signed by: Evan Finkelstein 8/8/2025 12:23 AM  Dictation workstation:   [id number]        Assessment/Plan     Patient is 75 y.o. female  with the following medical Problems:    Acute hypoxic respiratory failure secondary to pneumonia  Concern for COPD exacerbation  Altered mental status, likely delirium  FAUSTINA  Rheumatoid arthritis    Plan of Care:  Differential for CT chest findings includes infection versus malignancy versus ILD flare. Left-sided peripheral groundglass opacities and rounded consolidations suggest organizing pneumonia. The stable right-sided nodules and masses are more consistent with chronic rheumatoid nodules.  8/14: WBC continues to downtrend .  Creatinine continues to improve to 1.7  Has completed 7 days of ceftriaxone  Recommend follow-up CT after treatment to confirm resolution, exclude underlying neoplastic process.  GARRICK and ANCA are negative  Follow-up mycoplasma IgM  Solu-Medrol 20 mg IV twice daily  BAL grew MSSA.  Patient is currently on cefepime  DVT prophylaxis with heparin  Start bronchopulmonary hygiene and bronchodilator therapy with Duoneb/Mucomyst/Pulmicort/Atrovent  Oxygen for saturation 89 to 94%  Incentive spirometry  PT/OT/SLP  Minimize benzodiazepine and narcotics  especially in the setting of recent delirium.  Encourage ambulation  Head of bed  elevation and aspiration precautions.           STAFF PHYSICIAN NOTE OF PERSONAL INVOLVEMENT IN CARE  As the attending physician, I certify that I personally reviewed the patient's history and personally examined the patient to confirm the physical findings described above, and that I reviewed the relevant imaging studies and available reports.  I also discussed the differential diagnosis and all of the proposed management plans with the patient and individuals accompanying the patient to this visit.  They had the opportunity to ask questions about the proposed management plans and to have those questions answered.             [1]   Past Medical History:  Diagnosis Date    Chronic obstructive pulmonary disease, unspecified 11/09/2022    Chronic obstructive pulmonary disease, unspecified COPD type    Low back pain     Migraine, unspecified, not intractable, without status migrainosus     Migraines    Other chronic pain 10/21/2015    Chronic pain    Personal history of other diseases of the circulatory system     History of cardiac disorder    Personal history of other diseases of the nervous system and sense organs     History of otitis media    Personal history of other diseases of the respiratory system     History of asthma    Personal history of other diseases of the respiratory system     History of sinusitis    Personal history of other endocrine, nutritional and metabolic disease     History of thyroid disorder    Personal history of other malignant neoplasm of skin     History of malignant neoplasm of skin    Sarcoidosis, unspecified 10/05/2022    Sarcoidosis   [2]   Past Surgical History:  Procedure Laterality Date    ANKLE SURGERY  10/21/2015    Ankle Surgery    APPENDECTOMY  10/21/2015    Appendectomy    BREAST SURGERY  10/21/2015    Breast Surgery    CARPAL TUNNEL RELEASE  10/26/2016    Neuroplasty Median Nerve At Carpal Tunnel    CATARACT EXTRACTION Bilateral     CHOLECYSTECTOMY  10/21/2015     Cholecystectomy    CORONARY ANGIOPLASTY WITH STENT PLACEMENT  01/14/2021    Cath Placement Of Stent 1    CT GUIDED PERCUTANEOUS BIOPSY LUNG  08/12/2020    CT GUIDED PERCUTANEOUS BIOPSY LUNG 8/12/2020 [facility]    CT GUIDED PERCUTANEOUS BIOPSY LUNG  12/01/2020    CT GUIDED PERCUTANEOUS BIOPSY LUNG 12/1/2020 [facility]    EYE SURGERY      laser    LUNG SURGERY  10/21/2015    Lung Surgery    LYMPH NODE BIOPSY  09/29/2017    Biopsy Lymph Node    MR HEAD ANGIO WO IV CONTRAST  04/04/2022    MR HEAD ANGIO WO IV CONTRAST 4/4/2022 [facility]    MR NECK ANGIO WO IV CONTRAST  08/15/2022    MR NECK ANGIO WO IV CONTRAST 8/15/2022 [facility]    OTHER SURGICAL HISTORY  10/21/2015    Wrist Surgery    OTHER SURGICAL HISTORY  02/07/2022    Endoscopy    OTHER SURGICAL HISTORY  02/07/2022    Colonoscopy    OTHER SURGICAL HISTORY  10/29/2020    Thyroid surgery    OTHER SURGICAL HISTORY  10/29/2020    Sinus surgery    OTHER SURGICAL HISTORY  10/29/2020    Ear surgery    OTHER SURGICAL HISTORY  10/29/2020    Spinal surgery    OTHER SURGICAL HISTORY  10/21/2015    Thoracoscopy (Therapeutic) With Wedge Resection Of Lung    ROTATOR CUFF REPAIR  08/10/2017    Rotator Cuff Repair   [3]   Allergies  Allergen Reactions    Erythromycin Other     Chest pain    Augmentin [Amoxicillin-Pot Clavulanate] GI Upset and Nausea/vomiting

## 2025-08-18 NOTE — PROGRESS NOTES
Nephrology Consult Progress Note    Radha Deluna is a 75 y.o. female on day 11 of admission presenting with Altered mental status, unspecified altered mental status type.      Subjective   No acute events overnight, on NC O2, improved UOP, BM yesterday       Objective          Physical Exam    Vitals 24HR  Heart Rate:  [57-60]   Temp:  [36 °C (96.8 °F)-36.2 °C (97.2 °F)]   Resp:  [16-18]   BP: (130-151)/(60-70)   SpO2:  [94 %-99 %]        AAOx3, sleeping but arousable, on NS O2 2L  Decreased AEBL, wheezing+  RRR no murmurs  Abd soft, + BS   edema           I&O 24HR    Intake/Output Summary (Last 24 hours) at 8/18/2025 1028  Last data filed at 8/18/2025 0922  Gross per 24 hour   Intake 240 ml   Output 1050 ml   Net -810 ml         Medications  Prescriptions Prior to Admission[1]   Scheduled medications  Scheduled Medications[2]  Continuous medications  Continuous Medications[3]  PRN medications  PRN Medications[4]    Relevant Results      No results displayed because visit has over 200 results.         Imaging  XR chest 1 view  Result Date: 8/15/2025  1.  Redemonstration scattered ill-defined airspace and interstitial opacities right greater left overall slightly worsened compared to prior. Bilateral nodular opacities redemonstrated as described. Probable trace left effusion slightly improved. Continued clinical correlation follow-up advised.       MACRO: None   Signed by: Jonatan Smith 8/15/2025 2:31 PM Dictation workstation:   [id number]    XR chest 1 view  Result Date: 8/11/2025  1. Persistent right-greater-than-left ill-defined opacities. 2. Stable small left-sided pleural effusion   Signed by: Jesus Prasad 8/11/2025 12:25 PM Dictation workstation:   [id number]      Cardiology, Vascular, and Other Imaging  Bronchoscopy Diagnostic, Therapeutic, w BAL  Result Date: 8/15/2025  Table formatting from the original result was not included. Impression Bronchoalveolar lavage was performed x1 in the inferior  lingular segment (LB5) and the fluid appeared cloudy and thick Performed brushings with cytology and microbiology brushes in the superior lingular segment (LB4) and inferior lingular segment (LB5) Moderate, thick and clear secretions present with less than 25% obstruction in the larynx, left lung and right lung; performed washing Normal Findings Bronchoalveolar lavage was performed x1 in the inferior lingular segment (LB5) with 60 mL of saline instilled and a total return of 18 mL. The fluid appeared cloudy and thick without suspected diffuse alveolar hemorrhage but not bloody, purulent or serosanguinous. Performed 2 brushings with cytology and microbiology brushes in the superior lingular segment (LB4) and inferior lingular segment (LB5) Moderate, thick and clear secretions present with less than 25% obstruction in the larynx, left lung and right lung; secretions were easily removed; performed washing Dilated bronchi was seen especially at the lower lung zones consistent with bronchiectasis Recommendation Follow up bronchoscopy Indication Atypical pneumonia Post-Op Diagnosis Bronchiectasis, retained secretions Staff Staff Role Lester Calhoun MD Proceduralist Medications See Anesthesia Record. Preprocedure A history and physical has been performed, and patient medication allergies have been reviewed. The patient's tolerance of previous anesthesia has been reviewed. The risks and benefits of the procedure and the sedation options and risks were discussed with the patient. All questions were answered and informed consent obtained. Details of the Procedure The patient underwent monitored anesthesia care, which was administered by an anesthesia professional. The patient's blood pressure, heart rate, level of consciousness, oxygen saturation, respirations, ECG and ETCO2 were monitored throughout the procedure. The patient experienced no blood loss. The scope was introduced through the mouth. The procedure was not  difficult. The patient tolerated the procedure well. There were no apparent adverse events. Events Procedure Events Event Event Time ENDO SCOPE IN TIME 8/15/2025 12:48 PM Specimens ID Type Source Tests Collected by Time 1 : LEFT UPPER LOBE LINGULA BAL Non-Gynecologic Cytology BRONCHO-ALVEOLAR LAVAGE OF LEFT LOWER LOBE CYTOLOGY CONSULTATION (NON-GYNECOLOGIC) Lester Calhoun MD 8/15/2025 1253 2 : LEFT UPPER LOBE LINGULA BRUSHING Non-Gynecologic Cytology BRONCHIAL BRUSH LEFT UPPER LOBE CYTOLOGY CONSULTATION (NON-GYNECOLOGIC) Lester Calhoun MD 8/15/2025 1258 A : LEFT UPPER LOBE LINGULA BRUSHING Fluid BRONCHIAL BRUSH LEFT UPPER LOBE AFB CULTURE/SMEAR, FUNGAL CULTURE/SMEAR, RESPIRATORY CULTURE/SMEAR Lester Calhoun MD 8/15/2025 1259 B : LEFT UPPER LOBE LINGULA Fluid BRONCHO-ALVEOLAR LAVAGE OF LEFT UPPER LOBE AFB CULTURE/SMEAR, FUNGAL CULTURE/SMEAR, RESPIRATORY CULTURE/SMEAR Lester Calhoun MD 8/15/2025 1300 Procedure Location [facility] [address] [id number] [phone] Referring Provider Lester Calhoun MD Procedure Provider Lester Calhoun MD     Transthoracic Echo Complete  Result Date: 8/12/2025   [facility], [address] [facility] [place], [facility], [facility] [address]           Tel [phone] and Fax [phone] TRANSTHORACIC ECHOCARDIOGRAM REPORT  Patient Name:       CARMINE VELASQUEZ DAX Tucker Physician:    [id number] Sanford Seth MD Study Date:         8/12/2025           Ordering Provider:    [id number] LARS DUMONT MRN/PID:            [id number]            Fellow: Accession#:         [id number]        Nurse: Date of Birth/Age:  1950 / 75      Sonographer:          Patrick hansen                                     ACS, RDCS, FASE Gender assigned at  F                   Additional Staff: Birth: Height:             157.48 cm           Admit  Date:           8/7/2025 Weight:             65.32 kg            Admission Status:     Inpatient -                                                               Routine BSA / BMI:          1.66 m2 / 26.34     Encounter#:           [id number]                     kg/m2 Blood Pressure:     134/76 mmHg         Department Location:  [facility] Study Type:    TRANSTHORACIC ECHO (TTE) COMPLETE Diagnosis/ICD: Other forms of dyspnea-R06.09 Indication:    Dyspnea CPT Code:      Echo Complete w Full Doppler-20150 Patient History: Pertinent History: Dyspnea, CAD, HTN, Hyperlipidemia and Chest Pain. Hx coronary                    stents, hx TIA. AMS. Study Detail: The following Echo studies were performed: 2D, M-Mode, Doppler and               color flow. Technically challenging study due to body habitus.  PHYSICIAN INTERPRETATION: Left Ventricle: The left ventricular systolic function is normal with a visually estimated ejection fraction of 60-65%. There are no regional left ventricular wall motion abnormalities. The left ventricular cavity size is normal. There is mildly increased septal and normal posterior left ventricular wall thickness. Spectral Doppler shows a Grade I (impaired relaxation pattern) of left ventricular diastolic filling with normal left atrial filling pressure. Left Atrium: The left atrial size is normal. Right Ventricle: The right ventricle is normal in size. There is normal right ventricular global systolic function. Right Atrium: The right atrium is normal in size. Aortic Valve: The aortic valve is structurally normal. The aortic valve area by VTI is 2.19 cmÂ² with a peak velocity of 1.87 m/s. The peak and mean gradients are 14 mmHg and 8 mmHg, respectively with a dimensionless index of 0.53. There is trace to mild aortic valve regurgitation. Mitral Valve: The mitral valve is normal in structure. There is no evidence of mitral valve regurgitation. The E Vmax is 0.68 m/s. Tricuspid Valve: The  tricuspid valve is structurally normal. No evidence of tricuspid regurgitation. Pulmonic Valve: The pulmonic valve is structurally normal. There is physiologic pulmonic valve regurgitation. Pericardium: There is no pericardial effusion noted. Aorta: The aortic root is normal.  CONCLUSIONS:  1. The left ventricular systolic function is normal with a visually estimated ejection fraction of 60-65%.  2. Spectral Doppler shows a Grade I (impaired relaxation pattern) of left ventricular diastolic filling with normal left atrial filling pressure. QUANTITATIVE DATA SUMMARY:  2D MEASUREMENTS:          Normal Ranges: Ao Root d:       3.70 cm  (2.0-3.7cm) LAs:             2.80 cm  (2.7-4.0cm) IVSd:            0.95 cm  (0.6-1.1cm) LVPWd:           0.80 cm  (0.6-1.1cm) LVIDd:           4.90 cm  (3.9-5.9cm) LVIDs:           3.10 cm LV Mass Index:   92 g/m2 LVEDV Index:     47 ml/m2 LV % FS          36.7 %  LEFT ATRIUM:                  Normal Ranges: LA Vol A4C:        32.0 ml    (22+/-6mL/m2) LA Vol A2C:        48.2 ml LA Vol BP:         39.7 ml LA Vol Index A4C:  19.3ml/m2 LA Vol Index A2C:  29.0 ml/m2 LA Vol Index BP:   23.9 ml/m2 LA Area A4C:       14.0 cm2 LA Area A2C:       17.0 cm2 LA Major Axis A4C: 5.2 cm LA Major Axis A2C: 5.1 cm LA Volume Index:   22.0 ml/m2  RIGHT ATRIUM:          Normal Ranges: RA Area A4C:  11.0 cm2  M-MODE MEASUREMENTS:         Normal Ranges: Ao Root:             3.60 cm (2.0-3.7cm) LAs:                 4.40 cm (2.7-4.0cm)  AORTA MEASUREMENTS:         Normal Ranges: Asc Ao, d:          3.60 cm (2.1-3.4cm)  LV SYSTOLIC FUNCTION:                      Normal Ranges: EF-A4C View:    66 % (>=55%) EF-A2C View:    58 % EF-Biplane:     61 % EF-Visual:      63 % LV EF Reported: 63 %  LV DIASTOLIC FUNCTION:            Normal Ranges: MV Peak E:             0.68 m/s   (0.7-1.2 m/s) MV Peak A:             0.85 m/s   (0.42-0.7 m/s) E/A Ratio:             0.80       (1.0-2.2) MV e'                  0.091 m/s   (>8.0) MV lateral e'          0.10 m/s MV medial e'           0.08 m/s E/e' Ratio:            7.46       (<8.0) PulmV Sys Pollo:         97.10 cm/s PulmV Brasher Pollo:        69.20 cm/s PulmV S/D Pollo:         1.40  MITRAL VALVE:          Normal Ranges: MV DT:        148 msec (150-240msec)  AORTIC VALVE:                      Normal Ranges: AoV Vmax:                1.87 m/s  (<=1.7m/s) AoV Peak P.0 mmHg (<20mmHg) AoV Mean P.0 mmHg  (1.7-11.5mmHg) LVOT Max Pollo:            0.95 m/s  (<=1.1m/s) AoV VTI:                 40.60 cm  (18-25cm) LVOT VTI:                21.40 cm LVOT Diameter:           2.30 cm   (1.8-2.4cm) AoV Area, VTI:           2.19 cm2  (2.5-5.5cm2) AoV Area,Vmax:           2.11 cm2  (2.5-4.5cm2) AoV Dimensionless Index: 0.53  RIGHT VENTRICLE: RV Basal 2.60 cm RV Mid   2.50 cm RV Major 6.2 cm TAPSE:   20.9 mm RV s'    0.13 m/s  TRICUSPID VALVE/RVSP:         Normal Ranges: Est. RA Pressure:     3 IVC Diam:             1.50 cm  PULMONIC VALVE:          Normal Ranges: PV Max Pollo:     0.8 m/s  (0.6-0.9m/s) PV Max P.8 mmHg  PULMONARY VEINS: PulmV Brasher Pollo: 69.20 cm/s PulmV S/D Pollo:  1.40 PulmV Sys Pollo:  97.10 cm/s  [id number] Sanford Seth MD Electronically signed on 2025 at 9:50:03 AM  ** Final **         Renal US  RIGHT KIDNEY:  The right kidney measures 12.5 cm in length. The renal cortical  echogenicity and thickness are within normal limits. No  hydronephrosis is present; no evidence of nephrolithiasis. Trace  perinephric fluid.      LEFT KIDNEY:  The left kidney measures 12.6 cm in length. The renal cortical  echogenicity and thickness are within normal limits. Midpole  nonobstructive nephrolithiasis measuring 0.8 cm.      BLADDER:  Unremarkable      IMPRESSION:  1. Left midpole nonobstructive nephroliths measuring 0.8 cm.  2. Trace right perinephric fluid.  3. Small left-sided pleural effusion.    ASSESSMENT AND PLAN    76 yo female w/ PMH of COPD, chronic pain, RA on  methotrexate/plaquenil and humira for years (diagnosed when 56 yo), sarcoidosis, smoking, admitted with AMS, found to have FAUSTINA    FAUSTINA, nonoliguric, creatinine on admission 0.4, up to 4.36, improving, BUN high from steroids as well    Hyponatremia, controlled    Hypocalcemia    NAGMA    Hypomagnesemia, controlled    HTN, BP stable, at home on lopressor BID    HK      Plan  - creatinine improving, resolving severe ATN, very high inflammatory markers, hx of neuropathy, joint disease and lung nodule (being followed by CTS and pulmonary as OP) but AAV -ve; admitted with AMS with normal GFR  - s/p bronchoscopy , noted bactrim by ID  - diuretics prn, stable on NC 2L   - K stable continue low K diet  - please record UOP   - renal US as above not concerning  - avoid hypotension and nephrotoxins  - daily lytes and replete as needed, noted low Ca on prolia as OP, continue to hold after the dc, add po supplements    MARS reviewed, dose for GFR<19, primidone daily, hold methotrexate, plaquenil and neurontin    Thank you for the opportunity to assist in the care of this patient, please call with questions  Brynn Bar MD PhD                          [1]   Medications Prior to Admission   Medication Sig Dispense Refill Last Dose/Taking    Bifidobacterium infantis (ALIGN ORAL) Take 1 capsule by mouth once daily.   Unknown    carboxymethylcellulose sodium (TheraTears) 0.25 % dropperette Administer 1 drop into both eyes 4 times a day as needed (dry eye).   Unknown    clopidogrel (Plavix) 75 mg tablet Take 1 tablet (75 mg) by mouth once daily.   Unknown    clotrimazole (Mycelex) 10 mg matesu Take 1 tablet (10 mg) by mouth once daily. 70 Mateus 3 Unknown    denosumab (Prolia) 60 mg/mL syringe Inject 1 mL (60 mg) under the skin every 6 months.   7/31/2025    diphenhydrAMINE (Sominex) 25 mg tablet Take 1 tablet (25 mg) by mouth as needed at bedtime for sleep.   Unknown    ergocalciferol (Vitamin D-2) 1.25 MG (51196 UT) capsule TAKE 1  CAPSULE WEEKLY. (Patient taking differently: Take 1 capsule (1.25 mg) by mouth every 14 (fourteen) days.) 13 capsule 3 Unknown    famotidine (Pepcid) 40 mg tablet TAKE 1 TABLET BY MOUTH ONCE DAILY AT BEDTIME 90 tablet 3 Unknown    fluticasone (Flonase) 50 mcg/actuation nasal spray Administer 2 sprays into each nostril once daily. 16 g 3 Unknown    folic acid (Folvite) 1 mg tablet Take 1 tablet (1 mg) by mouth once daily.   Unknown    gabapentin (Neurontin) 400 mg capsule Take 2 capsules (800 mg) by mouth 4 times a day. 720 capsule 1 Unknown    Humira,CF, Pen 40 mg/0.4 mL pen injector kit pen-injector Inject 1 Pen (40 mg) under the skin every 14 (fourteen) days.   Unknown    HYDROcodone-acetaminophen (Norco) 7.5-325 mg tablet Take 1 tablet by mouth every 12 hours if needed for severe pain (7 - 10). 90 tablet 0 Unknown    hydroxychloroquine (Plaquenil) 200 mg tablet Take 1.5 tablets (300 mg) by mouth once daily.   Unknown    ipratropium (Atrovent) 42 mcg (0.06 %) nasal spray Administer 2 sprays into each nostril 4 times a day. 15 mL 5 Unknown    isosorbide mononitrate ER (Imdur) 60 mg 24 hr tablet Take 1 tablet (60 mg) by mouth once daily.   Unknown    methotrexate 25 mg/mL injection Inject 0.8 mL (20 mg) into the muscle 1 (one) time per week.   Unknown    metoclopramide (Reglan) 10 mg tablet Take 1 tablet (10 mg) by mouth once daily as needed (nausea). 30 tablet 1 Unknown    metoprolol tartrate (Lopressor) 50 mg tablet Take 1 tablet by mouth every 12 hours.   Unknown    Miebo, PF, 100 % drops Administer 1 drop into both eyes 4 times a day.   Unknown    montelukast (Singulair) 10 mg tablet Take 1 tablet (10 mg) by mouth once daily. 90 tablet 1 Unknown    naloxone (Narcan) 4 mg/0.1 mL nasal spray Administer 1 spray (4 mg) into affected nostril(s) if needed for opioid reversal. May repeat every 2-3 minutes if needed, alternating nostrils, until medical assistance becomes available. 2 each 0 Unknown    nitroglycerin  (Nitrostat) 0.4 mg SL tablet Place 1 tablet (0.4 mg) under the tongue every 5 minutes if needed for chest pain. May take up to 3 doses over 15 minutes. Call 911 if pain persists.   Unknown    oxygen (O2) therapy Inhale once daily at bedtime. use at night as directed   Unknown    polyethylene glycol (Glycolax, Miralax) 17 gram packet Take 17 g by mouth 2 times a day. Mix 1 cap (17g) into 8 ounces of fluid. 60 packet 7 Unknown    polyethylene glycol (Glycolax, Miralax) 17 gram/dose powder Mix 17 g of powder and drink 2 times a day.   Unknown    pravastatin (Pravachol) 20 mg tablet Take 1 tablet (20 mg) by mouth once daily.   Unknown    primidone (Mysoline) 50 mg tablet Take 3 tablets (150 mg) by mouth early in the morning.. 270 tablet 3 Unknown    ProAir HFA 90 mcg/actuation inhaler Inhale 2 puffs every 4 hours if needed for wheezing or shortness of breath. 8.5 g 5 Unknown    sennosides-docusate sodium (Martha-Colace) 8.6-50 mg tablet Take 1 tablet by mouth once daily. 30 tablet 11 Unknown    theophylline ER (Alok-Dur) 300 mg 12 hr tablet Take 1 tablet (300 mg) by mouth 3 times a day.   Unknown    Trelegy Ellipta 100-62.5-25 mcg blister with device Inhale 1 puff once daily.   Unknown    Xiidra 5 % dropperette Administer 1 drop into both eyes every 12 hours.   Unknown    azelastine (Astelin) 137 mcg (0.1 %) nasal spray Administer 2 sprays into each nostril 2 times a day. (Patient not taking: Reported on 8/7/2025) 72 mL 3 Not Taking    cyclobenzaprine (Flexeril) 10 mg tablet Take 1 tablet (10 mg) by mouth 3 times a day as needed for muscle spasms. (Patient not taking: Reported on 8/7/2025) 45 tablet 1 Not Taking    ipratropium (Atrovent) 0.02 % nebulizer solution Use 1 vial 4 times daily (Patient not taking: Reported on 8/7/2025) 75 mL 5 Not Taking    pantoprazole (ProtoNix) 40 mg EC tablet Take 1 tablet (40 mg) by mouth once daily. Do not crush, chew, or split. (Patient not taking: Reported on 8/7/2025) 30 tablet 5 Not  "Taking    syringe with needle 1 mL 25 gauge x 5/8\" syringe       [2] acetylcysteine, 3 mL, nebulization, TID  azelastine, 2 spray, Each Nostril, BID  budesonide, 0.25 mg, nebulization, Daily  clopidogrel, 75 mg, oral, Daily  fluticasone, 2 spray, Each Nostril, Daily  gabapentin, 300 mg, oral, BID  heparin (porcine), 5,000 Units, subcutaneous, q8h  [Held by provider] hydroxychloroquine, 300 mg, oral, Daily  ipratropium, 2 spray, Each Nostril, 4x daily  ipratropium-albuteroL, 3 mL, nebulization, TID  isosorbide mononitrate ER, 60 mg, oral, Daily  [Held by provider] methotrexate, 20 mg, intramuscular, Every Sunday  methylPREDNISolone sodium succinate (PF), 60 mg, intravenous, q6h  metoprolol tartrate, 50 mg, oral, q12h  minocycline, 200 mg, oral, q12h MARCY  montelukast, 10 mg, oral, Daily  nystatin, 5 mL, Mouth/Throat, TID  pantoprazole, 40 mg, oral, Daily  pravastatin, 20 mg, oral, Nightly  primidone, 150 mg, oral, Daily  sennosides-docusate sodium, 1 tablet, oral, Daily  sulfamethoxazole-trimethoprim, 2 tablet, oral, q12h MARCY     [3]    [4] PRN medications: albuterol, benzocaine-menthol, HYDROcodone-acetaminophen, ipratropium-albuteroL, lubricating eye drops, metoclopramide, naloxone, nitroglycerin, oxygen, polyethylene glycol    "

## 2025-08-18 NOTE — CONSULTS
Inpatient consult to Palliative Care  Consult performed by: Keisha Ballard, JOSE ANTONIO-CNP  Consult ordered by: Cherry Lin MD          Reason For Consult  Reason for Consult: communication / medical decision making     History Of Present Illness  Radha Deluna is a 75 y.o. female with past medical history of COPD, CAD, hypertension, and rheumatoid arthritis on Humira, methotrexate, and Plaquenil,initially admitted 8/7/2025 under the medicine service with altered mental status and acute hypoxic respiratory failure.  There was concern for pneumonia as well as COPD exacerbation and other opportunistic infections, and so pulmonology was consulted and the patient did have bronchoscopy 8/15/2025.  ID was consulted for close management of IV antibiotics.  Patient also developed FAUSTINA, and so nephrology was consulted, with the patient's creatinine peaking at 4.3, and consideration was severe ATN.  Of note, the patient is DNR CCA DNI CODE STATUS.  Patient did have persistent altered mentation and was refusing treatments and medications, and so palliative care was consulted to discuss goals of care and advance care planning.    Upon assessment of the patient this morning, the patient was sleeping but easily arousable to verbal stimuli.  The patient stated she is feeling much better and denies all symptoms of discomfort.  The patient stated she is hoping to become stronger and able to get back to normal living for her.  Nursing reported the patient is less confused and is now participating in her care without issue.    ESAS (Gilbert Symptom Assessment System):  Pain: None  Shortness of breath: None  Loss of appetite: None  Nausea: None  Constipation: None  Tiredness: None  Drowsiness: Mild  Anxiety: None  Depression: None  How you feel overall: Good    PPS (Palliative Prognostic Scale): Previously, 80%    PMH: as above    Personal/Social History  She reports that she has been smoking cigarettes. She has been exposed to  tobacco smoke. She has never used smokeless tobacco. She reports that she does not currently use alcohol. She reports that she does not use drugs.    Past Medical History  She has a past medical history of Chronic obstructive pulmonary disease, unspecified (11/09/2022), Low back pain, Migraine, unspecified, not intractable, without status migrainosus, Other chronic pain (10/21/2015), Personal history of other diseases of the circulatory system, Personal history of other diseases of the nervous system and sense organs, Personal history of other diseases of the respiratory system, Personal history of other diseases of the respiratory system, Personal history of other endocrine, nutritional and metabolic disease, Personal history of other malignant neoplasm of skin, and Sarcoidosis, unspecified (10/05/2022).    Surgical History  She has a past surgical history that includes Ankle surgery (10/21/2015); Appendectomy (10/21/2015); Lung surgery (10/21/2015); Cholecystectomy (10/21/2015); Breast surgery (10/21/2015); Other surgical history (10/21/2015); Other surgical history (02/07/2022); Other surgical history (02/07/2022); Lymph node biopsy (09/29/2017); Other surgical history (10/29/2020); Other surgical history (10/29/2020); Other surgical history (10/29/2020); Other surgical history (10/29/2020); Coronary angioplasty with stent (01/14/2021); Rotator cuff repair (08/10/2017); Other surgical history (10/21/2015); Carpal tunnel release (10/26/2016); CT guided percutaneous biopsy lung (08/12/2020); CT guided percutaneous biopsy lung (12/01/2020); MR angio head wo IV contrast (04/04/2022); MR angio neck wo IV contrast (08/15/2022); Cataract extraction (Bilateral); and Eye surgery.     Family History  Family History[1]  Allergies  Erythromycin and Augmentin [amoxicillin-pot clavulanate]    Review of Systems   Constitutional: Negative.    HENT: Negative.     Eyes: Negative.    Respiratory: Negative.     Cardiovascular:  Negative.    Gastrointestinal: Negative.    Endocrine: Negative.    Genitourinary: Negative.    Musculoskeletal: Negative.    Skin: Negative.    Allergic/Immunologic: Positive for immunocompromised state.   Neurological:  Positive for weakness.   Hematological: Negative.    Psychiatric/Behavioral: Negative.          Physical Exam  Constitutional:       General: She is not in acute distress.     Appearance: She is normal weight. She is not ill-appearing, toxic-appearing or diaphoretic.      Comments: Elderly, pleasant   HENT:      Head: Normocephalic and atraumatic.      Right Ear: External ear normal.      Left Ear: External ear normal.      Nose: Nose normal.      Mouth/Throat:      Mouth: Mucous membranes are moist.      Pharynx: Oropharynx is clear.     Eyes:      Extraocular Movements: Extraocular movements intact.      Pupils: Pupils are equal, round, and reactive to light.       Cardiovascular:      Rate and Rhythm: Regular rhythm. Bradycardia present.      Pulses: Normal pulses.      Heart sounds: Normal heart sounds.   Pulmonary:      Effort: Pulmonary effort is normal.      Comments: Slightly reduced air movement throughout; on low-flow nasal cannula  Abdominal:      General: There is no distension.      Palpations: Abdomen is soft.      Tenderness: There is no abdominal tenderness.     Musculoskeletal:         General: Normal range of motion.      Cervical back: Normal range of motion.      Right lower leg: No edema.      Left lower leg: No edema.     Skin:     General: Skin is warm and dry.      Coloration: Skin is pale.     Neurological:      General: No focal deficit present.      Mental Status: She is alert and oriented to person, place, and time. Mental status is at baseline.     Psychiatric:         Mood and Affect: Mood normal.         Behavior: Behavior normal.         Thought Content: Thought content normal.         Judgment: Judgment normal.         Last Recorded Vitals  Blood pressure 130/60,  "pulse 57, temperature 36.2 °C (97.2 °F), temperature source Temporal, resp. rate 16, height 1.6 m (5' 2.99\"), weight 65.7 kg (144 lb 13.5 oz), SpO2 94%.  Oxygen Therapy  Pulse Ox (24 hr min): 94  Medical Gas Therapy: Supplemental oxygen  Medical Gas Delivery Method: Nasal cannula  O2 Flow Rate (L/min): 3 L/min FiO2 (%): 28 %      Relevant Results  Scheduled medications  Scheduled Medications[2]  Continuous medications  Continuous Medications[3]  PRN medications  PRN Medications[4]    Results for orders placed or performed during the hospital encounter of 08/07/25 (from the past 96 hours)   Calcium, ionized   Result Value Ref Range    POCT Calcium, Ionized 0.86 (L) 1.1 - 1.33 mmol/L   Renal Function Panel   Result Value Ref Range    Glucose 92 74 - 99 mg/dL    Sodium 138 136 - 145 mmol/L    Potassium 5.6 (H) 3.5 - 5.3 mmol/L    Chloride 107 98 - 107 mmol/L    Bicarbonate 22 21 - 32 mmol/L    Anion Gap 15 10 - 20 mmol/L    Urea Nitrogen 75 (H) 6 - 23 mg/dL    Creatinine 3.21 (H) 0.50 - 1.05 mg/dL    eGFR 14 (L) >60 mL/min/1.73m*2    Calcium 7.7 (L) 8.6 - 10.3 mg/dL    Phosphorus 4.7 2.5 - 4.9 mg/dL    Albumin 2.8 (L) 3.4 - 5.0 g/dL   AFB Culture/Smear    Specimen: BRONCHIAL BRUSH LEFT UPPER LOBE; Fluid   Result Value Ref Range    AFB Culture       Culture in progress and will be examined weekly. A result will be issued either when positive or after 8 weeks incubation.    AFB Stain No acid fast bacilli seen    Fungal Culture/Smear    Specimen: BRONCHIAL BRUSH LEFT UPPER LOBE; Fluid   Result Value Ref Range    Fungal Smear No fungal elements seen    Respiratory Culture/Smear    Specimen: BRONCHIAL BRUSH LEFT UPPER LOBE; Fluid   Result Value Ref Range    Respiratory Culture/Smear Culture in progress     Gram Stain (1+) Rare Polymorphonuclear leukocytes (A)     Gram Stain (3+) Moderate Gram negative bacilli (A)    AFB Culture/Smear    Specimen: BRONCHO-ALVEOLAR LAVAGE OF LEFT UPPER LOBE; Fluid   Result Value Ref Range    " AFB Culture       Culture in progress and will be examined weekly. A result will be issued either when positive or after 8 weeks incubation.    AFB Stain No acid fast bacilli seen    Fungal Culture/Smear    Specimen: BRONCHO-ALVEOLAR LAVAGE OF LEFT UPPER LOBE; Fluid   Result Value Ref Range    Fungal Smear No fungal elements seen    Respiratory Culture/Smear    Specimen: BRONCHO-ALVEOLAR LAVAGE OF LEFT UPPER LOBE; Fluid   Result Value Ref Range    Respiratory Culture/Smear (4+) Abundant Escherichia coli (A)     Respiratory Culture/Smear (3+) Moderate Stenotrophomonas maltophilia group (A)     Respiratory Culture/Smear (2+) Few Prevotella melaninogenica (A)     Gram Stain (2+) Few Polymorphonuclear leukocytes (A)     Gram Stain (A)      (3+) Moderate Mixed Gram positive and Gram negative bacteria       Susceptibility    Stenotrophomonas maltophilia group - DISK DIFFUSION     Trimethoprim/Sulfamethoxazole  Susceptible mm     Levofloxacin  Susceptible mm    Stenotrophomonas maltophilia group - GRADIENT DIFFUSION     Minocycline  Susceptible ug/ml   PST Top   Result Value Ref Range    Extra Tube Hold for add-ons.    Lavender Top   Result Value Ref Range    Extra Tube Hold for add-ons.    CBC and Auto Differential   Result Value Ref Range    WBC 6.2 4.4 - 11.3 x10*3/uL    nRBC 0.0 0.0 - 0.0 /100 WBCs    RBC 3.17 (L) 4.00 - 5.20 x10*6/uL    Hemoglobin 10.9 (L) 12.0 - 16.0 g/dL    Hematocrit 33.2 (L) 36.0 - 46.0 %     (H) 80 - 100 fL    MCH 34.4 (H) 26.0 - 34.0 pg    MCHC 32.8 32.0 - 36.0 g/dL    RDW 14.5 11.5 - 14.5 %    Platelets 236 150 - 450 x10*3/uL    Neutrophils % 92.2 40.0 - 80.0 %    Immature Granulocytes %, Automated 0.8 0.0 - 0.9 %    Lymphocytes % 5.5 13.0 - 44.0 %    Monocytes % 1.3 2.0 - 10.0 %    Eosinophils % 0.0 0.0 - 6.0 %    Basophils % 0.2 0.0 - 2.0 %    Neutrophils Absolute 5.74 (H) 1.60 - 5.50 x10*3/uL    Immature Granulocytes Absolute, Automated 0.05 0.00 - 0.50 x10*3/uL    Lymphocytes  Absolute 0.34 (L) 0.80 - 3.00 x10*3/uL    Monocytes Absolute 0.08 0.05 - 0.80 x10*3/uL    Eosinophils Absolute 0.00 0.00 - 0.40 x10*3/uL    Basophils Absolute 0.01 0.00 - 0.10 x10*3/uL   Basic Metabolic Panel   Result Value Ref Range    Glucose 251 (H) 74 - 99 mg/dL    Sodium 135 (L) 136 - 145 mmol/L    Potassium 4.5 3.5 - 5.3 mmol/L    Chloride 104 98 - 107 mmol/L    Bicarbonate 24 21 - 32 mmol/L    Anion Gap 12 10 - 20 mmol/L    Urea Nitrogen 64 (H) 6 - 23 mg/dL    Creatinine 2.55 (H) 0.50 - 1.05 mg/dL    eGFR 19 (L) >60 mL/min/1.73m*2    Calcium 7.2 (L) 8.6 - 10.3 mg/dL   CBC and Auto Differential   Result Value Ref Range    WBC 10.5 4.4 - 11.3 x10*3/uL    nRBC 0.0 0.0 - 0.0 /100 WBCs    RBC 3.12 (L) 4.00 - 5.20 x10*6/uL    Hemoglobin 10.6 (L) 12.0 - 16.0 g/dL    Hematocrit 32.2 (L) 36.0 - 46.0 %     (H) 80 - 100 fL    MCH 34.0 26.0 - 34.0 pg    MCHC 32.9 32.0 - 36.0 g/dL    RDW 14.4 11.5 - 14.5 %    Platelets 263 150 - 450 x10*3/uL    Neutrophils % 87.7 40.0 - 80.0 %    Immature Granulocytes %, Automated 0.8 0.0 - 0.9 %    Lymphocytes % 6.4 13.0 - 44.0 %    Monocytes % 5.0 2.0 - 10.0 %    Eosinophils % 0.0 0.0 - 6.0 %    Basophils % 0.1 0.0 - 2.0 %    Neutrophils Absolute 9.21 (H) 1.60 - 5.50 x10*3/uL    Immature Granulocytes Absolute, Automated 0.08 0.00 - 0.50 x10*3/uL    Lymphocytes Absolute 0.67 (L) 0.80 - 3.00 x10*3/uL    Monocytes Absolute 0.52 0.05 - 0.80 x10*3/uL    Eosinophils Absolute 0.00 0.00 - 0.40 x10*3/uL    Basophils Absolute 0.01 0.00 - 0.10 x10*3/uL   Basic Metabolic Panel   Result Value Ref Range    Glucose 125 (H) 74 - 99 mg/dL    Sodium 141 136 - 145 mmol/L    Potassium 4.4 3.5 - 5.3 mmol/L    Chloride 108 (H) 98 - 107 mmol/L    Bicarbonate 27 21 - 32 mmol/L    Anion Gap 10 10 - 20 mmol/L    Urea Nitrogen 60 (H) 6 - 23 mg/dL    Creatinine 2.26 (H) 0.50 - 1.05 mg/dL    eGFR 22 (L) >60 mL/min/1.73m*2    Calcium 7.4 (L) 8.6 - 10.3 mg/dL   Calcium, ionized   Result Value Ref Range    POCT  Calcium, Ionized 1.03 (L) 1.1 - 1.33 mmol/L   Vitamin B12   Result Value Ref Range    Vitamin B12 509 211 - 911 pg/mL   Folate   Result Value Ref Range    Folate, Serum 10.6 >5.0 ng/mL   Basic Metabolic Panel   Result Value Ref Range    Glucose 109 (H) 74 - 99 mg/dL    Sodium 141 136 - 145 mmol/L    Potassium 4.7 3.5 - 5.3 mmol/L    Chloride 109 (H) 98 - 107 mmol/L    Bicarbonate 28 21 - 32 mmol/L    Anion Gap 9 (L) 10 - 20 mmol/L    Urea Nitrogen 50 (H) 6 - 23 mg/dL    Creatinine 1.70 (H) 0.50 - 1.05 mg/dL    eGFR 31 (L) >60 mL/min/1.73m*2    Calcium 7.1 (L) 8.6 - 10.3 mg/dL   CBC and Auto Differential   Result Value Ref Range    WBC 9.0 4.4 - 11.3 x10*3/uL    nRBC 0.0 0.0 - 0.0 /100 WBCs    RBC 2.94 (L) 4.00 - 5.20 x10*6/uL    Hemoglobin 9.8 (L) 12.0 - 16.0 g/dL    Hematocrit 30.6 (L) 36.0 - 46.0 %     (H) 80 - 100 fL    MCH 33.3 26.0 - 34.0 pg    MCHC 32.0 32.0 - 36.0 g/dL    RDW 14.4 11.5 - 14.5 %    Platelets 235 150 - 450 x10*3/uL    Neutrophils % 81.8 40.0 - 80.0 %    Immature Granulocytes %, Automated 0.6 0.0 - 0.9 %    Lymphocytes % 11.5 13.0 - 44.0 %    Monocytes % 6.0 2.0 - 10.0 %    Eosinophils % 0.0 0.0 - 6.0 %    Basophils % 0.1 0.0 - 2.0 %    Neutrophils Absolute 7.35 (H) 1.60 - 5.50 x10*3/uL    Immature Granulocytes Absolute, Automated 0.05 0.00 - 0.50 x10*3/uL    Lymphocytes Absolute 1.03 0.80 - 3.00 x10*3/uL    Monocytes Absolute 0.54 0.05 - 0.80 x10*3/uL    Eosinophils Absolute 0.00 0.00 - 0.40 x10*3/uL    Basophils Absolute 0.01 0.00 - 0.10 x10*3/uL     *Note: Due to a large number of results and/or encounters for the requested time period, some results have not been displayed. A complete set of results can be found in Results Review.     XR chest 1 view  Result Date: 8/15/2025  Interpreted By:  Jonatan Smith, STUDY: XR CHEST 1 VIEW;  8/15/2025 2:01 pm   INDICATION: Signs/Symptoms:s/p bronchosocpy.     COMPARISON: 08/11/2025   ACCESSION NUMBER(S): [id number]   ORDERING CLINICIAN: RYAN  ALTAQI   FINDINGS: Single frontal view chest   Trachea nondisplaced. Heart size stable. Prominence and atherosclerotic change aortic arch redemonstrated.   Scattered ill-defined air space and interstitial opacities redemonstrated bilaterally right greater than left and overall slightly worsened compared to prior. Nodular densities bilaterally redemonstrated particularly mid upper lung zone on the right and left perihilar region redemonstrated. Probable trace left effusion slightly improved.         1.  Redemonstration scattered ill-defined airspace and interstitial opacities right greater left overall slightly worsened compared to prior. Bilateral nodular opacities redemonstrated as described. Probable trace left effusion slightly improved. Continued clinical correlation follow-up advised.       MACRO: None   Signed by: Jonatan Smith 8/15/2025 2:31 PM Dictation workstation:   [id number]    Bronchoscopy Diagnostic, Therapeutic, w BAL  Result Date: 8/15/2025  Table formatting from the original result was not included. Impression Bronchoalveolar lavage was performed x1 in the inferior lingular segment (LB5) and the fluid appeared cloudy and thick Performed brushings with cytology and microbiology brushes in the superior lingular segment (LB4) and inferior lingular segment (LB5) Moderate, thick and clear secretions present with less than 25% obstruction in the larynx, left lung and right lung; performed washing Normal Findings Bronchoalveolar lavage was performed x1 in the inferior lingular segment (LB5) with 60 mL of saline instilled and a total return of 18 mL. The fluid appeared cloudy and thick without suspected diffuse alveolar hemorrhage but not bloody, purulent or serosanguinous. Performed 2 brushings with cytology and microbiology brushes in the superior lingular segment (LB4) and inferior lingular segment (LB5) Moderate, thick and clear secretions present with less than 25% obstruction in the larynx, left lung  and right lung; secretions were easily removed; performed washing Dilated bronchi was seen especially at the lower lung zones consistent with bronchiectasis Recommendation Follow up bronchoscopy Indication Atypical pneumonia Post-Op Diagnosis Bronchiectasis, retained secretions Staff Staff Role Lester Calhoun MD Proceduralist Medications See Anesthesia Record. Preprocedure A history and physical has been performed, and patient medication allergies have been reviewed. The patient's tolerance of previous anesthesia has been reviewed. The risks and benefits of the procedure and the sedation options and risks were discussed with the patient. All questions were answered and informed consent obtained. Details of the Procedure The patient underwent monitored anesthesia care, which was administered by an anesthesia professional. The patient's blood pressure, heart rate, level of consciousness, oxygen saturation, respirations, ECG and ETCO2 were monitored throughout the procedure. The patient experienced no blood loss. The scope was introduced through the mouth. The procedure was not difficult. The patient tolerated the procedure well. There were no apparent adverse events. Events Procedure Events Event Event Time ENDO SCOPE IN TIME 8/15/2025 12:48 PM Specimens ID Type Source Tests Collected by Time 1 : LEFT UPPER LOBE LINGULA BAL Non-Gynecologic Cytology BRONCHO-ALVEOLAR LAVAGE OF LEFT LOWER LOBE CYTOLOGY CONSULTATION (NON-GYNECOLOGIC) Lester Calhoun MD 8/15/2025 1253 2 : LEFT UPPER LOBE LINGULA BRUSHING Non-Gynecologic Cytology BRONCHIAL BRUSH LEFT UPPER LOBE CYTOLOGY CONSULTATION (NON-GYNECOLOGIC) Lester Calhoun MD 8/15/2025 1258 A : LEFT UPPER LOBE LINGULA BRUSHING Fluid BRONCHIAL BRUSH LEFT UPPER LOBE AFB CULTURE/SMEAR, FUNGAL CULTURE/SMEAR, RESPIRATORY CULTURE/SMEAR Lester Calhoun MD 8/15/2025 1259 B : LEFT UPPER LOBE LINGULA Fluid BRONCHO-ALVEOLAR LAVAGE OF LEFT UPPER LOBE AFB CULTURE/SMEAR, FUNGAL CULTURE/SMEAR,  RESPIRATORY CULTURE/SMEAR Lester Calhoun MD 8/15/2025 1300 Procedure Location [facility] [address] [facility] [id number] [phone] Referring Provider Lester Calhoun MD Procedure Provider Lester Calhoun MD     Transthoracic Echo Complete  Result Date: 8/12/2025   [facility], [address] [facility] [address]           Tel [phone] and Fax [phone] TRANSTHORACIC ECHOCARDIOGRAM REPORT  Patient Name:       CARMINE VERDUZCO  Reading Physician:    [id number] Sanford Seth MD Study Date:         8/12/2025           Ordering Provider:    [id number] LARS DUMONT MRN/PID:            [id number]            Fellow: Accession#:         [id number]        Nurse: Date of Birth/Age:  1950 / 75      Sonographer:          Patrick Jacobson                     years                                     ACS, RDCS, FASE Gender assigned at  F                   Additional Staff: Birth: Height:             157.48 cm           Admit Date:           8/7/2025 Weight:             65.32 kg            Admission Status:     Inpatient -                                                               Routine BSA / BMI:          1.66 m2 / 26.34     Encounter#:           [id number]                     kg/m2 Blood Pressure:     134/76 mmHg         Department Location:  [facility] Study Type:    TRANSTHORACIC ECHO (TTE) COMPLETE Diagnosis/ICD: Other forms of dyspnea-R06.09 Indication:    Dyspnea CPT Code:      Echo Complete w Full Doppler-[id number] Patient History: Pertinent History: Dyspnea, CAD, HTN, Hyperlipidemia and Chest Pain. Hx coronary                    stents, hx TIA. AMS. Study Detail: The following Echo studies were performed: 2D, M-Mode, Doppler and               color flow. Technically challenging study due to body habitus.  PHYSICIAN INTERPRETATION: Left  Ventricle: The left ventricular systolic function is normal with a visually estimated ejection fraction of 60-65%. There are no regional left ventricular wall motion abnormalities. The left ventricular cavity size is normal. There is mildly increased septal and normal posterior left ventricular wall thickness. Spectral Doppler shows a Grade I (impaired relaxation pattern) of left ventricular diastolic filling with normal left atrial filling pressure. Left Atrium: The left atrial size is normal. Right Ventricle: The right ventricle is normal in size. There is normal right ventricular global systolic function. Right Atrium: The right atrium is normal in size. Aortic Valve: The aortic valve is structurally normal. The aortic valve area by VTI is 2.19 cmÂ² with a peak velocity of 1.87 m/s. The peak and mean gradients are 14 mmHg and 8 mmHg, respectively with a dimensionless index of 0.53. There is trace to mild aortic valve regurgitation. Mitral Valve: The mitral valve is normal in structure. There is no evidence of mitral valve regurgitation. The E Vmax is 0.68 m/s. Tricuspid Valve: The tricuspid valve is structurally normal. No evidence of tricuspid regurgitation. Pulmonic Valve: The pulmonic valve is structurally normal. There is physiologic pulmonic valve regurgitation. Pericardium: There is no pericardial effusion noted. Aorta: The aortic root is normal.  CONCLUSIONS:  1. The left ventricular systolic function is normal with a visually estimated ejection fraction of 60-65%.  2. Spectral Doppler shows a Grade I (impaired relaxation pattern) of left ventricular diastolic filling with normal left atrial filling pressure. QUANTITATIVE DATA SUMMARY:  2D MEASUREMENTS:          Normal Ranges: Ao Root d:       3.70 cm  (2.0-3.7cm) LAs:             2.80 cm  (2.7-4.0cm) IVSd:            0.95 cm  (0.6-1.1cm) LVPWd:           0.80 cm  (0.6-1.1cm) LVIDd:           4.90 cm  (3.9-5.9cm) LVIDs:           3.10 cm LV Mass Index:    92 g/m2 LVEDV Index:     47 ml/m2 LV % FS          36.7 %  LEFT ATRIUM:                  Normal Ranges: LA Vol A4C:        32.0 ml    (22+/-6mL/m2) LA Vol A2C:        48.2 ml LA Vol BP:         39.7 ml LA Vol Index A4C:  19.3ml/m2 LA Vol Index A2C:  29.0 ml/m2 LA Vol Index BP:   23.9 ml/m2 LA Area A4C:       14.0 cm2 LA Area A2C:       17.0 cm2 LA Major Axis A4C: 5.2 cm LA Major Axis A2C: 5.1 cm LA Volume Index:   22.0 ml/m2  RIGHT ATRIUM:          Normal Ranges: RA Area A4C:  11.0 cm2  M-MODE MEASUREMENTS:         Normal Ranges: Ao Root:             3.60 cm (2.0-3.7cm) LAs:                 4.40 cm (2.7-4.0cm)  AORTA MEASUREMENTS:         Normal Ranges: Asc Ao, d:          3.60 cm (2.1-3.4cm)  LV SYSTOLIC FUNCTION:                      Normal Ranges: EF-A4C View:    66 % (>=55%) EF-A2C View:    58 % EF-Biplane:     61 % EF-Visual:      63 % LV EF Reported: 63 %  LV DIASTOLIC FUNCTION:            Normal Ranges: MV Peak E:             0.68 m/s   (0.7-1.2 m/s) MV Peak A:             0.85 m/s   (0.42-0.7 m/s) E/A Ratio:             0.80       (1.0-2.2) MV e'                  0.091 m/s  (>8.0) MV lateral e'          0.10 m/s MV medial e'           0.08 m/s E/e' Ratio:            7.46       (<8.0) PulmV Sys Pollo:         97.10 cm/s PulmV Brasher Pollo:        69.20 cm/s PulmV S/D Pollo:         1.40  MITRAL VALVE:          Normal Ranges: MV DT:        148 msec (150-240msec)  AORTIC VALVE:                      Normal Ranges: AoV Vmax:                1.87 m/s  (<=1.7m/s) AoV Peak P.0 mmHg (<20mmHg) AoV Mean P.0 mmHg  (1.7-11.5mmHg) LVOT Max Pollo:            0.95 m/s  (<=1.1m/s) AoV VTI:                 40.60 cm  (18-25cm) LVOT VTI:                21.40 cm LVOT Diameter:           2.30 cm   (1.8-2.4cm) AoV Area, VTI:           2.19 cm2  (2.5-5.5cm2) AoV Area,Vmax:           2.11 cm2  (2.5-4.5cm2) AoV Dimensionless Index: 0.53  RIGHT VENTRICLE: RV Basal 2.60 cm RV Mid   2.50 cm RV Major 6.2 cm TAPSE:    20.9 mm RV s'    0.13 m/s  TRICUSPID VALVE/RVSP:         Normal Ranges: Est. RA Pressure:     3 IVC Diam:             1.50 cm  PULMONIC VALVE:          Normal Ranges: PV Max Pollo:     0.8 m/s  (0.6-0.9m/s) PV Max P.8 mmHg  PULMONARY VEINS: PulmV Brasher Pollo: 69.20 cm/s PulmV S/D Pollo:  1.40 PulmV Sys Pollo:  97.10 cm/s  [id number] Sanford Seth MD Electronically signed on 2025 at 9:50:03 AM  ** Final **     XR chest 1 view  Result Date: 2025  Interpreted By:  Jesus Prasad, STUDY: XR CHEST 1 VIEW; 2025 12:11 pm   INDICATION: Signs/Symptoms:Shortness of breath.   COMPARISON: Chest CT scan 2025   ACCESSION NUMBER(S): [id number]   ORDERING CLINICIAN: LARS DUMONT   TECHNIQUE: 1 view of the chest was performed.   FINDINGS: Persistent bilateral right-greater-than-left scattered ill-defined airspace opacities better visualized in prior chest CT scan.. Small left-sided pleural effusion. No pneumothorax.  The cardiomediastinal silhouette is stable.       1. Persistent right-greater-than-left ill-defined opacities. 2. Stable small left-sided pleural effusion   Signed by: Jesus Prasad 2025 12:25 PM Dictation workstation:   [id number]    US renal complete  Result Date: 2025  Interpreted By:  Jesus Prasad, STUDY: US RENAL COMPLETE;  8/10/2025 11:15 am   INDICATION: Signs/Symptoms:Acute Kidney Injury.     COMPARISON: None.   ACCESSION NUMBER(S): [id number]   ORDERING CLINICIAN: NICHOLE WALLACE   TECHNIQUE: Multiple images of the kidneys were obtained  .   FINDINGS: RIGHT KIDNEY: The right kidney measures 12.5 cm in length. The renal cortical echogenicity and thickness are within normal limits. No hydronephrosis is present; no evidence of nephrolithiasis. Trace perinephric fluid.   LEFT KIDNEY: The left kidney measures 12.6 cm in length. The renal cortical echogenicity and thickness are within normal limits. Midpole nonobstructive nephrolithiasis measuring 0.8 cm.   BLADDER: Unremarkable       1.  Left midpole nonobstructive nephroliths measuring 0.8 cm. 2. Trace right perinephric fluid. 3. Small left-sided pleural effusion.   MACRO: None     Signed by: Jesus Prasad 8/11/2025 6:44 AM Dictation workstation:   [id number]    EEG  IMPRESSION Impression This awake and sleep routine is normal. No epileptiform discharges or lateralizing signs are seen. A full report will be scanned into the patient's chart at a later time. This report has been interpreted and electronically signed by    MR brain w and wo IV contrast  Result Date: 8/9/2025  Interpreted By:  Salvatore Marcelo, STUDY: MR BRAIN W AND WO IV CONTRAST;  8/8/2025 7:13 pm   INDICATION: Signs/Symptoms:AMS, question of seizure.     COMPARISON: CT of August 7, 2020   ACCESSION NUMBER(S): [id number]   ORDERING CLINICIAN: YANCY LIMA   TECHNIQUE: Standard multiplanar multisequence MR imaging was performed through the brain prior to and following administration of 13 ML Dotarem intravenous contrast.   FINDINGS: Parenchyma:  There is a background of age-related volume loss and presumed ischemic white matter demyelination.   Ventricles: There is no hydrocephalus.   Extra-axial spaces: No abnormal extra-axial fluid collection. Basal cisterns are patent.   Vessels: T2 flow voids are maintained.   Orbits:  The patient is status post bilateral lens surgery.   Paranasal sinuses and mastoid air cells: No fluid levels are present.   Skull: There is normal T1 marrow signal without evidence of aggressive lesion.   Soft tissues: Unremarkable       1. Negative head MRI examination for acute change. 2. There is a background of age-related volume loss, atherosclerotic disease, and ischemic white matter demyelination.   MACRO: None   Signed by: Salvatore Marcelo 8/9/2025 6:57 AM Dictation workstation:   [id number]    CT chest wo IV contrast  Result Date: 8/8/2025  Interpreted By:  Finkelstein, Evan, STUDY: CT CHEST WO IV CONTRAST;  8/7/2025 7:52 pm   INDICATION:  Signs/Symptoms:Acute hypoxic respiratory failure.     COMPARISON: Chest radiograph 08/07/2025.   ACCESSION NUMBER(S): [id number]   ORDERING CLINICIAN: LARS DUMONT   TECHNIQUE: Axial CT images of the chest obtained  without IV contrast.  Sagittal and coronal reconstructions were created..   FINDINGS: CHEST WALL AND LOWER NECK: Hypodense nodule in the right thyroid lobe measuring up to 2.6 cm. UPPER ABDOMEN: No acute abnormality of the partially visualized abdomen.   VESSELS: Aorta and pulmonary artery are normal caliber. Atherosclerotic calcifications in the aorta and coronary arteries. HEART: Normal size. No pericardial effusion. MEDIASTINUM AND TIAGO: No pathologically enlarged lymph nodes. LUNG, PLEURA, AND LARGE AIRWAYS: Small left pleural effusion. 3 cm right upper lobe mass best seen on image 99 of series 601 is stable compared to prior imaging 02/28/2024 when imaged in the same plane. 7 mm right upper lobe pulmonary nodule image 132 is stable compared to prior imaging. Stable partially calcified 1.3 cm nodule in the right upper lobe image 148. There are rounded opacities in the left upper and lower lobes with peripheral ground-glass attenuation that are new from prior imaging 10/28/2024.   BONES: No acute osseous abnormality. Prominent Schmorl's node in the superior T12 endplate.       Rounded opacities in the left upper and lower lobes with peripheral ground-glass attenuation that are new from prior imaging 10/28/2024 concerning for pneumonia. Given the nodular appearance of the lesions, however, recommend interval follow-up with CT after treatment to confirm resolution and exclude an underlying neoplastic process.   Scattered pulmonary nodules and masses throughout the right lung which are stable compared to prior imaging.   Small left pleural effusion.   2.6 cm hypodense right thyroid nodule likely corresponding to abnormality on prior thyroid ultrasound 11/11/2024 for which a biopsy was recommended.    MACRO: None.   Signed by: Evan Finkelstein 8/8/2025 12:23 AM Dictation workstation:   [id number]    XR chest 1 view  Result Date: 8/7/2025  Interpreted By:  Rustam Hilliard, STUDY: XR CHEST 1 VIEW;  8/7/2025 1:58 pm   INDICATION: Signs/Symptoms:cough.     COMPARISON: 01/19/2025   ACCESSION NUMBER(S): [id number]   ORDERING CLINICIAN: RADHA SUTHERLAND   FINDINGS:         CARDIOMEDIASTINAL SILHOUETTE: Cardiomediastinal silhouette is mildly enlarged   LUNGS: Redemonstration of multiple nodular opacities over the right lung similar to the prior. The largest measuring 15 mm over the right upper lung. Increased perihilar vascular congestion and mild edema present.   ABDOMEN: No remarkable upper abdominal findings.   BONES: No acute osseous changes.       1.  Redemonstration of several nodular opacities over the right lung the largest measuring 15 mm over the upper lungs. These appear similar to the prior. Repeat CT is advised for further evaluation of these findings       MACRO: None   Signed by: Rustam Hilliard 8/7/2025 2:08 PM Dictation workstation:   [id number]    CT head wo IV contrast  Result Date: 8/7/2025  Interpreted By:  Ezra Tracy, STUDY: CT HEAD WO IV CONTRAST;  8/7/2025 1:25 pm   INDICATION: Signs/Symptoms:confusion.     COMPARISON: MRI brain 4 April 2022   ACCESSION NUMBER(S): [id number]   ORDERING CLINICIAN: RADHA SUTHERLAND   TECHNIQUE: CT of the brain from the skull vertex to the skull base, without intravenous contrast   FINDINGS: ACUTE INTRA-AXIAL HEMORRHAGE:  Negative   ACUTE EXTRA-AXIAL/SUBDURAL HEMORRHAGE:  Negative   ACUTE INTRACRANIAL MASS EFFECT:  Negative   CT EVIDENCE OF ACUTE / SUBACUTE TERRITORIAL ISCHEMIA:  Negative   VENTRICLES:  Normal caliber and configuration   OTHER BRAIN FINDINGS:  Densely calcified bilateral carotid siphons   INCLUDED PARANASAL SINUSES: Only a fraction of the maxillary sinuses are included in the field of view but on the left, included portion completely  opacified. Mucosal thickening and some of the ethmoid air cells   INCLUDED MASTOID AIR CELLS: All clear   SKULL:  No lytic or blastic lesion   EXTRACRANIAL SOFT TISSUES:  Scalp and occular globes grossly normal by CT       NO ACUTE INTRACRANIAL PROCESS   MACRO: None   Signed by: Ezra Tracy 8/7/2025 1:49 PM Dictation workstation:   [id number]         Assessment/Plan   IMP:  Radha Deluna is a 75 y.o. female with past medical history of COPD, CAD, hypertension, and rheumatoid arthritis on Humira, methotrexate, and Plaquenil,initially admitted 8/7/2025 under the medicine service with altered mental status and acute hypoxic respiratory failure.  There was concern for pneumonia as well as COPD exacerbation and other opportunistic infections, and so pulmonology was consulted and the patient did have bronchoscopy 8/15/2025.  ID was consulted for close management of IV antibiotics.  Patient also developed FAUSITNA, and so nephrology was consulted, with the patient's creatinine peaking at 4.3, and consideration was severe ATN.  Of note, the patient is DNR CCA DNI CODE STATUS.  Patient did have persistent altered mentation and was refusing treatments and medications, and so palliative care was consulted to discuss goals of care and advance care planning.    Recommendations  - The patient is DNR CCA DNI CODE STATUS, which is appropriate given her underlying comorbidities and current clinical status  - The patient is now participating in all of her care and is having improved mentation  - The patient would like to work with rehabilitative efforts in order to restore functional ability  - Outpatient palliative care may be of benefit for ongoing symptom management and goals of care discussions    8/18/2025-   I introduced the practice of palliative care and the services it provides. I then reviewed at length with the patient the clinical diagnosis/medical problems that the patient presented with and the treatments provided so  far. We discussed the implications of the current circumstances and option plans going forward.  The patient denied all symptoms of discomfort and stated she is generally feeling well, and more back to her baseline, and so no additional medications will be recommended per our service at this time.  The patient did state her goal is to work with PT and OT in order to come back to her baseline functional ability.  I discussed with her outpatient palliative care may be of benefit for ongoing symptom management and goals of care discussions, although the patient may not yet require the services given her level of overall improvement.  The patient did confirm CODE STATUS of DNR CCA DNI, and stated she is otherwise comfortable with her current level of care.  I answered her questions and concerns to the best of my ability and offered emotional support.  The patient was able to demonstrate mental status A&O x 4 this morning, and is conversing and interacting very appropriately and without anomaly.  Palliative care will follow peripherally while the patient remains in the hospital setting.    Thank you for allowing us to participate in the care of this lady.  Should you have any further questions or concerns regarding her care, please do not hesitate to contact us via Haiku/Epic Chat (Keisha Ballard during weekdays work hours and Spenser Menard during weekdays after hours and over the weekend)    (This note was generated with voice recognition software and may contain errors including spelling, grammar, syntax and misrecognition of what was dictated, that are not fully corrected.)    Patient/proxy preference for information  Prefers full information    Goals of Care  The patient is DNR CCA DNI CODE STATUS.  Her goal is for total rehabilitative effort.    I spent 65 minutes in the professional and overall care of this patient.      Keisha Ballard, APRN-CNP       [1]   Family History  Problem Relation Name Age of Onset     Other (cardiac disorder) Mother      Other (cardiac disorder) Father      Stroke Father      Coronary artery disease Father      Hypertension Father      Diabetes Other     [2] acetylcysteine, 3 mL, nebulization, TID  azelastine, 2 spray, Each Nostril, BID  budesonide, 0.25 mg, nebulization, Daily  clopidogrel, 75 mg, oral, Daily  fluticasone, 2 spray, Each Nostril, Daily  gabapentin, 300 mg, oral, BID  heparin (porcine), 5,000 Units, subcutaneous, q8h  [Held by provider] hydroxychloroquine, 300 mg, oral, Daily  ipratropium, 2 spray, Each Nostril, 4x daily  ipratropium-albuteroL, 3 mL, nebulization, TID  isosorbide mononitrate ER, 60 mg, oral, Daily  [Held by provider] methotrexate, 20 mg, intramuscular, Every Sunday  methylPREDNISolone sodium succinate (PF), 60 mg, intravenous, q6h  metoprolol tartrate, 50 mg, oral, q12h  montelukast, 10 mg, oral, Daily  nystatin, 5 mL, Mouth/Throat, TID  pantoprazole, 40 mg, oral, Daily  pravastatin, 20 mg, oral, Nightly  primidone, 150 mg, oral, Daily  sennosides-docusate sodium, 1 tablet, oral, Daily  [3]    [4] PRN medications: albuterol, benzocaine-menthol, HYDROcodone-acetaminophen, ipratropium-albuteroL, lubricating eye drops, metoclopramide, naloxone, nitroglycerin, oxygen, polyethylene glycol

## 2025-08-18 NOTE — PROGRESS NOTES
Infectious disease progress note  Subjective   Pneumonia    Antibiotics  None on board currently    Objective   Range of Vitals (last 24 hours)  Heart Rate:  [57-60]   Temp:  [36 °C (96.8 °F)-36.2 °C (97.2 °F)]   Resp:  [16-18]   BP: (130-151)/(60-70)   SpO2:  [94 %-99 %]   Daily Weight  08/14/25 : 65.7 kg (144 lb 13.5 oz)    Body mass index is 25.66 kg/m².      Physical Exam  Patient resting in bed on 2 L oxygen  Family member by bedside  1 over cultures and preliminary results and what we are following up      Relevant Results  Labs  Lab Results   Component Value Date    WBC 9.0 08/18/2025    HGB 9.8 (L) 08/18/2025    HCT 30.6 (L) 08/18/2025     (H) 08/18/2025     08/18/2025     Lab Results   Component Value Date    GLUCOSE 109 (H) 08/18/2025    CALCIUM 7.1 (L) 08/18/2025     08/18/2025    K 4.7 08/18/2025    CO2 28 08/18/2025     (H) 08/18/2025    BUN 50 (H) 08/18/2025    CREATININE 1.70 (H) 08/18/2025   ESR: --  Lab Results   Component Value Date    SEDRATE 76 (H) 08/13/2025     Lab Results   Component Value Date    CRP 17.37 (H) 08/13/2025     Lab Results   Component Value Date    ALT 6 (L) 08/07/2025    AST 11 08/07/2025    ALKPHOS 60 08/07/2025    BILITOT 0.6 08/07/2025       Microbiology  8-7-2025 blood culture no growth 4 days  8-9-2025 respiratory culture no predominating pathogen  8-9-2025 MRSA PCR negative  8- urine Legionella antigen and strep antigen negative  8- BAL for fungus no fungal elements seen  8- respiratory culture from BAL is growing E. coli, Stenotrophomonas maltophilia and Prevotella melena Crista with sensitivities pending  8- respiratory viral panel pending, Aspergillus galactomannan pending, Coccidioides antibody pending    Imaging  8- chest x-ray redemonstration of scattered ill-defined airspace opacity right greater than left    Assessment/Plan   1.  For now we will start Bactrim orally and minocycline till the sensitivity  results are back and the E. coli,  and Prevotella  2.  Will follow-up on viral panel and fungal cultures and panels which are still pending      Other issues  Rheumatoid arthritis; on Humira and methotrexate  Immunocompromised host    I reviewed and interpreted all lab test imaging studies and documentations from other healthcare providers  I am monitoring for antibiotic side effects and toxicity     Ansley Stokes MD

## 2025-08-18 NOTE — PROGRESS NOTES
[facility] Hospitalist Progress Note        Name: Radha Deluna  :  1950(75 y.o.)  MRN:  [id number]    Date: 25     ASSESSMENT & PLAN   75 y.o. female with rheumatoid arthritis, immunocompromised on Humira, methotrexate, Plaquenil, who presented with acute hypoxic respiratory failure and delirium on . CT chest performed admission is concerning of subacute rounded opacities in the left upper and lower lobes with peripheral ground-glass attenuation that are new from prior imaging 10/28/2024 concerning for pneumonia.    Acute hypoxic respiratory failure 2/2 CAP  Abnormal CT Chest  - Pulm and ID following. Bronchoscopy on 8/15/25  -BAL with MSSA, started on Cefepime, Bactrim and minocycline by ID   -Will reach out to ID regarding bactrim given recovering ATN  -Solumedrol weaned   -GARRICK, ANCA negative  -Wean o2 for goal o2 sat 88-92%; continue pulmonary hygiene     FAUSTNIA/ATN  -Nephrology following, Cr peaked at 4.3, gradually improving     Acute metabolic encephalopathy  Concern for underlying dementia   -MRI shows no acute CVA  -EEG: no epileptiform activity  -Avoid sedating medications per neurology  -Improved      HTN  -Continue metoprolol     CAD  -Continue plavix, statin, bb, imdur     RA  -Hold hydroxychloroquine, methotrexate     Chronic back pain  -Renally dosed gabapentin, continue home norco     Allergic rhinitis  - azelastine, flonase, singulair     DVT Prophylaxis: subcutaneous heparin  Code status: DNR and No Intubation      SUBJECTIVE   Interval History:   Discussed with pulm, will wean steroids. Started on antibiotics by ID.    Review of Systems:   Other than patient's chronic conditions and those complaints in the history above, the rest of the 10 systems review were done and were negative.     Current medications:  Scheduled Meds:Scheduled Medications[1]  Continuous Infusions:Continuous Medications[2]  PRN Meds:PRN Medications[3]    OBJECTIVE     Vitals:    25 0448 25 0642  08/18/25 1136 08/18/25 1159   BP: 130/60  156/73    BP Location: Left arm  Left arm    Patient Position: Lying  Lying    Pulse: 57  59    Resp: 16  20    Temp: 36.2 °C (97.2 °F)  35.3 °C (95.5 °F)    TempSrc: Temporal  Temporal    SpO2: 98% 94% 96% 95%   Weight:       Height:            Physical Exam  Vitals and nursing note reviewed.   HENT:      Mouth/Throat:      Mouth: Mucous membranes are moist.      Pharynx: Oropharynx is clear.     Cardiovascular:      Rate and Rhythm: Normal rate and regular rhythm.   Pulmonary:      Effort: Pulmonary effort is normal.      Comments: Decreased breath sounds at bases  Abdominal:      Palpations: Abdomen is soft.     Musculoskeletal:      Right lower leg: No edema.      Left lower leg: No edema.     Neurological:      Mental Status: She is alert.         Labs:   Lab Results   Component Value Date     08/18/2025    K 4.7 08/18/2025     (H) 08/18/2025    CO2 28 08/18/2025    BUN 50 (H) 08/18/2025    CREATININE 1.70 (H) 08/18/2025    GLUCOSE 109 (H) 08/18/2025    CALCIUM 7.1 (L) 08/18/2025    PROT 6.9 08/07/2025    BILITOT 0.6 08/07/2025    ALKPHOS 60 08/07/2025    AST 11 08/07/2025    ALT 6 (L) 08/07/2025       Lab Results   Component Value Date    WBC 9.0 08/18/2025    HGB 9.8 (L) 08/18/2025    HCT 30.6 (L) 08/18/2025     (H) 08/18/2025     08/18/2025       Imaging (within the past 24 hours):   Imaging  No results found.      Cardiology, Vascular, and Other Imaging  No other imaging results found for the past 2 days        Electronically signed by Mariusz Moore DO on 08/18/25 at 3:42 PM            [1] acetylcysteine, 3 mL, nebulization, TID  azelastine, 2 spray, Each Nostril, BID  budesonide, 0.25 mg, nebulization, Daily  cefepime, 1 g, intravenous, q12h  clopidogrel, 75 mg, oral, Daily  fluticasone, 2 spray, Each Nostril, Daily  gabapentin, 300 mg, oral, BID  heparin (porcine), 5,000 Units, subcutaneous, q8h  [Held by provider] hydroxychloroquine,  300 mg, oral, Daily  ipratropium, 2 spray, Each Nostril, 4x daily  ipratropium-albuteroL, 3 mL, nebulization, TID  isosorbide mononitrate ER, 60 mg, oral, Daily  [Held by provider] methotrexate, 20 mg, intramuscular, Every Sunday  [START ON 8/19/2025] methylPREDNISolone sodium succinate (PF), 20 mg, intravenous, q12h  metoprolol tartrate, 50 mg, oral, q12h  minocycline, 200 mg, oral, q12h MARCY  montelukast, 10 mg, oral, Daily  nystatin, 5 mL, Mouth/Throat, TID  pantoprazole, 40 mg, oral, Daily  pravastatin, 20 mg, oral, Nightly  primidone, 150 mg, oral, Daily  sennosides-docusate sodium, 1 tablet, oral, Daily  sulfamethoxazole-trimethoprim, 2 tablet, oral, q12h MARCY     [2]    [3] PRN medications: albuterol, benzocaine-menthol, HYDROcodone-acetaminophen, ipratropium-albuteroL, lubricating eye drops, metoclopramide, naloxone, nitroglycerin, oxygen, polyethylene glycol

## 2025-08-18 NOTE — CARE PLAN
The patient's goals for the shift include      The clinical goals for the shift include Remain hemodynamically stable      Problem: Pain - Adult  Goal: Verbalizes/displays adequate comfort level or baseline comfort level  Outcome: Progressing     Problem: Safety - Adult  Goal: Free from fall injury  Outcome: Progressing     Problem: Discharge Planning  Goal: Discharge to home or other facility with appropriate resources  Outcome: Progressing     Problem: Chronic Conditions and Co-morbidities  Goal: Patient's chronic conditions and co-morbidity symptoms are monitored and maintained or improved  Outcome: Progressing     Problem: Nutrition  Goal: Nutrient intake appropriate for maintaining nutritional needs  Outcome: Progressing     Problem: Skin  Goal: Decreased wound size/increased tissue granulation at next dressing change  Outcome: Progressing  Flowsheets (Taken 8/18/2025 1855)  Decreased wound size/increased tissue granulation at next dressing change: Promote sleep for wound healing  Goal: Participates in plan/prevention/treatment measures  Outcome: Progressing  Flowsheets (Taken 8/18/2025 1855)  Participates in plan/prevention/treatment measures: Discuss with provider PT/OT consult  Goal: Prevent/manage excess moisture  Outcome: Progressing  Flowsheets (Taken 8/18/2025 1855)  Prevent/manage excess moisture: Cleanse incontinence/protect with barrier cream  Goal: Prevent/minimize sheer/friction injuries  Outcome: Progressing  Flowsheets (Taken 8/18/2025 1855)  Prevent/minimize sheer/friction injuries: Increase activity/out of bed for meals  Goal: Promote/optimize nutrition  Outcome: Progressing  Flowsheets (Taken 8/18/2025 1855)  Promote/optimize nutrition: Monitor/record intake including meals  Goal: Promote skin healing  Outcome: Progressing  Flowsheets (Taken 8/18/2025 1855)  Promote skin healing: Rotate device position/do not position patient on device     Problem: Pain  Goal: Takes deep breaths with improved  pain control throughout the shift  Outcome: Progressing  Goal: Turns in bed with improved pain control throughout the shift  Outcome: Progressing  Goal: Walks with improved pain control throughout the shift  Outcome: Progressing  Goal: Performs ADL's with improved pain control throughout shift  Outcome: Progressing  Goal: Participates in PT with improved pain control throughout the shift  Outcome: Progressing  Goal: Free from opioid side effects throughout the shift  Outcome: Progressing  Goal: Free from acute confusion related to pain meds throughout the shift  Outcome: Progressing

## 2025-08-19 ENCOUNTER — APPOINTMENT (OUTPATIENT)
Dept: PRIMARY CARE | Facility: CLINIC | Age: 75
End: 2025-08-19
Payer: MEDICARE

## 2025-08-19 LAB
ANION GAP SERPL CALC-SCNC: 9 MMOL/L (ref 10–20)
ASPERGILLUS GALACTOMANNAN EIA,SERUM: 0.04
BASOPHILS # BLD AUTO: 0.02 X10*3/UL (ref 0–0.1)
BASOPHILS NFR BLD AUTO: 0.2 %
BUN SERPL-MCNC: 44 MG/DL (ref 6–23)
CALCIUM SERPL-MCNC: 7.1 MG/DL (ref 8.6–10.3)
CHLORIDE SERPL-SCNC: 110 MMOL/L (ref 98–107)
CO2 SERPL-SCNC: 25 MMOL/L (ref 21–32)
CREAT SERPL-MCNC: 1.78 MG/DL (ref 0.5–1.05)
EGFRCR SERPLBLD CKD-EPI 2021: 29 ML/MIN/1.73M*2
EOSINOPHIL # BLD AUTO: 0.03 X10*3/UL (ref 0–0.4)
EOSINOPHIL NFR BLD AUTO: 0.2 %
ERYTHROCYTE [DISTWIDTH] IN BLOOD BY AUTOMATED COUNT: 14.4 % (ref 11.5–14.5)
ERYTHROCYTE [DISTWIDTH] IN BLOOD BY AUTOMATED COUNT: 14.6 % (ref 11.5–14.5)
GLUCOSE SERPL-MCNC: 104 MG/DL (ref 74–99)
HCT VFR BLD AUTO: 31.6 % (ref 36–46)
HCT VFR BLD AUTO: 32.7 % (ref 36–46)
HGB BLD-MCNC: 10.2 G/DL (ref 12–16)
HGB BLD-MCNC: 10.8 G/DL (ref 12–16)
HOLD SPECIMEN: NORMAL
HOLD SPECIMEN: NORMAL
IMM GRANULOCYTES # BLD AUTO: 0.08 X10*3/UL (ref 0–0.5)
IMM GRANULOCYTES NFR BLD AUTO: 0.6 % (ref 0–0.9)
LABORATORY COMMENT REPORT: NORMAL
LABORATORY COMMENT REPORT: NORMAL
LYMPHOCYTES # BLD AUTO: 0.89 X10*3/UL (ref 0.8–3)
LYMPHOCYTES NFR BLD AUTO: 7 %
MCH RBC QN AUTO: 34 PG (ref 26–34)
MCH RBC QN AUTO: 34.6 PG (ref 26–34)
MCHC RBC AUTO-ENTMCNC: 32.3 G/DL (ref 32–36)
MCHC RBC AUTO-ENTMCNC: 33 G/DL (ref 32–36)
MCV RBC AUTO: 105 FL (ref 80–100)
MCV RBC AUTO: 105 FL (ref 80–100)
MONOCYTES # BLD AUTO: 0.63 X10*3/UL (ref 0.05–0.8)
MONOCYTES NFR BLD AUTO: 5 %
NEUTROPHILS # BLD AUTO: 11.01 X10*3/UL (ref 1.6–5.5)
NEUTROPHILS NFR BLD AUTO: 87 %
NRBC BLD-RTO: 0 /100 WBCS (ref 0–0)
NRBC BLD-RTO: 0 /100 WBCS (ref 0–0)
PATH REPORT.FINAL DX SPEC: NORMAL
PATH REPORT.GROSS SPEC: NORMAL
PATH REPORT.TOTAL CANCER: NORMAL
PLATELET # BLD AUTO: 241 X10*3/UL (ref 150–450)
PLATELET # BLD AUTO: 244 X10*3/UL (ref 150–450)
POTASSIUM SERPL-SCNC: 4.4 MMOL/L (ref 3.5–5.3)
RBC # BLD AUTO: 3 X10*6/UL (ref 4–5.2)
RBC # BLD AUTO: 3.12 X10*6/UL (ref 4–5.2)
SODIUM SERPL-SCNC: 140 MMOL/L (ref 136–145)
WBC # BLD AUTO: 12.7 X10*3/UL (ref 4.4–11.3)
WBC # BLD AUTO: 16.2 X10*3/UL (ref 4.4–11.3)

## 2025-08-19 PROCEDURE — 2500000002 HC RX 250 W HCPCS SELF ADMINISTERED DRUGS (ALT 637 FOR MEDICARE OP, ALT 636 FOR OP/ED): Performed by: INTERNAL MEDICINE

## 2025-08-19 PROCEDURE — 2500000004 HC RX 250 GENERAL PHARMACY W/ HCPCS (ALT 636 FOR OP/ED): Performed by: INTERNAL MEDICINE

## 2025-08-19 PROCEDURE — 97116 GAIT TRAINING THERAPY: CPT | Mod: GP,CQ

## 2025-08-19 PROCEDURE — 94640 AIRWAY INHALATION TREATMENT: CPT

## 2025-08-19 PROCEDURE — 2500000001 HC RX 250 WO HCPCS SELF ADMINISTERED DRUGS (ALT 637 FOR MEDICARE OP): Performed by: STUDENT IN AN ORGANIZED HEALTH CARE EDUCATION/TRAINING PROGRAM

## 2025-08-19 PROCEDURE — 80048 BASIC METABOLIC PNL TOTAL CA: CPT | Performed by: HOSPITALIST

## 2025-08-19 PROCEDURE — 2500000001 HC RX 250 WO HCPCS SELF ADMINISTERED DRUGS (ALT 637 FOR MEDICARE OP): Performed by: INTERNAL MEDICINE

## 2025-08-19 PROCEDURE — 85027 COMPLETE CBC AUTOMATED: CPT | Performed by: STUDENT IN AN ORGANIZED HEALTH CARE EDUCATION/TRAINING PROGRAM

## 2025-08-19 PROCEDURE — 1100000001 HC PRIVATE ROOM DAILY

## 2025-08-19 PROCEDURE — 97535 SELF CARE MNGMENT TRAINING: CPT | Mod: CO,GO

## 2025-08-19 PROCEDURE — 99232 SBSQ HOSP IP/OBS MODERATE 35: CPT | Performed by: STUDENT IN AN ORGANIZED HEALTH CARE EDUCATION/TRAINING PROGRAM

## 2025-08-19 PROCEDURE — 2500000001 HC RX 250 WO HCPCS SELF ADMINISTERED DRUGS (ALT 637 FOR MEDICARE OP): Performed by: HOSPITALIST

## 2025-08-19 PROCEDURE — 36415 COLL VENOUS BLD VENIPUNCTURE: CPT | Performed by: STUDENT IN AN ORGANIZED HEALTH CARE EDUCATION/TRAINING PROGRAM

## 2025-08-19 PROCEDURE — 85025 COMPLETE CBC W/AUTO DIFF WBC: CPT | Performed by: HOSPITALIST

## 2025-08-19 PROCEDURE — 2500000001 HC RX 250 WO HCPCS SELF ADMINISTERED DRUGS (ALT 637 FOR MEDICARE OP): Performed by: NURSE PRACTITIONER

## 2025-08-19 PROCEDURE — 36415 COLL VENOUS BLD VENIPUNCTURE: CPT | Performed by: HOSPITALIST

## 2025-08-19 RX ADMIN — BUDESONIDE 0.25 MG: 0.25 INHALANT RESPIRATORY (INHALATION) at 06:47

## 2025-08-19 RX ADMIN — AZELASTINE HYDROCHLORIDE 2 SPRAY: 137 SPRAY, METERED NASAL at 09:22

## 2025-08-19 RX ADMIN — IPRATROPIUM BROMIDE 2 SPRAY: 42 SPRAY, METERED NASAL at 09:21

## 2025-08-19 RX ADMIN — PANTOPRAZOLE SODIUM 40 MG: 40 TABLET, DELAYED RELEASE ORAL at 05:25

## 2025-08-19 RX ADMIN — MONTELUKAST 10 MG: 10 TABLET, FILM COATED ORAL at 09:23

## 2025-08-19 RX ADMIN — SENNOSIDES AND DOCUSATE SODIUM 1 TABLET: 50; 8.6 TABLET ORAL at 09:22

## 2025-08-19 RX ADMIN — ACETYLCYSTEINE 600 MG: 200 SOLUTION ORAL; RESPIRATORY (INHALATION) at 06:47

## 2025-08-19 RX ADMIN — HEPARIN SODIUM 5000 UNITS: 5000 INJECTION, SOLUTION INTRAVENOUS; SUBCUTANEOUS at 17:05

## 2025-08-19 RX ADMIN — IPRATROPIUM BROMIDE 2 SPRAY: 42 SPRAY, METERED NASAL at 17:04

## 2025-08-19 RX ADMIN — HYDROCODONE BITARTRATE AND ACETAMINOPHEN 1 TABLET: 7.5; 325 TABLET ORAL at 20:05

## 2025-08-19 RX ADMIN — METHYLPREDNISOLONE SODIUM SUCCINATE 20 MG: 40 INJECTION, POWDER, FOR SOLUTION INTRAMUSCULAR; INTRAVENOUS at 13:49

## 2025-08-19 RX ADMIN — METHYLPREDNISOLONE SODIUM SUCCINATE 20 MG: 40 INJECTION, POWDER, FOR SOLUTION INTRAMUSCULAR; INTRAVENOUS at 02:21

## 2025-08-19 RX ADMIN — FLUTICASONE PROPIONATE 2 SPRAY: 50 SPRAY, METERED NASAL at 09:38

## 2025-08-19 RX ADMIN — ISOSORBIDE MONONITRATE 60 MG: 60 TABLET, EXTENDED RELEASE ORAL at 09:22

## 2025-08-19 RX ADMIN — NYSTATIN 500000 UNITS: 100000 SUSPENSION ORAL at 09:22

## 2025-08-19 RX ADMIN — NYSTATIN 500000 UNITS: 100000 SUSPENSION ORAL at 20:05

## 2025-08-19 RX ADMIN — PRAVASTATIN SODIUM 20 MG: 20 TABLET ORAL at 20:05

## 2025-08-19 RX ADMIN — CLOPIDOGREL BISULFATE 75 MG: 75 TABLET, FILM COATED ORAL at 09:23

## 2025-08-19 RX ADMIN — METOPROLOL TARTRATE 50 MG: 50 TABLET, FILM COATED ORAL at 09:23

## 2025-08-19 RX ADMIN — PRIMIDONE 150 MG: 50 TABLET ORAL at 05:26

## 2025-08-19 RX ADMIN — IPRATROPIUM BROMIDE AND ALBUTEROL SULFATE 3 ML: .5; 3 SOLUTION RESPIRATORY (INHALATION) at 06:47

## 2025-08-19 RX ADMIN — METOPROLOL TARTRATE 50 MG: 50 TABLET, FILM COATED ORAL at 20:05

## 2025-08-19 RX ADMIN — HEPARIN SODIUM 5000 UNITS: 5000 INJECTION, SOLUTION INTRAVENOUS; SUBCUTANEOUS at 02:22

## 2025-08-19 RX ADMIN — IPRATROPIUM BROMIDE 2 SPRAY: 42 SPRAY, METERED NASAL at 13:49

## 2025-08-19 RX ADMIN — IPRATROPIUM BROMIDE AND ALBUTEROL SULFATE 3 ML: .5; 3 SOLUTION RESPIRATORY (INHALATION) at 18:38

## 2025-08-19 RX ADMIN — NYSTATIN 500000 UNITS: 100000 SUSPENSION ORAL at 17:04

## 2025-08-19 RX ADMIN — ACETYLCYSTEINE 600 MG: 200 SOLUTION ORAL; RESPIRATORY (INHALATION) at 18:38

## 2025-08-19 RX ADMIN — CEFEPIME HYDROCHLORIDE 1 G: 1 INJECTION, SOLUTION INTRAVENOUS at 17:06

## 2025-08-19 RX ADMIN — HEPARIN SODIUM 5000 UNITS: 5000 INJECTION, SOLUTION INTRAVENOUS; SUBCUTANEOUS at 09:23

## 2025-08-19 RX ADMIN — IPRATROPIUM BROMIDE AND ALBUTEROL SULFATE 3 ML: .5; 3 SOLUTION RESPIRATORY (INHALATION) at 12:06

## 2025-08-19 RX ADMIN — CEFEPIME HYDROCHLORIDE 1 G: 1 INJECTION, SOLUTION INTRAVENOUS at 04:02

## 2025-08-19 RX ADMIN — GABAPENTIN 300 MG: 300 CAPSULE ORAL at 20:08

## 2025-08-19 RX ADMIN — ACETYLCYSTEINE 600 MG: 200 SOLUTION ORAL; RESPIRATORY (INHALATION) at 12:06

## 2025-08-19 RX ADMIN — IPRATROPIUM BROMIDE 2 SPRAY: 42 SPRAY, METERED NASAL at 20:05

## 2025-08-19 RX ADMIN — MINOCYCLINE HYDROCHLORIDE 200 MG: 100 TABLET, FILM COATED ORAL at 09:22

## 2025-08-19 RX ADMIN — GABAPENTIN 300 MG: 300 CAPSULE ORAL at 09:23

## 2025-08-19 RX ADMIN — AZELASTINE HYDROCHLORIDE 2 SPRAY: 137 SPRAY, METERED NASAL at 20:05

## 2025-08-19 ASSESSMENT — COGNITIVE AND FUNCTIONAL STATUS - GENERAL
TOILETING: A LITTLE
MOVING TO AND FROM BED TO CHAIR: A LITTLE
MOBILITY SCORE: 18
PERSONAL GROOMING: A LITTLE
CLIMB 3 TO 5 STEPS WITH RAILING: A LITTLE
TURNING FROM BACK TO SIDE WHILE IN FLAT BAD: A LITTLE
STANDING UP FROM CHAIR USING ARMS: A LITTLE
DAILY ACTIVITIY SCORE: 19
DRESSING REGULAR UPPER BODY CLOTHING: A LITTLE
HELP NEEDED FOR BATHING: A LITTLE
DRESSING REGULAR LOWER BODY CLOTHING: A LITTLE
WALKING IN HOSPITAL ROOM: A LITTLE
MOVING FROM LYING ON BACK TO SITTING ON SIDE OF FLAT BED WITH BEDRAILS: A LITTLE

## 2025-08-19 ASSESSMENT — PAIN SCALES - GENERAL
PAINLEVEL_OUTOF10: 0 - NO PAIN
PAINLEVEL_OUTOF10: 0 - NO PAIN

## 2025-08-19 ASSESSMENT — ACTIVITIES OF DAILY LIVING (ADL): HOME_MANAGEMENT_TIME_ENTRY: 15

## 2025-08-19 ASSESSMENT — PAIN - FUNCTIONAL ASSESSMENT: PAIN_FUNCTIONAL_ASSESSMENT: 0-10

## 2025-08-19 NOTE — PROGRESS NOTES
Occupational Therapy    OT Treatment    Patient Name: Radha Deluna  MRN: [id number]  Department: [facility]  Room: [address] [place] [address]  Today's Date: 8/19/2025  Time Calculation  Start Time: 0922  Stop Time: 0951  Time Calculation (min): 29 min        Assessment:  End of Session Communication: Bedside nurse  End of Session Patient Position: Up in chair, Alarm on     Plan:  Treatment Interventions: ADL retraining, Functional transfer training, Endurance training, Patient/family training, Equipment evaluation/education, Compensatory technique education (energy conservation/pursed-lip breathing techniques training)  OT Frequency for Current Admission: 3 times per week during this acute inpatient hospitalization  OT Discharge Recommendations: Low intensity level of continued care  OT - OK to Discharge: Yes (to next level of care when medically cleared by physician/medical team)  Treatment Interventions: ADL retraining, Functional transfer training, Endurance training, Patient/family training, Equipment evaluation/education, Compensatory technique education (energy conservation/pursed-lip breathing techniques training)    Subjective   OT Visit Info:  OT Received On: 08/19/25  General Visit Info:  General  Co-Treatment: PT  Co-Treatment Reason: maximize functional mobility  Prior to Session Communication: Bedside nurse  Patient Position Received: pt sitting up EOB with nurse present  General Comment: in agreement to complete therapy session  Precautions:  Medical Precautions: Fall precautions (pt trialed without O2 for therapy session.)  Precautions Comment: contact/droplet precautions      Vital Signs Comment: pt pulse ox dropped to 88% post ambulation. with increased time pt able to recover to WNL     Pain:  Pain Assessment  0-10 (Numeric) Pain Score: 0 - No pain    Objective    Cognition:  Cognition  Overall Cognitive Status: Within Functional Limits       Activities of Daily Living: Grooming  Grooming Level of Assistance: Close  supervision  Grooming Where Assessed: Standing sinkside  Grooming Comments: brush teeth and brush hair    LE Dressing  LE Dressing: Yes  Pants Level of Assistance: Close supervision  LE Dressing Where Assessed: Edge of bed  LE Dressing Comments: SBA To doff/don tennis shoes and fasten laces  Functional Standing Tolerance:  Time: 5:00 with FWW  Bed Mobility/Transfers:      Transfers  Transfer: Yes  Transfer 1  Technique 1: Sit to stand, Stand to sit  Transfer Device 1:  (no device)  Transfer Level of Assistance 1: Close supervision      Functional Mobility:  Functional Mobility  Functional Mobility Performed: Yes  Functional Mobility 1  Device 1: No device  Assistance 1: Minimum assistance  Comments 1: pt ambulated in room and out into hallway with min vc for increased safety. pt scissoring and unsteady at times      Outcome Measures:[facility] Daily Activity  Putting on and taking off regular lower body clothing: A little  Bathing (including washing, rinsing, drying): A little  Putting on and taking off regular upper body clothing: A little  Toileting, which includes using toilet, bedpan or urinal: A little  Taking care of personal grooming such as brushing teeth: A little  Eating Meals: None  Daily Activity - Total Score: 19        Education Documentation  Body Mechanics, taught by RADHA Canchola at 8/19/2025 11:46 AM.  Learner: Patient  Readiness: Acceptance  Method: Explanation  Response: Verbalizes Understanding    Precautions, taught by RADHA Canchola at 8/19/2025 11:46 AM.  Learner: Patient  Readiness: Acceptance  Method: Explanation  Response: Verbalizes Understanding    ADL Training, taught by RADHA Canchola at 8/19/2025 11:46 AM.  Learner: Patient  Readiness: Acceptance  Method: Explanation  Response: Verbalizes Understanding    Education Comments  No comments found.             Goals:  Encounter Problems       Encounter Problems (Active)       OT Goals       Patient will complete lower  body bathing/dressing; toileting with modified independence using assistive techniques/adaptive equipment as needed  (Progressing)       Start:  08/08/25    Expected End:  08/22/25            Patient will perform bed mobility and functional transfers safely and independently: bed, chair, commode using DME as needed  (Progressing)       Start:  08/08/25    Expected End:  08/22/25            Patient will tolerate standing for 5 mins. and show overall good standing balance during ADL's and functional transfers/mobility  (Progressing)       Start:  08/08/25    Expected End:  08/22/25            Patient will apply energy conservation/pursed-lip breathing techniques to ADL's and functional transfers with minimal cues  (Progressing)       Start:  08/08/25    Expected End:  08/22/25                  Encounter Problems (Resolved)       impaired functional daily living skills       Pt will complete the [facility] cognitive assessment to  assist in safe discharge planning (Met)       Start:  08/17/25    Expected End:  08/17/25    Resolved:  08/17/25

## 2025-08-19 NOTE — PROGRESS NOTES
Physical Therapy    Physical Therapy    Physical Therapy Treatment    Patient Name: Radha Deluna  MRN: [id number]  Today's Date: 8/19/2025  Time Calculation  Start Time: 0921  Stop Time: 0950  Time Calculation (min): 29 min     919/919-A    Assessment/Plan   PT Assessment  PT Assessment Results: Decreased strength, Decreased endurance, Impaired balance, Decreased mobility, Pain  Rehab Prognosis: Good  Barriers to Discharge Home: No anticipated barriers  Evaluation/Treatment Tolerance: Patient tolerated treatment well  Medical Staff Made Aware: Yes  End of Session Communication: Bedside nurse  Assessment Comment: Pt presents /c decline from functional baseline 2nd acute on chronic medical  conditions. Pt will benefit from continued PT services at low intensity to address above and maximize functional mobility.  End of Session Patient Position: Up in chair, Alarm on     PT Plan  Treatment/Interventions: Bed mobility, Transfer training, Gait training, Balance training, Neuromuscular re-education, Strengthening, Endurance training, Therapeutic exercise, Therapeutic activity, Stair training  PT Plan: Ongoing PT  PT Frequency for Current Admission: 3 times per week during this acute inpatient hospitalization  PT Discharge Recommendations: Low intensity level of continued care  PT Recommended Transfer Status: Assist x1  PT - OK to Discharge: Yes    General Visit Information:   PT  Visit  PT Received On: 08/19/25  General  Co-Treatment: OT  Co-Treatment Reason: to maximize pt safety and mobility  Prior to Session Communication: Bedside nurse, Care Coordinator  Patient Position Received:  (pt seated EOB with RN present upon therapy arrival)  General Comment: pt pleasant and agreeable to participate in therapy  Subjective     Precautions:  Precautions  Medical Precautions: Fall precautions, Infection precautions (droplet/contact: respiratory r/o), Oxygen therapy device and L/min (see vital signs)  Precautions Comment:  piv; I&O; oob with assist    Vital Signs:  Vital Signs  Vital Signs Comment: pt originally on 2LO2.  after first ambulation bout & seated rest SpO2 94%; RN transitioning pt to room air.  after second ambulation bout on RA, SpO2 89%, increasing up to 93-94% after seated rest.  pt denies feeling any SOB.  pt remaining on RA upon therapy departure per RN.  Objective     Pain:  Pain Assessment  Pain Assessment: 0-10  0-10 (Numeric) Pain Score: 0 - No pain    Cognition:  Cognition  Overall Cognitive Status: Within Functional Limits    Treatments:  Bed Mobility  Bed Mobility:  (NT - pt seated EOB upon therapy arrival and in chair upon departure)    Transfers  Transfer:  (sit <> stand x2 trials with SBA /s AD)    Ambulation/Gait Training  Ambulation/Gait Training Performed:  (pt ambulates around room bed > sink > chair with CGA/min A /s AD.  after seated rest break [facility] x [facility] /s AD, chair follow for safety.  multiple episodes unsteadiness & pt scissoring req. min A to recover.)      Outcome Measures:  [facility] Basic Mobility  Turning from your back to your side while in a flat bed without using bedrails: A little  Moving from lying on your back to sitting on the side of a flat bed without using bedrails: A little  Moving to and from bed to chair (including a wheelchair): A little  Standing up from a chair using your arms (e.g. wheelchair or bedside chair): A little  To walk in hospital room: A little  Climbing 3-5 steps with railing: A little  Basic Mobility - Total Score: 18    Education Documentation  Precautions, taught by Gabriella Baumann PTA at 8/19/2025 12:02 PM.  Learner: Patient  Readiness: Acceptance  Method: Explanation  Response: Verbalizes Understanding    Mobility Training, taught by Gabriella Baumann PTA at 8/19/2025 12:02 PM.  Learner: Patient  Readiness: Acceptance  Method: Explanation  Response: Verbalizes Understanding    EDUCATION:     Encounter Problems       Encounter  Problems (Active)       PT Problem       STG - Pt will transition supine <> sitting with mod I (Progressing)       Start:  08/08/25    Expected End:  08/22/25            STG - Pt will transfer STS with mod I (Progressing)       Start:  08/08/25    Expected End:  08/22/25            STG - Pt will amb 150' using LRAD with mod I (Progressing)       Start:  08/08/25    Expected End:  08/22/25            STG - Pt will ascend/descend 4 steps /c rafiq, supervision (Progressing)       Start:  08/08/25    Expected End:  08/22/25

## 2025-08-19 NOTE — PROGRESS NOTES
Nephrology Consult Progress Note    Radha Deluna is a 75 y.o. female on day 12 of admission presenting with Altered mental status, unspecified altered mental status type.      Subjective   No acute events overnight, on NC O2, improved UOP, BM yesterday       Objective          Physical Exam    Vitals 24HR  Heart Rate:  [63-76]   Temp:  [35.1 °C (95.2 °F)-36.1 °C (97 °F)]   Resp:  [18]   BP: (117-138)/(55-65)   SpO2:  [94 %-98 %]        AAOx3, on NC O2, isolation  Decreased AEBL, wheezing+  RRR no murmurs  Abd soft, + BS   edema           I&O 24HR    Intake/Output Summary (Last 24 hours) at 8/19/2025 1230  Last data filed at 8/19/2025 0921  Gross per 24 hour   Intake 50 ml   Output 900 ml   Net -850 ml         Medications  Prescriptions Prior to Admission[1]   Scheduled medications  Scheduled Medications[2]  Continuous medications  Continuous Medications[3]  PRN medications  PRN Medications[4]    Relevant Results      No results displayed because visit has over 200 results.         Imaging  XR chest 1 view  Result Date: 8/15/2025  1.  Redemonstration scattered ill-defined airspace and interstitial opacities right greater left overall slightly worsened compared to prior. Bilateral nodular opacities redemonstrated as described. Probable trace left effusion slightly improved. Continued clinical correlation follow-up advised.       MACRO: None   Signed by: Jonatan Smith 8/15/2025 2:31 PM Dictation workstation:   [id number]      Cardiology, Vascular, and Other Imaging  Bronchoscopy Diagnostic, Therapeutic, w BAL  Result Date: 8/15/2025  Table formatting from the original result was not included. Impression Bronchoalveolar lavage was performed x1 in the inferior lingular segment (LB5) and the fluid appeared cloudy and thick Performed brushings with cytology and microbiology brushes in the superior lingular segment (LB4) and inferior lingular segment (LB5) Moderate, thick and clear secretions present with less than  25% obstruction in the larynx, left lung and right lung; performed washing Normal Findings Bronchoalveolar lavage was performed x1 in the inferior lingular segment (LB5) with 60 mL of saline instilled and a total return of 18 mL. The fluid appeared cloudy and thick without suspected diffuse alveolar hemorrhage but not bloody, purulent or serosanguinous. Performed 2 brushings with cytology and microbiology brushes in the superior lingular segment (LB4) and inferior lingular segment (LB5) Moderate, thick and clear secretions present with less than 25% obstruction in the larynx, left lung and right lung; secretions were easily removed; performed washing Dilated bronchi was seen especially at the lower lung zones consistent with bronchiectasis Recommendation Follow up bronchoscopy Indication Atypical pneumonia Post-Op Diagnosis Bronchiectasis, retained secretions Staff Staff Role Lester Calhoun MD Proceduralist Medications See Anesthesia Record. Preprocedure A history and physical has been performed, and patient medication allergies have been reviewed. The patient's tolerance of previous anesthesia has been reviewed. The risks and benefits of the procedure and the sedation options and risks were discussed with the patient. All questions were answered and informed consent obtained. Details of the Procedure The patient underwent monitored anesthesia care, which was administered by an anesthesia professional. The patient's blood pressure, heart rate, level of consciousness, oxygen saturation, respirations, ECG and ETCO2 were monitored throughout the procedure. The patient experienced no blood loss. The scope was introduced through the mouth. The procedure was not difficult. The patient tolerated the procedure well. There were no apparent adverse events. Events Procedure Events Event Event Time ENDO SCOPE IN TIME 8/15/2025 12:48 PM Specimens ID Type Source Tests Collected by Time 1 : LEFT UPPER LOBE LINGULA BAL  Non-Gynecologic Cytology BRONCHO-ALVEOLAR LAVAGE OF LEFT LOWER LOBE CYTOLOGY CONSULTATION (NON-GYNECOLOGIC) Lester Calhoun MD 8/15/2025 1253 2 : LEFT UPPER LOBE LINGULA BRUSHING Non-Gynecologic Cytology BRONCHIAL BRUSH LEFT UPPER LOBE CYTOLOGY CONSULTATION (NON-GYNECOLOGIC) Lester Calhoun MD 8/15/2025 1258 A : LEFT UPPER LOBE LINGULA BRUSHING Fluid BRONCHIAL BRUSH LEFT UPPER LOBE AFB CULTURE/SMEAR, FUNGAL CULTURE/SMEAR, RESPIRATORY CULTURE/SMEAR Lester Calhoun MD 8/15/2025 1259 B : LEFT UPPER LOBE LINGULA Fluid BRONCHO-ALVEOLAR LAVAGE OF LEFT UPPER LOBE AFB CULTURE/SMEAR, FUNGAL CULTURE/SMEAR, RESPIRATORY CULTURE/SMEAR Lester Calhoun MD 8/15/2025 1300 Procedure Location [facility] [place] [address] [facility] [id number] [phone] Referring Provider Lester Calhoun MD Procedure Provider Lester Calhoun MD         Renal US  RIGHT KIDNEY:  The right kidney measures 12.5 cm in length. The renal cortical  echogenicity and thickness are within normal limits. No  hydronephrosis is present; no evidence of nephrolithiasis. Trace  perinephric fluid.      LEFT KIDNEY:  The left kidney measures 12.6 cm in length. The renal cortical  echogenicity and thickness are within normal limits. Midpole  nonobstructive nephrolithiasis measuring 0.8 cm.      BLADDER:  Unremarkable      IMPRESSION:  1. Left midpole nonobstructive nephroliths measuring 0.8 cm.  2. Trace right perinephric fluid.  3. Small left-sided pleural effusion.    ASSESSMENT AND PLAN    76 yo female w/ PMH of COPD, chronic pain, RA on methotrexate/plaquenil and humira for years (diagnosed when 56 yo), sarcoidosis, smoking, admitted with AMS, found to have FAUSTINA    FAUSTINA, nonoliguric, creatinine on admission 0.4, up to 4.36, improving, BUN high from steroids as well    Hyponatremia, controlled    Hypocalcemia    NAGMA    Hypomagnesemia, controlled    HTN, BP stable, at home on lopressor BID    HK      Plan  - creatinine improving, resolving severe  ATN, AAV -ve; admitted with AMS with normal GFR  - s/p bronchoscopy , noted bactrim by ID  - diuretics prn, stable on NC 2L   - K stable continue low K diet  - please record UOP   - renal US as above not concerning  - avoid hypotension and nephrotoxins  - daily lytes and replete as needed, noted low Ca on prolia as OP, continue to hold after the dc, add po supplements    MARS reviewed, dose for GFR<19, primidone daily, hold methotrexate, plaquenil and neurontin    Thank you for the opportunity to assist in the care of this patient, please call with questions  Brynn Bar MD PhD                            [1]   Medications Prior to Admission   Medication Sig Dispense Refill Last Dose/Taking    Bifidobacterium infantis (ALIGN ORAL) Take 1 capsule by mouth once daily.   Unknown    carboxymethylcellulose sodium (TheraTears) 0.25 % dropperette Administer 1 drop into both eyes 4 times a day as needed (dry eye).   Unknown    clopidogrel (Plavix) 75 mg tablet Take 1 tablet (75 mg) by mouth once daily.   Unknown    clotrimazole (Mycelex) 10 mg mateus Take 1 tablet (10 mg) by mouth once daily. 70 Mateus 3 Unknown    denosumab (Prolia) 60 mg/mL syringe Inject 1 mL (60 mg) under the skin every 6 months.   7/31/2025    diphenhydrAMINE (Sominex) 25 mg tablet Take 1 tablet (25 mg) by mouth as needed at bedtime for sleep.   Unknown    ergocalciferol (Vitamin D-2) 1.25 MG (54826 UT) capsule TAKE 1 CAPSULE WEEKLY. (Patient taking differently: Take 1 capsule (1.25 mg) by mouth every 14 (fourteen) days.) 13 capsule 3 Unknown    famotidine (Pepcid) 40 mg tablet TAKE 1 TABLET BY MOUTH ONCE DAILY AT BEDTIME 90 tablet 3 Unknown    fluticasone (Flonase) 50 mcg/actuation nasal spray Administer 2 sprays into each nostril once daily. 16 g 3 Unknown    folic acid (Folvite) 1 mg tablet Take 1 tablet (1 mg) by mouth once daily.   Unknown    gabapentin (Neurontin) 400 mg capsule Take 2 capsules (800 mg) by mouth 4 times a day. 720 capsule 1  Unknown    Humira,CF, Pen 40 mg/0.4 mL pen injector kit pen-injector Inject 1 Pen (40 mg) under the skin every 14 (fourteen) days.   Unknown    HYDROcodone-acetaminophen (Norco) 7.5-325 mg tablet Take 1 tablet by mouth every 12 hours if needed for severe pain (7 - 10). 90 tablet 0 Unknown    hydroxychloroquine (Plaquenil) 200 mg tablet Take 1.5 tablets (300 mg) by mouth once daily.   Unknown    ipratropium (Atrovent) 42 mcg (0.06 %) nasal spray Administer 2 sprays into each nostril 4 times a day. 15 mL 5 Unknown    isosorbide mononitrate ER (Imdur) 60 mg 24 hr tablet Take 1 tablet (60 mg) by mouth once daily.   Unknown    methotrexate 25 mg/mL injection Inject 0.8 mL (20 mg) into the muscle 1 (one) time per week.   Unknown    metoclopramide (Reglan) 10 mg tablet Take 1 tablet (10 mg) by mouth once daily as needed (nausea). 30 tablet 1 Unknown    metoprolol tartrate (Lopressor) 50 mg tablet Take 1 tablet by mouth every 12 hours.   Unknown    Miebo, PF, 100 % drops Administer 1 drop into both eyes 4 times a day.   Unknown    montelukast (Singulair) 10 mg tablet Take 1 tablet (10 mg) by mouth once daily. 90 tablet 1 Unknown    naloxone (Narcan) 4 mg/0.1 mL nasal spray Administer 1 spray (4 mg) into affected nostril(s) if needed for opioid reversal. May repeat every 2-3 minutes if needed, alternating nostrils, until medical assistance becomes available. 2 each 0 Unknown    nitroglycerin (Nitrostat) 0.4 mg SL tablet Place 1 tablet (0.4 mg) under the tongue every 5 minutes if needed for chest pain. May take up to 3 doses over 15 minutes. Call 911 if pain persists.   Unknown    oxygen (O2) therapy Inhale once daily at bedtime. use at night as directed   Unknown    polyethylene glycol (Glycolax, Miralax) 17 gram packet Take 17 g by mouth 2 times a day. Mix 1 cap (17g) into 8 ounces of fluid. 60 packet 7 Unknown    polyethylene glycol (Glycolax, Miralax) 17 gram/dose powder Mix 17 g of powder and drink 2 times a day.    "Unknown    pravastatin (Pravachol) 20 mg tablet Take 1 tablet (20 mg) by mouth once daily.   Unknown    primidone (Mysoline) 50 mg tablet Take 3 tablets (150 mg) by mouth early in the morning.. 270 tablet 3 Unknown    ProAir HFA 90 mcg/actuation inhaler Inhale 2 puffs every 4 hours if needed for wheezing or shortness of breath. 8.5 g 5 Unknown    sennosides-docusate sodium (Martha-Colace) 8.6-50 mg tablet Take 1 tablet by mouth once daily. 30 tablet 11 Unknown    theophylline ER (Alok-Dur) 300 mg 12 hr tablet Take 1 tablet (300 mg) by mouth 3 times a day.   Unknown    Trelegy Ellipta 100-62.5-25 mcg blister with device Inhale 1 puff once daily.   Unknown    Xiidra 5 % dropperette Administer 1 drop into both eyes every 12 hours.   Unknown    azelastine (Astelin) 137 mcg (0.1 %) nasal spray Administer 2 sprays into each nostril 2 times a day. (Patient not taking: Reported on 8/7/2025) 72 mL 3 Not Taking    cyclobenzaprine (Flexeril) 10 mg tablet Take 1 tablet (10 mg) by mouth 3 times a day as needed for muscle spasms. (Patient not taking: Reported on 8/7/2025) 45 tablet 1 Not Taking    ipratropium (Atrovent) 0.02 % nebulizer solution Use 1 vial 4 times daily (Patient not taking: Reported on 8/7/2025) 75 mL 5 Not Taking    pantoprazole (ProtoNix) 40 mg EC tablet Take 1 tablet (40 mg) by mouth once daily. Do not crush, chew, or split. (Patient not taking: Reported on 8/7/2025) 30 tablet 5 Not Taking    syringe with needle 1 mL 25 gauge x 5/8\" syringe       [2] acetylcysteine, 3 mL, nebulization, TID  azelastine, 2 spray, Each Nostril, BID  budesonide, 0.25 mg, nebulization, Daily  cefepime, 1 g, intravenous, q12h  clopidogrel, 75 mg, oral, Daily  fluticasone, 2 spray, Each Nostril, Daily  gabapentin, 300 mg, oral, BID  heparin (porcine), 5,000 Units, subcutaneous, q8h  [Held by provider] hydroxychloroquine, 300 mg, oral, Daily  ipratropium, 2 spray, Each Nostril, 4x daily  ipratropium-albuteroL, 3 mL, nebulization, " TID  isosorbide mononitrate ER, 60 mg, oral, Daily  [Held by provider] methotrexate, 20 mg, intramuscular, Every Sunday  methylPREDNISolone sodium succinate (PF), 20 mg, intravenous, q12h  metoprolol tartrate, 50 mg, oral, BID  minocycline, 200 mg, oral, q12h MARCY  montelukast, 10 mg, oral, Daily  nystatin, 5 mL, Mouth/Throat, TID  pantoprazole, 40 mg, oral, Daily  pravastatin, 20 mg, oral, Nightly  primidone, 150 mg, oral, Daily  sennosides-docusate sodium, 1 tablet, oral, Daily  [Held by provider] sulfamethoxazole-trimethoprim, 2 tablet, oral, q12h MARCY     [3]    [4] PRN medications: albuterol, benzocaine-menthol, HYDROcodone-acetaminophen, ipratropium-albuteroL, lubricating eye drops, metoclopramide, naloxone, nitroglycerin, oxygen, polyethylene glycol

## 2025-08-19 NOTE — CARE PLAN
The patient's goals for the shift include      The clinical goals for the shift include Remain hemodynamically stable    Over the shift, the patient did make progress toward the following goals. Barriers to progression include any changes in S/S. Recommendations to address these barriers include continue to monitor for any changes in S/S.

## 2025-08-19 NOTE — CARE PLAN
The patient's goals for the shift include      The clinical goals for the shift include maintain HDS      Problem: Safety - Adult  Goal: Free from fall injury  Outcome: Progressing     Problem: Chronic Conditions and Co-morbidities  Goal: Patient's chronic conditions and co-morbidity symptoms are monitored and maintained or improved  Outcome: Progressing     Problem: Nutrition  Goal: Nutrient intake appropriate for maintaining nutritional needs  Outcome: Progressing     Problem: Skin  Goal: Decreased wound size/increased tissue granulation at next dressing change  Outcome: Progressing  Flowsheets (Taken 8/19/2025 6755)  Decreased wound size/increased tissue granulation at next dressing change: Promote sleep for wound healing

## 2025-08-19 NOTE — PROGRESS NOTES
[facility] Hospitalist Progress Note        Name: Radha Deluna  :  1950(75 y.o.)  MRN:  [id number]    Date: 25     ASSESSMENT & PLAN   75 y.o. female with rheumatoid arthritis, immunocompromised on Humira, methotrexate, Plaquenil, who presented with acute hypoxic respiratory failure and delirium on . CT chest performed admission is concerning of subacute rounded opacities in the left upper and lower lobes with peripheral ground-glass attenuation that are new from prior imaging 10/28/2024 concerning for pneumonia.    Acute hypoxic respiratory failure 2/2 CAP  Abnormal CT Chest  - Pulm and ID following. Bronchoscopy on 8/15/25  -BAL with strep maltophilia and rare pseudomonas, started on Cefepime, Bactrim and minocycline by ID   -Will reach out to ID regarding bactrim given recovering ATN  -Solumedrol weaned   -GARRICK, ANCA negative  -Wean o2 for goal o2 sat 88-92%; continue pulmonary hygiene     FAUSTINA/ATN  -Nephrology following, Cr peaked at 4.3, gradually improving     Acute metabolic encephalopathy  Concern for underlying dementia   -MRI shows no acute CVA  -EEG: no epileptiform activity  -Avoid sedating medications per neurology  -Improved      HTN  -Continue metoprolol     CAD  -Continue plavix, statin, bb, imdur     RA  -Hold hydroxychloroquine, methotrexate     Chronic back pain  -Renally dosed gabapentin, continue home norco     Allergic rhinitis  -Azelastine, flonase, singulair     DVT Prophylaxis: subcutaneous heparin  Code status: DNR and No Intubation      SUBJECTIVE   Interval History:   Doing okay. Asked to see if oxygen could be weaned further. Overall no major issues overnight.    Review of Systems:   Other than patient's chronic conditions and those complaints in the history above, the rest of the 10 systems review were done and were negative.     Current medications:  Scheduled Meds:Scheduled Medications[1]  Continuous Infusions:Continuous Medications[2]  PRN Meds:PRN  Medications[3]    OBJECTIVE     Vitals:    08/19/25 0528 08/19/25 0647 08/19/25 0751 08/19/25 1206   BP: 136/65  117/55    BP Location:   Right arm    Patient Position:   Lying    Pulse: 63  76    Resp:   18    Temp: 35.9 °C (96.6 °F)  35.1 °C (95.2 °F)    TempSrc:   Temporal    SpO2: 97% 96% 94% 96%   Weight:       Height:            Physical Exam  Vitals and nursing note reviewed.   HENT:      Mouth/Throat:      Mouth: Mucous membranes are moist.      Pharynx: Oropharynx is clear.     Cardiovascular:      Rate and Rhythm: Normal rate and regular rhythm.   Pulmonary:      Effort: Pulmonary effort is normal.      Comments: Decreased breath sounds at bases  Abdominal:      Palpations: Abdomen is soft.         Labs:   Lab Results   Component Value Date     08/19/2025    K 4.4 08/19/2025     (H) 08/19/2025    CO2 25 08/19/2025    BUN 44 (H) 08/19/2025    CREATININE 1.78 (H) 08/19/2025    GLUCOSE 104 (H) 08/19/2025    CALCIUM 7.1 (L) 08/19/2025    PROT 6.9 08/07/2025    BILITOT 0.6 08/07/2025    ALKPHOS 60 08/07/2025    AST 11 08/07/2025    ALT 6 (L) 08/07/2025       Lab Results   Component Value Date    WBC 12.7 (H) 08/19/2025    HGB 10.2 (L) 08/19/2025    HCT 31.6 (L) 08/19/2025     (H) 08/19/2025     08/19/2025       Imaging (within the past 24 hours):   Imaging  No results found.      Cardiology, Vascular, and Other Imaging  No other imaging results found for the past 2 days        Electronically signed by Mariusz Moore DO on 08/19/25 at 2:20 PM              [1] acetylcysteine, 3 mL, nebulization, TID  azelastine, 2 spray, Each Nostril, BID  budesonide, 0.25 mg, nebulization, Daily  cefepime, 1 g, intravenous, q12h  clopidogrel, 75 mg, oral, Daily  fluticasone, 2 spray, Each Nostril, Daily  gabapentin, 300 mg, oral, BID  heparin (porcine), 5,000 Units, subcutaneous, q8h  [Held by provider] hydroxychloroquine, 300 mg, oral, Daily  ipratropium, 2 spray, Each Nostril, 4x  daily  ipratropium-albuteroL, 3 mL, nebulization, TID  isosorbide mononitrate ER, 60 mg, oral, Daily  [Held by provider] methotrexate, 20 mg, intramuscular, Every Sunday  methylPREDNISolone sodium succinate (PF), 20 mg, intravenous, q12h  metoprolol tartrate, 50 mg, oral, BID  minocycline, 200 mg, oral, q12h MARCY  montelukast, 10 mg, oral, Daily  nystatin, 5 mL, Mouth/Throat, TID  pantoprazole, 40 mg, oral, Daily  pravastatin, 20 mg, oral, Nightly  primidone, 150 mg, oral, Daily  sennosides-docusate sodium, 1 tablet, oral, Daily  [Held by provider] sulfamethoxazole-trimethoprim, 2 tablet, oral, q12h MARCY     [2]    [3] PRN medications: albuterol, benzocaine-menthol, HYDROcodone-acetaminophen, ipratropium-albuteroL, lubricating eye drops, metoclopramide, naloxone, nitroglycerin, oxygen, polyethylene glycol

## 2025-08-20 ENCOUNTER — APPOINTMENT (OUTPATIENT)
Dept: RADIOLOGY | Facility: HOSPITAL | Age: 75
DRG: 871 | End: 2025-08-20
Payer: MEDICARE

## 2025-08-20 LAB
ACID FAST STN SPEC: NORMAL
ACID FAST STN SPEC: NORMAL
ADENOVIRUS QPCR, VIRC: NOT DETECTED COPIES/ML
ADENOVIRUS RVP, VIRC: NOT DETECTED
ANION GAP SERPL CALC-SCNC: 10 MMOL/L (ref 10–20)
ASPERGILLUS GALACTOMANNAN EIA (NON-BLOOD SPECIMEN): 0.05
BUN SERPL-MCNC: 47 MG/DL (ref 6–23)
CALCIUM SERPL-MCNC: 7.1 MG/DL (ref 8.6–10.3)
CHLAMYDIA.PNEUMONIAE PCR, VIRC: NOT DETECTED
CHLORIDE SERPL-SCNC: 111 MMOL/L (ref 98–107)
CO2 SERPL-SCNC: 23 MMOL/L (ref 21–32)
COCCIDIOIDES AB TITR SER CF: NORMAL {TITER}
CREAT SERPL-MCNC: 1.77 MG/DL (ref 0.5–1.05)
CYTOMEGALOVIRUS DNA PCR, (NON-BLOOD SPECI: ABNORMAL IU/ML
EGFRCR SERPLBLD CKD-EPI 2021: 29 ML/MIN/1.73M*2
ENTEROVIRUS/RHINOVIRUS RVP, VIRC: NOT DETECTED
GLUCOSE SERPL-MCNC: 89 MG/DL (ref 74–99)
HUMAN BOCAVIRUS RVP, VIRC: NOT DETECTED
HUMAN CORONAVIRUS RVP, VIRC: NOT DETECTED
HUMAN HERPESVIRUS-6 DNA PCR, QUANTITATIVE (NON-BLOOD SPECIMEN): NOT DETECTED COPIES/ML
INFLUENZA A , VIRC: NOT DETECTED
INFLUENZA A H1N1-09 , VIRC: NOT DETECTED
INFLUENZA B PCR, VIRC: NOT DETECTED
LEGIONELLA PNEUMO PCR, VIRC: NOT DETECTED
METAPNEUMOVIRUS , VIRC: NOT DETECTED
MYCOBACTERIUM SPEC CULT: NORMAL
MYCOBACTERIUM SPEC CULT: NORMAL
MYCOPLASMA.PNEUMONIAE PCR, VIRC: NOT DETECTED
PAN.LEGIONELLA PCR, VIRC: NOT DETECTED
PARAINFLUENZA PCR, VIRC: NOT DETECTED
PNEUMOCYSTIS PCR,QUANTITATIVE (NON-BLOOD SPECIMEN): NOT DETECTED COPIES/ML
POTASSIUM SERPL-SCNC: 4.2 MMOL/L (ref 3.5–5.3)
RSV PCR, RVP, VIRC: NOT DETECTED
SODIUM SERPL-SCNC: 140 MMOL/L (ref 136–145)

## 2025-08-20 PROCEDURE — 2500000001 HC RX 250 WO HCPCS SELF ADMINISTERED DRUGS (ALT 637 FOR MEDICARE OP): Performed by: INTERNAL MEDICINE

## 2025-08-20 PROCEDURE — 2500000001 HC RX 250 WO HCPCS SELF ADMINISTERED DRUGS (ALT 637 FOR MEDICARE OP): Performed by: HOSPITALIST

## 2025-08-20 PROCEDURE — 71045 X-RAY EXAM CHEST 1 VIEW: CPT | Performed by: STUDENT IN AN ORGANIZED HEALTH CARE EDUCATION/TRAINING PROGRAM

## 2025-08-20 PROCEDURE — 80048 BASIC METABOLIC PNL TOTAL CA: CPT | Performed by: STUDENT IN AN ORGANIZED HEALTH CARE EDUCATION/TRAINING PROGRAM

## 2025-08-20 PROCEDURE — 1100000001 HC PRIVATE ROOM DAILY

## 2025-08-20 PROCEDURE — 2500000004 HC RX 250 GENERAL PHARMACY W/ HCPCS (ALT 636 FOR OP/ED): Performed by: INTERNAL MEDICINE

## 2025-08-20 PROCEDURE — 2500000001 HC RX 250 WO HCPCS SELF ADMINISTERED DRUGS (ALT 637 FOR MEDICARE OP): Performed by: STUDENT IN AN ORGANIZED HEALTH CARE EDUCATION/TRAINING PROGRAM

## 2025-08-20 PROCEDURE — 2500000001 HC RX 250 WO HCPCS SELF ADMINISTERED DRUGS (ALT 637 FOR MEDICARE OP): Performed by: NURSE PRACTITIONER

## 2025-08-20 PROCEDURE — 36415 COLL VENOUS BLD VENIPUNCTURE: CPT | Performed by: STUDENT IN AN ORGANIZED HEALTH CARE EDUCATION/TRAINING PROGRAM

## 2025-08-20 PROCEDURE — 71045 X-RAY EXAM CHEST 1 VIEW: CPT

## 2025-08-20 PROCEDURE — 99232 SBSQ HOSP IP/OBS MODERATE 35: CPT | Performed by: STUDENT IN AN ORGANIZED HEALTH CARE EDUCATION/TRAINING PROGRAM

## 2025-08-20 PROCEDURE — 2500000002 HC RX 250 W HCPCS SELF ADMINISTERED DRUGS (ALT 637 FOR MEDICARE OP, ALT 636 FOR OP/ED): Performed by: INTERNAL MEDICINE

## 2025-08-20 PROCEDURE — 94640 AIRWAY INHALATION TREATMENT: CPT

## 2025-08-20 RX ORDER — PREDNISONE 20 MG/1
20 TABLET ORAL DAILY
Status: DISCONTINUED | OUTPATIENT
Start: 2025-08-20 | End: 2025-08-21 | Stop reason: HOSPADM

## 2025-08-20 RX ORDER — LEVOFLOXACIN 750 MG/1
750 TABLET, FILM COATED ORAL
Status: DISCONTINUED | OUTPATIENT
Start: 2025-08-20 | End: 2025-08-21 | Stop reason: HOSPADM

## 2025-08-20 RX ORDER — LEVOFLOXACIN 250 MG/1
250 TABLET, FILM COATED ORAL
Status: DISCONTINUED | OUTPATIENT
Start: 2025-08-20 | End: 2025-08-20

## 2025-08-20 RX ADMIN — MONTELUKAST 10 MG: 10 TABLET, FILM COATED ORAL at 09:02

## 2025-08-20 RX ADMIN — NYSTATIN 500000 UNITS: 100000 SUSPENSION ORAL at 20:58

## 2025-08-20 RX ADMIN — IPRATROPIUM BROMIDE 2 SPRAY: 42 SPRAY, METERED NASAL at 11:20

## 2025-08-20 RX ADMIN — PANTOPRAZOLE SODIUM 40 MG: 40 TABLET, DELAYED RELEASE ORAL at 05:55

## 2025-08-20 RX ADMIN — ACETYLCYSTEINE 600 MG: 200 SOLUTION ORAL; RESPIRATORY (INHALATION) at 19:17

## 2025-08-20 RX ADMIN — LEVOFLOXACIN 750 MG: 750 TABLET, FILM COATED ORAL at 15:40

## 2025-08-20 RX ADMIN — HYDROCODONE BITARTRATE AND ACETAMINOPHEN 1 TABLET: 7.5; 325 TABLET ORAL at 20:58

## 2025-08-20 RX ADMIN — METOPROLOL TARTRATE 50 MG: 50 TABLET, FILM COATED ORAL at 20:58

## 2025-08-20 RX ADMIN — IPRATROPIUM BROMIDE AND ALBUTEROL SULFATE 3 ML: .5; 3 SOLUTION RESPIRATORY (INHALATION) at 06:25

## 2025-08-20 RX ADMIN — CLOPIDOGREL BISULFATE 75 MG: 75 TABLET, FILM COATED ORAL at 09:02

## 2025-08-20 RX ADMIN — IPRATROPIUM BROMIDE AND ALBUTEROL SULFATE 3 ML: .5; 3 SOLUTION RESPIRATORY (INHALATION) at 19:17

## 2025-08-20 RX ADMIN — PRAVASTATIN SODIUM 20 MG: 20 TABLET ORAL at 20:58

## 2025-08-20 RX ADMIN — NYSTATIN 500000 UNITS: 100000 SUSPENSION ORAL at 09:01

## 2025-08-20 RX ADMIN — AZELASTINE HYDROCHLORIDE 2 SPRAY: 137 SPRAY, METERED NASAL at 20:58

## 2025-08-20 RX ADMIN — PREDNISONE 20 MG: 20 TABLET ORAL at 15:40

## 2025-08-20 RX ADMIN — MINOCYCLINE HYDROCHLORIDE 200 MG: 100 TABLET, FILM COATED ORAL at 20:58

## 2025-08-20 RX ADMIN — METOPROLOL TARTRATE 50 MG: 50 TABLET, FILM COATED ORAL at 09:01

## 2025-08-20 RX ADMIN — GABAPENTIN 300 MG: 300 CAPSULE ORAL at 20:58

## 2025-08-20 RX ADMIN — ACETYLCYSTEINE 600 MG: 200 SOLUTION ORAL; RESPIRATORY (INHALATION) at 11:14

## 2025-08-20 RX ADMIN — SENNOSIDES AND DOCUSATE SODIUM 1 TABLET: 50; 8.6 TABLET ORAL at 09:02

## 2025-08-20 RX ADMIN — AZELASTINE HYDROCHLORIDE 2 SPRAY: 137 SPRAY, METERED NASAL at 09:05

## 2025-08-20 RX ADMIN — HYDROCODONE BITARTRATE AND ACETAMINOPHEN 1 TABLET: 7.5; 325 TABLET ORAL at 09:01

## 2025-08-20 RX ADMIN — GABAPENTIN 300 MG: 300 CAPSULE ORAL at 09:01

## 2025-08-20 RX ADMIN — IPRATROPIUM BROMIDE AND ALBUTEROL SULFATE 3 ML: .5; 3 SOLUTION RESPIRATORY (INHALATION) at 11:14

## 2025-08-20 RX ADMIN — MINOCYCLINE HYDROCHLORIDE 200 MG: 100 TABLET, FILM COATED ORAL at 11:20

## 2025-08-20 RX ADMIN — METHYLPREDNISOLONE SODIUM SUCCINATE 20 MG: 40 INJECTION, POWDER, FOR SOLUTION INTRAMUSCULAR; INTRAVENOUS at 03:09

## 2025-08-20 RX ADMIN — CEFEPIME HYDROCHLORIDE 1 G: 1 INJECTION, SOLUTION INTRAVENOUS at 03:09

## 2025-08-20 RX ADMIN — ISOSORBIDE MONONITRATE 60 MG: 60 TABLET, EXTENDED RELEASE ORAL at 09:01

## 2025-08-20 RX ADMIN — HEPARIN SODIUM 5000 UNITS: 5000 INJECTION, SOLUTION INTRAVENOUS; SUBCUTANEOUS at 03:09

## 2025-08-20 RX ADMIN — BUDESONIDE 0.25 MG: 0.25 INHALANT RESPIRATORY (INHALATION) at 06:26

## 2025-08-20 RX ADMIN — IPRATROPIUM BROMIDE 2 SPRAY: 42 SPRAY, METERED NASAL at 20:58

## 2025-08-20 RX ADMIN — IPRATROPIUM BROMIDE 2 SPRAY: 42 SPRAY, METERED NASAL at 15:41

## 2025-08-20 RX ADMIN — FLUTICASONE PROPIONATE 2 SPRAY: 50 SPRAY, METERED NASAL at 09:05

## 2025-08-20 RX ADMIN — ACETYLCYSTEINE 600 MG: 200 SOLUTION ORAL; RESPIRATORY (INHALATION) at 06:25

## 2025-08-20 RX ADMIN — PRIMIDONE 150 MG: 50 TABLET ORAL at 11:20

## 2025-08-20 RX ADMIN — NYSTATIN 500000 UNITS: 100000 SUSPENSION ORAL at 15:40

## 2025-08-20 RX ADMIN — CEFEPIME HYDROCHLORIDE 1 G: 1 INJECTION, SOLUTION INTRAVENOUS at 15:40

## 2025-08-20 NOTE — PROGRESS NOTES
Met with pt, DC plan is for home with [facility] and [employer].   Repeat [facility]--pt lives alone, told me that she does not have a walker at home, will ask therapy to assist me to get her one prior to discharge.  Pt told me that her sister will pick her up at discharge but is unable to provide transportation this evening.   Maryse Sahu RN [facility]

## 2025-08-20 NOTE — PROGRESS NOTES
[facility] Hospitalist Progress Note        Name: Radha Deluna  :  1950(75 y.o.)  MRN:  [id number]    Date: 25     ASSESSMENT & PLAN   75 y.o. female with rheumatoid arthritis, immunocompromised on Humira, methotrexate, Plaquenil, who presented with acute hypoxic respiratory failure and delirium on . CT chest performed admission is concerning of subacute rounded opacities in the left upper and lower lobes with peripheral ground-glass attenuation that are new from prior imaging 10/28/2024 concerning for pneumonia.    Acute hypoxic respiratory failure 2/2 CAP  Abnormal CT Chest  - Pulm and ID following. Bronchoscopy on 8/15/25  -BAL with strep maltophilia and rare pseudomonas, maintained on Cefepime, Levaquin and minocycline by ID   -Solumedrol weaned   -GARRICK, ANCA negative  -Wean o2 for goal o2 sat 88-92%; continue pulmonary hygiene     FAUSTINA/ATN  -Nephrology following, Cr peaked at 4.3, gradually improving     Acute metabolic encephalopathy  Concern for underlying dementia   -MRI shows no acute CVA  -EEG: no epileptiform activity  -Avoid sedating medications per neurology  -Improved      HTN  -Continue metoprolol     CAD  -Continue plavix, statin, bb, imdur     RA  -Hold hydroxychloroquine, methotrexate     Chronic back pain  -Renally dosed gabapentin, continue home norco     Allergic rhinitis  -Azelastine, flonase, singulair     DVT Prophylaxis: subcutaneous heparin  Code status: DNR and No Intubation  Dispo: Pending steroid wean and final antibiotic plans from ID      SUBJECTIVE   Interval History:   WBC did increase, but clinically feels well. Weaned to room air during the day. Antibiotics per ID.    Review of Systems:   Other than patient's chronic conditions and those complaints in the history above, the rest of the 10 systems review were done and were negative.     Current medications:  Scheduled Meds:Scheduled Medications[1]  Continuous Infusions:Continuous Medications[2]  PRN Meds:PRN  Medications[3]    OBJECTIVE     Vitals:    08/20/25 0457 08/20/25 0626 08/20/25 0751 08/20/25 1117   BP: 160/72  140/72    BP Location: Right arm  Right arm    Patient Position: Lying  Lying    Pulse: 66  75    Resp: 18  20    Temp: 36.4 °C (97.5 °F)  35.6 °C (96.1 °F)    TempSrc: Temporal  Temporal    SpO2: 92% 94% 92% 93%   Weight:       Height:            Physical Exam  Vitals and nursing note reviewed.   HENT:      Mouth/Throat:      Mouth: Mucous membranes are moist.      Pharynx: Oropharynx is clear.     Cardiovascular:      Rate and Rhythm: Normal rate and regular rhythm.   Pulmonary:      Effort: Pulmonary effort is normal.      Comments: Decreased breath sounds at bases  Abdominal:      Palpations: Abdomen is soft.         Labs:   Lab Results   Component Value Date     08/20/2025    K 4.2 08/20/2025     (H) 08/20/2025    CO2 23 08/20/2025    BUN 47 (H) 08/20/2025    CREATININE 1.77 (H) 08/20/2025    GLUCOSE 89 08/20/2025    CALCIUM 7.1 (L) 08/20/2025    PROT 6.9 08/07/2025    BILITOT 0.6 08/07/2025    ALKPHOS 60 08/07/2025    AST 11 08/07/2025    ALT 6 (L) 08/07/2025       Lab Results   Component Value Date    WBC 16.2 (H) 08/19/2025    HGB 10.8 (L) 08/19/2025    HCT 32.7 (L) 08/19/2025     (H) 08/19/2025     08/19/2025       Imaging (within the past 24 hours):   Imaging  No results found.      Cardiology, Vascular, and Other Imaging  No other imaging results found for the past 2 days        Electronically signed by Mariusz Moore DO on 08/20/25 at 2:43 PM                [1] acetylcysteine, 3 mL, nebulization, TID  azelastine, 2 spray, Each Nostril, BID  budesonide, 0.25 mg, nebulization, Daily  cefepime, 1 g, intravenous, q12h  clopidogrel, 75 mg, oral, Daily  fluticasone, 2 spray, Each Nostril, Daily  gabapentin, 300 mg, oral, BID  heparin (porcine), 5,000 Units, subcutaneous, q8h  [Held by provider] hydroxychloroquine, 300 mg, oral, Daily  ipratropium, 2 spray, Each Nostril, 4x  daily  ipratropium-albuteroL, 3 mL, nebulization, TID  isosorbide mononitrate ER, 60 mg, oral, Daily  levoFLOXacin, 750 mg, oral, q48h  [Held by provider] methotrexate, 20 mg, intramuscular, Every Sunday  metoprolol tartrate, 50 mg, oral, BID  minocycline, 200 mg, oral, q12h MARCY  montelukast, 10 mg, oral, Daily  nystatin, 5 mL, Mouth/Throat, TID  pantoprazole, 40 mg, oral, Daily  pravastatin, 20 mg, oral, Nightly  predniSONE, 20 mg, oral, Daily  primidone, 150 mg, oral, Daily  sennosides-docusate sodium, 1 tablet, oral, Daily     [2]    [3] PRN medications: albuterol, benzocaine-menthol, HYDROcodone-acetaminophen, ipratropium-albuteroL, lubricating eye drops, metoclopramide, naloxone, nitroglycerin, oxygen, polyethylene glycol

## 2025-08-20 NOTE — PROGRESS NOTES
Infectious disease progress note  Subjective   She is receiving a breathing treatment.  She otherwise is breathing decently on room air and has no complaints    Antibiotics  Cefepime day 3  Minocycline day 3  Bactrim held    Objective   Range of Vitals (last 24 hours)  Heart Rate:  [66-77]   Temp:  [35.6 °C (96.1 °F)-36.7 °C (98.1 °F)]   Resp:  [18-20]   BP: (112-160)/(62-72)   SpO2:  [92 %-96 %]   Daily Weight  08/14/25 : 65.7 kg (144 lb 13.5 oz)    Body mass index is 25.66 kg/m².      Physical Exam  Lying in bed, interactive     Relevant Results  Labs  Lab Results   Component Value Date    WBC 16.2 (H) 08/19/2025    HGB 10.8 (L) 08/19/2025    HCT 32.7 (L) 08/19/2025     (H) 08/19/2025     08/19/2025     Lab Results   Component Value Date    GLUCOSE 89 08/20/2025    CALCIUM 7.1 (L) 08/20/2025     08/20/2025    K 4.2 08/20/2025    CO2 23 08/20/2025     (H) 08/20/2025    BUN 47 (H) 08/20/2025    CREATININE 1.77 (H) 08/20/2025   ESR: --  Lab Results   Component Value Date    SEDRATE 76 (H) 08/13/2025     Lab Results   Component Value Date    CRP 17.37 (H) 08/13/2025     Lab Results   Component Value Date    ALT 6 (L) 08/07/2025    AST 11 08/07/2025    ALKPHOS 60 08/07/2025    BILITOT 0.6 08/07/2025       Microbiology  8-7-2025 blood culture no growth 4 days  8-9-2025 respiratory culture no predominating pathogen  8-9-2025 MRSA PCR negative  8- urine Legionella antigen and strep antigen negative  8- BAL for fungus no fungal elements seen  8- respiratory culture from BAL is growing E. coli, Stenotrophomonas maltophilia, Prevotella, and pseudomonas  8- respiratory viral panel pending, Aspergillus galactomannan negative, Coccidioides antibody pending    Imaging  8- chest x-ray redemonstration of scattered ill-defined airspace opacity right greater than left    Assessment/Plan   Polymicrobial pneumonia with stenotrophomonas, pseudomonas and prevotella.  Her  Bactrim has apparently been held due to her impaired kidney function.  I will switch the Bactrim to Levaquin.    1.  Continue cefepime and minocycline   2.  Change Bactrim to Levaquin  3.  Await Pseudomonas sensitivities    Other issues  Rheumatoid arthritis; on Humira and methotrexate  Immunocompromised host    I reviewed and interpreted all lab test imaging studies and documentations from other healthcare providers  I am monitoring for antibiotic side effects and toxicity     Salvatore Sow MD

## 2025-08-20 NOTE — PROGRESS NOTES
Nephrology Consult Progress Note    Radha Deluna is a 75 y.o. female on day 13 of admission presenting with Altered mental status, unspecified altered mental status type.      Subjective   No acute events overnight, on NC O2, improved UOP, BM yesterday       Objective          Physical Exam    Vitals 24HR  Heart Rate:  [66-77]   Temp:  [35.6 °C (96.1 °F)-36.7 °C (98.1 °F)]   Resp:  [18-20]   BP: (112-160)/(62-72)   SpO2:  [92 %-96 %]        AAOx3, on NC O2, isolation  Decreased AEBL, wheezing+  RRR no murmurs  Abd soft, + BS   edema           I&O 24HR    Intake/Output Summary (Last 24 hours) at 8/20/2025 1303  Last data filed at 8/20/2025 0953  Gross per 24 hour   Intake --   Output 400 ml   Net -400 ml         Medications  Prescriptions Prior to Admission[1]   Scheduled medications  Scheduled Medications[2]  Continuous medications  Continuous Medications[3]  PRN medications  PRN Medications[4]    Relevant Results      No results displayed because visit has over 200 results.         Imaging  XR chest 1 view  Result Date: 8/15/2025  1.  Redemonstration scattered ill-defined airspace and interstitial opacities right greater left overall slightly worsened compared to prior. Bilateral nodular opacities redemonstrated as described. Probable trace left effusion slightly improved. Continued clinical correlation follow-up advised.       MACRO: None   Signed by: Jonatan Smith 8/15/2025 2:31 PM Dictation workstation:   [id number]      Cardiology, Vascular, and Other Imaging  Bronchoscopy Diagnostic, Therapeutic, w BAL  Result Date: 8/15/2025  Table formatting from the original result was not included. Impression Bronchoalveolar lavage was performed x1 in the inferior lingular segment (LB5) and the fluid appeared cloudy and thick Performed brushings with cytology and microbiology brushes in the superior lingular segment (LB4) and inferior lingular segment (LB5) Moderate, thick and clear secretions present with less than  25% obstruction in the larynx, left lung and right lung; performed washing Normal Findings Bronchoalveolar lavage was performed x1 in the inferior lingular segment (LB5) with 60 mL of saline instilled and a total return of 18 mL. The fluid appeared cloudy and thick without suspected diffuse alveolar hemorrhage but not bloody, purulent or serosanguinous. Performed 2 brushings with cytology and microbiology brushes in the superior lingular segment (LB4) and inferior lingular segment (LB5) Moderate, thick and clear secretions present with less than 25% obstruction in the larynx, left lung and right lung; secretions were easily removed; performed washing Dilated bronchi was seen especially at the lower lung zones consistent with bronchiectasis Recommendation Follow up bronchoscopy Indication Atypical pneumonia Post-Op Diagnosis Bronchiectasis, retained secretions Staff Staff Role Lester Calhoun MD Proceduralist Medications See Anesthesia Record. Preprocedure A history and physical has been performed, and patient medication allergies have been reviewed. The patient's tolerance of previous anesthesia has been reviewed. The risks and benefits of the procedure and the sedation options and risks were discussed with the patient. All questions were answered and informed consent obtained. Details of the Procedure The patient underwent monitored anesthesia care, which was administered by an anesthesia professional. The patient's blood pressure, heart rate, level of consciousness, oxygen saturation, respirations, ECG and ETCO2 were monitored throughout the procedure. The patient experienced no blood loss. The scope was introduced through the mouth. The procedure was not difficult. The patient tolerated the procedure well. There were no apparent adverse events. Events Procedure Events Event Event Time ENDO SCOPE IN TIME 8/15/2025 12:48 PM Specimens ID Type Source Tests Collected by Time 1 : LEFT UPPER LOBE LINGULA BAL  Non-Gynecologic Cytology BRONCHO-ALVEOLAR LAVAGE OF LEFT LOWER LOBE CYTOLOGY CONSULTATION (NON-GYNECOLOGIC) Lester Calhoun MD 8/15/2025 1253 2 : LEFT UPPER LOBE LINGULA BRUSHING Non-Gynecologic Cytology BRONCHIAL BRUSH LEFT UPPER LOBE CYTOLOGY CONSULTATION (NON-GYNECOLOGIC) Lester Calhoun MD 8/15/2025 1258 A : LEFT UPPER LOBE LINGULA BRUSHING Fluid BRONCHIAL BRUSH LEFT UPPER LOBE AFB CULTURE/SMEAR, FUNGAL CULTURE/SMEAR, RESPIRATORY CULTURE/SMEAR Lester Calhoun MD 8/15/2025 1259 B : LEFT UPPER LOBE LINGULA Fluid BRONCHO-ALVEOLAR LAVAGE OF LEFT UPPER LOBE AFB CULTURE/SMEAR, FUNGAL CULTURE/SMEAR, RESPIRATORY CULTURE/SMEAR Lester Calhoun MD 8/15/2025 1300 Procedure Location [facility] [address] [facility] [id number] [phone] Referring Provider Lseter Calhoun MD Procedure Provider Lester Calhoun MD         Renal US  RIGHT KIDNEY:  The right kidney measures 12.5 cm in length. The renal cortical  echogenicity and thickness are within normal limits. No  hydronephrosis is present; no evidence of nephrolithiasis. Trace  perinephric fluid.      LEFT KIDNEY:  The left kidney measures 12.6 cm in length. The renal cortical  echogenicity and thickness are within normal limits. Midpole  nonobstructive nephrolithiasis measuring 0.8 cm.      BLADDER:  Unremarkable      IMPRESSION:  1. Left midpole nonobstructive nephroliths measuring 0.8 cm.  2. Trace right perinephric fluid.  3. Small left-sided pleural effusion.    ASSESSMENT AND PLAN    76 yo female w/ PMH of COPD, chronic pain, RA on methotrexate/plaquenil and humira for years (diagnosed when 54 yo), sarcoidosis, smoking, admitted with AMS, found to have FAUSTINA    FAUSTINA, nonoliguric, creatinine on admission 0.4, up to 4.36, improving, BUN high from steroids as well    Hyponatremia, controlled    Hypocalcemia    NAGMA    Hypomagnesemia, controlled    HTN, BP stable, at home on lopressor BID    HK      Plan  - creatinine improving, resolving severe  ATN, AAV -ve; admitted with AMS with normal GFR  - s/p bronchoscopy , noted bactrim by ID  - diuretics prn, stable on NC 2L   - K stable continue low K diet  - please record UOP   - renal US as above not concerning  - avoid hypotension and nephrotoxins  - daily lytes and replete as needed, noted low Ca on prolia as OP, continue to hold after the dc    MARS reviewed, dose for GFR<30, primidone daily, can resume methotrexate, plaquenil and neurontin    Thank you for the opportunity to assist in the care of this patient, please call with questions  Brynn Bar MD PhD                              [1]   Medications Prior to Admission   Medication Sig Dispense Refill Last Dose/Taking    Bifidobacterium infantis (ALIGN ORAL) Take 1 capsule by mouth once daily.   Unknown    carboxymethylcellulose sodium (TheraTears) 0.25 % dropperette Administer 1 drop into both eyes 4 times a day as needed (dry eye).   Unknown    clopidogrel (Plavix) 75 mg tablet Take 1 tablet (75 mg) by mouth once daily.   Unknown    clotrimazole (Mycelex) 10 mg mateus Take 1 tablet (10 mg) by mouth once daily. 70 Mateus 3 Unknown    denosumab (Prolia) 60 mg/mL syringe Inject 1 mL (60 mg) under the skin every 6 months.   7/31/2025    diphenhydrAMINE (Sominex) 25 mg tablet Take 1 tablet (25 mg) by mouth as needed at bedtime for sleep.   Unknown    ergocalciferol (Vitamin D-2) 1.25 MG (87072 UT) capsule TAKE 1 CAPSULE WEEKLY. (Patient taking differently: Take 1 capsule (1.25 mg) by mouth every 14 (fourteen) days.) 13 capsule 3 Unknown    famotidine (Pepcid) 40 mg tablet TAKE 1 TABLET BY MOUTH ONCE DAILY AT BEDTIME 90 tablet 3 Unknown    fluticasone (Flonase) 50 mcg/actuation nasal spray Administer 2 sprays into each nostril once daily. 16 g 3 Unknown    folic acid (Folvite) 1 mg tablet Take 1 tablet (1 mg) by mouth once daily.   Unknown    gabapentin (Neurontin) 400 mg capsule Take 2 capsules (800 mg) by mouth 4 times a day. 720 capsule 1 Unknown     Humira,CF, Pen 40 mg/0.4 mL pen injector kit pen-injector Inject 1 Pen (40 mg) under the skin every 14 (fourteen) days.   Unknown    HYDROcodone-acetaminophen (Norco) 7.5-325 mg tablet Take 1 tablet by mouth every 12 hours if needed for severe pain (7 - 10). 90 tablet 0 Unknown    hydroxychloroquine (Plaquenil) 200 mg tablet Take 1.5 tablets (300 mg) by mouth once daily.   Unknown    ipratropium (Atrovent) 42 mcg (0.06 %) nasal spray Administer 2 sprays into each nostril 4 times a day. 15 mL 5 Unknown    isosorbide mononitrate ER (Imdur) 60 mg 24 hr tablet Take 1 tablet (60 mg) by mouth once daily.   Unknown    methotrexate 25 mg/mL injection Inject 0.8 mL (20 mg) into the muscle 1 (one) time per week.   Unknown    metoclopramide (Reglan) 10 mg tablet Take 1 tablet (10 mg) by mouth once daily as needed (nausea). 30 tablet 1 Unknown    metoprolol tartrate (Lopressor) 50 mg tablet Take 1 tablet by mouth every 12 hours.   Unknown    Miebo, PF, 100 % drops Administer 1 drop into both eyes 4 times a day.   Unknown    montelukast (Singulair) 10 mg tablet Take 1 tablet (10 mg) by mouth once daily. 90 tablet 1 Unknown    naloxone (Narcan) 4 mg/0.1 mL nasal spray Administer 1 spray (4 mg) into affected nostril(s) if needed for opioid reversal. May repeat every 2-3 minutes if needed, alternating nostrils, until medical assistance becomes available. 2 each 0 Unknown    nitroglycerin (Nitrostat) 0.4 mg SL tablet Place 1 tablet (0.4 mg) under the tongue every 5 minutes if needed for chest pain. May take up to 3 doses over 15 minutes. Call 911 if pain persists.   Unknown    oxygen (O2) therapy Inhale once daily at bedtime. use at night as directed   Unknown    polyethylene glycol (Glycolax, Miralax) 17 gram packet Take 17 g by mouth 2 times a day. Mix 1 cap (17g) into 8 ounces of fluid. 60 packet 7 Unknown    polyethylene glycol (Glycolax, Miralax) 17 gram/dose powder Mix 17 g of powder and drink 2 times a day.   Unknown     "pravastatin (Pravachol) 20 mg tablet Take 1 tablet (20 mg) by mouth once daily.   Unknown    primidone (Mysoline) 50 mg tablet Take 3 tablets (150 mg) by mouth early in the morning.. 270 tablet 3 Unknown    ProAir HFA 90 mcg/actuation inhaler Inhale 2 puffs every 4 hours if needed for wheezing or shortness of breath. 8.5 g 5 Unknown    sennosides-docusate sodium (Martha-Colace) 8.6-50 mg tablet Take 1 tablet by mouth once daily. 30 tablet 11 Unknown    theophylline ER (Alok-Dur) 300 mg 12 hr tablet Take 1 tablet (300 mg) by mouth 3 times a day.   Unknown    Trelegy Ellipta 100-62.5-25 mcg blister with device Inhale 1 puff once daily.   Unknown    Xiidra 5 % dropperette Administer 1 drop into both eyes every 12 hours.   Unknown    azelastine (Astelin) 137 mcg (0.1 %) nasal spray Administer 2 sprays into each nostril 2 times a day. (Patient not taking: Reported on 8/7/2025) 72 mL 3 Not Taking    cyclobenzaprine (Flexeril) 10 mg tablet Take 1 tablet (10 mg) by mouth 3 times a day as needed for muscle spasms. (Patient not taking: Reported on 8/7/2025) 45 tablet 1 Not Taking    ipratropium (Atrovent) 0.02 % nebulizer solution Use 1 vial 4 times daily (Patient not taking: Reported on 8/7/2025) 75 mL 5 Not Taking    pantoprazole (ProtoNix) 40 mg EC tablet Take 1 tablet (40 mg) by mouth once daily. Do not crush, chew, or split. (Patient not taking: Reported on 8/7/2025) 30 tablet 5 Not Taking    syringe with needle 1 mL 25 gauge x 5/8\" syringe       [2] acetylcysteine, 3 mL, nebulization, TID  azelastine, 2 spray, Each Nostril, BID  budesonide, 0.25 mg, nebulization, Daily  cefepime, 1 g, intravenous, q12h  clopidogrel, 75 mg, oral, Daily  fluticasone, 2 spray, Each Nostril, Daily  gabapentin, 300 mg, oral, BID  heparin (porcine), 5,000 Units, subcutaneous, q8h  [Held by provider] hydroxychloroquine, 300 mg, oral, Daily  ipratropium, 2 spray, Each Nostril, 4x daily  ipratropium-albuteroL, 3 mL, nebulization, TID  isosorbide " mononitrate ER, 60 mg, oral, Daily  [Held by provider] methotrexate, 20 mg, intramuscular, Every Sunday  metoprolol tartrate, 50 mg, oral, BID  minocycline, 200 mg, oral, q12h MARCY  montelukast, 10 mg, oral, Daily  nystatin, 5 mL, Mouth/Throat, TID  pantoprazole, 40 mg, oral, Daily  pravastatin, 20 mg, oral, Nightly  predniSONE, 20 mg, oral, Daily  primidone, 150 mg, oral, Daily  sennosides-docusate sodium, 1 tablet, oral, Daily  sulfamethoxazole-trimethoprim, 2 tablet, oral, q12h MARCY     [3]    [4] PRN medications: albuterol, benzocaine-menthol, HYDROcodone-acetaminophen, ipratropium-albuteroL, lubricating eye drops, metoclopramide, naloxone, nitroglycerin, oxygen, polyethylene glycol

## 2025-08-20 NOTE — PROGRESS NOTES
Infectious disease progress note  Subjective   She is breathing adequately on room air    Antibiotics  Cefepime day 2  Minocycline day 2  Bactrim day 2    Objective   Range of Vitals (last 24 hours)  Heart Rate:  [63-77]   Temp:  [35.1 °C (95.2 °F)-36.7 °C (98.1 °F)]   Resp:  [18-20]   BP: (112-136)/(55-66)   SpO2:  [92 %-97 %]   Daily Weight  08/14/25 : 65.7 kg (144 lb 13.5 oz)    Body mass index is 25.66 kg/m².      Physical Exam  Lying in bed, interactive     Relevant Results  Labs  Lab Results   Component Value Date    WBC 16.2 (H) 08/19/2025    HGB 10.8 (L) 08/19/2025    HCT 32.7 (L) 08/19/2025     (H) 08/19/2025     08/19/2025     Lab Results   Component Value Date    GLUCOSE 104 (H) 08/19/2025    CALCIUM 7.1 (L) 08/19/2025     08/19/2025    K 4.4 08/19/2025    CO2 25 08/19/2025     (H) 08/19/2025    BUN 44 (H) 08/19/2025    CREATININE 1.78 (H) 08/19/2025   ESR: --  Lab Results   Component Value Date    SEDRATE 76 (H) 08/13/2025     Lab Results   Component Value Date    CRP 17.37 (H) 08/13/2025     Lab Results   Component Value Date    ALT 6 (L) 08/07/2025    AST 11 08/07/2025    ALKPHOS 60 08/07/2025    BILITOT 0.6 08/07/2025       Microbiology  8-7-2025 blood culture no growth 4 days  8-9-2025 respiratory culture no predominating pathogen  8-9-2025 MRSA PCR negative  8- urine Legionella antigen and strep antigen negative  8- BAL for fungus no fungal elements seen  8- respiratory culture from BAL is growing E. coli, Stenotrophomonas maltophilia, Prevotella, and pseudomonas  8- respiratory viral panel pending, Aspergillus galactomannan negative, Coccidioides antibody pending    Imaging  8- chest x-ray redemonstration of scattered ill-defined airspace opacity right greater than left    Assessment/Plan   Polymicrobial pneumonia with stenotrophomonas, pseudomonas and prevotella    1.  Continue cefepime, bactrim and minocycline     Other  issues  Rheumatoid arthritis; on Humira and methotrexate  Immunocompromised host    I reviewed and interpreted all lab test imaging studies and documentations from other healthcare providers  I am monitoring for antibiotic side effects and toxicity     Salvatore Sow MD

## 2025-08-21 ENCOUNTER — DOCUMENTATION (OUTPATIENT)
Dept: HOME HEALTH SERVICES | Facility: HOME HEALTH | Age: 75
End: 2025-08-21
Payer: MEDICARE

## 2025-08-21 ENCOUNTER — HOME HEALTH ADMISSION (OUTPATIENT)
Dept: HOME HEALTH SERVICES | Facility: HOME HEALTH | Age: 75
End: 2025-08-21
Payer: MEDICARE

## 2025-08-21 VITALS
WEIGHT: 144.84 LBS | HEART RATE: 65 BPM | HEIGHT: 63 IN | SYSTOLIC BLOOD PRESSURE: 157 MMHG | OXYGEN SATURATION: 94 % | TEMPERATURE: 96.8 F | RESPIRATION RATE: 18 BRPM | BODY MASS INDEX: 25.66 KG/M2 | DIASTOLIC BLOOD PRESSURE: 70 MMHG

## 2025-08-21 LAB
ANION GAP SERPL CALC-SCNC: 11 MMOL/L (ref 10–20)
BACTERIA SPEC RESP CULT: ABNORMAL
BUN SERPL-MCNC: 49 MG/DL (ref 6–23)
CALCIUM SERPL-MCNC: 7.8 MG/DL (ref 8.6–10.3)
CHLORIDE SERPL-SCNC: 111 MMOL/L (ref 98–107)
CO2 SERPL-SCNC: 21 MMOL/L (ref 21–32)
CREAT SERPL-MCNC: 1.72 MG/DL (ref 0.5–1.05)
EGFRCR SERPLBLD CKD-EPI 2021: 30 ML/MIN/1.73M*2
ERYTHROCYTE [DISTWIDTH] IN BLOOD BY AUTOMATED COUNT: 14.6 % (ref 11.5–14.5)
FUNGUS SPEC CULT: ABNORMAL
FUNGUS SPEC FUNGUS STN: ABNORMAL
GLUCOSE SERPL-MCNC: 83 MG/DL (ref 74–99)
GRAM STN SPEC: ABNORMAL
GRAM STN SPEC: ABNORMAL
HCT VFR BLD AUTO: 31.3 % (ref 36–46)
HGB BLD-MCNC: 10.3 G/DL (ref 12–16)
HOLD SPECIMEN: NORMAL
LABORATORY COMMENT REPORT: ABNORMAL
MCH RBC QN AUTO: 34.2 PG (ref 26–34)
MCHC RBC AUTO-ENTMCNC: 32.9 G/DL (ref 32–36)
MCV RBC AUTO: 104 FL (ref 80–100)
NRBC BLD-RTO: 0 /100 WBCS (ref 0–0)
PLATELET # BLD AUTO: 240 X10*3/UL (ref 150–450)
POTASSIUM SERPL-SCNC: 4.4 MMOL/L (ref 3.5–5.3)
RBC # BLD AUTO: 3.01 X10*6/UL (ref 4–5.2)
SODIUM SERPL-SCNC: 139 MMOL/L (ref 136–145)
WBC # BLD AUTO: 10.4 X10*3/UL (ref 4.4–11.3)

## 2025-08-21 PROCEDURE — 2500000004 HC RX 250 GENERAL PHARMACY W/ HCPCS (ALT 636 FOR OP/ED): Performed by: INTERNAL MEDICINE

## 2025-08-21 PROCEDURE — 80048 BASIC METABOLIC PNL TOTAL CA: CPT | Performed by: STUDENT IN AN ORGANIZED HEALTH CARE EDUCATION/TRAINING PROGRAM

## 2025-08-21 PROCEDURE — 94640 AIRWAY INHALATION TREATMENT: CPT

## 2025-08-21 PROCEDURE — 2500000001 HC RX 250 WO HCPCS SELF ADMINISTERED DRUGS (ALT 637 FOR MEDICARE OP): Performed by: STUDENT IN AN ORGANIZED HEALTH CARE EDUCATION/TRAINING PROGRAM

## 2025-08-21 PROCEDURE — 99239 HOSP IP/OBS DSCHRG MGMT >30: CPT | Performed by: STUDENT IN AN ORGANIZED HEALTH CARE EDUCATION/TRAINING PROGRAM

## 2025-08-21 PROCEDURE — 36415 COLL VENOUS BLD VENIPUNCTURE: CPT | Performed by: STUDENT IN AN ORGANIZED HEALTH CARE EDUCATION/TRAINING PROGRAM

## 2025-08-21 PROCEDURE — 2500000001 HC RX 250 WO HCPCS SELF ADMINISTERED DRUGS (ALT 637 FOR MEDICARE OP): Performed by: INTERNAL MEDICINE

## 2025-08-21 PROCEDURE — 85027 COMPLETE CBC AUTOMATED: CPT | Performed by: STUDENT IN AN ORGANIZED HEALTH CARE EDUCATION/TRAINING PROGRAM

## 2025-08-21 PROCEDURE — 2500000001 HC RX 250 WO HCPCS SELF ADMINISTERED DRUGS (ALT 637 FOR MEDICARE OP): Performed by: HOSPITALIST

## 2025-08-21 PROCEDURE — 2500000002 HC RX 250 W HCPCS SELF ADMINISTERED DRUGS (ALT 637 FOR MEDICARE OP, ALT 636 FOR OP/ED): Performed by: INTERNAL MEDICINE

## 2025-08-21 RX ORDER — MINOCYCLINE HYDROCHLORIDE 100 MG/1
200 TABLET ORAL EVERY 12 HOURS SCHEDULED
Qty: 28 TABLET | Refills: 0 | Status: SHIPPED | OUTPATIENT
Start: 2025-08-21 | End: 2025-08-28

## 2025-08-21 RX ORDER — CIPROFLOXACIN 250 MG/1
250 TABLET, FILM COATED ORAL 2 TIMES DAILY
Qty: 14 TABLET | Refills: 0 | Status: SHIPPED | OUTPATIENT
Start: 2025-08-21 | End: 2025-08-28

## 2025-08-21 RX ADMIN — CEFEPIME HYDROCHLORIDE 1 G: 1 INJECTION, SOLUTION INTRAVENOUS at 03:01

## 2025-08-21 RX ADMIN — Medication: at 12:17

## 2025-08-21 RX ADMIN — BUDESONIDE 0.25 MG: 0.25 INHALANT RESPIRATORY (INHALATION) at 06:39

## 2025-08-21 RX ADMIN — PREDNISONE 20 MG: 20 TABLET ORAL at 09:18

## 2025-08-21 RX ADMIN — IPRATROPIUM BROMIDE 2 SPRAY: 42 SPRAY, METERED NASAL at 06:36

## 2025-08-21 RX ADMIN — HEPARIN SODIUM 5000 UNITS: 5000 INJECTION, SOLUTION INTRAVENOUS; SUBCUTANEOUS at 09:17

## 2025-08-21 RX ADMIN — HEPARIN SODIUM 5000 UNITS: 5000 INJECTION, SOLUTION INTRAVENOUS; SUBCUTANEOUS at 03:01

## 2025-08-21 RX ADMIN — METOPROLOL TARTRATE 50 MG: 50 TABLET, FILM COATED ORAL at 09:18

## 2025-08-21 RX ADMIN — PANTOPRAZOLE SODIUM 40 MG: 40 TABLET, DELAYED RELEASE ORAL at 06:34

## 2025-08-21 RX ADMIN — MONTELUKAST 10 MG: 10 TABLET, FILM COATED ORAL at 09:18

## 2025-08-21 RX ADMIN — GABAPENTIN 300 MG: 300 CAPSULE ORAL at 09:27

## 2025-08-21 RX ADMIN — MINOCYCLINE HYDROCHLORIDE 200 MG: 100 TABLET, FILM COATED ORAL at 11:50

## 2025-08-21 RX ADMIN — SENNOSIDES AND DOCUSATE SODIUM 1 TABLET: 50; 8.6 TABLET ORAL at 09:18

## 2025-08-21 RX ADMIN — CLOPIDOGREL BISULFATE 75 MG: 75 TABLET, FILM COATED ORAL at 09:18

## 2025-08-21 RX ADMIN — NYSTATIN 500000 UNITS: 100000 SUSPENSION ORAL at 09:17

## 2025-08-21 RX ADMIN — PRIMIDONE 150 MG: 50 TABLET ORAL at 06:34

## 2025-08-21 RX ADMIN — ACETYLCYSTEINE 600 MG: 200 SOLUTION ORAL; RESPIRATORY (INHALATION) at 06:39

## 2025-08-21 RX ADMIN — HYDROCODONE BITARTRATE AND ACETAMINOPHEN 1 TABLET: 7.5; 325 TABLET ORAL at 09:18

## 2025-08-21 RX ADMIN — IPRATROPIUM BROMIDE AND ALBUTEROL SULFATE 3 ML: .5; 3 SOLUTION RESPIRATORY (INHALATION) at 06:39

## 2025-08-21 RX ADMIN — FLUTICASONE PROPIONATE 2 SPRAY: 50 SPRAY, METERED NASAL at 09:18

## 2025-08-21 RX ADMIN — ISOSORBIDE MONONITRATE 60 MG: 60 TABLET, EXTENDED RELEASE ORAL at 09:18

## 2025-08-21 ASSESSMENT — PAIN SCALES - GENERAL
PAINLEVEL_OUTOF10: 3
PAINLEVEL_OUTOF10: 8

## 2025-08-21 ASSESSMENT — PAIN DESCRIPTION - DESCRIPTORS: DESCRIPTORS: ACHING;THROBBING;DISCOMFORT

## 2025-08-21 ASSESSMENT — PAIN - FUNCTIONAL ASSESSMENT
PAIN_FUNCTIONAL_ASSESSMENT: 0-10
PAIN_FUNCTIONAL_ASSESSMENT: 0-10

## 2025-08-21 ASSESSMENT — PAIN DESCRIPTION - ORIENTATION: ORIENTATION: MID;LOWER

## 2025-08-21 ASSESSMENT — PAIN DESCRIPTION - LOCATION: LOCATION: BACK

## 2025-08-21 NOTE — PROGRESS NOTES
Assessment and Plan  Patient is 75 y.o. female  with the following medical Problems:  Acute hypoxic respiratory failure secondary to pneumonia  Concern for COPD exacerbation  Altered mental status, likely delirium  FAUSTINA  Rheumatoid arthritis      Plan of Care:  Differential for CT chest findings includes infection versus malignancy versus ILD flare. Left-sided peripheral groundglass opacities and rounded consolidations suggest organizing pneumonia. The stable right-sided nodules and masses are more consistent with chronic rheumatoid nodules.  WBC improved to 10.4  Creatinine is improved to 1.72  Recommend follow-up CT after treatment to confirm resolution, exclude underlying neoplastic process.  GARRICK and ANCA are negative  Follow-up mycoplasma IgM.  Results are still pending.  Continue oral prednisone 20 mg daily for 14 days  BAL grew MSSA.  ID team is following. Currently on cefepime day 3, minocycline day 3,  switch from Bactrim to Levaquin  Has completed 7 days of ceftriaxone  DVT prophylaxis with heparin  Start bronchopulmonary hygiene and bronchodilator therapy with Duoneb/Mucomyst/Pulmicort/Atrovent  Oxygen for saturation 89 to 94%  Incentive spirometry  PT/OT/SLP  Minimize benzodiazepine and narcotics  especially in the setting of recent delirium.  Encourage ambulation  Head of bed elevation and aspiration precautions.   Mycoplasma antibody still pending  Cefepime, Minocycline day 3. Bactrim held.  ID is managing antibiotics.      History of Present Illness: Radha Deluna is a 75 y.o. female with a PMHx of chronic lower back pain, COPD, migraine, CAD s/p stents, AAA, CVA, hypertension, hypercholesterolemia, GERD, PAD, rheumatoid lung disease s/p lung biopsy who presented to [facility] on 8/7/2025 with a chief complaint of AMS.  Of note, patient was recently admitted on January 2025 to [facility] for concern for acute exacerbation of COPD and pneumonia.  She was treated with antibiotics and steroids and  discharged with a prolonged course of antibiotics.     In the ED, she was found to be tachycardic and hypotensive.  CT head showed no acute intracranial process.  However, she was found to have acute hypoxic respiratory failure requiring 2L NC.  CXR showed several nodular opacities over the right lung.  She received Zosyn, vancomycin in the ED.       On the floor: She improved back to her baseline mentation, and her oxygen requirement went from 2 L to room air.  CT chest 8/11 showed rounded opacities in the left upper and lower lobes with peripheral groundglass attenuation, new from 10/2024. Also showed scattered pulmonary nodules and masses throughout right lung,  stable from prior imaging.     Neurology was consulted, who suggested that patient suffered a period of AMS secondary to underlying infection, rather than a primary neurological process. On 8/11, she again reported increased shortness of breath.  Due to concern for pulmonary congestion, fluids were discontinued. Echo was normal EF and grade 1 diastolic relaxation.  She was also started on IV Solu-Medrol 40 mg daily on 8/12, which was increased to twice daily on 8/13.  She was also started on Pulmicort.  In addition to this, she was found to have worsening FAUSTINA likely 2/2 ATN, with creatinine of 4.29.     Pro-Ethan was 0.86.    Mycoplasma, Legionella: Negative  MRSA: Negative  Antibiotics: Ceftriaxone day 7, completed doxycycline 5 days.     Daily progress:  August 21, 2025:  Continues to breathing well on room air, has no acute complaints. Creatinine continues to improve.  Chest x-ray was personally reviewed and independently interpreted which showed interval improvement in interstitial/nodular opacities.  Bactrim was held by ID due to kidney function,  planning to switch to Levaquin.  Patient is much improved from pulmonology perspective, will continue to follow.    August 20, 2025: Patient has been weaned off supplemental oxygen.  Chest x-ray from August  15, 2025 showed scattered interstitial opacities.  Mycoplasma IgM continues to be pending.  Repeated chest x-ray will be ordered for today and will be personally reviewed and independently interpreted.  Patient CT scan of the chest from August 8, 2025 was personally reviewed and independently interpreted and showed scattered atelectatic changes in pneumonic filtrates.  ID team is managing antibiotic and patient is currently on Bactrim, cefepime, minocycline.  IV Solu-Medrol will be switched to oral and can be continued for at least 14 days.    August 18, 2025: Patient continues to be on supplemental oxygen.  She continues to be intermittently confused.  CT scan from August 8, 2025 showed scattered atelectatic changes and pneumonic infiltrate.  Patient remains on cefepime and Bactrim as per ID team.  Steroid was reduced to 20 mg IV twice daily.  Mycoplasma IgM was added.  Legionella and strep urinary antigen are negative.         Past Medical/Surgical History:   Medical History[1]  Surgical History[2]    Family History:   Family medical history includes stroke in father.    Allergies:     Allergies[3]      Social history:   reports that she has been smoking cigarettes. She has been exposed to tobacco smoke. She has never used smokeless tobacco. She reports that she does not currently use alcohol. She reports that she does not use drugs.    Current Medications:   No recently discontinued medications to reconcile    Review of Systems:   General: denies weight gain, denies loss of appetite, fever, chills, night sweats.  HEENT: denies headaches, dizziness, head trauma, visual changes, eye pain, tinnitus, nosebleeds, hoarseness or throat pain    Respiratory: denies chest pain, dyspnea, cough and hemoptysis  Cardiovascular: denies orthopnea, paroxysmal nocturnal dyspnea, leg swelling, and previous heart attack.    Gastrointestinal: denies pain, nausea vomiting, diarrhea, constipation, melena or bleeding.  Genitourinary:  denies hematuria, frequency, urgency or dysuria  Neurology: denies syncope, seizures, paralysis, paraesthesia   Endocrine: denies polyuria, polydipsia, skin or hair changes, and heat or cold intolerance  Musculoskeletal: denies joint pain, swelling, arthritis or myalgia  Hematologic: denies bleeding, adenopathy and easy bruising  Skin: denies rashes and skin discoloration  Psychiatry: denies depression    Physical Exam:   Vital Signs:   Vitals:    08/21/25 0743   BP: 157/70   Pulse: 65   Resp: 18   Temp: 36 °C (96.8 °F)   SpO2: 93%       Input/Output:    Intake/Output Summary (Last 24 hours) at 8/21/2025 0917  Last data filed at 8/21/2025 0408  Gross per 24 hour   Intake 200 ml   Output 1800 ml   Net -1600 ml       Oxygen requirements:   Room air, sometimes 2 L nasal cannula    Ventilator Information:   NA       General appearance: Not in pain or distress, in no respiratory distress    HEENT: Atraumatic/normocephalic, EOMI, MEGHANN, pharynx clear, moist mucosa, redness of the uvula appreciated,   Neck: Supple, no jugular venous distension, lymphadenopathy, thyromegaly or carotid bruits  Chest: Equal normal breath sounds, no wheezing, no crackles and no tenderness over ribs   Cardiovascular: Normal S1, S2, regular rate and rhythm, no murmur, rub or gallop  Abdomen: Normal sounds present, soft, lax with no tenderness, no hepatosplenomegaly, and no masses  Extremities: No edema. Pulses are equally present.   Skin: intact, no rashes   Neurologic: Alert and oriented x 3, No focal deficit     Investigations:  Labs, radiological imaging and cardiac work up were personally reviewed      Aric Mosley DO   PGY-1, IM        STAFF PHYSICIAN NOTE OF PERSONAL INVOLVEMENT IN CARE  As the attending physician, I certify that I personally reviewed the patient's history and personally examined the patient to confirm the physical findings described above, and that I reviewed the relevant imaging studies and available reports.  I also  discussed the differential diagnosis and all of the proposed management plans with the patient and individuals accompanying the patient to this visit.  They had the opportunity to ask questions about the proposed management plans and to have those questions answered.            [1]   Past Medical History:  Diagnosis Date    Chronic obstructive pulmonary disease, unspecified 11/09/2022    Chronic obstructive pulmonary disease, unspecified COPD type    Low back pain     Migraine, unspecified, not intractable, without status migrainosus     Migraines    Other chronic pain 10/21/2015    Chronic pain    Personal history of other diseases of the circulatory system     History of cardiac disorder    Personal history of other diseases of the nervous system and sense organs     History of otitis media    Personal history of other diseases of the respiratory system     History of asthma    Personal history of other diseases of the respiratory system     History of sinusitis    Personal history of other endocrine, nutritional and metabolic disease     History of thyroid disorder    Personal history of other malignant neoplasm of skin     History of malignant neoplasm of skin    Sarcoidosis, unspecified 10/05/2022    Sarcoidosis   [2]   Past Surgical History:  Procedure Laterality Date    ANKLE SURGERY  10/21/2015    Ankle Surgery    APPENDECTOMY  10/21/2015    Appendectomy    BREAST SURGERY  10/21/2015    Breast Surgery    CARPAL TUNNEL RELEASE  10/26/2016    Neuroplasty Median Nerve At Carpal Tunnel    CATARACT EXTRACTION Bilateral     CHOLECYSTECTOMY  10/21/2015    Cholecystectomy    CORONARY ANGIOPLASTY WITH STENT PLACEMENT  01/14/2021    Cath Placement Of Stent 1    CT GUIDED PERCUTANEOUS BIOPSY LUNG  08/12/2020    CT GUIDED PERCUTANEOUS BIOPSY LUNG 8/12/2020 [facility]    CT GUIDED PERCUTANEOUS BIOPSY LUNG  12/01/2020    CT GUIDED PERCUTANEOUS BIOPSY LUNG 12/1/2020 [facility]    EYE SURGERY      laser    LUNG  SURGERY  10/21/2015    Lung Surgery    LYMPH NODE BIOPSY  09/29/2017    Biopsy Lymph Node    MR HEAD ANGIO WO IV CONTRAST  04/04/2022    MR HEAD ANGIO WO IV CONTRAST 4/4/2022 [facility]    MR NECK ANGIO WO IV CONTRAST  08/15/2022    MR NECK ANGIO WO IV CONTRAST 8/15/2022 [facility]    OTHER SURGICAL HISTORY  10/21/2015    Wrist Surgery    OTHER SURGICAL HISTORY  02/07/2022    Endoscopy    OTHER SURGICAL HISTORY  02/07/2022    Colonoscopy    OTHER SURGICAL HISTORY  10/29/2020    Thyroid surgery    OTHER SURGICAL HISTORY  10/29/2020    Sinus surgery    OTHER SURGICAL HISTORY  10/29/2020    Ear surgery    OTHER SURGICAL HISTORY  10/29/2020    Spinal surgery    OTHER SURGICAL HISTORY  10/21/2015    Thoracoscopy (Therapeutic) With Wedge Resection Of Lung    ROTATOR CUFF REPAIR  08/10/2017    Rotator Cuff Repair   [3]   Allergies  Allergen Reactions    Erythromycin Other     Chest pain    Augmentin [Amoxicillin-Pot Clavulanate] GI Upset and Nausea/vomiting

## 2025-08-21 NOTE — PROGRESS NOTES
Physical Therapy                 Therapy Communication Note    Patient Name: Radha Deluna  MRN: [id number]  Department: [facility] 9  Room: [facility]/919-  Today's Date: 8/21/2025     Discipline: Physical Therapy    Comment:  pt seen at 9:15.  FWW vended for discharge home this date.  pt completes sit <> stand and a few steps fwd/retro at supervision level; pt politely declines anything further at this time.  did not bill for service as not with pt for at least 8 minutes.  communicated with [facility].

## 2025-08-21 NOTE — CARE PLAN
Problem: Pain - Adult  Goal: Verbalizes/displays adequate comfort level or baseline comfort level  Outcome: Progressing   The patient's goals for the shift include discharge     The clinical goals for the shift include maintain HDS    Pt alert and oriented. No complaints of pain at this time. No s/s of respiratory distress or shortness of breath. Bed locked and low. RN will continue to monitor.

## 2025-08-21 NOTE — DISCHARGE SUMMARY
Discharge Diagnosis  Altered mental status, unspecified altered mental status type    Issues Requiring Follow-Up      Test Results Pending At Discharge  Pending Labs       Order Current Status    Mycoplasma pneumoniae antibody, IgM In process    AFB Culture/Smear Preliminary result    AFB Culture/Smear Preliminary result    Fungal Culture/Smear Preliminary result    Fungal Culture/Smear Preliminary result            Hospital Course   75 y.o. female with rheumatoid arthritis, immunocompromised on Humira, methotrexate, Plaquenil, who presented with acute hypoxic respiratory failure and delirium on 8/7.  Patient was admitted with working diagnosis of pneumonia gram-negative likely.  She had bronchoscopy which grew stenotrophomonas maltophilia and Pseudomonas, in the hospital initially she was treated with Levaquin cefepime and minocycline we have the sensitivities back I will discharge her on ciprofloxacin  renally adjusted and minocycline for 7 days to complete the course.  She is doing well not on oxygen at baseline mental status denies any pain, shortness of breath nausea or vomiting.  Home health care has been arranged plan to discharge home with home health care.  Will recommend outpatient follow-up with primary care and ID.  She did have FAUSTINA Which has improved creatinine is down to 1.7, expected to gradually improve over the course of days.  She will resume her hydroxychloroquine as an outpatient methotrexate to start after completing antibiotic course.         Visit Vitals  /70 (BP Location: Right arm, Patient Position: Lying)   Pulse 65   Temp 36 °C (96.8 °F) (Temporal)   Resp 18     Vitals:    08/14/25 1318   Weight: 65.7 kg (144 lb 13.5 oz)       Immunization History   Administered Date(s) Administered    Flu vaccine (IIV4), preservative free *Check age/dose* 09/28/2017    Flu vaccine, quadrivalent, high-dose, preservative free, age 65y+ (FLUZONE) 09/07/2021, 11/09/2022, 09/13/2023    Flu vaccine,  trivalent, preservative free, HIGH-DOSE, age 65y+ (Fluzone) 08/26/2016, 09/15/2018, 12/27/2019, 09/27/2024    Hepatitis B vaccine, adult *Check Product/Dose* 03/13/2001, 05/15/2001, 11/20/2001    Influenza, seasonal, injectable 10/09/2014, 11/09/2022    Pfizer Purple Cap SARS-CoV-2 01/20/2021, 02/15/2021    Pneumococcal conjugate vaccine, 13-valent (PREVNAR 13) 02/24/2018    Pneumococcal conjugate vaccine, 20-valent (PREVNAR 20) 11/09/2022    Pneumococcal polysaccharide vaccine, 23-valent, age 2 years and older (PNEUMOVAX 23) 12/27/2019    RSV, 60 Years And Older (AREXVY) 02/13/2024    Zoster vaccine, recombinant, adult (SHINGRIX) 03/11/2018, 08/29/2018    Zoster, live 08/29/2018       Results      Pertinent Physical Exam At Time of Discharge  Physical Exam    Home Medications     Medication List      START taking these medications     ciprofloxacin 250 mg tablet; Commonly known as: Cipro; Take 1 tablet   (250 mg) by mouth 2 times a day for 7 days.   minocycline 100 mg tablet; Commonly known as: Dynacin; Take 2 tablets   (200 mg) by mouth every 12 hours for 7 days.     CHANGE how you take these medications     ergocalciferol 1250 mcg (50,000 units) capsule; Commonly known as:   Vitamin D-2; TAKE 1 CAPSULE WEEKLY.; What changed: when to take this     CONTINUE taking these medications     ALIGN ORAL   azelastine 137 mcg (0.1 %) nasal spray; Commonly known as: Astelin;   Administer 2 sprays into each nostril 2 times a day.   clopidogrel 75 mg tablet; Commonly known as: Plavix   clotrimazole 10 mg timbo; Commonly known as: Mycelex; Take 1 tablet (10   mg) by mouth once daily.   diphenhydrAMINE 25 mg tablet; Commonly known as: Sominex   famotidine 40 mg tablet; Commonly known as: Pepcid; TAKE 1 TABLET BY   MOUTH ONCE DAILY AT BEDTIME   fluticasone 50 mcg/actuation nasal spray; Commonly known as: Flonase;   Administer 2 sprays into each nostril once daily.   folic acid 1 mg tablet; Commonly known as: Folvite   gabapentin  400 mg capsule; Commonly known as: Neurontin; Take 2 capsules   (800 mg) by mouth 4 times a day.   Humira(CF) Pen 40 mg/0.4 mL pen injector kit pen-injector; Generic drug:   adalimumab   HYDROcodone-acetaminophen 7.5-325 mg tablet; Commonly known as: Norco;   Take 1 tablet by mouth every 12 hours if needed for severe pain (7 - 10).   hydroxychloroquine 200 mg tablet; Commonly known as: Plaquenil   * ipratropium 0.02 % nebulizer solution; Commonly known as: Atrovent;   Use 1 vial 4 times daily   * ipratropium 42 mcg (0.06 %) nasal spray; Commonly known as: Atrovent;   Administer 2 sprays into each nostril 4 times a day.   isosorbide mononitrate ER 60 mg 24 hr tablet; Commonly known as: Imdur   methotrexate 25 mg/mL injection   metoclopramide 10 mg tablet; Commonly known as: Reglan; Take 1 tablet   (10 mg) by mouth once daily as needed (nausea).   metoprolol tartrate 50 mg tablet; Commonly known as: Lopressor   Miebo (PF) 100 % drops; Generic drug: perfluorohexyloctane (PF)   montelukast 10 mg tablet; Commonly known as: Singulair; Take 1 tablet   (10 mg) by mouth once daily.   naloxone 4 mg/0.1 mL nasal spray; Commonly known as: Narcan; Administer   1 spray (4 mg) into affected nostril(s) if needed for opioid reversal. May   repeat every 2-3 minutes if needed, alternating nostrils, until medical   assistance becomes available.   nitroglycerin 0.4 mg SL tablet; Commonly known as: Nitrostat   oxygen DME/Hospice therapy; Commonly known as: O2   pantoprazole 40 mg EC tablet; Commonly known as: ProtoNix; Take 1 tablet   (40 mg) by mouth once daily. Do not crush, chew, or split.   * polyethylene glycol 17 gram packet; Commonly known as: Glycolax,   Miralax; Take 17 g by mouth 2 times a day. Mix 1 cap (17g) into 8 ounces   of fluid.   * polyethylene glycol 17 gram/dose powder; Commonly known as: Glycolax,   Miralax   pravastatin 20 mg tablet; Commonly known as: Pravachol   primidone 50 mg tablet; Commonly known as: Mysoline;  "Take 3 tablets (150   mg) by mouth early in the morning..   ProAir HFA 90 mcg/actuation inhaler; Generic drug: albuterol; Inhale 2   puffs every 4 hours if needed for wheezing or shortness of breath.   Prolia 60 mg/mL syringe; Generic drug: denosumab   sennosides-docusate sodium 8.6-50 mg tablet; Commonly known as:   Martha-Colace; Take 1 tablet by mouth once daily.   syringe with needle 1 mL 25 gauge x 5/8\" syringe   theophylline  mg 12 hr tablet; Commonly known as: Alok-Dur   TheraTears 0.25 % dropperette; Generic drug: carboxymethylcellulose   sodium   Trelegy Ellipta 100-62.5-25 mcg blister with device; Generic drug:   fluticasone-umeclidin-vilanter   Xiidra 5 % dropperette; Generic drug: lifitegrast  * This list has 4 medication(s) that are the same as other medications   prescribed for you. Read the directions carefully, and ask your doctor or   other care provider to review them with you.     STOP taking these medications     cyclobenzaprine 10 mg tablet; Commonly known as: Flexeril       Outpatient Follow-Up  Future Appointments   Date Time Provider Department Center   8/28/2025  8:00 AM Brett Rider DO PAROPCPNM [place]   9/3/2025  9:20 AM Zelalem Dior MD [id number] [place]   9/3/2025 10:45 AM PAR OPCTR PET CT PAROPCCT PAR RAD   9/3/2025 11:45 AM Arnaud Marroquin MD [id number] [place]   9/8/2025  9:15 AM Frank Izquierdo MD [id number] [place]   9/29/2025 10:15 AM Annie Mcmahan APRN-CNP [id number] [place]   10/7/2025 10:30 AM DO YONNY [id number] [place]   1/27/2026 10:30 AM INF 03 PARM OPCTR PAROPCINF [place]       Hugh Gusman MD  "

## 2025-08-21 NOTE — PROGRESS NOTES
Met with pt and her sister--following up on DC plan of [email or web address].   PT/OT provided pt a walker for home.  Await DC order.  Maryse Sahu RN [facility]

## 2025-08-22 ENCOUNTER — PATIENT OUTREACH (OUTPATIENT)
Dept: PRIMARY CARE | Facility: CLINIC | Age: 75
End: 2025-08-22
Payer: MEDICARE

## 2025-08-22 ENCOUNTER — PATIENT OUTREACH (OUTPATIENT)
Dept: CARE COORDINATION | Age: 75
End: 2025-08-22
Payer: MEDICARE

## 2025-08-22 LAB
FUNGUS SPEC CULT: NORMAL
FUNGUS SPEC FUNGUS STN: NORMAL

## 2025-08-22 SDOH — ECONOMIC STABILITY: INCOME INSECURITY: IN THE PAST 12 MONTHS HAS THE ELECTRIC, GAS, OIL, OR WATER COMPANY THREATENED TO SHUT OFF SERVICES IN YOUR HOME?: NO

## 2025-08-22 SDOH — ECONOMIC STABILITY: HOUSING INSECURITY: IN THE LAST 12 MONTHS, WAS THERE A TIME WHEN YOU WERE NOT ABLE TO PAY THE MORTGAGE OR RENT ON TIME?: NO

## 2025-08-22 SDOH — HEALTH STABILITY: PHYSICAL HEALTH: ON AVERAGE, HOW MANY DAYS PER WEEK DO YOU ENGAGE IN MODERATE TO STRENUOUS EXERCISE (LIKE A BRISK WALK)?: 0 DAYS

## 2025-08-22 SDOH — ECONOMIC STABILITY: HOUSING INSECURITY: IN THE PAST 12 MONTHS, HOW MANY TIMES HAVE YOU MOVED WHERE YOU WERE LIVING?: 0

## 2025-08-22 SDOH — SOCIAL STABILITY: SOCIAL NETWORK: HOW OFTEN DO YOU GET TOGETHER WITH FRIENDS OR RELATIVES?: THREE TIMES A WEEK

## 2025-08-22 SDOH — HEALTH STABILITY: PHYSICAL HEALTH: ON AVERAGE, HOW MANY MINUTES DO YOU ENGAGE IN EXERCISE AT THIS LEVEL?: 0 MIN

## 2025-08-22 SDOH — HEALTH STABILITY: MENTAL HEALTH: HOW OFTEN DO YOU HAVE SIX OR MORE DRINKS ON ONE OCCASION?: NEVER

## 2025-08-22 SDOH — SOCIAL STABILITY: SOCIAL INSECURITY: ARE YOU MARRIED, WIDOWED, DIVORCED, SEPARATED, NEVER MARRIED, OR LIVING WITH A PARTNER?: DIVORCED

## 2025-08-22 SDOH — ECONOMIC STABILITY: FOOD INSECURITY: HOW HARD IS IT FOR YOU TO PAY FOR THE VERY BASICS LIKE FOOD, HOUSING, MEDICAL CARE, AND HEATING?: NOT HARD AT ALL

## 2025-08-22 SDOH — ECONOMIC STABILITY: FOOD INSECURITY: WITHIN THE PAST 12 MONTHS, THE FOOD YOU BOUGHT JUST DIDN'T LAST AND YOU DIDN'T HAVE MONEY TO GET MORE.: NEVER TRUE

## 2025-08-22 SDOH — HEALTH STABILITY: MENTAL HEALTH: HOW OFTEN DO YOU HAVE A DRINK CONTAINING ALCOHOL?: NEVER

## 2025-08-22 SDOH — SOCIAL STABILITY: SOCIAL NETWORK: HOW OFTEN DO YOU ATTEND MEETINGS OF THE CLUBS OR ORGANIZATIONS YOU BELONG TO?: NEVER

## 2025-08-22 SDOH — HEALTH STABILITY: MENTAL HEALTH: HOW MANY DRINKS CONTAINING ALCOHOL DO YOU HAVE ON A TYPICAL DAY WHEN YOU ARE DRINKING?: PATIENT DOES NOT DRINK

## 2025-08-22 SDOH — SOCIAL STABILITY: SOCIAL NETWORK: HOW OFTEN DO YOU ATTEND CHURCH OR RELIGIOUS SERVICES?: NEVER

## 2025-08-22 SDOH — ECONOMIC STABILITY: HOUSING INSECURITY: AT ANY TIME IN THE PAST 12 MONTHS, WERE YOU HOMELESS OR LIVING IN A SHELTER (INCLUDING NOW)?: NO

## 2025-08-22 SDOH — SOCIAL STABILITY: SOCIAL INSECURITY: WITHIN THE LAST YEAR, HAVE YOU BEEN HUMILIATED OR EMOTIONALLY ABUSED IN OTHER WAYS BY YOUR PARTNER OR EX-PARTNER?: NO

## 2025-08-22 SDOH — SOCIAL STABILITY: SOCIAL NETWORK
DO YOU BELONG TO ANY CLUBS OR ORGANIZATIONS SUCH AS CHURCH GROUPS, UNIONS, FRATERNAL OR ATHLETIC GROUPS, OR SCHOOL GROUPS?: NO

## 2025-08-22 SDOH — HEALTH STABILITY: MENTAL HEALTH
DO YOU FEEL STRESS - TENSE, RESTLESS, NERVOUS, OR ANXIOUS, OR UNABLE TO SLEEP AT NIGHT BECAUSE YOUR MIND IS TROUBLED ALL THE TIME - THESE DAYS?: NOT AT ALL

## 2025-08-22 SDOH — ECONOMIC STABILITY: FOOD INSECURITY: WITHIN THE PAST 12 MONTHS, YOU WORRIED THAT YOUR FOOD WOULD RUN OUT BEFORE YOU GOT THE MONEY TO BUY MORE.: NEVER TRUE

## 2025-08-22 SDOH — SOCIAL STABILITY: SOCIAL INSECURITY: WITHIN THE LAST YEAR, HAVE YOU BEEN AFRAID OF YOUR PARTNER OR EX-PARTNER?: NO

## 2025-08-22 SDOH — SOCIAL STABILITY: SOCIAL NETWORK
IN A TYPICAL WEEK, HOW MANY TIMES DO YOU TALK ON THE PHONE WITH FAMILY, FRIENDS, OR NEIGHBORS?: MORE THAN THREE TIMES A WEEK

## 2025-08-22 SDOH — HEALTH STABILITY: PHYSICAL HEALTH
HOW OFTEN DO YOU NEED TO HAVE SOMEONE HELP YOU WHEN YOU READ INSTRUCTIONS, PAMPHLETS, OR OTHER WRITTEN MATERIAL FROM YOUR DOCTOR OR PHARMACY?: NEVER

## 2025-08-22 SDOH — ECONOMIC STABILITY: TRANSPORTATION INSECURITY: IN THE PAST 12 MONTHS, HAS LACK OF TRANSPORTATION KEPT YOU FROM MEDICAL APPOINTMENTS OR FROM GETTING MEDICATIONS?: NO

## 2025-08-22 ASSESSMENT — ENCOUNTER SYMPTOMS
LOSS OF SENSATION IN FEET: 0
DEPRESSION: 0
OCCASIONAL FEELINGS OF UNSTEADINESS: 0

## 2025-08-22 ASSESSMENT — LIFESTYLE VARIABLES
AUDIT-C TOTAL SCORE: 0
SKIP TO QUESTIONS 9-10: 1

## 2025-08-22 ASSESSMENT — ACTIVITIES OF DAILY LIVING (ADL): LACK_OF_TRANSPORTATION: NO

## 2025-08-24 DIAGNOSIS — R11.0 NAUSEA: ICD-10-CM

## 2025-08-24 LAB — M PNEUMO IGM SER IA-ACNC: 0.05 U/L

## 2025-08-25 ENCOUNTER — HOME CARE VISIT (OUTPATIENT)
Dept: HOME HEALTH SERVICES | Facility: HOME HEALTH | Age: 75
End: 2025-08-25
Payer: MEDICARE

## 2025-08-25 VITALS
DIASTOLIC BLOOD PRESSURE: 68 MMHG | SYSTOLIC BLOOD PRESSURE: 112 MMHG | OXYGEN SATURATION: 99 % | TEMPERATURE: 98.4 F | RESPIRATION RATE: 20 BRPM | HEART RATE: 70 BPM

## 2025-08-25 PROCEDURE — 169592 NO-PAY CLAIM PROCEDURE

## 2025-08-25 PROCEDURE — G0299 HHS/HOSPICE OF RN EA 15 MIN: HCPCS | Mod: HHH

## 2025-08-25 RX ORDER — METOCLOPRAMIDE 10 MG/1
10 TABLET ORAL DAILY PRN
Qty: 30 TABLET | Refills: 1 | Status: SHIPPED | OUTPATIENT
Start: 2025-08-25 | End: 2025-10-24

## 2025-08-25 SDOH — HEALTH STABILITY: MENTAL HEALTH: SMOKING IN HOME: 1

## 2025-08-25 SDOH — ECONOMIC STABILITY: HOUSING INSECURITY: EVIDENCE OF SMOKING MATERIAL: 1

## 2025-08-25 ASSESSMENT — ENCOUNTER SYMPTOMS
SPUTUM CONSISTENCY: THIN
PAIN SEVERITY GOAL: 0/10
COUGH CHARACTERISTICS: PRODUCTIVE
COUGH CHARACTERISTICS: MOIST
PAIN LOCATION: BACK
LAST BOWEL MOVEMENT: 67440
COUGH: 1
LOWER EXTREMITY EDEMA: 1
PAIN LOCATION - PAIN SEVERITY: 4/10
PAIN LOCATION - PAIN FREQUENCY: CONSTANT
SHORTNESS OF BREATH: 1
PAIN: 1
DYSPNEA ACTIVITY LEVEL: AFTER AMBULATING 10 - 20 FT
AGGRESSION WITHIN DEFINED LIMITS: 1
CHANGE IN APPETITE: UNCHANGED
FATIGUES EASILY: 1
SPUTUM PRODUCTION: 1
SUBJECTIVE PAIN PROGRESSION: UNCHANGED
DYSPNEA ON EXERTION: 1
SPUTUM AMOUNT: SCANT
CONSTIPATION: 1
STOOL FREQUENCY: LESS THAN DAILY
HIGHEST PAIN SEVERITY IN PAST 24 HOURS: 5/10
PAIN LOCATION - PAIN QUALITY: ACHE, DULL
APPETITE LEVEL: GOOD
FATIGUE: 1
LOWEST PAIN SEVERITY IN PAST 24 HOURS: 3/10
MUSCLE WEAKNESS: 1
ANGER WITHIN DEFINED LIMITS: 1
SLEEP QUALITY: ADEQUATE
PERSON REPORTING PAIN: PATIENT
SPUTUM COLOR: CLEAR

## 2025-08-25 ASSESSMENT — ACTIVITIES OF DAILY LIVING (ADL)
ENTERING_EXITING_HOME: MAXIMUM ASSIST
OASIS_M1830: 05

## 2025-08-25 ASSESSMENT — LIFESTYLE VARIABLES: SMOKING_STATUS: 1

## 2025-08-26 ENCOUNTER — HOME CARE VISIT (OUTPATIENT)
Dept: HOME HEALTH SERVICES | Facility: HOME HEALTH | Age: 75
End: 2025-08-26
Payer: MEDICARE

## 2025-08-26 VITALS — HEART RATE: 65 BPM | OXYGEN SATURATION: 100 % | DIASTOLIC BLOOD PRESSURE: 60 MMHG | SYSTOLIC BLOOD PRESSURE: 100 MMHG

## 2025-08-26 DIAGNOSIS — J42 CHRONIC BRONCHITIS, UNSPECIFIED CHRONIC BRONCHITIS TYPE (MULTI): ICD-10-CM

## 2025-08-26 DIAGNOSIS — G45.9 TRANSIENT ISCHEMIC ATTACK: Primary | ICD-10-CM

## 2025-08-26 DIAGNOSIS — I10 HTN (HYPERTENSION), BENIGN: ICD-10-CM

## 2025-08-26 PROCEDURE — G0152 HHCP-SERV OF OT,EA 15 MIN: HCPCS | Mod: HHH

## 2025-08-26 PROCEDURE — G0151 HHCP-SERV OF PT,EA 15 MIN: HCPCS | Mod: HHH

## 2025-08-26 SDOH — HEALTH STABILITY: MENTAL HEALTH: SMOKING IN HOME: 1

## 2025-08-26 SDOH — ECONOMIC STABILITY: HOUSING INSECURITY: EVIDENCE OF SMOKING MATERIAL: 0

## 2025-08-26 ASSESSMENT — ENCOUNTER SYMPTOMS
PAIN LOCATION - EXACERBATING FACTORS: ACTIVITY
PAIN LOCATION - PAIN FREQUENCY: FREQUENT
PAIN LOCATION: BACK
PAIN LOCATION - EXACERBATING FACTORS: ACTIVITY
PAIN LOCATION - PAIN FREQUENCY: CONSTANT
PAIN LOCATION - PAIN SEVERITY: 7/10
PAIN LOCATION - PAIN SEVERITY: 7/10
LOWEST PAIN SEVERITY IN PAST 24 HOURS: 4/10
PAIN: 1
HIGHEST PAIN SEVERITY IN PAST 24 HOURS: 9/10
HIGHEST PAIN SEVERITY IN PAST 24 HOURS: 9/10
PERSON REPORTING PAIN: PATIENT
LOWEST PAIN SEVERITY IN PAST 24 HOURS: 5/10
SUBJECTIVE PAIN PROGRESSION: GRADUALLY IMPROVING
PERSON REPORTING PAIN: PATIENT
PAIN: 1
PAIN LOCATION: BACK
OCCASIONAL FEELINGS OF UNSTEADINESS: 0

## 2025-08-26 ASSESSMENT — ACTIVITIES OF DAILY LIVING (ADL)
TOILETING: 1
DRESSING_UB_CURRENT_FUNCTION: SUPERVISION
BATHING ASSESSED: 1
BATHING_CURRENT_FUNCTION: SUPERVISION
DRESSING_LB_CURRENT_FUNCTION: SUPERVISION
AMBULATION ASSISTANCE ON FLAT SURFACES: 1
TOILETING: INDEPENDENT
PREPARING MEALS: NEEDS ASSISTANCE

## 2025-08-27 ENCOUNTER — TELEMEDICINE (OUTPATIENT)
Dept: PHARMACY | Facility: HOSPITAL | Age: 75
End: 2025-08-27
Payer: MEDICARE

## 2025-08-27 ENCOUNTER — APPOINTMENT (OUTPATIENT)
Dept: CARE COORDINATION | Age: 75
End: 2025-08-27
Payer: MEDICARE

## 2025-08-27 ENCOUNTER — PATIENT OUTREACH (OUTPATIENT)
Dept: CARE COORDINATION | Age: 75
End: 2025-08-27

## 2025-08-27 DIAGNOSIS — J42 CHRONIC BRONCHITIS, UNSPECIFIED CHRONIC BRONCHITIS TYPE (MULTI): ICD-10-CM

## 2025-08-27 DIAGNOSIS — I10 HTN (HYPERTENSION), BENIGN: ICD-10-CM

## 2025-08-27 DIAGNOSIS — G45.9 TRANSIENT ISCHEMIC ATTACK: ICD-10-CM

## 2025-08-27 LAB
ACID FAST STN SPEC: NORMAL
ACID FAST STN SPEC: NORMAL
MYCOBACTERIUM SPEC CULT: NORMAL
MYCOBACTERIUM SPEC CULT: NORMAL

## 2025-08-28 ENCOUNTER — OFFICE VISIT (OUTPATIENT)
Dept: PAIN MEDICINE | Facility: CLINIC | Age: 75
End: 2025-08-28
Payer: MEDICARE

## 2025-08-28 VITALS
WEIGHT: 144 LBS | BODY MASS INDEX: 25.52 KG/M2 | DIASTOLIC BLOOD PRESSURE: 67 MMHG | HEIGHT: 63 IN | SYSTOLIC BLOOD PRESSURE: 139 MMHG | TEMPERATURE: 97.2 F | RESPIRATION RATE: 20 BRPM

## 2025-08-28 DIAGNOSIS — D86.9 SARCOID: ICD-10-CM

## 2025-08-28 DIAGNOSIS — M05.711 RHEUMATOID ARTHRITIS INVOLVING RIGHT SHOULDER WITH POSITIVE RHEUMATOID FACTOR (MULTI): Primary | ICD-10-CM

## 2025-08-28 DIAGNOSIS — M05.779 RHEUMATOID ARTHRITIS INVOLVING FOOT WITH POSITIVE RHEUMATOID FACTOR, UNSPECIFIED LATERALITY (MULTI): ICD-10-CM

## 2025-08-28 DIAGNOSIS — D86.9 SARCOIDOSIS: ICD-10-CM

## 2025-08-28 DIAGNOSIS — M80.00XA AGE-RELATED OSTEOPOROSIS WITH CURRENT PATHOLOGICAL FRACTURE, INITIAL ENCOUNTER: ICD-10-CM

## 2025-08-28 DIAGNOSIS — Z79.891 LONG TERM (CURRENT) USE OF OPIATE ANALGESIC: ICD-10-CM

## 2025-08-28 DIAGNOSIS — Z79.631 ON METHOTREXATE THERAPY: ICD-10-CM

## 2025-08-28 DIAGNOSIS — M54.17 LUMBOSACRAL RADICULOPATHY AT L5: ICD-10-CM

## 2025-08-28 DIAGNOSIS — M47.816 SPONDYLOSIS OF LUMBAR REGION WITHOUT MYELOPATHY OR RADICULOPATHY: ICD-10-CM

## 2025-08-28 DIAGNOSIS — I73.9 PAD (PERIPHERAL ARTERY DISEASE): ICD-10-CM

## 2025-08-28 PROCEDURE — 4004F PT TOBACCO SCREEN RCVD TLK: CPT | Performed by: ANESTHESIOLOGY

## 2025-08-28 PROCEDURE — 1125F AMNT PAIN NOTED PAIN PRSNT: CPT | Performed by: ANESTHESIOLOGY

## 2025-08-28 PROCEDURE — 99212 OFFICE O/P EST SF 10 MIN: CPT

## 2025-08-28 PROCEDURE — 1160F RVW MEDS BY RX/DR IN RCRD: CPT | Performed by: ANESTHESIOLOGY

## 2025-08-28 PROCEDURE — 1111F DSCHRG MED/CURRENT MED MERGE: CPT | Performed by: ANESTHESIOLOGY

## 2025-08-28 PROCEDURE — 3075F SYST BP GE 130 - 139MM HG: CPT | Performed by: ANESTHESIOLOGY

## 2025-08-28 PROCEDURE — 1159F MED LIST DOCD IN RCRD: CPT | Performed by: ANESTHESIOLOGY

## 2025-08-28 PROCEDURE — 99213 OFFICE O/P EST LOW 20 MIN: CPT | Performed by: ANESTHESIOLOGY

## 2025-08-28 PROCEDURE — 3078F DIAST BP <80 MM HG: CPT | Performed by: ANESTHESIOLOGY

## 2025-08-28 RX ORDER — HYDROCODONE BITARTRATE AND ACETAMINOPHEN 7.5; 325 MG/1; MG/1
1 TABLET ORAL EVERY 8 HOURS PRN
Qty: 90 TABLET | Refills: 0 | Status: SHIPPED | OUTPATIENT
Start: 2025-08-28

## 2025-08-28 ASSESSMENT — PAIN DESCRIPTION - DESCRIPTORS: DESCRIPTORS: DULL;ACHING;THROBBING

## 2025-08-28 ASSESSMENT — LIFESTYLE VARIABLES: TOTAL SCORE: 1

## 2025-08-28 ASSESSMENT — PAIN - FUNCTIONAL ASSESSMENT: PAIN_FUNCTIONAL_ASSESSMENT: 0-10

## 2025-08-28 ASSESSMENT — ENCOUNTER SYMPTOMS
DEPRESSION: 0
OCCASIONAL FEELINGS OF UNSTEADINESS: 1
LOSS OF SENSATION IN FEET: 0

## 2025-08-28 ASSESSMENT — PAIN SCALES - GENERAL
PAINLEVEL_OUTOF10: 7
PAINLEVEL_OUTOF10: 7

## 2025-08-31 LAB
FUNGUS SPEC CULT: NORMAL
FUNGUS SPEC FUNGUS STN: NORMAL

## 2025-09-03 ENCOUNTER — HOSPITAL ENCOUNTER (OUTPATIENT)
Dept: RADIOLOGY | Facility: CLINIC | Age: 75
Discharge: HOME | End: 2025-09-03
Payer: MEDICARE

## 2025-09-03 ENCOUNTER — OFFICE VISIT (OUTPATIENT)
Dept: SURGERY | Facility: CLINIC | Age: 75
End: 2025-09-03
Payer: MEDICARE

## 2025-09-03 ENCOUNTER — DOCUMENTATION (OUTPATIENT)
Dept: CARE COORDINATION | Age: 75
End: 2025-09-03

## 2025-09-03 ENCOUNTER — HOME CARE VISIT (OUTPATIENT)
Dept: HOME HEALTH SERVICES | Facility: HOME HEALTH | Age: 75
End: 2025-09-03
Payer: MEDICARE

## 2025-09-03 ENCOUNTER — OFFICE VISIT (OUTPATIENT)
Dept: HEMATOLOGY/ONCOLOGY | Facility: CLINIC | Age: 75
End: 2025-09-03
Payer: MEDICARE

## 2025-09-03 ENCOUNTER — LAB (OUTPATIENT)
Dept: LAB | Facility: CLINIC | Age: 75
End: 2025-09-03
Payer: MEDICARE

## 2025-09-03 ENCOUNTER — PATIENT OUTREACH (OUTPATIENT)
Dept: CARE COORDINATION | Age: 75
End: 2025-09-03

## 2025-09-03 VITALS
OXYGEN SATURATION: 98 % | DIASTOLIC BLOOD PRESSURE: 84 MMHG | SYSTOLIC BLOOD PRESSURE: 130 MMHG | WEIGHT: 142.4 LBS | HEART RATE: 84 BPM | TEMPERATURE: 97.9 F | BODY MASS INDEX: 25.23 KG/M2 | HEIGHT: 63 IN

## 2025-09-03 VITALS
DIASTOLIC BLOOD PRESSURE: 68 MMHG | HEART RATE: 81 BPM | RESPIRATION RATE: 18 BRPM | BODY MASS INDEX: 24.99 KG/M2 | OXYGEN SATURATION: 100 % | TEMPERATURE: 96.6 F | WEIGHT: 141.09 LBS | SYSTOLIC BLOOD PRESSURE: 116 MMHG

## 2025-09-03 DIAGNOSIS — D50.9 IRON DEFICIENCY ANEMIA, UNSPECIFIED IRON DEFICIENCY ANEMIA TYPE: Primary | ICD-10-CM

## 2025-09-03 DIAGNOSIS — R91.1 LUNG NODULE: ICD-10-CM

## 2025-09-03 DIAGNOSIS — R91.8 PULMONARY NODULES/LESIONS, MULTIPLE: Primary | ICD-10-CM

## 2025-09-03 DIAGNOSIS — D50.9 IRON DEFICIENCY ANEMIA, UNSPECIFIED IRON DEFICIENCY ANEMIA TYPE: ICD-10-CM

## 2025-09-03 LAB
ACID FAST STN SPEC: NORMAL
ACID FAST STN SPEC: NORMAL
ALBUMIN SERPL BCP-MCNC: 3.3 G/DL (ref 3.4–5)
ALP SERPL-CCNC: 69 U/L (ref 33–136)
ALT SERPL W P-5'-P-CCNC: 8 U/L (ref 7–45)
ANION GAP SERPL CALC-SCNC: 11 MMOL/L (ref 10–20)
AST SERPL W P-5'-P-CCNC: 12 U/L (ref 9–39)
BASOPHILS # BLD AUTO: 0.06 X10*3/UL (ref 0–0.1)
BASOPHILS NFR BLD AUTO: 1.1 %
BILIRUB SERPL-MCNC: 0.3 MG/DL (ref 0–1.2)
BUN SERPL-MCNC: 8 MG/DL (ref 6–23)
CALCIUM SERPL-MCNC: 9.4 MG/DL (ref 8.6–10.3)
CHLORIDE SERPL-SCNC: 103 MMOL/L (ref 98–107)
CO2 SERPL-SCNC: 27 MMOL/L (ref 21–32)
CREAT SERPL-MCNC: 1.1 MG/DL (ref 0.5–1.05)
EGFRCR SERPLBLD CKD-EPI 2021: 52 ML/MIN/1.73M*2
EOSINOPHIL # BLD AUTO: 0.27 X10*3/UL (ref 0–0.4)
EOSINOPHIL NFR BLD AUTO: 4.9 %
ERYTHROCYTE [DISTWIDTH] IN BLOOD BY AUTOMATED COUNT: 14.5 % (ref 11.5–14.5)
FERRITIN SERPL-MCNC: 692 NG/ML (ref 8–150)
FUNGUS SPEC CULT: ABNORMAL
FUNGUS SPEC FUNGUS STN: ABNORMAL
GLUCOSE SERPL-MCNC: 103 MG/DL (ref 74–99)
HCT VFR BLD AUTO: 31.8 % (ref 36–46)
HGB BLD-MCNC: 10.1 G/DL (ref 12–16)
IMM GRANULOCYTES # BLD AUTO: 0.02 X10*3/UL (ref 0–0.5)
IMM GRANULOCYTES NFR BLD AUTO: 0.4 % (ref 0–0.9)
IRON SATN MFR SERPL: 64 % (ref 25–45)
IRON SERPL-MCNC: 109 UG/DL (ref 35–150)
LABORATORY COMMENT REPORT: ABNORMAL
LYMPHOCYTES # BLD AUTO: 2.23 X10*3/UL (ref 0.8–3)
LYMPHOCYTES NFR BLD AUTO: 40.7 %
MCH RBC QN AUTO: 32.5 PG (ref 26–34)
MCHC RBC AUTO-ENTMCNC: 31.8 G/DL (ref 32–36)
MCV RBC AUTO: 102 FL (ref 80–100)
MONOCYTES # BLD AUTO: 0.53 X10*3/UL (ref 0.05–0.8)
MONOCYTES NFR BLD AUTO: 9.7 %
MYCOBACTERIUM SPEC CULT: NORMAL
MYCOBACTERIUM SPEC CULT: NORMAL
NEUTROPHILS # BLD AUTO: 2.37 X10*3/UL (ref 1.6–5.5)
NEUTROPHILS NFR BLD AUTO: 43.2 %
NRBC BLD-RTO: 0 /100 WBCS (ref 0–0)
PLATELET # BLD AUTO: 281 X10*3/UL (ref 150–450)
POTASSIUM SERPL-SCNC: 2.7 MMOL/L (ref 3.5–5.3)
PROT SERPL-MCNC: 6.5 G/DL (ref 6.4–8.2)
RBC # BLD AUTO: 3.11 X10*6/UL (ref 4–5.2)
SODIUM SERPL-SCNC: 138 MMOL/L (ref 136–145)
TIBC SERPL-MCNC: 170 UG/DL (ref 240–445)
UIBC SERPL-MCNC: 61 UG/DL (ref 110–370)
WBC # BLD AUTO: 5.5 X10*3/UL (ref 4.4–11.3)

## 2025-09-03 PROCEDURE — 1125F AMNT PAIN NOTED PAIN PRSNT: CPT | Performed by: INTERNAL MEDICINE

## 2025-09-03 PROCEDURE — 99214 OFFICE O/P EST MOD 30 MIN: CPT | Performed by: INTERNAL MEDICINE

## 2025-09-03 PROCEDURE — 99214 OFFICE O/P EST MOD 30 MIN: CPT | Performed by: THORACIC SURGERY (CARDIOTHORACIC VASCULAR SURGERY)

## 2025-09-03 PROCEDURE — 4004F PT TOBACCO SCREEN RCVD TLK: CPT | Performed by: THORACIC SURGERY (CARDIOTHORACIC VASCULAR SURGERY)

## 2025-09-03 PROCEDURE — 3075F SYST BP GE 130 - 139MM HG: CPT | Performed by: THORACIC SURGERY (CARDIOTHORACIC VASCULAR SURGERY)

## 2025-09-03 PROCEDURE — 82728 ASSAY OF FERRITIN: CPT | Mod: PARLAB

## 2025-09-03 PROCEDURE — 71250 CT THORAX DX C-: CPT

## 2025-09-03 PROCEDURE — 36415 COLL VENOUS BLD VENIPUNCTURE: CPT

## 2025-09-03 PROCEDURE — 1111F DSCHRG MED/CURRENT MED MERGE: CPT | Performed by: THORACIC SURGERY (CARDIOTHORACIC VASCULAR SURGERY)

## 2025-09-03 PROCEDURE — 71250 CT THORAX DX C-: CPT | Performed by: RADIOLOGY

## 2025-09-03 PROCEDURE — 83550 IRON BINDING TEST: CPT

## 2025-09-03 PROCEDURE — 3074F SYST BP LT 130 MM HG: CPT | Performed by: INTERNAL MEDICINE

## 2025-09-03 PROCEDURE — 3079F DIAST BP 80-89 MM HG: CPT | Performed by: THORACIC SURGERY (CARDIOTHORACIC VASCULAR SURGERY)

## 2025-09-03 PROCEDURE — 85025 COMPLETE CBC W/AUTO DIFF WBC: CPT

## 2025-09-03 PROCEDURE — 1111F DSCHRG MED/CURRENT MED MERGE: CPT | Performed by: INTERNAL MEDICINE

## 2025-09-03 PROCEDURE — 4004F PT TOBACCO SCREEN RCVD TLK: CPT | Performed by: INTERNAL MEDICINE

## 2025-09-03 PROCEDURE — 99214 OFFICE O/P EST MOD 30 MIN: CPT | Mod: 25 | Performed by: THORACIC SURGERY (CARDIOTHORACIC VASCULAR SURGERY)

## 2025-09-03 PROCEDURE — 1126F AMNT PAIN NOTED NONE PRSNT: CPT | Performed by: THORACIC SURGERY (CARDIOTHORACIC VASCULAR SURGERY)

## 2025-09-03 PROCEDURE — 1159F MED LIST DOCD IN RCRD: CPT | Performed by: INTERNAL MEDICINE

## 2025-09-03 PROCEDURE — 3078F DIAST BP <80 MM HG: CPT | Performed by: INTERNAL MEDICINE

## 2025-09-03 PROCEDURE — 1159F MED LIST DOCD IN RCRD: CPT | Performed by: THORACIC SURGERY (CARDIOTHORACIC VASCULAR SURGERY)

## 2025-09-03 PROCEDURE — 80053 COMPREHEN METABOLIC PANEL: CPT

## 2025-09-03 RX ORDER — POTASSIUM CHLORIDE 20 MEQ/1
20 TABLET, EXTENDED RELEASE ORAL DAILY
Qty: 30 TABLET | Refills: 2 | Status: SHIPPED | OUTPATIENT
Start: 2025-09-03 | End: 2025-12-02

## 2025-09-03 ASSESSMENT — ENCOUNTER SYMPTOMS
DEPRESSION: 0
DEPRESSION: 0
LOSS OF SENSATION IN FEET: 1
OCCASIONAL FEELINGS OF UNSTEADINESS: 1
OCCASIONAL FEELINGS OF UNSTEADINESS: 1
LOSS OF SENSATION IN FEET: 1

## 2025-09-03 ASSESSMENT — PAIN SCALES - GENERAL
PAINLEVEL_OUTOF10: 7
PAINLEVEL_OUTOF10: 0-NO PAIN

## 2025-09-04 ENCOUNTER — APPOINTMENT (OUTPATIENT)
Dept: PRIMARY CARE | Facility: CLINIC | Age: 75
End: 2025-09-04
Payer: MEDICARE

## 2025-09-04 DIAGNOSIS — I10 HTN (HYPERTENSION), BENIGN: ICD-10-CM

## 2025-09-04 DIAGNOSIS — G45.9 TRANSIENT ISCHEMIC ATTACK: Primary | ICD-10-CM

## 2025-09-05 ENCOUNTER — APPOINTMENT (OUTPATIENT)
Dept: CARE COORDINATION | Age: 75
End: 2025-09-05
Payer: MEDICARE

## 2025-09-05 ENCOUNTER — HOME CARE VISIT (OUTPATIENT)
Dept: HOME HEALTH SERVICES | Facility: HOME HEALTH | Age: 75
End: 2025-09-05
Payer: MEDICARE

## 2025-09-05 ENCOUNTER — TELEMEDICINE (OUTPATIENT)
Dept: PHARMACY | Facility: HOSPITAL | Age: 75
End: 2025-09-05
Payer: MEDICARE

## 2025-09-05 ENCOUNTER — PATIENT OUTREACH (OUTPATIENT)
Dept: CARE COORDINATION | Age: 75
End: 2025-09-05

## 2025-09-05 VITALS — OXYGEN SATURATION: 98 %

## 2025-09-05 DIAGNOSIS — I10 HTN (HYPERTENSION), BENIGN: ICD-10-CM

## 2025-09-05 DIAGNOSIS — G45.9 TRANSIENT ISCHEMIC ATTACK: ICD-10-CM

## 2025-09-05 PROCEDURE — G0157 HHC PT ASSISTANT EA 15: HCPCS | Mod: HHH

## 2025-09-05 ASSESSMENT — ENCOUNTER SYMPTOMS
PAIN LOCATION: BACK
PERSON REPORTING PAIN: PATIENT
PAIN: 1
HIGHEST PAIN SEVERITY IN PAST 24 HOURS: 3/10

## 2025-09-08 ENCOUNTER — APPOINTMENT (OUTPATIENT)
Dept: NEUROLOGY | Facility: CLINIC | Age: 75
End: 2025-09-08
Payer: MEDICARE

## 2025-09-12 ENCOUNTER — APPOINTMENT (OUTPATIENT)
Dept: CARE COORDINATION | Age: 75
End: 2025-09-12
Payer: MEDICARE

## 2025-09-19 ENCOUNTER — APPOINTMENT (OUTPATIENT)
Dept: PRIMARY CARE | Facility: CLINIC | Age: 75
End: 2025-09-19
Payer: MEDICARE

## 2025-09-29 ENCOUNTER — APPOINTMENT (OUTPATIENT)
Dept: PRIMARY CARE | Facility: CLINIC | Age: 75
End: 2025-09-29
Payer: MEDICARE

## 2025-09-30 ENCOUNTER — APPOINTMENT (OUTPATIENT)
Dept: PRIMARY CARE | Facility: CLINIC | Age: 75
End: 2025-09-30
Payer: MEDICARE

## 2025-10-07 ENCOUNTER — APPOINTMENT (OUTPATIENT)
Dept: PRIMARY CARE | Facility: CLINIC | Age: 75
End: 2025-10-07
Payer: MEDICARE